# Patient Record
Sex: MALE | Race: WHITE | NOT HISPANIC OR LATINO | Employment: OTHER | URBAN - NONMETROPOLITAN AREA
[De-identification: names, ages, dates, MRNs, and addresses within clinical notes are randomized per-mention and may not be internally consistent; named-entity substitution may affect disease eponyms.]

---

## 2023-01-24 PROBLEM — E66.3 OVERWEIGHT WITH BODY MASS INDEX (BMI) OF 29 TO 29.9 IN ADULT: Status: ACTIVE | Noted: 2023-01-24

## 2023-01-24 PROBLEM — B35.1 MYCOTIC TOENAILS: Status: ACTIVE | Noted: 2023-01-24

## 2023-01-24 PROBLEM — E66.9 CLASS 1 OBESITY WITH BODY MASS INDEX (BMI) OF 30.0 TO 30.9 IN ADULT: Status: ACTIVE | Noted: 2023-01-24

## 2023-01-24 PROBLEM — J44.9 COPD (CHRONIC OBSTRUCTIVE PULMONARY DISEASE) (MULTI): Status: ACTIVE | Noted: 2023-01-24

## 2023-01-24 PROBLEM — F17.200 SMOKING: Status: ACTIVE | Noted: 2023-01-24

## 2023-01-24 PROBLEM — J01.10 ACUTE FRONTAL SINUSITIS: Status: ACTIVE | Noted: 2023-01-24

## 2023-01-24 PROBLEM — I25.10 CAD (CORONARY ARTERY DISEASE): Status: ACTIVE | Noted: 2023-01-24

## 2023-01-24 PROBLEM — R73.9 HYPERGLYCEMIA: Status: ACTIVE | Noted: 2023-01-24

## 2023-01-24 PROBLEM — I48.91 ATRIAL FIBRILLATION (MULTI): Status: ACTIVE | Noted: 2023-01-24

## 2023-01-24 PROBLEM — J01.00 ACUTE MAXILLARY SINUSITIS: Status: ACTIVE | Noted: 2023-01-24

## 2023-01-24 PROBLEM — D72.9 ABNORMAL WBC COUNT: Status: ACTIVE | Noted: 2023-01-24

## 2023-01-24 PROBLEM — I48.0 PAF (PAROXYSMAL ATRIAL FIBRILLATION) (MULTI): Status: ACTIVE | Noted: 2023-01-24

## 2023-01-24 PROBLEM — E66.811 CLASS 1 OBESITY WITH BODY MASS INDEX (BMI) OF 30.0 TO 30.9 IN ADULT: Status: ACTIVE | Noted: 2023-01-24

## 2023-01-24 PROBLEM — N40.0 BPH (BENIGN PROSTATIC HYPERPLASIA): Status: ACTIVE | Noted: 2023-01-24

## 2023-01-24 PROBLEM — E66.9 OBESITY (BMI 30.0-34.9): Status: ACTIVE | Noted: 2023-01-24

## 2023-01-24 PROBLEM — D64.9 LOW HEMOGLOBIN: Status: ACTIVE | Noted: 2023-01-24

## 2023-01-24 PROBLEM — I10 BENIGN ESSENTIAL HTN: Status: ACTIVE | Noted: 2023-01-24

## 2023-01-24 PROBLEM — U07.1 COVID-19: Status: ACTIVE | Noted: 2023-01-24

## 2023-01-24 PROBLEM — R20.2 NUMBNESS AND TINGLING OF BOTH LEGS: Status: ACTIVE | Noted: 2023-01-24

## 2023-01-24 PROBLEM — L60.0 INGROWING TOENAIL OF LEFT FOOT: Status: ACTIVE | Noted: 2023-01-24

## 2023-01-24 PROBLEM — I10 UNCONTROLLED HYPERTENSION: Status: ACTIVE | Noted: 2023-01-24

## 2023-01-24 PROBLEM — J32.9 SINUS INFECTION: Status: ACTIVE | Noted: 2023-01-24

## 2023-01-24 PROBLEM — E78.00 HYPERCHOLESTEROLEMIA: Status: ACTIVE | Noted: 2023-01-24

## 2023-01-24 PROBLEM — R05.9 COUGH: Status: ACTIVE | Noted: 2023-01-24

## 2023-01-24 PROBLEM — J20.9 ACUTE BRONCHITIS: Status: ACTIVE | Noted: 2023-01-24

## 2023-01-24 PROBLEM — R53.83 FATIGUE: Status: ACTIVE | Noted: 2023-01-24

## 2023-01-24 PROBLEM — E66.811 OBESITY (BMI 30.0-34.9): Status: ACTIVE | Noted: 2023-01-24

## 2023-01-24 PROBLEM — R07.81 RIB PAIN: Status: ACTIVE | Noted: 2023-01-24

## 2023-01-24 PROBLEM — R20.2 DISTAL PARESTHESIA: Status: ACTIVE | Noted: 2023-01-24

## 2023-01-24 PROBLEM — G62.9 PERIPHERAL NEUROPATHY: Status: ACTIVE | Noted: 2023-01-24

## 2023-01-24 PROBLEM — R42 DIZZINESS: Status: ACTIVE | Noted: 2023-01-24

## 2023-01-24 PROBLEM — R20.0 NUMBNESS AND TINGLING OF BOTH LEGS: Status: ACTIVE | Noted: 2023-01-24

## 2023-01-24 PROBLEM — K21.9 GASTROESOPHAGEAL REFLUX DISEASE WITHOUT ESOPHAGITIS: Status: ACTIVE | Noted: 2023-01-24

## 2023-01-24 PROBLEM — M79.672 LEFT FOOT PAIN: Status: ACTIVE | Noted: 2023-01-24

## 2023-01-24 RX ORDER — LISINOPRIL 20 MG/1
1 TABLET ORAL DAILY
COMMUNITY
Start: 2016-06-17 | End: 2023-04-06 | Stop reason: SDUPTHER

## 2023-01-24 RX ORDER — ATORVASTATIN CALCIUM 40 MG/1
1 TABLET, FILM COATED ORAL DAILY
COMMUNITY
Start: 2016-05-17 | End: 2023-04-04 | Stop reason: SDUPTHER

## 2023-01-24 RX ORDER — TAMSULOSIN HYDROCHLORIDE 0.4 MG/1
1 CAPSULE ORAL DAILY
COMMUNITY
Start: 2016-10-27 | End: 2023-04-06 | Stop reason: SDUPTHER

## 2023-01-24 RX ORDER — CLOPIDOGREL BISULFATE 75 MG/1
1 TABLET ORAL DAILY
COMMUNITY
Start: 2021-03-01 | End: 2023-04-06 | Stop reason: SDUPTHER

## 2023-01-24 RX ORDER — FUROSEMIDE 20 MG/1
1 TABLET ORAL DAILY
COMMUNITY
Start: 2019-03-28 | End: 2023-07-19 | Stop reason: SDUPTHER

## 2023-01-24 RX ORDER — METOPROLOL SUCCINATE 25 MG/1
1 TABLET, EXTENDED RELEASE ORAL DAILY
COMMUNITY
Start: 2017-11-08 | End: 2023-04-06

## 2023-03-07 ENCOUNTER — APPOINTMENT (OUTPATIENT)
Dept: PRIMARY CARE | Facility: CLINIC | Age: 88
End: 2023-03-07
Payer: MEDICARE

## 2023-03-13 ENCOUNTER — TELEPHONE (OUTPATIENT)
Dept: PRIMARY CARE | Facility: CLINIC | Age: 88
End: 2023-03-13
Payer: MEDICARE

## 2023-03-13 NOTE — TELEPHONE ENCOUNTER
Pt was discharged from the hospital on xarelto 20mg, but was never told to discontinue plavix.  Daughter gave the plavis this morning and wants to know if she should hold his xarelto tonight??

## 2023-03-22 ENCOUNTER — OFFICE VISIT (OUTPATIENT)
Dept: PRIMARY CARE | Facility: CLINIC | Age: 88
End: 2023-03-22
Payer: MEDICARE

## 2023-03-22 VITALS
SYSTOLIC BLOOD PRESSURE: 111 MMHG | BODY MASS INDEX: 27.52 KG/M2 | OXYGEN SATURATION: 93 % | WEIGHT: 196.6 LBS | RESPIRATION RATE: 18 BRPM | HEIGHT: 71 IN | HEART RATE: 78 BPM | DIASTOLIC BLOOD PRESSURE: 70 MMHG

## 2023-03-22 DIAGNOSIS — I48.91 ATRIAL FIBRILLATION, UNSPECIFIED TYPE (MULTI): ICD-10-CM

## 2023-03-22 DIAGNOSIS — N40.0 BENIGN PROSTATIC HYPERPLASIA, UNSPECIFIED WHETHER LOWER URINARY TRACT SYMPTOMS PRESENT: ICD-10-CM

## 2023-03-22 DIAGNOSIS — E78.00 HYPERCHOLESTEROLEMIA: ICD-10-CM

## 2023-03-22 DIAGNOSIS — I25.10 CORONARY ARTERY DISEASE INVOLVING NATIVE HEART, UNSPECIFIED VESSEL OR LESION TYPE, UNSPECIFIED WHETHER ANGINA PRESENT: ICD-10-CM

## 2023-03-22 DIAGNOSIS — J44.9 CHRONIC OBSTRUCTIVE PULMONARY DISEASE, UNSPECIFIED COPD TYPE (MULTI): ICD-10-CM

## 2023-03-22 DIAGNOSIS — R30.0 DYSURIA: ICD-10-CM

## 2023-03-22 DIAGNOSIS — I10 BENIGN ESSENTIAL HTN: ICD-10-CM

## 2023-03-22 PROCEDURE — 1036F TOBACCO NON-USER: CPT | Performed by: INTERNAL MEDICINE

## 2023-03-22 PROCEDURE — 1159F MED LIST DOCD IN RCRD: CPT | Performed by: INTERNAL MEDICINE

## 2023-03-22 PROCEDURE — 3078F DIAST BP <80 MM HG: CPT | Performed by: INTERNAL MEDICINE

## 2023-03-22 PROCEDURE — 99213 OFFICE O/P EST LOW 20 MIN: CPT | Performed by: INTERNAL MEDICINE

## 2023-03-22 PROCEDURE — 3074F SYST BP LT 130 MM HG: CPT | Performed by: INTERNAL MEDICINE

## 2023-03-22 RX ORDER — METOPROLOL SUCCINATE 50 MG/1
50 TABLET, EXTENDED RELEASE ORAL DAILY
COMMUNITY
End: 2023-04-06 | Stop reason: SDUPTHER

## 2023-03-22 RX ORDER — PANTOPRAZOLE SODIUM 40 MG/1
40 TABLET, DELAYED RELEASE ORAL
COMMUNITY
End: 2023-04-06 | Stop reason: SDUPTHER

## 2023-03-22 ASSESSMENT — PAIN SCALES - GENERAL: PAINLEVEL: 0-NO PAIN

## 2023-03-22 NOTE — PATIENT INSTRUCTIONS
Continue taking your medications as prescribed  Follow up with cardiology as discussed  Follow up in four months  Please drop off urine specimen when you are able so we can check for infection

## 2023-03-22 NOTE — PROGRESS NOTES
Subjective Pt. States that he is doing ok, has been feeling weak and his breathing is still an issue at times. He states that he feels like he has phlegm in bottom of his lung, and he has difficulty coughing it out. He states his appetite is not as good as it had been prior to being hospitalized. He states that he had nuclear stress test yesterday, has follow up on next Tuesday. T hHe states that he is not having diarrhea, but he is having dysuria, and noticed blood in his urine. He states that it feels similar to when he had kidney stone in the past, but he denies any flank or back pain.     KENNEDY Iqbal is a 87 y.o. male with PMH remarkable for HTN, HLD, COPD, BPH who presents to the office today for a check up. He was recently hospitalized from 03/07/23--> 03/12/23 for PAFIb, acute respiratory failure with hypoxia, alcohol abuse, CAP, COPD. CT scan of chest in ER showed no PE, but did show bilateral PNA, most pronounced laterally and posteriorly in the lower lobe. He received IV Ceftriaxone, Azithromycin, Solu-Medrol, was placed on supplemental oxygen and was discharged to home on 02 with activity and at night. Per hospital notes, his AFIB had been rate controlled on tele, but he was noted to have a HR in the 150's overnight during his hospitalization.  He had 2D Echo 3/8: LVSF mildly decreased with a 40% EFl with pseudonormal pattern of LVDF. CVP is elevated. Cardiology was consulted, recommend outpatient follow-up for perfusion stress test and to discuss possible Watchman procedure. He was place on [facility] protocol. He had diarrhea, stool test was negative for cdiff. He was discharged to home on 03/12/23.    Review of Systems  Constitutional: No fever, chills, anorexia or weight loss  Eye: No blurry vision, diplopia or vision loss  ENT: No nasal congestion, earache or sore throat  Respiratory: + cough, congestion, No SOB, hemoptysis or wheezing  Cardiac: No chest pain or palpitations, dyspnea on exertion or  "orthopnea  Gastrointestinal: No abdominal pain, nausea, vomiting, diarrhea, melena, hemoptysis or hematochezia.  Genitourinary: No dysuria, hematuria, frequency, urgency, incontinence or flank pain  Musculoskeletal: No arthralgia, myalgia, stiffness, weakness  Neurological: No dizziness, lightheadness, blurry vision, headache or syncope  Psychiatric: No history of anxiety, depression, hallucinations, suicidal/homicidal ideation  Skin: No rash or pruritus, ulcers  Endocrine: No heat or cold intolerance, polyuria, polydipsia  Hematologic: No bruising, petechiae    Objective   Vitals:    03/22/23 1121   BP: 111/70   Pulse: 78   Resp: 18   SpO2: 93%   Weight: 89.2 kg (196 lb 9.6 oz)   Height: 1.803 m (5' 11\")       Physical Exam  General: Alert and oriented x3, well nourished, no acute distress.  Eye: PERRL, EOMI, normal conjunctiva.  HENT: Normocephalic, clear tympanic membranes, moist oral mucosa, no sinus tenderness.  Neck: Supple, non-tender, no carotid bruits, no JVD, no lymphadenopathy.  Lungs: diffuse rhonchi, mostly CTA,  non-labored respiration.  Heart: Normal rate, regular rhythm, no murmur, gallop or edema.  Abdomen: Soft, non-tender, non-distended, normal bowel sounds, no masses.  Musculoskeletal: Normal range of motion and strength, no tenderness or swelling.  Skin: Skin is warm, dry and pink, no rashes or lesions.  Neurologic: Alert & Oriented x3, intact senses, motor, response and reflexes ok, normal strength, CN II-XII intact.  Psychiatric: Cooperative, appropriate mood and affect.    Assessment/Plan   Dysuria with hematuria  - will check urine for UA/C+S, pt. Will drop specimen off as he is unable to urinate at this time    Recent hospitalization for acute hypoxic respiratory failure 2/2 CAP/COPD  - he has been completely weaned off oxygen, and his daughter has been monitoring his SP02 at home and it has been good  - states his breathing is improved, still has occasional cough, but has been walking " around a lot and feels like he is getting better every day    Paroxysmal AFIB  - he had nuclear stress test yesterday, awaiting results  - follow up with cardiology  - c/w Metoprolol Succinate 50mg daily, dose increased during recent hospitalization  - c/w Xarelto for stroke prevention    HTN/CAD/Chronic systolic HF  - c/w current Plavix, ASA, Metoprolol, Lasix, Lisinopril    HLD  - c/w statin    BPH  - c/w Flomax    Follow up in four months  ----------------------------------    Written by  Ritika Cerrato acting as a scribe for Dr. Hubbard. This note accurately reflects the work and decisions made by Dr. Hubbard.     I, Dr. Hubbard, attest all medical record entries made by the scribe were under my direction and were personally dictated by me. I have reviewed the chart and agree that the record accurately reflects my performance of the history, physical exam, and assessment and plan.

## 2023-03-23 ENCOUNTER — LAB (OUTPATIENT)
Dept: LAB | Facility: LAB | Age: 88
End: 2023-03-23
Payer: MEDICARE

## 2023-03-23 DIAGNOSIS — R30.0 DYSURIA: ICD-10-CM

## 2023-03-23 LAB
APPEARANCE, URINE: ABNORMAL
BILIRUBIN, URINE: NEGATIVE
BLOOD, URINE: NEGATIVE
COLOR, URINE: YELLOW
GLUCOSE, URINE: NEGATIVE MG/DL
KETONES, URINE: NEGATIVE MG/DL
LEUKOCYTE ESTERASE, URINE: NEGATIVE
NITRITE, URINE: NEGATIVE
PH, URINE: 6 (ref 5–8)
PROTEIN, URINE: NEGATIVE MG/DL
SPECIFIC GRAVITY, URINE: 1.02 (ref 1–1.03)
UROBILINOGEN, URINE: 2 MG/DL (ref 0–1.9)

## 2023-03-23 PROCEDURE — 81003 URINALYSIS AUTO W/O SCOPE: CPT

## 2023-03-23 PROCEDURE — 87186 SC STD MICRODIL/AGAR DIL: CPT

## 2023-03-23 PROCEDURE — 87077 CULTURE AEROBIC IDENTIFY: CPT

## 2023-03-23 PROCEDURE — 87086 URINE CULTURE/COLONY COUNT: CPT

## 2023-03-27 ENCOUNTER — TELEPHONE (OUTPATIENT)
Dept: PRIMARY CARE | Facility: CLINIC | Age: 88
End: 2023-03-27
Payer: MEDICARE

## 2023-03-27 DIAGNOSIS — N39.0 URINARY TRACT INFECTION WITHOUT HEMATURIA, SITE UNSPECIFIED: Primary | ICD-10-CM

## 2023-03-27 LAB — URINE CULTURE: ABNORMAL

## 2023-03-27 RX ORDER — NITROFURANTOIN 25; 75 MG/1; MG/1
100 CAPSULE ORAL 2 TIMES DAILY
Qty: 14 CAPSULE | Refills: 0 | Status: SHIPPED | OUTPATIENT
Start: 2023-03-27 | End: 2023-04-03

## 2023-04-04 DIAGNOSIS — E78.00 HYPERCHOLESTEROLEMIA: ICD-10-CM

## 2023-04-04 RX ORDER — ATORVASTATIN CALCIUM 40 MG/1
40 TABLET, FILM COATED ORAL DAILY
Qty: 30 TABLET | Refills: 0 | Status: SHIPPED | OUTPATIENT
Start: 2023-04-04 | End: 2023-04-06 | Stop reason: SDUPTHER

## 2023-04-06 DIAGNOSIS — K21.9 CHRONIC GERD: ICD-10-CM

## 2023-04-06 DIAGNOSIS — I25.10 CORONARY ARTERY DISEASE, UNSPECIFIED VESSEL OR LESION TYPE, UNSPECIFIED WHETHER ANGINA PRESENT, UNSPECIFIED WHETHER NATIVE OR TRANSPLANTED HEART: ICD-10-CM

## 2023-04-06 DIAGNOSIS — E78.00 HYPERCHOLESTEROLEMIA: ICD-10-CM

## 2023-04-06 DIAGNOSIS — N40.1 BENIGN PROSTATIC HYPERPLASIA WITH LOWER URINARY TRACT SYMPTOMS, SYMPTOM DETAILS UNSPECIFIED: ICD-10-CM

## 2023-04-06 DIAGNOSIS — I10 BENIGN HYPERTENSION: Primary | ICD-10-CM

## 2023-04-07 RX ORDER — TAMSULOSIN HYDROCHLORIDE 0.4 MG/1
0.4 CAPSULE ORAL DAILY
Qty: 90 CAPSULE | Refills: 0 | Status: SHIPPED | OUTPATIENT
Start: 2023-04-07 | End: 2023-07-19 | Stop reason: SDUPTHER

## 2023-04-07 RX ORDER — PANTOPRAZOLE SODIUM 40 MG/1
40 TABLET, DELAYED RELEASE ORAL
Qty: 90 TABLET | Refills: 0 | Status: SHIPPED | OUTPATIENT
Start: 2023-04-07 | End: 2023-07-19 | Stop reason: SDUPTHER

## 2023-04-07 RX ORDER — METOPROLOL SUCCINATE 50 MG/1
50 TABLET, EXTENDED RELEASE ORAL DAILY
Qty: 90 TABLET | Refills: 0 | Status: SHIPPED | OUTPATIENT
Start: 2023-04-07 | End: 2023-07-19 | Stop reason: SDUPTHER

## 2023-04-07 RX ORDER — LISINOPRIL 20 MG/1
20 TABLET ORAL DAILY
Qty: 90 TABLET | Refills: 0 | Status: SHIPPED | OUTPATIENT
Start: 2023-04-07 | End: 2023-07-19 | Stop reason: SDUPTHER

## 2023-04-07 RX ORDER — ATORVASTATIN CALCIUM 40 MG/1
40 TABLET, FILM COATED ORAL DAILY
Qty: 90 TABLET | Refills: 0 | Status: SHIPPED | OUTPATIENT
Start: 2023-04-07 | End: 2023-05-31 | Stop reason: SDUPTHER

## 2023-04-07 RX ORDER — CLOPIDOGREL BISULFATE 75 MG/1
TABLET ORAL
Qty: 90 TABLET | Refills: 0 | Status: SHIPPED | OUTPATIENT
Start: 2023-04-07 | End: 2023-07-19 | Stop reason: SDUPTHER

## 2023-05-31 DIAGNOSIS — E78.00 HYPERCHOLESTEROLEMIA: ICD-10-CM

## 2023-05-31 RX ORDER — ATORVASTATIN CALCIUM 40 MG/1
40 TABLET, FILM COATED ORAL DAILY
Qty: 90 TABLET | Refills: 0 | Status: SHIPPED | OUTPATIENT
Start: 2023-05-31 | End: 2023-07-19 | Stop reason: SDUPTHER

## 2023-07-19 ENCOUNTER — OFFICE VISIT (OUTPATIENT)
Dept: PRIMARY CARE | Facility: CLINIC | Age: 88
End: 2023-07-19
Payer: MEDICARE

## 2023-07-19 VITALS
RESPIRATION RATE: 18 BRPM | BODY MASS INDEX: 28.59 KG/M2 | SYSTOLIC BLOOD PRESSURE: 112 MMHG | HEIGHT: 71 IN | OXYGEN SATURATION: 95 % | HEART RATE: 79 BPM | DIASTOLIC BLOOD PRESSURE: 78 MMHG | WEIGHT: 204.2 LBS

## 2023-07-19 DIAGNOSIS — I10 BENIGN HYPERTENSION: ICD-10-CM

## 2023-07-19 DIAGNOSIS — I48.91 ATRIAL FIBRILLATION, UNSPECIFIED TYPE (MULTI): ICD-10-CM

## 2023-07-19 DIAGNOSIS — E01.0 THYROMEGALY: ICD-10-CM

## 2023-07-19 DIAGNOSIS — K21.9 CHRONIC GERD: ICD-10-CM

## 2023-07-19 DIAGNOSIS — I48.0 PAF (PAROXYSMAL ATRIAL FIBRILLATION) (MULTI): ICD-10-CM

## 2023-07-19 DIAGNOSIS — E78.5 HYPERLIPIDEMIA, UNSPECIFIED HYPERLIPIDEMIA TYPE: ICD-10-CM

## 2023-07-19 DIAGNOSIS — K21.9 GASTROESOPHAGEAL REFLUX DISEASE WITHOUT ESOPHAGITIS: ICD-10-CM

## 2023-07-19 DIAGNOSIS — I10 BENIGN ESSENTIAL HTN: ICD-10-CM

## 2023-07-19 DIAGNOSIS — N40.1 BENIGN PROSTATIC HYPERPLASIA WITH LOWER URINARY TRACT SYMPTOMS, SYMPTOM DETAILS UNSPECIFIED: ICD-10-CM

## 2023-07-19 DIAGNOSIS — N40.1 BENIGN PROSTATIC HYPERPLASIA WITH URINARY FREQUENCY: ICD-10-CM

## 2023-07-19 DIAGNOSIS — I25.10 CORONARY ARTERY DISEASE, UNSPECIFIED VESSEL OR LESION TYPE, UNSPECIFIED WHETHER ANGINA PRESENT, UNSPECIFIED WHETHER NATIVE OR TRANSPLANTED HEART: ICD-10-CM

## 2023-07-19 DIAGNOSIS — E78.00 HYPERCHOLESTEROLEMIA: ICD-10-CM

## 2023-07-19 DIAGNOSIS — Z00.00 MEDICARE ANNUAL WELLNESS VISIT, SUBSEQUENT: ICD-10-CM

## 2023-07-19 DIAGNOSIS — R35.0 BENIGN PROSTATIC HYPERPLASIA WITH URINARY FREQUENCY: ICD-10-CM

## 2023-07-19 DIAGNOSIS — J44.9 CHRONIC OBSTRUCTIVE PULMONARY DISEASE, UNSPECIFIED COPD TYPE (MULTI): ICD-10-CM

## 2023-07-19 PROBLEM — R53.1 GENERALIZED WEAKNESS: Status: ACTIVE | Noted: 2023-01-24

## 2023-07-19 PROBLEM — R26.2 DIFFICULTY WALKING: Status: ACTIVE | Noted: 2023-07-19

## 2023-07-19 PROCEDURE — 3074F SYST BP LT 130 MM HG: CPT | Performed by: INTERNAL MEDICINE

## 2023-07-19 PROCEDURE — 1126F AMNT PAIN NOTED NONE PRSNT: CPT | Performed by: INTERNAL MEDICINE

## 2023-07-19 PROCEDURE — 1160F RVW MEDS BY RX/DR IN RCRD: CPT | Performed by: INTERNAL MEDICINE

## 2023-07-19 PROCEDURE — 3078F DIAST BP <80 MM HG: CPT | Performed by: INTERNAL MEDICINE

## 2023-07-19 PROCEDURE — 1170F FXNL STATUS ASSESSED: CPT | Performed by: INTERNAL MEDICINE

## 2023-07-19 PROCEDURE — 99214 OFFICE O/P EST MOD 30 MIN: CPT | Performed by: INTERNAL MEDICINE

## 2023-07-19 PROCEDURE — G0439 PPPS, SUBSEQ VISIT: HCPCS | Performed by: INTERNAL MEDICINE

## 2023-07-19 PROCEDURE — 1159F MED LIST DOCD IN RCRD: CPT | Performed by: INTERNAL MEDICINE

## 2023-07-19 RX ORDER — CLOPIDOGREL BISULFATE 75 MG/1
TABLET ORAL
Qty: 90 TABLET | Refills: 0 | Status: SHIPPED | OUTPATIENT
Start: 2023-07-19 | End: 2023-11-22 | Stop reason: SDUPTHER

## 2023-07-19 RX ORDER — ATORVASTATIN CALCIUM 40 MG/1
40 TABLET, FILM COATED ORAL DAILY
Qty: 90 TABLET | Refills: 0 | Status: SHIPPED | OUTPATIENT
Start: 2023-07-19 | End: 2023-09-25 | Stop reason: SDUPTHER

## 2023-07-19 RX ORDER — FUROSEMIDE 20 MG/1
20 TABLET ORAL DAILY
Qty: 90 TABLET | Refills: 0 | Status: SHIPPED | OUTPATIENT
Start: 2023-07-19 | End: 2023-11-22 | Stop reason: SDUPTHER

## 2023-07-19 RX ORDER — LISINOPRIL 20 MG/1
20 TABLET ORAL DAILY
Qty: 90 TABLET | Refills: 0 | Status: SHIPPED | OUTPATIENT
Start: 2023-07-19 | End: 2023-11-22 | Stop reason: SDUPTHER

## 2023-07-19 RX ORDER — PANTOPRAZOLE SODIUM 40 MG/1
40 TABLET, DELAYED RELEASE ORAL
Qty: 90 TABLET | Refills: 0 | Status: SHIPPED | OUTPATIENT
Start: 2023-07-19 | End: 2023-11-22 | Stop reason: SDUPTHER

## 2023-07-19 RX ORDER — TAMSULOSIN HYDROCHLORIDE 0.4 MG/1
0.4 CAPSULE ORAL DAILY
Qty: 90 CAPSULE | Refills: 0 | Status: SHIPPED | OUTPATIENT
Start: 2023-07-19 | End: 2023-11-22 | Stop reason: SDUPTHER

## 2023-07-19 RX ORDER — METOPROLOL SUCCINATE 50 MG/1
50 TABLET, EXTENDED RELEASE ORAL DAILY
Qty: 90 TABLET | Refills: 0 | Status: SHIPPED | OUTPATIENT
Start: 2023-07-19 | End: 2023-11-22 | Stop reason: SDUPTHER

## 2023-07-19 ASSESSMENT — ACTIVITIES OF DAILY LIVING (ADL)
TAKING_MEDICATION: INDEPENDENT
GROCERY_SHOPPING: INDEPENDENT
BATHING: INDEPENDENT
MANAGING_FINANCES: INDEPENDENT
DOING_HOUSEWORK: INDEPENDENT
DRESSING: INDEPENDENT

## 2023-07-19 ASSESSMENT — PATIENT HEALTH QUESTIONNAIRE - PHQ9
1. LITTLE INTEREST OR PLEASURE IN DOING THINGS: NOT AT ALL
SUM OF ALL RESPONSES TO PHQ9 QUESTIONS 1 AND 2: 0
2. FEELING DOWN, DEPRESSED OR HOPELESS: NOT AT ALL

## 2023-07-19 ASSESSMENT — PAIN SCALES - GENERAL: PAINLEVEL: 0-NO PAIN

## 2023-07-19 NOTE — LETTER
Chico Iqbal  1935      To whom it concerns,    Chico Iqbal is cleared to have his oxygen discontinued. Please feel free to contact my office with any questions or concerns at [phone].        Thank you,      Dr. Erick Baldwin.

## 2023-07-19 NOTE — PROGRESS NOTES
"SUBJECTIVE: He states that he has been feeling good, he has been very active in his garden. He states he gets winded when working outside in the heat. He states that he has always had good lungs. He states that he urinates 3-4 times per night, takes his Flomax as prescribed. He states he has a lot of sinus drainage at night, feels like it is \"choking him\" at night. He states that he had recent carotid US and was told to report there was abnormality of his thyroid. He has BLE edema, takes his water pills as prescribed. He has compression stocking at home, but he does not wear them very often.     HPI: Chico Iqbal is a 87 y.o. male with PMH remarkable for HTN, HLD, COPD, BPH  who presents to the office today for Medicare Wellness exam. He was last seen in the office on 03/22/23 for check up, had complaint of dysuria with hematuria at that time, culture was positive for multi-drug resistant organism Enterococcus faecium, treated with Nitrofurantoin at that time.     HEALTH MAINTENANCE:  Smoking: Smokes cigars  Labs: 3/12/23  PSA: 3/8/22 WNL  Colonoscopy (45-75):   Lung cancer (55-80 + 30 pack year + smoking/quit in last 15 years): CT chest with IV contrast on 03/07/23 revealed: No CT evidence of pulmonary embolism in the current exam. Evidence of prior granulomatous disease as described. Bilateral pneumonia as described, most pronounced laterally and posteriorly in the lower lobes. Mild nonspecific mediastinal and bilateral hilar adenopathy, perhaps infectious or inflammatory. Mild bronchial wall thickening, predominantly in the lower lobes. Suspect mild bronchitis. Left thyroid lobe nodule is nonspecific. Consider further evaluation  with thyroid ultrasound. Advanced three-vessel bilateral coronary artery calcifications.  DEXA (65+, q 2 years): 5/22/17 osteopenia    SOCIAL HISTORY:  Social History     Tobacco Use    Smoking status: Former     Types: Cigars    Smokeless tobacco: Never    Tobacco comments:     JUST QUIT " "ABOUT 2 WEEKS AGO WHEN HE GOT SICK    Substance Use Topics    Alcohol use: Yes     Comment: OCCASIONAL, 2-3 BEERS DAILY BUT NONE IN LAST MONTH    Drug use: Never       IMMUNIZATIONS:  Immunization History   Administered Date(s) Administered    Pfizer Purple Cap SARS-CoV-2 03/12/2021, 04/04/2021, 12/06/2021     REVIEW OF SYSTEMS:  Review of Systems  12 point review of systems negative unless stated above in HPI    ALLERGIES:  No Known Allergies     VITAL SIGNS:  Vitals:    07/19/23 1336   BP: 112/78   Pulse: 79   Resp: 18   SpO2: 95%   Weight: 92.6 kg (204 lb 3.2 oz)   Height: 1.803 m (5' 11\")     PHYSICAL EXAM:  General: Alert and oriented x3, well nourished, no acute distress.  Eye: PERRL, EOMI, normal conjunctiva.  HENT: Normocephalic, clear tympanic membranes, moist oral mucosa, no sinus tenderness.  Neck: Supple, non-tender, no carotid bruits, no JVD, no lymphadenopathy. Thyromegaly is present  Lungs: Clear to auscultation, non-labored respiration.  Heart: Normal rate, regular rhythm, no murmur, gallop or edema.  Abdomen: Soft, non-tender, non-distended, normal bowel sounds, no masses.  Musculoskeletal: Normal range of motion and strength, no tenderness or swelling.  Skin: Skin is warm, dry and pink, no rashes or lesions.  Neurologic: Alert & Oriented x3, intact senses, motor, response and reflexes ok, normal strength, CN II-XII intact.  Psychiatric: Cooperative, appropriate mood and affect.    MEDICATIONS:  Current Outpatient Medications on File Prior to Visit   Medication Sig Dispense Refill    atorvastatin (Lipitor) 40 mg tablet Take 1 tablet (40 mg) by mouth once daily. 90 tablet 0    clopidogrel (Plavix) 75 mg tablet Take one tablet by mouth daily 90 tablet 0    furosemide (Lasix) 20 mg tablet Take 1 tablet (20 mg) by mouth once daily.      lisinopril 20 mg tablet Take 1 tablet (20 mg) by mouth once daily. 90 tablet 0    metoprolol succinate XL (Toprol-XL) 50 mg 24 hr tablet Take 1 tablet (50 mg) by mouth " once daily. Do not crush or chew. 90 tablet 0    nirmatrelvir-ritonavir 300 mg (150 mg x 2)-100 mg tablets,dose pack take 2 nirmatrelvir and 1 ritonavir  by mouth twice daily for 5 days      pantoprazole (ProtoNix) 40 mg EC tablet Take 1 tablet (40 mg) by mouth once daily in the morning. Take before meals. Do not crush, chew, or split. 90 tablet 0    rivaroxaban (Xarelto) 20 mg tablet Take 1 tablet (20 mg) by mouth once daily in the evening. Take with meals. Take with food.      tamsulosin (Flomax) 0.4 mg 24 hr capsule Take 1 capsule (0.4 mg) by mouth once daily. 90 capsule 0     No current facility-administered medications on file prior to visit.      LABORATORY DATA:  Lab Results   Component Value Date    WBC 4.3 (L) 03/12/2023    HGB 11.5 (L) 03/12/2023    HCT 33.9 (L) 03/12/2023     03/12/2023    CHOL 111 09/02/2021    TRIG 148 09/02/2021    HDL 38.0 (A) 09/02/2021    ALT 12 03/07/2023    AST 25 03/07/2023     03/12/2023    K 3.4 (L) 03/12/2023     03/12/2023    CREATININE 0.85 03/12/2023    BUN 21 03/12/2023    CO2 28 03/12/2023    TSH 1.73 06/29/2020    INR 1.2 (H) 03/07/2023    HGBA1C 6.1 (A) 03/08/2022     ASSESSMENT AND PLAN:  Health Maintenance: Annual Medicare wellness visit  - reviewed most recent bloodwork from 3/12/23   - he has not had lipid panel, PSA, TSH for over a year  - will check PSA, lipid panel, TSH and free T4 due to thyroid nodule  - he had thyroid nodule noted on recent carotid US, will get US of thyroid for further evaluation  - he is refusing a cologuard test or colonoscopy at this point  - Follow up in four months    COPD  - has been stable  - he had oxygen ordered after hospitalization but has not needed it since March  - ok to discontinue oxygen      Paroxysmal AFIB/HTN/CAD/Chronic systolic HF  - c/w Metoprolol  Succinate 50mg daily  - c/w Xarelto for stroke prevention  - c/w current Plavix, ASA,  Lasix, Lisinopril  - he does have some swelling of BLE's, advised to  wear compression stockings during the day  - continue to follow up with cardiology  - stress test on 03/21/23 showed no chest discomfort or ischemic EKG changes with pharmacological stress, normal left ventricular systolic function, no perfusion evidence of scar, or focal ischemia  - 2D echo on 03/09/23 revealed: 1. Left ventricular systolic function is mildly decreased with a 40% estimated ejection fraction.  2. Multiple segmental abnormalities exist. See findings.3. Spectral Doppler shows a pseudonormal pattern of left ventricular diastolic filling.4. There is mildly reduced right ventricular systolic function.5. The left atrium is mild to moderately dilated.  6. There is global and segmental LV hypokinesis. 7. CVP is elevated.  -Bilateral Carotid US on 06/26/23 showed 20-49% carotid stenosis bilaterally    HLD  - c/w statin  - will check lipid panel     BPH  - he denies any increased urinary symptoms, states he does have urinary frequency, but it is not any worse  - c/w Flomax  - will check PSA    GERD  - stable  - c/w PPI     --------------------  Written by Ritika Cerrato LPN, acting as a scribe for Dr. Hubbard. This note accurately reflects the work and decisions made by Dr. Hubbard.     I, Dr. Hubbard, attest all medical record entries made by the scribe were under my direction and were personally dictated by me. I have reviewed the chart and agree that the record accurately reflects my performance of the history, physical exam, and assessment and plan.

## 2023-07-20 NOTE — PATIENT INSTRUCTIONS
It was great to see you in the office today! Here is what we discussed at your visit today:  Please continue to take your current medications   Please get bloodwork drawn as soon as you are able. We will call you with results.  Try to wear your compression stockings during the day to help with the swelling in your legs  We ordered an ultrasound of your thryoid, please schedule it as soon as you are able  Follow up in four months

## 2023-07-24 ENCOUNTER — LAB (OUTPATIENT)
Dept: LAB | Facility: LAB | Age: 88
End: 2023-07-24
Payer: MEDICARE

## 2023-07-24 DIAGNOSIS — N40.1 BENIGN PROSTATIC HYPERPLASIA WITH URINARY FREQUENCY: ICD-10-CM

## 2023-07-24 DIAGNOSIS — R35.0 BENIGN PROSTATIC HYPERPLASIA WITH URINARY FREQUENCY: ICD-10-CM

## 2023-07-24 DIAGNOSIS — E01.0 THYROMEGALY: ICD-10-CM

## 2023-07-24 DIAGNOSIS — E78.5 HYPERLIPIDEMIA, UNSPECIFIED HYPERLIPIDEMIA TYPE: ICD-10-CM

## 2023-07-24 LAB
CHOLESTEROL (MG/DL) IN SER/PLAS: 102 MG/DL (ref 0–199)
CHOLESTEROL IN HDL (MG/DL) IN SER/PLAS: 28.8 MG/DL
CHOLESTEROL/HDL RATIO: 3.5
LDL: 50 MG/DL (ref 0–99)
PROSTATE SPECIFIC ANTIGEN,SCREEN: 0.87 NG/ML (ref 0–4)
THYROTROPIN (MIU/L) IN SER/PLAS BY DETECTION LIMIT <= 0.05 MIU/L: 1.2 MIU/L (ref 0.44–3.98)
THYROXINE (T4) FREE (NG/DL) IN SER/PLAS: 1.09 NG/DL (ref 0.61–1.12)
TRIGLYCERIDE (MG/DL) IN SER/PLAS: 114 MG/DL (ref 0–149)
VLDL: 23 MG/DL (ref 0–40)

## 2023-07-24 PROCEDURE — 84439 ASSAY OF FREE THYROXINE: CPT

## 2023-07-24 PROCEDURE — 36415 COLL VENOUS BLD VENIPUNCTURE: CPT

## 2023-07-24 PROCEDURE — 80061 LIPID PANEL: CPT

## 2023-07-24 PROCEDURE — 84153 ASSAY OF PSA TOTAL: CPT

## 2023-07-24 PROCEDURE — 84443 ASSAY THYROID STIM HORMONE: CPT

## 2023-09-25 DIAGNOSIS — E78.00 HYPERCHOLESTEROLEMIA: ICD-10-CM

## 2023-09-25 RX ORDER — ATORVASTATIN CALCIUM 40 MG/1
40 TABLET, FILM COATED ORAL DAILY
Qty: 90 TABLET | Refills: 0 | Status: SHIPPED | OUTPATIENT
Start: 2023-09-25 | End: 2023-11-22 | Stop reason: SDUPTHER

## 2023-11-22 ENCOUNTER — OFFICE VISIT (OUTPATIENT)
Dept: PRIMARY CARE | Facility: CLINIC | Age: 88
End: 2023-11-22
Payer: MEDICARE

## 2023-11-22 VITALS
SYSTOLIC BLOOD PRESSURE: 109 MMHG | OXYGEN SATURATION: 96 % | HEIGHT: 71 IN | HEART RATE: 78 BPM | RESPIRATION RATE: 22 BRPM | DIASTOLIC BLOOD PRESSURE: 69 MMHG | BODY MASS INDEX: 28.56 KG/M2 | WEIGHT: 204 LBS

## 2023-11-22 DIAGNOSIS — J44.9 CHRONIC OBSTRUCTIVE PULMONARY DISEASE, UNSPECIFIED COPD TYPE (MULTI): ICD-10-CM

## 2023-11-22 DIAGNOSIS — I25.10 CORONARY ARTERY DISEASE, UNSPECIFIED VESSEL OR LESION TYPE, UNSPECIFIED WHETHER ANGINA PRESENT, UNSPECIFIED WHETHER NATIVE OR TRANSPLANTED HEART: ICD-10-CM

## 2023-11-22 DIAGNOSIS — K21.9 CHRONIC GERD: ICD-10-CM

## 2023-11-22 DIAGNOSIS — R31.9 HEMATURIA, UNSPECIFIED TYPE: ICD-10-CM

## 2023-11-22 DIAGNOSIS — N40.1 BENIGN PROSTATIC HYPERPLASIA WITH LOWER URINARY TRACT SYMPTOMS, SYMPTOM DETAILS UNSPECIFIED: ICD-10-CM

## 2023-11-22 DIAGNOSIS — E78.00 HYPERCHOLESTEROLEMIA: ICD-10-CM

## 2023-11-22 DIAGNOSIS — I10 BENIGN HYPERTENSION: ICD-10-CM

## 2023-11-22 DIAGNOSIS — I48.91 ATRIAL FIBRILLATION, UNSPECIFIED TYPE (MULTI): ICD-10-CM

## 2023-11-22 DIAGNOSIS — I10 BENIGN ESSENTIAL HTN: ICD-10-CM

## 2023-11-22 PROCEDURE — 3074F SYST BP LT 130 MM HG: CPT | Performed by: INTERNAL MEDICINE

## 2023-11-22 PROCEDURE — 99214 OFFICE O/P EST MOD 30 MIN: CPT | Performed by: INTERNAL MEDICINE

## 2023-11-22 PROCEDURE — 3078F DIAST BP <80 MM HG: CPT | Performed by: INTERNAL MEDICINE

## 2023-11-22 PROCEDURE — 1160F RVW MEDS BY RX/DR IN RCRD: CPT | Performed by: INTERNAL MEDICINE

## 2023-11-22 PROCEDURE — 1159F MED LIST DOCD IN RCRD: CPT | Performed by: INTERNAL MEDICINE

## 2023-11-22 PROCEDURE — 1126F AMNT PAIN NOTED NONE PRSNT: CPT | Performed by: INTERNAL MEDICINE

## 2023-11-22 RX ORDER — PANTOPRAZOLE SODIUM 40 MG/1
40 TABLET, DELAYED RELEASE ORAL
Qty: 90 TABLET | Refills: 0 | Status: SHIPPED | OUTPATIENT
Start: 2023-11-22 | End: 2024-03-25 | Stop reason: SDUPTHER

## 2023-11-22 RX ORDER — TAMSULOSIN HYDROCHLORIDE 0.4 MG/1
0.4 CAPSULE ORAL DAILY
Qty: 90 CAPSULE | Refills: 0 | Status: SHIPPED | OUTPATIENT
Start: 2023-11-22 | End: 2024-03-25 | Stop reason: SDUPTHER

## 2023-11-22 RX ORDER — FUROSEMIDE 20 MG/1
20 TABLET ORAL DAILY
Qty: 90 TABLET | Refills: 0 | Status: SHIPPED | OUTPATIENT
Start: 2023-11-22 | End: 2024-04-03 | Stop reason: SDUPTHER

## 2023-11-22 RX ORDER — TADALAFIL 20 MG/1
TABLET ORAL
COMMUNITY
Start: 2023-09-11

## 2023-11-22 RX ORDER — FLUTICASONE PROPIONATE 50 MCG
SPRAY, SUSPENSION (ML) NASAL
COMMUNITY
Start: 2023-10-27

## 2023-11-22 RX ORDER — LISINOPRIL 20 MG/1
20 TABLET ORAL DAILY
Qty: 90 TABLET | Refills: 0 | Status: SHIPPED | OUTPATIENT
Start: 2023-11-22 | End: 2024-04-03 | Stop reason: SDUPTHER

## 2023-11-22 RX ORDER — CLOPIDOGREL BISULFATE 75 MG/1
TABLET ORAL
Qty: 90 TABLET | Refills: 0 | Status: SHIPPED | OUTPATIENT
Start: 2023-11-22 | End: 2024-04-03 | Stop reason: SDUPTHER

## 2023-11-22 RX ORDER — METOPROLOL SUCCINATE 50 MG/1
50 TABLET, EXTENDED RELEASE ORAL DAILY
Qty: 90 TABLET | Refills: 0 | Status: SHIPPED | OUTPATIENT
Start: 2023-11-22 | End: 2024-03-25 | Stop reason: SDUPTHER

## 2023-11-22 RX ORDER — ATORVASTATIN CALCIUM 40 MG/1
40 TABLET, FILM COATED ORAL DAILY
Qty: 90 TABLET | Refills: 0 | Status: SHIPPED | OUTPATIENT
Start: 2023-11-22 | End: 2024-01-30 | Stop reason: SDUPTHER

## 2023-11-22 ASSESSMENT — PAIN SCALES - GENERAL: PAINLEVEL: 0-NO PAIN

## 2023-11-22 ASSESSMENT — ENCOUNTER SYMPTOMS
RESPIRATORY NEGATIVE: 1
PSYCHIATRIC NEGATIVE: 1
PALPITATIONS: 1
NEUROLOGICAL NEGATIVE: 1
CONSTITUTIONAL NEGATIVE: 1
BACK PAIN: 1
GASTROINTESTINAL NEGATIVE: 1

## 2023-11-22 ASSESSMENT — PATIENT HEALTH QUESTIONNAIRE - PHQ9
SUM OF ALL RESPONSES TO PHQ9 QUESTIONS 1 AND 2: 0
2. FEELING DOWN, DEPRESSED OR HOPELESS: NOT AT ALL
1. LITTLE INTEREST OR PLEASURE IN DOING THINGS: NOT AT ALL

## 2023-11-22 NOTE — PROGRESS NOTES
Patient ID: He states that he has been doing ok. He states he has been eating ok. He has been sleeping ok. He denies any constipation. Denies any change in color of stool or urine. Denies any abnormal bleeding or bruising. He states that sometimes he feels like his heart is racing/fluttering, but it goes away quickly. He states that he uses a urinal at night so he does not have to get up out of bed several times to urinate and sometimes his urine is red. He states he does have some slight back pain at times, but he states he feels it is because he sits too much. He wears his compression stockings, sometimes his BLE's swell up.     HPI: Chico Iqbal is a 88 y.o. male with PMH remarkable for HTN, HLD, COPD, BPH  who presents to the office today for follow up.     HEALTH MAINTENANCE: Follow Up  Smoking: Smokes cigars  Labs: 7/24/23  PSA: 7/24/23 WNL  Colonoscopy (45-75):   Lung cancer (55-80 + 30 pack year + smoking/quit in last 15 years): CT chest with IV contrast on 03/07/23 revealed: No CT evidence of pulmonary embolism in the current exam. Evidence of prior granulomatous disease as described. Bilateral pneumonia as described, most pronounced laterally and posteriorly in the lower lobes. Mild nonspecific mediastinal and bilateral hilar adenopathy, perhaps infectious or inflammatory. Mild bronchial wall thickening, predominantly in the lower lobes. Suspect mild bronchitis. Left thyroid lobe nodule is nonspecific. Consider further evaluation  with thyroid ultrasound. Advanced three-vessel bilateral coronary artery calcifications.  DEXA (65+, q 2 years): 5/22/17 osteopenia    SOCIAL HISTORY:  Social History     Tobacco Use    Smoking status: Some Days     Types: Cigars    Smokeless tobacco: Never   Substance Use Topics    Alcohol use: Yes     Comment: OCCASIONAL, 2-3 BEERS DAILY    Drug use: Never     IMMUNIZATIONS:  Immunization History   Administered Date(s) Administered    Influenza, High Dose Seasonal, Preservative  Free 03/09/2023    Pfizer Purple Cap SARS-CoV-2 03/12/2021, 04/04/2021, 12/06/2021    Pneumococcal polysaccharide vaccine, 23-valent, age 2 years and older (PNEUMOVAX 23) 03/09/2023     REVIEW OF SYSTEMS:  Review of Systems   Constitutional: Negative.    Respiratory: Negative.     Cardiovascular:  Positive for palpitations.   Gastrointestinal: Negative.    Genitourinary: Negative.    Musculoskeletal:  Positive for back pain.   Neurological: Negative.    Psychiatric/Behavioral: Negative.       ALLERGIES:  No Known Allergies     VITAL SIGNS:  Vitals:    11/22/23 1416   BP: 109/69   Pulse: 78   Resp: 22   SpO2: 96%     PHYSICAL EXAM:  Physical Exam  Vitals reviewed.   Constitutional:       Appearance: Normal appearance.   HENT:      Head: Normocephalic and atraumatic.   Cardiovascular:      Rate and Rhythm: Normal rate and regular rhythm.      Pulses: Normal pulses.      Heart sounds: Normal heart sounds.   Pulmonary:      Effort: Pulmonary effort is normal.      Breath sounds: Normal breath sounds.   Abdominal:      General: Bowel sounds are normal.      Palpations: Abdomen is soft.   Musculoskeletal:         General: Normal range of motion.   Neurological:      General: No focal deficit present.      Mental Status: He is alert and oriented to person, place, and time.   Psychiatric:         Mood and Affect: Mood normal.         Behavior: Behavior normal.         MEDICATIONS:  Current Outpatient Medications on File Prior to Visit   Medication Sig Dispense Refill    atorvastatin (Lipitor) 40 mg tablet Take 1 tablet (40 mg) by mouth once daily. 90 tablet 0    clopidogrel (Plavix) 75 mg tablet Take one tablet by mouth daily 90 tablet 0    fluticasone (Flonase) 50 mcg/actuation nasal spray USE 1 SPRAY IN EACH NOSTRIL ONCE A DAY AS NEEDED FOR ALLERGIES      furosemide (Lasix) 20 mg tablet Take 1 tablet (20 mg) by mouth once daily. 90 tablet 0    lisinopril 20 mg tablet Take 1 tablet (20 mg) by mouth once daily. 90 tablet 0     metoprolol succinate XL (Toprol-XL) 50 mg 24 hr tablet Take 1 tablet (50 mg) by mouth once daily. Do not crush or chew. 90 tablet 0    pantoprazole (ProtoNix) 40 mg EC tablet Take 1 tablet (40 mg) by mouth once daily in the morning. Take before meals. Do not crush, chew, or split. 90 tablet 0    rivaroxaban (Xarelto) 20 mg tablet Take 1 tablet (20 mg) by mouth once daily in the evening. Take with meals. Take with food. 90 tablet 0    tadalafil 20 mg tablet TAKE 1 TABLET BY MOUTH EVERY 36 HOURS PRIOR TO INTERCOURSE; EFFECT MAY LAST 36 HOURS. TAKE NO MORE THAN 1 PER DAY.      tamsulosin (Flomax) 0.4 mg 24 hr capsule Take 1 capsule (0.4 mg) by mouth once daily. 90 capsule 0    fish oil concentrate (Omega-3) 120-180 mg capsule Take 1 capsule (1 g) by mouth once daily.       No current facility-administered medications on file prior to visit.        LABORATORY DATA:  Lab Results   Component Value Date    WBC 4.3 (L) 03/12/2023    HGB 11.5 (L) 03/12/2023    HCT 33.9 (L) 03/12/2023     03/12/2023    CHOL 102 07/24/2023    TRIG 114 07/24/2023    HDL 28.8 (A) 07/24/2023    ALT 12 03/07/2023    AST 25 03/07/2023     03/12/2023    K 3.4 (L) 03/12/2023     03/12/2023    CREATININE 0.85 03/12/2023    BUN 21 03/12/2023    CO2 28 03/12/2023    TSH 1.20 07/24/2023    INR 1.2 (H) 03/07/2023    HGBA1C 6.1 (A) 03/08/2022       ASSESSMENT AND PLAN:  Health Maintenance: Annual Medicare wellness visit  - reviewed most recent bloodwork from 07/24/23  - he is refusing a cologuard test or colonoscopy at this point  - he states he uses a urinal at night so that he does not have to get up to go to BR and his urine appears red sometimes, he also has complaint of low back pain and reports history of kidney stones  - will check urine for hematuria with urinalysis, patient unable to urinate today, advised to follow up in lab  - Follow up in four months     COPD  - has been stable  - we discontinued oxygen on previous  exam    Paroxysmal AFIB/HTN/CAD/Chronic systolic HF  - BP today is good, 109/69  - c/w Metoprolol  Succinate 50mg daily  - c/w Xarelto for stroke prevention  - c/w current Plavix, ASA,  Lasix, Lisinopril, lipid lowering agent  - BLE edema stable, he is compliant with wearing bilateral compression stockings  - continue to follow up with cardiology  - stress test on 03/21/23 showed no chest discomfort or ischemic EKG changes with pharmacological stress, normal left ventricular systolic function, no perfusion evidence of scar, or focal ischemia  - 2D echo on 03/09/23 revealed: 1. Left ventricular systolic function is mildly decreased with a 40% estimated ejection fraction.  2. Multiple segmental abnormalities exist. See findings.3. Spectral Doppler shows a pseudonormal pattern of left ventricular diastolic filling.4. There is mildly reduced right ventricular systolic function.5. The left atrium is mild to moderately dilated 6. There is global and segmental LV hypokinesis. 7. CVP is elevated.  -Bilateral Carotid US on 06/26/23 showed 20-49% carotid stenosis bilaterally     HLD  - c/w statin  - lipid panel on 07/24/23 WNL other than low HDL     BPH  - he denies any increased urinary symptoms, states he does have urinary frequency, but it is not any worse  - c/w Flomax  - PSA on 07/24/23 was normal     GERD  - stable  - c/w PPI  --------------------  Written by Ritika Cerrato LPN, acting as a scribe for Dr. Hubbard. This note accurately reflects the work and decisions made by Dr. Hubbard.     I, Dr. Hubbard, attest all medical record entries made by the scribe were under my direction and were personally dictated by me. I have reviewed the chart and agree that the record accurately reflects my performance of the history, physical exam, and assessment and plan.

## 2023-11-23 NOTE — PATIENT INSTRUCTIONS
It was great to see you in the office today! Here is what we discussed at your visit today:  Please continue to take your current medications   As discussed please drop off urine specimen as soon as you are able due to the discoloration of your urine  Follow up in four months

## 2024-01-30 DIAGNOSIS — E78.00 HYPERCHOLESTEROLEMIA: ICD-10-CM

## 2024-01-30 RX ORDER — ATORVASTATIN CALCIUM 40 MG/1
40 TABLET, FILM COATED ORAL DAILY
Qty: 90 TABLET | Refills: 0 | Status: SHIPPED | OUTPATIENT
Start: 2024-01-30 | End: 2024-05-14 | Stop reason: SDUPTHER

## 2024-03-05 DIAGNOSIS — I25.10 CORONARY ARTERY DISEASE, UNSPECIFIED VESSEL OR LESION TYPE, UNSPECIFIED WHETHER ANGINA PRESENT, UNSPECIFIED WHETHER NATIVE OR TRANSPLANTED HEART: ICD-10-CM

## 2024-03-25 DIAGNOSIS — N40.1 BENIGN PROSTATIC HYPERPLASIA WITH LOWER URINARY TRACT SYMPTOMS, SYMPTOM DETAILS UNSPECIFIED: ICD-10-CM

## 2024-03-25 DIAGNOSIS — K21.9 CHRONIC GERD: ICD-10-CM

## 2024-03-25 DIAGNOSIS — I10 BENIGN HYPERTENSION: ICD-10-CM

## 2024-03-25 RX ORDER — PANTOPRAZOLE SODIUM 40 MG/1
40 TABLET, DELAYED RELEASE ORAL
Qty: 90 TABLET | Refills: 0 | Status: SHIPPED | OUTPATIENT
Start: 2024-03-25

## 2024-03-25 RX ORDER — TAMSULOSIN HYDROCHLORIDE 0.4 MG/1
0.4 CAPSULE ORAL DAILY
Qty: 90 CAPSULE | Refills: 0 | Status: SHIPPED | OUTPATIENT
Start: 2024-03-25

## 2024-03-25 RX ORDER — METOPROLOL SUCCINATE 50 MG/1
50 TABLET, EXTENDED RELEASE ORAL DAILY
Qty: 90 TABLET | Refills: 0 | Status: SHIPPED | OUTPATIENT
Start: 2024-03-25

## 2024-03-28 ENCOUNTER — APPOINTMENT (OUTPATIENT)
Dept: PRIMARY CARE | Facility: CLINIC | Age: 89
End: 2024-03-28
Payer: MEDICARE

## 2024-04-03 ENCOUNTER — OFFICE VISIT (OUTPATIENT)
Dept: PRIMARY CARE | Facility: CLINIC | Age: 89
End: 2024-04-03
Payer: MEDICARE

## 2024-04-03 VITALS
RESPIRATION RATE: 20 BRPM | DIASTOLIC BLOOD PRESSURE: 67 MMHG | BODY MASS INDEX: 27.41 KG/M2 | SYSTOLIC BLOOD PRESSURE: 120 MMHG | HEART RATE: 86 BPM | OXYGEN SATURATION: 95 % | WEIGHT: 195.8 LBS | HEIGHT: 71 IN

## 2024-04-03 DIAGNOSIS — I10 BENIGN HYPERTENSION: ICD-10-CM

## 2024-04-03 DIAGNOSIS — I10 BENIGN ESSENTIAL HTN: ICD-10-CM

## 2024-04-03 DIAGNOSIS — I11.0 HYPERTENSIVE HEART DISEASE WITH HEART FAILURE (MULTI): ICD-10-CM

## 2024-04-03 DIAGNOSIS — R31.9 HEMATURIA, UNSPECIFIED TYPE: ICD-10-CM

## 2024-04-03 DIAGNOSIS — N40.0 BENIGN PROSTATIC HYPERPLASIA, UNSPECIFIED WHETHER LOWER URINARY TRACT SYMPTOMS PRESENT: ICD-10-CM

## 2024-04-03 DIAGNOSIS — J32.9 CHRONIC SINUSITIS, UNSPECIFIED LOCATION: ICD-10-CM

## 2024-04-03 DIAGNOSIS — I48.0 PAF (PAROXYSMAL ATRIAL FIBRILLATION) (MULTI): ICD-10-CM

## 2024-04-03 DIAGNOSIS — J96.01 ACUTE RESPIRATORY FAILURE WITH HYPOXIA (MULTI): Primary | ICD-10-CM

## 2024-04-03 DIAGNOSIS — Z00.00 HEALTHCARE MAINTENANCE: ICD-10-CM

## 2024-04-03 DIAGNOSIS — E78.00 HYPERCHOLESTEROLEMIA: ICD-10-CM

## 2024-04-03 DIAGNOSIS — J44.9 CHRONIC OBSTRUCTIVE PULMONARY DISEASE, UNSPECIFIED COPD TYPE (MULTI): ICD-10-CM

## 2024-04-03 DIAGNOSIS — K21.9 GASTROESOPHAGEAL REFLUX DISEASE WITHOUT ESOPHAGITIS: ICD-10-CM

## 2024-04-03 DIAGNOSIS — I25.10 CORONARY ARTERY DISEASE, UNSPECIFIED VESSEL OR LESION TYPE, UNSPECIFIED WHETHER ANGINA PRESENT, UNSPECIFIED WHETHER NATIVE OR TRANSPLANTED HEART: ICD-10-CM

## 2024-04-03 LAB
POC APPEARANCE, URINE: CLEAR
POC BILIRUBIN, URINE: NEGATIVE
POC BLOOD, URINE: ABNORMAL
POC COLOR, URINE: YELLOW
POC GLUCOSE, URINE: NEGATIVE MG/DL
POC KETONES, URINE: NEGATIVE MG/DL
POC LEUKOCYTES, URINE: ABNORMAL
POC NITRITE,URINE: NEGATIVE
POC PH, URINE: 7 PH
POC PROTEIN, URINE: NEGATIVE MG/DL
POC SPECIFIC GRAVITY, URINE: 1.02
POC UROBILINOGEN, URINE: 1 EU/DL

## 2024-04-03 PROCEDURE — 81003 URINALYSIS AUTO W/O SCOPE: CPT | Performed by: INTERNAL MEDICINE

## 2024-04-03 PROCEDURE — 1159F MED LIST DOCD IN RCRD: CPT | Performed by: INTERNAL MEDICINE

## 2024-04-03 PROCEDURE — 1158F ADVNC CARE PLAN TLK DOCD: CPT | Performed by: INTERNAL MEDICINE

## 2024-04-03 PROCEDURE — 1126F AMNT PAIN NOTED NONE PRSNT: CPT | Performed by: INTERNAL MEDICINE

## 2024-04-03 PROCEDURE — 87086 URINE CULTURE/COLONY COUNT: CPT

## 2024-04-03 PROCEDURE — 3078F DIAST BP <80 MM HG: CPT | Performed by: INTERNAL MEDICINE

## 2024-04-03 PROCEDURE — 1160F RVW MEDS BY RX/DR IN RCRD: CPT | Performed by: INTERNAL MEDICINE

## 2024-04-03 PROCEDURE — 1123F ACP DISCUSS/DSCN MKR DOCD: CPT | Performed by: INTERNAL MEDICINE

## 2024-04-03 PROCEDURE — 3074F SYST BP LT 130 MM HG: CPT | Performed by: INTERNAL MEDICINE

## 2024-04-03 PROCEDURE — 99213 OFFICE O/P EST LOW 20 MIN: CPT | Performed by: INTERNAL MEDICINE

## 2024-04-03 RX ORDER — LISINOPRIL 20 MG/1
20 TABLET ORAL DAILY
Qty: 90 TABLET | Refills: 0 | Status: SHIPPED | OUTPATIENT
Start: 2024-04-03

## 2024-04-03 RX ORDER — DOXYCYCLINE 100 MG/1
100 CAPSULE ORAL 2 TIMES DAILY
Qty: 28 CAPSULE | Refills: 0 | Status: SHIPPED | OUTPATIENT
Start: 2024-04-03 | End: 2024-04-17

## 2024-04-03 RX ORDER — FUROSEMIDE 20 MG/1
20 TABLET ORAL DAILY
Qty: 90 TABLET | Refills: 0 | Status: SHIPPED | OUTPATIENT
Start: 2024-04-03

## 2024-04-03 RX ORDER — CLOPIDOGREL BISULFATE 75 MG/1
TABLET ORAL
Qty: 90 TABLET | Refills: 0 | Status: SHIPPED | OUTPATIENT
Start: 2024-04-03

## 2024-04-03 ASSESSMENT — ENCOUNTER SYMPTOMS
MUSCULOSKELETAL NEGATIVE: 1
CONSTITUTIONAL NEGATIVE: 1
NEUROLOGICAL NEGATIVE: 1
HEMATOLOGIC/LYMPHATIC NEGATIVE: 1
RESPIRATORY NEGATIVE: 1
PSYCHIATRIC NEGATIVE: 1
GASTROINTESTINAL NEGATIVE: 1
CARDIOVASCULAR NEGATIVE: 1
HEMATURIA: 1

## 2024-04-03 ASSESSMENT — PATIENT HEALTH QUESTIONNAIRE - PHQ9
2. FEELING DOWN, DEPRESSED OR HOPELESS: NOT AT ALL
1. LITTLE INTEREST OR PLEASURE IN DOING THINGS: NOT AT ALL
SUM OF ALL RESPONSES TO PHQ9 QUESTIONS 1 AND 2: 0

## 2024-04-03 ASSESSMENT — PAIN SCALES - GENERAL: PAINLEVEL: 0-NO PAIN

## 2024-04-03 NOTE — PATIENT INSTRUCTIONS
It was great to see you in the office today! Here is what we discussed at your visit today:  Please continue to take your current medications   We have sent in a prescription for Doxycycline for your sinus symptoms. Take as directed  Use Flonase as discussed, 1 spray each nostril twice daily  Follow up in four months

## 2024-04-04 LAB — BACTERIA UR CULT: NORMAL

## 2024-05-06 ENCOUNTER — OFFICE VISIT (OUTPATIENT)
Dept: PRIMARY CARE | Facility: CLINIC | Age: 89
End: 2024-05-06
Payer: MEDICARE

## 2024-05-06 VITALS
OXYGEN SATURATION: 97 % | WEIGHT: 187 LBS | DIASTOLIC BLOOD PRESSURE: 75 MMHG | HEART RATE: 86 BPM | BODY MASS INDEX: 26.18 KG/M2 | HEIGHT: 71 IN | SYSTOLIC BLOOD PRESSURE: 117 MMHG | RESPIRATION RATE: 20 BRPM

## 2024-05-06 DIAGNOSIS — N30.01 ACUTE CYSTITIS WITH HEMATURIA: Primary | ICD-10-CM

## 2024-05-06 DIAGNOSIS — K59.01 SLOW TRANSIT CONSTIPATION: ICD-10-CM

## 2024-05-06 DIAGNOSIS — R39.9 UTI SYMPTOMS: ICD-10-CM

## 2024-05-06 DIAGNOSIS — R30.9 URINARY PAIN: ICD-10-CM

## 2024-05-06 DIAGNOSIS — K59.1 FUNCTIONAL DIARRHEA: ICD-10-CM

## 2024-05-06 LAB
POC APPEARANCE, URINE: ABNORMAL
POC BILIRUBIN, URINE: ABNORMAL
POC BLOOD, URINE: ABNORMAL
POC COLOR, URINE: ABNORMAL
POC GLUCOSE, URINE: NEGATIVE MG/DL
POC KETONES, URINE: NEGATIVE MG/DL
POC LEUKOCYTES, URINE: ABNORMAL
POC NITRITE,URINE: NEGATIVE
POC PH, URINE: 5.5 PH
POC PROTEIN, URINE: ABNORMAL MG/DL
POC SPECIFIC GRAVITY, URINE: 1.02
POC UROBILINOGEN, URINE: 1 EU/DL

## 2024-05-06 PROCEDURE — 3074F SYST BP LT 130 MM HG: CPT

## 2024-05-06 PROCEDURE — 1123F ACP DISCUSS/DSCN MKR DOCD: CPT

## 2024-05-06 PROCEDURE — 1125F AMNT PAIN NOTED PAIN PRSNT: CPT

## 2024-05-06 PROCEDURE — 87186 SC STD MICRODIL/AGAR DIL: CPT

## 2024-05-06 PROCEDURE — 1159F MED LIST DOCD IN RCRD: CPT

## 2024-05-06 PROCEDURE — 1160F RVW MEDS BY RX/DR IN RCRD: CPT

## 2024-05-06 PROCEDURE — 87086 URINE CULTURE/COLONY COUNT: CPT

## 2024-05-06 PROCEDURE — 3078F DIAST BP <80 MM HG: CPT

## 2024-05-06 PROCEDURE — 81002 URINALYSIS NONAUTO W/O SCOPE: CPT

## 2024-05-06 PROCEDURE — 99213 OFFICE O/P EST LOW 20 MIN: CPT

## 2024-05-06 RX ORDER — AMOXICILLIN 250 MG
1 CAPSULE ORAL DAILY
Qty: 7 TABLET | Refills: 0 | Status: SHIPPED | OUTPATIENT
Start: 2024-05-06 | End: 2024-05-13

## 2024-05-06 RX ORDER — NITROFURANTOIN 25; 75 MG/1; MG/1
100 CAPSULE ORAL 2 TIMES DAILY
Qty: 14 CAPSULE | Refills: 0 | Status: SHIPPED | OUTPATIENT
Start: 2024-05-06 | End: 2024-05-13

## 2024-05-06 RX ORDER — DOCUSATE SODIUM 100 MG/1
100 CAPSULE, LIQUID FILLED ORAL 2 TIMES DAILY
Qty: 60 CAPSULE | Refills: 0 | Status: SHIPPED | OUTPATIENT
Start: 2024-05-06

## 2024-05-06 ASSESSMENT — ENCOUNTER SYMPTOMS
DIZZINESS: 0
FLANK PAIN: 0
HEMATURIA: 1
SHORTNESS OF BREATH: 0
FEVER: 0
FATIGUE: 0
VOMITING: 0
BLOOD IN STOOL: 0
CHILLS: 0
ABDOMINAL PAIN: 1
DIARRHEA: 1
HEADACHES: 0
NAUSEA: 0
DYSURIA: 1
CONSTIPATION: 1

## 2024-05-06 ASSESSMENT — PAIN SCALES - GENERAL: PAINLEVEL: 9

## 2024-05-06 NOTE — PATIENT INSTRUCTIONS
For constipation, we recommend that you start taking…  Stool Softener: Colace (docusate sodium) 100mg two times a day  Natural Laxative: Senna (Sennoside, senokot or senna glycoside) 8.6mg one time a day (usually at night)  These are both over the counter and can be found at many stores.   Call the office with any questions / concerns.     OT Current Status-Daily Note


Subjective


Pt denies pain only itching. RN notified post treatment.





Appearance


Pt remained supine in bed, all needs within reach at OT departure.





Mental Status/Objective


Patient Orientation:  Person, Place, Situation


Attachments:  IV





ADL-Treatment


Therapy Code Descriptions/Definitions 





Functional [place] Measure:


0=Not Assessed/NA        4=Minimal Assistance


1=Total Assistance        5=Supervision or Setup


2=Maximal Assistance  6=Modified [place]


3=Moderate Assistance 7=Complete IndependenceSCALE: Activities may be completed 

with or without assistive devices.





6-Indepedent-patient completes the activity by him/herself with no assistance 

from a helper.


5-Set-up or Clean-up Assistance-helper sets up or cleans up; patient completes 

activity. [place] assists only prior to or  


    following the activity.


4-Supervision or Touching Assistance-helper provides verbal cues and/or 

touching/steadying and/or contact guard assistance as patient completes 

activity. Assistance may be provided   


    throughout the activity or intermittently.


3-Partial/Moderate Assistance-helper does LESS THAN HALF the effort. [place] 

lifts, holds or supports trunk or limbs, but provides less than half the effort.


2-Substantial/Maximal Assistance-helper does MORE THAN HALF the effort. [place] 

lifts or holds trunk or limbs and provides more than half the effort.


3-Denixqeeh-rsdstj does ALL the effort. Patient does none of the effort to 

complete the activity. Or, the assistance of 2 or more helpers is required for 

the patient to complete the  


    activity.


If activity was not attempted, code reason:


7-Patient Refused.


9-Not Applicable-not attempted and the patient did not perform the activity 

before the current illness, exacerbation or injury.


10-Not Attempted due to Environmental Limitations-(lack of equipment, weather 

restraints, etc.).


88-Not Attempted due to Medical Conditions or Safety Concerns.


Shower/Bathe Self (QC):  3


Pt supine in bed at OT arrival. Reports itchy skin under abodminal pannus. Skin 

inspected, redness and odor present. Pt able to reach area to wash but required 

mod a to ensure thorough cleaning. Pt able to lift stomach as OT washed under 

pannus and mya area. RN notified that pt may need powder/cream to keep from 

getting worse. Pt declined sitting EOB or even adjusting self in bed as he was 

positioned very close to Edge. OT educated pt on continuing to move during the 

day in order to maintain strength as well as relieve pressure on bottom. Pt 

verbalizes agreement but does not appear as if he will comply.





Education


OT Patient Education:  Correct positioning, Disease process, Progress toward 

Goal/Update tx plan, Purpose of tx/functional activities, Safety issues


Teaching Recipient:  Patient


Teaching Methods:  Discussion


Response to Teaching:  Verbalize Understanding, Reinforcement Needed





OT Long Term Goals


Long Term Goals


Time Frame:  Jan 11, 2022


Toileting Hygiene (QC):  4


Shower/Bathe Self (QC):  4


Upper Body Dressing (QC):  5


Lower Body Dressing (QC):  4


On/Off Footwear (QC):  4


Transfer to/from w/c with CGA-MIn A.


1=Demonstrate adherence to instructed precautions during ADL tasks.


2=Patient will verbalize/demonstrate understanding of assistive devices/gabriela

fications for ADL.


3=Patient will improve strength/tolerance for activity to enable patient to 

perform ADL's.





OT Education/Plan


Problem List/Assessment


Assessment:  Decreased Activ Tolerance, Decreased Safety Aware, Decreased UE 

Strength, Impaired I ADL's, Impaired Self-Care Skills





Discharge Recommendations


Plan/Recommendations:  Continue POC





Treatment Plan/Plan of Care


Treatment,Training & Education:  Yes


Patient would benefit from OT for education, treatment and training to promote 

independence in ADL's, mobility, safety and/or upper extremity function for 

ADL's.


Plan of Care:  ADL Retraining, Functional Mobility, UE Funct Exercise/Act, W/C 

Management Training


Treatment Duration:  Jan 11, 2022


Frequency:  3 times per week


Estimated Hrs Per Day:  .25 hour per day


Rehab Potential:  Poor





Time/GCodes


Start Time:  08:46


Stop Time:  08:57


Total Time Billed (hr/min):  11


Billed Treatment Time


1 visit


ADL











Yaritza Grace OT                Dec 22, 2021 09:29

## 2024-05-06 NOTE — PROGRESS NOTES
"Subjective   Patient ID: Chico Iqbal is a 88 y.o. male who presents for Abdominal Pain (Increased fluid, no output, shooting pain, back pain, urinating a little at a time, burning, feels like a kidney stone/).    HPI   88 y.o. male with PMH remarkable for HTN, HLD, COPD, BPH who presents to the office today for abdominal pain and decreased urine output.     Dysuria:  - x 1 week, States when he wakes up in the morning it is reddish in color, has pain when urinating. Mentions he has history of kidney stones and is drinking a lot of water. Last formed bowel movement was 1 week ago. Has taken Milk of magnesia some times to help with constipation. States bowel movement yesterday was watery, diarrhea. Denies any fevers, chills, N/V, flank pain.     Review of Systems   Constitutional:  Negative for chills, fatigue and fever.   Respiratory:  Negative for shortness of breath.    Cardiovascular:  Negative for chest pain.   Gastrointestinal:  Positive for abdominal pain, constipation and diarrhea. Negative for blood in stool, nausea and vomiting.   Genitourinary:  Positive for decreased urine volume, dysuria, hematuria and penile pain. Negative for flank pain.   Neurological:  Negative for dizziness and headaches.   All other systems reviewed and are negative.      Objective   /75   Pulse 86   Resp 20   Ht 1.803 m (5' 11\")   Wt 84.8 kg (187 lb)   SpO2 97%   BMI 26.08 kg/m²     Physical Exam  Vitals reviewed.   Constitutional:       Appearance: Normal appearance.   HENT:      Head: Normocephalic and atraumatic.   Eyes:      Extraocular Movements: Extraocular movements intact.      Conjunctiva/sclera: Conjunctivae normal.   Cardiovascular:      Rate and Rhythm: Normal rate and regular rhythm.      Pulses: Normal pulses.      Heart sounds: Normal heart sounds.   Pulmonary:      Effort: Pulmonary effort is normal.      Breath sounds: Normal breath sounds.   Abdominal:      General: Bowel sounds are normal.      " Palpations: There is no mass.      Tenderness: There is abdominal tenderness in the right upper quadrant and left upper quadrant. There is guarding. There is no right CVA tenderness, left CVA tenderness or rebound.      Comments: Tenderness to palpation of the upper abdomen.   Musculoskeletal:      Cervical back: Normal range of motion and neck supple.   Neurological:      General: No focal deficit present.      Mental Status: He is alert and oriented to person, place, and time.   Psychiatric:         Mood and Affect: Mood normal.         Behavior: Behavior normal.         Thought Content: Thought content normal.         Judgment: Judgment normal.         Assessment/Plan   Problem List Items Addressed This Visit             ICD-10-CM    Acute cystitis with hematuria - Primary N30.01    Relevant Medications    nitrofurantoin, macrocrystal-monohydrate, (Macrobid) 100 mg capsule    Slow transit constipation K59.01    Relevant Medications    docusate sodium (Colace) 100 mg capsule    sennosides-docusate sodium (Connie-Colace) 8.6-50 mg tablet    Functional diarrhea K59.1     Other Visit Diagnoses         Codes    Urinary pain     R30.9    Relevant Orders    POCT UA (nonautomated) manually resulted (Completed)    UTI symptoms     R39.9    Relevant Orders    Urine Culture          - POCT UA shows infection. Will treat for UTI. Will follow up with culture results.   - Patient has not had regular bowel movement in 1 week. Will treat for constipation with overflow diarrhea. Recommend colace and senna.  - Discussed importance of hydration and regular bowel movements.   - Patient understands seeking care at ER with worsening symptoms.   - Follow up in 3-5 days if no improvement in symptoms.

## 2024-05-09 DIAGNOSIS — N30.01 ACUTE CYSTITIS WITH HEMATURIA: Primary | ICD-10-CM

## 2024-05-09 LAB — BACTERIA UR CULT: ABNORMAL

## 2024-05-09 RX ORDER — SULFAMETHOXAZOLE AND TRIMETHOPRIM 800; 160 MG/1; MG/1
1 TABLET ORAL 2 TIMES DAILY
Qty: 6 TABLET | Refills: 0 | Status: SHIPPED | OUTPATIENT
Start: 2024-05-09 | End: 2024-05-12

## 2024-05-14 DIAGNOSIS — E78.00 HYPERCHOLESTEROLEMIA: ICD-10-CM

## 2024-05-14 RX ORDER — ATORVASTATIN CALCIUM 40 MG/1
40 TABLET, FILM COATED ORAL DAILY
Qty: 90 TABLET | Refills: 0 | Status: SHIPPED | OUTPATIENT
Start: 2024-05-14

## 2024-06-20 ENCOUNTER — TELEPHONE (OUTPATIENT)
Dept: PRIMARY CARE | Facility: CLINIC | Age: 89
End: 2024-06-20
Payer: MEDICARE

## 2024-06-20 DIAGNOSIS — R30.0 DYSURIA: Primary | ICD-10-CM

## 2024-06-21 ENCOUNTER — LAB (OUTPATIENT)
Dept: LAB | Facility: LAB | Age: 89
End: 2024-06-21
Payer: MEDICARE

## 2024-06-21 DIAGNOSIS — R30.0 DYSURIA: Primary | ICD-10-CM

## 2024-06-21 DIAGNOSIS — R30.0 DYSURIA: ICD-10-CM

## 2024-06-21 LAB
APPEARANCE UR: CLEAR
BACTERIA #/AREA URNS AUTO: ABNORMAL /HPF
BILIRUB UR STRIP.AUTO-MCNC: NEGATIVE MG/DL
COLOR UR: YELLOW
GLUCOSE UR STRIP.AUTO-MCNC: NORMAL MG/DL
KETONES UR STRIP.AUTO-MCNC: NEGATIVE MG/DL
LEUKOCYTE ESTERASE UR QL STRIP.AUTO: ABNORMAL
MUCOUS THREADS #/AREA URNS AUTO: ABNORMAL /LPF
NITRITE UR QL STRIP.AUTO: NEGATIVE
PH UR STRIP.AUTO: 7.5 [PH]
PROT UR STRIP.AUTO-MCNC: ABNORMAL MG/DL
RBC # UR STRIP.AUTO: NEGATIVE /UL
RBC #/AREA URNS AUTO: ABNORMAL /HPF
SP GR UR STRIP.AUTO: 1.02
SQUAMOUS #/AREA URNS AUTO: ABNORMAL /HPF
TRANS CELLS #/AREA UR COMP ASSIST: ABNORMAL /HPF
UROBILINOGEN UR STRIP.AUTO-MCNC: ABNORMAL MG/DL
WBC #/AREA URNS AUTO: >50 /HPF

## 2024-06-21 PROCEDURE — 81001 URINALYSIS AUTO W/SCOPE: CPT

## 2024-06-21 PROCEDURE — 87086 URINE CULTURE/COLONY COUNT: CPT

## 2024-06-21 PROCEDURE — 87186 SC STD MICRODIL/AGAR DIL: CPT

## 2024-06-21 RX ORDER — CEPHALEXIN 500 MG/1
500 CAPSULE ORAL 3 TIMES DAILY
Qty: 21 CAPSULE | Refills: 0 | Status: SHIPPED | OUTPATIENT
Start: 2024-06-21 | End: 2024-06-28

## 2024-06-23 LAB — BACTERIA UR CULT: ABNORMAL

## 2024-07-01 ENCOUNTER — APPOINTMENT (OUTPATIENT)
Dept: UROLOGY | Facility: CLINIC | Age: 89
End: 2024-07-01
Payer: MEDICARE

## 2024-07-01 DIAGNOSIS — R31.0 GROSS HEMATURIA: ICD-10-CM

## 2024-07-01 DIAGNOSIS — N40.0 BENIGN PROSTATIC HYPERPLASIA, UNSPECIFIED WHETHER LOWER URINARY TRACT SYMPTOMS PRESENT: ICD-10-CM

## 2024-07-01 PROCEDURE — 1123F ACP DISCUSS/DSCN MKR DOCD: CPT | Performed by: STUDENT IN AN ORGANIZED HEALTH CARE EDUCATION/TRAINING PROGRAM

## 2024-07-01 PROCEDURE — G2211 COMPLEX E/M VISIT ADD ON: HCPCS | Performed by: STUDENT IN AN ORGANIZED HEALTH CARE EDUCATION/TRAINING PROGRAM

## 2024-07-01 PROCEDURE — 99204 OFFICE O/P NEW MOD 45 MIN: CPT | Performed by: STUDENT IN AN ORGANIZED HEALTH CARE EDUCATION/TRAINING PROGRAM

## 2024-07-01 RX ORDER — TAMSULOSIN HYDROCHLORIDE 0.4 MG/1
0.4 CAPSULE ORAL DAILY
Qty: 30 CAPSULE | Refills: 11 | Status: SHIPPED | OUTPATIENT
Start: 2024-07-01 | End: 2024-07-02 | Stop reason: SDUPTHER

## 2024-07-01 NOTE — PROGRESS NOTES
Subjective   Patient ID: Chico Iqbal is a 88 y.o. male who presents for UTIs.    HPI  88 y.o. male who presents for UTIs. Results of UA as below. Initially seen by his PCP.     Had UTI 5/6/24 and then again 6/21/24. Was on antibiotics Keflex and he felt better after taking this.    He is on single dose tamsulosin for urinary sxs.      Social History     Tobacco Use    Smoking status: Some Days     Types: Cigars     Passive exposure: Current    Smokeless tobacco: Never   Vaping Use    Vaping status: Never Used   Substance Use Topics    Alcohol use: Yes     Comment: OCCASIONAL, 2-3 BEERS DAILY    Drug use: Never     Component  Ref Range & Units 1 mo ago 2 mo ago   POC Color, Urine  Straw, Yellow, Light-Yellow Orange Abnormal  Yellow   POC Appearance, Urine  Clear Cloudy Abnormal  Clear   POC Glucose, Urine  NEGATIVE mg/dl NEGATIVE NEGATIVE   POC Bilirubin, Urine  NEGATIVE SMALL (1+) Abnormal  NEGATIVE   POC Ketones, Urine  NEGATIVE mg/dl NEGATIVE NEGATIVE   POC Specific Gravity, Urine  1.005 - 1.035 1.025 1.020   POC Blood, Urine  NEGATIVE MODERATE (2+) Abnormal  MODERATE (2+) Abnormal    POC PH, Urine  No Reference Range Established PH 5.5 7.0   POC Protein, Urine  NEGATIVE, 30 (1+) mg/dl 30 (1+) NEGATIVE   POC Urobilinogen, Urine  0.2, 1.0 EU/DL 1.0 1.0   Poc Nitrite, Urine  NEGATIVE NEGATIVE NEGATIVE   POC Leukocytes, Urine  NEGATIVE LARGE (3+) Abnormal  SMALL (1+) Abnormal      Review of Systems  A complete review of systems was performed. All systems are noted to be negative unless indicated in the history of present illness, impression, active problem list, or past histories.     Objective   Physical Exam  General: Well developed, well nourished, alert and cooperative, appears in no acute distress  Eyes: Non-injected conjunctiva, sclera clear, no proptosis  Cardiac: Extremities are warm and well perfused. No edema, cyanosis or pallor.   Lungs: Breathing is easy, non-labored. Speaking in clear and complete  sentences. Normal diaphragmatic movement.  Abdomen: soft, non-tender  MSK: Ambulatory with steady gait, unassisted  Neuro: alert and oriented to person, place and time  Psych: Demonstrates good judgement and reason, without hallucinations, abnormal affect or abnormal behaviors.  Skin: no obvious lesions, no rashes.  : phallus circumcised, normal in size, no plaques or lesions. Testicles normal in size and texture, no masses  AARON: prostate enlarged, smooth surface, no nodules    Assessment/Plan   88 y.o. male who presents for UTIs. Had UTI 5/6/24 and then again 6/21/24. Was on antibiotics Keflex and he felt better after taking this.    He is on single dose tamsulosin for urinary sxs. We discussed increasing to double dose. Risks, benefits, alternatives and side effects discussed.     We will obtain a CT urogram along with blood work as outline below. We will review at next visit.      Plan:  CT urogram.   CBC, CMP in 1 week.   Make tamsulosin 0.4  mg 2 pills daily at bedtime.  UA, U culture.   FUV 4-6 weeks.     Diagnoses and all orders for this visit:  Benign prostatic hyperplasia, unspecified whether lower urinary tract symptoms present  -     Urine Culture; Future  -     Urinalysis with Reflex Microscopic; Future  -     CT urography w 3D volume rendered imaging; Future  -     CBC; Future  -     Comprehensive Metabolic Panel; Future  Gross hematuria  -     Urine Culture; Future  -     Urinalysis with Reflex Microscopic; Future  -     CT urography w 3D volume rendered imaging; Future  -     CBC; Future  -     Comprehensive Metabolic Panel; Future    Scribe Attestation  By signing my name below, ISepideh Scribe attest that this documentation has been prepared under the direction and in the presence of Latrell Yancey MD.

## 2024-07-02 DIAGNOSIS — N40.0 BENIGN PROSTATIC HYPERPLASIA, UNSPECIFIED WHETHER LOWER URINARY TRACT SYMPTOMS PRESENT: ICD-10-CM

## 2024-07-02 DIAGNOSIS — I10 BENIGN HYPERTENSION: ICD-10-CM

## 2024-07-02 DIAGNOSIS — K21.9 CHRONIC GERD: ICD-10-CM

## 2024-07-02 DIAGNOSIS — I25.10 CORONARY ARTERY DISEASE, UNSPECIFIED VESSEL OR LESION TYPE, UNSPECIFIED WHETHER ANGINA PRESENT, UNSPECIFIED WHETHER NATIVE OR TRANSPLANTED HEART: ICD-10-CM

## 2024-07-02 RX ORDER — TAMSULOSIN HYDROCHLORIDE 0.4 MG/1
0.4 CAPSULE ORAL DAILY
Qty: 90 CAPSULE | Refills: 0 | Status: SHIPPED | OUTPATIENT
Start: 2024-07-02 | End: 2025-07-02

## 2024-07-02 RX ORDER — PANTOPRAZOLE SODIUM 40 MG/1
40 TABLET, DELAYED RELEASE ORAL
Qty: 90 TABLET | Refills: 0 | Status: SHIPPED | OUTPATIENT
Start: 2024-07-02

## 2024-07-02 RX ORDER — CLOPIDOGREL BISULFATE 75 MG/1
TABLET ORAL
Qty: 90 TABLET | Refills: 0 | Status: SHIPPED | OUTPATIENT
Start: 2024-07-02

## 2024-07-02 RX ORDER — FUROSEMIDE 20 MG/1
20 TABLET ORAL DAILY
Qty: 90 TABLET | Refills: 0 | Status: SHIPPED | OUTPATIENT
Start: 2024-07-02

## 2024-07-02 RX ORDER — METOPROLOL SUCCINATE 50 MG/1
50 TABLET, EXTENDED RELEASE ORAL DAILY
Qty: 90 TABLET | Refills: 0 | Status: SHIPPED | OUTPATIENT
Start: 2024-07-02

## 2024-07-03 ENCOUNTER — APPOINTMENT (OUTPATIENT)
Dept: UROLOGY | Facility: CLINIC | Age: 89
End: 2024-07-03
Payer: MEDICARE

## 2024-07-15 ENCOUNTER — LAB (OUTPATIENT)
Dept: LAB | Facility: LAB | Age: 89
End: 2024-07-15
Payer: MEDICARE

## 2024-07-15 DIAGNOSIS — N40.0 BENIGN PROSTATIC HYPERPLASIA, UNSPECIFIED WHETHER LOWER URINARY TRACT SYMPTOMS PRESENT: ICD-10-CM

## 2024-07-15 DIAGNOSIS — R31.0 GROSS HEMATURIA: ICD-10-CM

## 2024-07-15 LAB
ALBUMIN SERPL BCP-MCNC: 3.6 G/DL (ref 3.4–5)
ALP SERPL-CCNC: 87 U/L (ref 33–136)
ALT SERPL W P-5'-P-CCNC: 10 U/L (ref 10–52)
ANION GAP SERPL CALC-SCNC: 10 MMOL/L (ref 10–20)
AST SERPL W P-5'-P-CCNC: 14 U/L (ref 9–39)
BILIRUB SERPL-MCNC: 0.6 MG/DL (ref 0–1.2)
BUN SERPL-MCNC: 10 MG/DL (ref 6–23)
CALCIUM SERPL-MCNC: 8.8 MG/DL (ref 8.6–10.3)
CHLORIDE SERPL-SCNC: 101 MMOL/L (ref 98–107)
CO2 SERPL-SCNC: 30 MMOL/L (ref 21–32)
CREAT SERPL-MCNC: 0.73 MG/DL (ref 0.5–1.3)
EGFRCR SERPLBLD CKD-EPI 2021: 88 ML/MIN/1.73M*2
ERYTHROCYTE [DISTWIDTH] IN BLOOD BY AUTOMATED COUNT: 14.8 % (ref 11.5–14.5)
GLUCOSE SERPL-MCNC: 116 MG/DL (ref 74–99)
HCT VFR BLD AUTO: 24 % (ref 41–52)
HGB BLD-MCNC: 7.8 G/DL (ref 13.5–17.5)
MCH RBC QN AUTO: 40.8 PG (ref 26–34)
MCHC RBC AUTO-ENTMCNC: 32.5 G/DL (ref 32–36)
MCV RBC AUTO: 126 FL (ref 80–100)
NRBC BLD-RTO: 0 /100 WBCS (ref 0–0)
PLATELET # BLD AUTO: 105 X10*3/UL (ref 150–450)
POTASSIUM SERPL-SCNC: 3.8 MMOL/L (ref 3.5–5.3)
PROT SERPL-MCNC: 6.4 G/DL (ref 6.4–8.2)
RBC # BLD AUTO: 1.91 X10*6/UL (ref 4.5–5.9)
SODIUM SERPL-SCNC: 137 MMOL/L (ref 136–145)
WBC # BLD AUTO: 3.8 X10*3/UL (ref 4.4–11.3)

## 2024-07-15 PROCEDURE — 36415 COLL VENOUS BLD VENIPUNCTURE: CPT

## 2024-07-26 ENCOUNTER — LAB (OUTPATIENT)
Dept: LAB | Facility: LAB | Age: 89
End: 2024-07-26
Payer: MEDICARE

## 2024-07-26 ENCOUNTER — HOSPITAL ENCOUNTER (OUTPATIENT)
Dept: RADIOLOGY | Facility: HOSPITAL | Age: 89
Discharge: HOME | End: 2024-07-26
Payer: MEDICARE

## 2024-07-26 DIAGNOSIS — N40.0 BENIGN PROSTATIC HYPERPLASIA, UNSPECIFIED WHETHER LOWER URINARY TRACT SYMPTOMS PRESENT: ICD-10-CM

## 2024-07-26 DIAGNOSIS — R31.0 GROSS HEMATURIA: ICD-10-CM

## 2024-07-26 PROCEDURE — 76377 3D RENDER W/INTRP POSTPROCES: CPT

## 2024-07-26 PROCEDURE — 2550000001 HC RX 255 CONTRASTS: Performed by: STUDENT IN AN ORGANIZED HEALTH CARE EDUCATION/TRAINING PROGRAM

## 2024-07-26 PROCEDURE — 87086 URINE CULTURE/COLONY COUNT: CPT

## 2024-07-27 LAB — BACTERIA UR CULT: ABNORMAL

## 2024-08-05 NOTE — PROGRESS NOTES
Patient ID:   Chico Iqbal is a 88 y.o. male with PMH remarkable for HTN, HLD, COPD, BPH who presents to the office today for Medicare Annual Wellness Visit Subsequent.    HEALTH MAINTENANCE: Annual Medicare Wellness Physical  Last Labs: CBC, CMP 7/15/2024. Lipid, TSH 7/24/2023.   PSA Screening (starting at age 55 till 69): 0.87 on 7/24/2023  Colonoscopy (45-75 or age 40 with 1st degree relative dx colon ca): Declines  Lung cancer screening (50-76 y/o x 20 pk yr for at least 20 yrs + current smoker OR quit in last 15 years, no CT w/I last year): 3/7/2023 showed RLL calcified granuloma.  DEXA (65+, q 2 years women and 70+ men): 2017 osteopenia of left femoral neck and left total hip  Echo 3/9/2023 showed LVSF 40%, pseudonormal pattern of LVDF. LA mild to moderately dilated. Global and segmental LV hypokinesis. CVP elevated.   Carotid US done 6/26/2024 showed 20-49% bilaterally.  - last urine culture 7/26/2024 showed multiple organisms present, likely contamination.     Last Hgb 7.8 and hematocrit was 24.0 on 7/15/2024 --> will recheck this level.    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     Joint Injection Large/Arthrocentesis: R greater trochanteric bursa on 8/6/2024 12:34 PM  Indications: pain  Details: 25 G needle  Medications: 2.5 mg triamcinolone acetonide 40 mg/mL; 1 mL lidocaine PF 10 mg/mL (1 %)  Aspirate: 0 mL  Outcome: tolerated well, no immediate complications  Procedure, treatment alternatives, risks and benefits explained, specific risks discussed. Consent was given by the patient. Immediately prior to procedure a time out was called to verify the correct patient, procedure, equipment, support  "staff and site/side marked as required. Patient was prepped and draped in the usual sterile fashion.         Social History     Tobacco Use    Smoking status: Some Days     Types: Cigars     Passive exposure: Current    Smokeless tobacco: Never   Vaping Use    Vaping status: Never Used   Substance Use Topics    Alcohol use: Not Currently     Comment: OCCASIONAL, 2-3 BEERS DAILY    Drug use: Never     Immunization History   Administered Date(s) Administered    Influenza, High Dose Seasonal, Preservative Free 03/09/2023    Quietyme Purple Cap SARS-CoV-2 03/12/2021, 04/04/2021, 12/06/2021    Pneumococcal polysaccharide vaccine, 23-valent, age 2 years and older (PNEUMOVAX 23) 03/09/2023     Review of Systems   Constitutional:  Positive for fatigue.   Genitourinary:  Positive for difficulty urinating, dysuria, hematuria and urgency.   Musculoskeletal:  Positive for arthralgias and myalgias.   Neurological:  Positive for weakness.   All other systems reviewed and are negative.    No Known Allergies     Visit Vitals  /62 (BP Location: Left arm, Patient Position: Sitting)   Pulse 77   Resp 18   Ht 1.803 m (5' 11\")   Wt 83 kg (183 lb)   SpO2 91%   BMI 25.52 kg/m²   Smoking Status Some Days   BSA 2.04 m²     Physical Exam  Vitals reviewed.   Constitutional:       General: He is not in acute distress.     Appearance: Normal appearance. He is not ill-appearing.   HENT:      Head: Normocephalic and atraumatic.      Right Ear: Tympanic membrane and external ear normal.      Left Ear: Tympanic membrane and external ear normal.      Nose: Nose normal.      Mouth/Throat:      Mouth: Mucous membranes are moist.      Pharynx: Oropharynx is clear.   Eyes:      Conjunctiva/sclera: Conjunctivae normal.      Pupils: Pupils are equal, round, and reactive to light.   Cardiovascular:      Rate and Rhythm: Normal rate and regular rhythm.      Heart sounds: Normal heart sounds. No murmur heard.  Pulmonary:      Effort: Pulmonary effort is " normal. No respiratory distress.      Breath sounds: Normal breath sounds. No wheezing.   Abdominal:      General: There is no distension.      Palpations: Abdomen is soft. There is no mass.      Tenderness: There is no abdominal tenderness.   Musculoskeletal:      Cervical back: Normal range of motion and neck supple.      Right hip: Tenderness present. Decreased range of motion.   Skin:     General: Skin is warm and dry.      Findings: Bruising present.   Neurological:      General: No focal deficit present.      Mental Status: He is alert and oriented to person, place, and time.      Sensory: No sensory deficit.      Motor: Weakness present.   Psychiatric:         Mood and Affect: Mood normal.         Behavior: Behavior normal.       Current Outpatient Medications   Medication Instructions    atorvastatin (LIPITOR) 40 mg, oral, Daily    clopidogrel (Plavix) 75 mg tablet Take one tablet by mouth daily    docusate sodium (COLACE) 100 mg, oral, 2 times daily    Eliquis 2.5 mg, oral, 2 times daily    furosemide (LASIX) 20 mg, oral, Daily    metoprolol succinate XL (TOPROL-XL) 50 mg, oral, Daily, Do not crush or chew.     mirtazapine (REMERON) 7.5 mg, oral, Nightly    pantoprazole (PROTONIX) 40 mg, oral, Daily before breakfast, Do not crush, chew, or split.     tamsulosin (FLOMAX) 0.4 mg, oral, Daily       Lab Results   Component Value Date    WBC 3.8 (L) 07/15/2024    HGB 7.8 (L) 07/15/2024    HCT 24.0 (L) 07/15/2024     (L) 07/15/2024    CHOL 102 07/24/2023    TRIG 114 07/24/2023    HDL 28.8 (A) 07/24/2023    ALT 10 07/15/2024    AST 14 07/15/2024     07/15/2024    K 3.8 07/15/2024     07/15/2024    CREATININE 0.73 07/15/2024    BUN 10 07/15/2024    CO2 30 07/15/2024    TSH 1.20 07/24/2023    INR 1.2 (H) 03/07/2023    HGBA1C 6.1 (A) 03/08/2022     Problem List Items Addressed This Visit             ICD-10-CM    BPH (benign prostatic hyperplasia) N40.0     - c/w flomax  - FU with Dr Ramirez as  scheduled  - cystoscopy is scheduled for soon with him         Hypercholesterolemia E78.00    Low hemoglobin D64.9     - Hgb was 7.8 ~3 wks ago when Dr Yancey checked it  - will recheck the level in 2-3 days and we will call you with the results  - if Hgb <7 then you will likely need a blood transfusion  - he is currently on eliqus 2.5mg BID for PMH Afib and endorses hematuria         Relevant Orders    CBC and Auto Differential    PAF (paroxysmal atrial fibrillation) (Multi) I48.0     - c/w eliqus for stroke prevention. He is on 2.5mg eliqus BID.  - c/w metoprolol for rate control         Colon cancer screening declined Z53.20    Encounter for annual wellness visit (AWV) in Medicare patient Z00.00    Trochanteric bursitis of right hip M70.61     - s/p steroid injection done today  - pt tolerated well         Relevant Medications    triamcinolone acetonide (Kenalog-40) injection 40 mg (Completed)    lidocaine PF (Xylocaine) 10 mg/mL (1 %) injection 10 mg (Completed)     --------------------  Written by Annalise Arellano RN, acting as a scribe for Dr. Hubbard. This note accurately reflects the work and decisions made by Dr. Hubbard.     I, Dr. Hubbard, attest all medical record entries made by the scribe were under my direction and were personally dictated by me. I have reviewed the chart and agree that the record accurately reflects my performance of the history, physical exam, and assessment and plan.

## 2024-08-06 ENCOUNTER — APPOINTMENT (OUTPATIENT)
Dept: PRIMARY CARE | Facility: CLINIC | Age: 89
End: 2024-08-06
Payer: MEDICARE

## 2024-08-06 VITALS
OXYGEN SATURATION: 91 % | RESPIRATION RATE: 18 BRPM | SYSTOLIC BLOOD PRESSURE: 116 MMHG | WEIGHT: 183 LBS | HEIGHT: 71 IN | DIASTOLIC BLOOD PRESSURE: 62 MMHG | HEART RATE: 77 BPM | BODY MASS INDEX: 25.62 KG/M2

## 2024-08-06 DIAGNOSIS — M70.61 TROCHANTERIC BURSITIS OF RIGHT HIP: ICD-10-CM

## 2024-08-06 DIAGNOSIS — E78.00 HYPERCHOLESTEROLEMIA: ICD-10-CM

## 2024-08-06 DIAGNOSIS — I48.0 PAF (PAROXYSMAL ATRIAL FIBRILLATION) (MULTI): ICD-10-CM

## 2024-08-06 DIAGNOSIS — Z00.00 ENCOUNTER FOR ANNUAL WELLNESS VISIT (AWV) IN MEDICARE PATIENT: ICD-10-CM

## 2024-08-06 DIAGNOSIS — Z53.20 COLON CANCER SCREENING DECLINED: ICD-10-CM

## 2024-08-06 DIAGNOSIS — N40.0 BENIGN PROSTATIC HYPERPLASIA, UNSPECIFIED WHETHER LOWER URINARY TRACT SYMPTOMS PRESENT: ICD-10-CM

## 2024-08-06 DIAGNOSIS — D64.9 LOW HEMOGLOBIN: ICD-10-CM

## 2024-08-06 PROBLEM — J32.9 SINUS INFECTION: Status: RESOLVED | Noted: 2023-01-24 | Resolved: 2024-08-06

## 2024-08-06 PROBLEM — J96.01 ACUTE RESPIRATORY FAILURE WITH HYPOXIA (MULTI): Status: RESOLVED | Noted: 2024-04-03 | Resolved: 2024-08-06

## 2024-08-06 PROBLEM — R05.9 COUGH: Status: RESOLVED | Noted: 2023-01-24 | Resolved: 2024-08-06

## 2024-08-06 PROBLEM — K59.1 FUNCTIONAL DIARRHEA: Status: RESOLVED | Noted: 2024-05-06 | Resolved: 2024-08-06

## 2024-08-06 PROBLEM — I25.2 HISTORY OF MYOCARDIAL INFARCTION: Status: ACTIVE | Noted: 2024-08-06

## 2024-08-06 PROBLEM — I48.91 ATRIAL FIBRILLATION (MULTI): Status: RESOLVED | Noted: 2023-01-24 | Resolved: 2024-08-06

## 2024-08-06 PROBLEM — E66.811 CLASS 1 OBESITY WITH BODY MASS INDEX (BMI) OF 30.0 TO 30.9 IN ADULT: Status: RESOLVED | Noted: 2023-01-24 | Resolved: 2024-08-06

## 2024-08-06 PROBLEM — E66.811 OBESITY (BMI 30.0-34.9): Status: RESOLVED | Noted: 2023-01-24 | Resolved: 2024-08-06

## 2024-08-06 PROBLEM — J20.9 ACUTE BRONCHITIS: Status: RESOLVED | Noted: 2023-01-24 | Resolved: 2024-08-06

## 2024-08-06 PROBLEM — J01.00 ACUTE MAXILLARY SINUSITIS: Status: RESOLVED | Noted: 2023-01-24 | Resolved: 2024-08-06

## 2024-08-06 PROBLEM — J01.10 ACUTE FRONTAL SINUSITIS: Status: RESOLVED | Noted: 2023-01-24 | Resolved: 2024-08-06

## 2024-08-06 PROBLEM — U07.1 COVID-19: Status: RESOLVED | Noted: 2023-01-24 | Resolved: 2024-08-06

## 2024-08-06 PROBLEM — R73.9 HYPERGLYCEMIA: Status: RESOLVED | Noted: 2023-01-24 | Resolved: 2024-08-06

## 2024-08-06 PROBLEM — E66.9 CLASS 1 OBESITY WITH BODY MASS INDEX (BMI) OF 30.0 TO 30.9 IN ADULT: Status: RESOLVED | Noted: 2023-01-24 | Resolved: 2024-08-06

## 2024-08-06 PROBLEM — E66.9 OBESITY (BMI 30.0-34.9): Status: RESOLVED | Noted: 2023-01-24 | Resolved: 2024-08-06

## 2024-08-06 PROCEDURE — G0439 PPPS, SUBSEQ VISIT: HCPCS | Performed by: INTERNAL MEDICINE

## 2024-08-06 PROCEDURE — 3078F DIAST BP <80 MM HG: CPT | Performed by: INTERNAL MEDICINE

## 2024-08-06 PROCEDURE — 1159F MED LIST DOCD IN RCRD: CPT | Performed by: INTERNAL MEDICINE

## 2024-08-06 PROCEDURE — 99214 OFFICE O/P EST MOD 30 MIN: CPT | Performed by: INTERNAL MEDICINE

## 2024-08-06 PROCEDURE — 1170F FXNL STATUS ASSESSED: CPT | Performed by: INTERNAL MEDICINE

## 2024-08-06 PROCEDURE — 1160F RVW MEDS BY RX/DR IN RCRD: CPT | Performed by: INTERNAL MEDICINE

## 2024-08-06 PROCEDURE — 1125F AMNT PAIN NOTED PAIN PRSNT: CPT | Performed by: INTERNAL MEDICINE

## 2024-08-06 PROCEDURE — 3074F SYST BP LT 130 MM HG: CPT | Performed by: INTERNAL MEDICINE

## 2024-08-06 PROCEDURE — 20610 DRAIN/INJ JOINT/BURSA W/O US: CPT | Performed by: INTERNAL MEDICINE

## 2024-08-06 PROCEDURE — 1123F ACP DISCUSS/DSCN MKR DOCD: CPT | Performed by: INTERNAL MEDICINE

## 2024-08-06 RX ORDER — APIXABAN 2.5 MG/1
2.5 TABLET, FILM COATED ORAL 2 TIMES DAILY
COMMUNITY
Start: 2024-08-02

## 2024-08-06 RX ORDER — LIDOCAINE HYDROCHLORIDE 10 MG/ML
1 INJECTION, SOLUTION EPIDURAL; INFILTRATION; INTRACAUDAL; PERINEURAL ONCE
Status: COMPLETED | OUTPATIENT
Start: 2024-08-06 | End: 2024-08-06

## 2024-08-06 RX ORDER — MIRTAZAPINE 15 MG/1
7.5 TABLET, FILM COATED ORAL NIGHTLY
COMMUNITY
Start: 2024-07-25

## 2024-08-06 RX ORDER — TRIAMCINOLONE ACETONIDE 40 MG/ML
40 INJECTION, SUSPENSION INTRA-ARTICULAR; INTRAMUSCULAR ONCE
Status: COMPLETED | OUTPATIENT
Start: 2024-08-06 | End: 2024-08-06

## 2024-08-06 RX ADMIN — TRIAMCINOLONE ACETONIDE 40 MG: 40 INJECTION, SUSPENSION INTRA-ARTICULAR; INTRAMUSCULAR at 12:43

## 2024-08-06 RX ADMIN — TRIAMCINOLONE ACETONIDE 2.5 MG: 40 INJECTION, SUSPENSION INTRA-ARTICULAR; INTRAMUSCULAR at 12:34

## 2024-08-06 RX ADMIN — LIDOCAINE HYDROCHLORIDE 10 MG: 10 INJECTION, SOLUTION EPIDURAL; INFILTRATION; INTRACAUDAL; PERINEURAL at 12:42

## 2024-08-06 RX ADMIN — LIDOCAINE HYDROCHLORIDE 1 ML: 10 INJECTION, SOLUTION EPIDURAL; INFILTRATION; INTRACAUDAL; PERINEURAL at 12:34

## 2024-08-06 ASSESSMENT — ACTIVITIES OF DAILY LIVING (ADL)
GROCERY_SHOPPING: NEEDS ASSISTANCE
BATHING: INDEPENDENT
TAKING_MEDICATION: TOTAL CARE
MANAGING_FINANCES: INDEPENDENT
DOING_HOUSEWORK: NEEDS ASSISTANCE
DRESSING: INDEPENDENT

## 2024-08-06 ASSESSMENT — ENCOUNTER SYMPTOMS
DYSURIA: 1
ARTHRALGIAS: 1
WEAKNESS: 1
DIFFICULTY URINATING: 1
MYALGIAS: 1
FATIGUE: 1
HEMATURIA: 1

## 2024-08-06 ASSESSMENT — PATIENT HEALTH QUESTIONNAIRE - PHQ9
2. FEELING DOWN, DEPRESSED OR HOPELESS: NOT AT ALL
SUM OF ALL RESPONSES TO PHQ9 QUESTIONS 1 AND 2: 0
1. LITTLE INTEREST OR PLEASURE IN DOING THINGS: NOT AT ALL

## 2024-08-06 ASSESSMENT — PAIN SCALES - GENERAL: PAINLEVEL: 5

## 2024-08-06 NOTE — PATIENT INSTRUCTIONS
It was nice to see you in the office today!  As discussed during our visit...     Please have your blood drawn in the next 1-2 days.  We will contact you with the results of your blood work and any necessary adjustments to your plan of care.  If you do not hear from us within 3-5 days of having your blood drawn, please call the [place] office at [phone].     The two closest Outpatient Lab's to this office is:  [facility]  [address] [place] [address]    Hours:   Monday through Friday 7:30am - 4:30pm (Closed 12:30-1pm for lunch)     Or you can go to ...  [place] [id number] [address] [phone]    Hours:   Monday through Friday 7:30am - 4:30pm (Closed 12:30-1pm for lunch)   Saturday 8am - 12pm    Website to confirm hours and location OR look up more locations ... [email or web address]     --------------------------------------------------    Let us know if your right hip pain does not improve.

## 2024-08-06 NOTE — ASSESSMENT & PLAN NOTE
- Hgb was 7.8 ~3 wks ago when Dr Yancey checked it  - will recheck the level in 2-3 days and we will call you with the results  - if Hgb <7 then you will likely need a blood transfusion  - he is currently on eliqus 2.5mg BID for PMH Afib and endorses hematuria

## 2024-08-10 DIAGNOSIS — E78.00 HYPERCHOLESTEROLEMIA: ICD-10-CM

## 2024-08-11 RX ORDER — LIDOCAINE HYDROCHLORIDE 10 MG/ML
1 INJECTION, SOLUTION EPIDURAL; INFILTRATION; INTRACAUDAL; PERINEURAL
Status: COMPLETED | OUTPATIENT
Start: 2024-08-06 | End: 2024-08-06

## 2024-08-11 RX ORDER — TRIAMCINOLONE ACETONIDE 40 MG/ML
2.5 INJECTION, SUSPENSION INTRA-ARTICULAR; INTRAMUSCULAR
Status: COMPLETED | OUTPATIENT
Start: 2024-08-06 | End: 2024-08-06

## 2024-08-12 ENCOUNTER — APPOINTMENT (OUTPATIENT)
Dept: UROLOGY | Facility: CLINIC | Age: 89
End: 2024-08-12
Payer: MEDICARE

## 2024-08-12 DIAGNOSIS — R31.0 GROSS HEMATURIA: Primary | ICD-10-CM

## 2024-08-12 DIAGNOSIS — N40.0 BENIGN PROSTATIC HYPERPLASIA, UNSPECIFIED WHETHER LOWER URINARY TRACT SYMPTOMS PRESENT: ICD-10-CM

## 2024-08-12 PROCEDURE — 99214 OFFICE O/P EST MOD 30 MIN: CPT | Performed by: STUDENT IN AN ORGANIZED HEALTH CARE EDUCATION/TRAINING PROGRAM

## 2024-08-12 PROCEDURE — 52000 CYSTOURETHROSCOPY: CPT | Performed by: STUDENT IN AN ORGANIZED HEALTH CARE EDUCATION/TRAINING PROGRAM

## 2024-08-12 RX ORDER — ATORVASTATIN CALCIUM 40 MG/1
40 TABLET, FILM COATED ORAL DAILY
Qty: 90 TABLET | Refills: 0 | Status: SHIPPED | OUTPATIENT
Start: 2024-08-12

## 2024-08-12 NOTE — PROGRESS NOTES
Patient ID: Chico Iqbal is a 88 y.o. male.    Procedures  PROCEDURE NOTE:    PREOPERATIVE DIAGNOSIS:  Gross hematuria    POSTOPERATIVE DIAGNOSIS:  Same    OPERATION:  Flexible Cystourethroscopy      SURGEON:  Latrell Yancey MD    ANESTHESIA:  2%  lidocaine jelly    COMPLICATIONS:  None    EBL: Minimal    SPECIMEN:  Voided urine was not collected and submitted for cytology.    DISPOSITION:  The patient was discharged home after the procedure, per routine.    INDICATIONS: :  Mr. Iqbal is a 88 y.o. patient with a history of gross hematuria and UTI who presents today for Cystoscopy.     The indications, risks and benefits of this procedure were discussed with the patient, consent was obtained prior to the procedure, and to the best of my judgement the patient seemed to understand and agree to the procedure.    PROCEDURE:  The patient  was brought into the procedure suite and informed consent was reviewed and confirmed. Vital signs were obtained prior to the procedure: There were no vitals taken for this visit..  The patient was escorted onto the stretcher, placed supine, prepped with betadine and draped in the usual standard surgical fashion.  Intraurethral 2% viscous lidocaine jelly was used for local analgesia.  A 16 [place] flexible cystourethroscope was inserted into the urethra.   The penile urethra was normal.  The prostate urethra was enlarged with prominent median lobe and prominent veins.  Upon entering the bladder the entire bladder was surveyed in a 360 degree fashion.  The left and right ureteral orifices were in normal orthotopic position effluxing clear yellow urine, bilaterally.   There was no evidence of any bladder lesions, foreign objects, stones or evidence of any mucosal changes. The cystoscope was then retroflexed.  The bladder neck was then further examined without any evidence of lesions. The scope was then removed and in an antegrade fashion, the urethra and bladder were again resurveyed with no  evidence of additional lesions.  The cystoscope was then fully removed.   The patient tolerated the procedure well.  Vitals were stable after the procedure.  The patient was able to void and was discharged home.  Verbal and written Post procedure instructions were reviewed with the patient.    IMPRESSION:  Enlarged prostate with prominent median lobe and prominent veins    PLAN:  Urine PCR  Start finasteride 5mg daily  Increase tamsulosin dose to 2 pills at night    Subjective   Patient ID: Chico Iqbal is a 88 y.o. male.    HPI  88 y.o. male who presents for UTIs. Results of UA as below. Initially seen by his PCP.      Had UTI 5/6/24 and then again 6/21/24. Was on antibiotics Keflex and he felt better after taking this.     He is on double dose tamsulosin for urinary sxs.       CT urography w 3D volume rendered imaging  Result Date: 7/28/2024      1.  14 mm right upper pole hyperdense minimally enhancing lesion as described, suspicious for papillary subtype RCC. No calculi or hydronephrosis. 2. Unremarkable urinary bladder. 3. Moderate prostatomegaly with mass effect on the urinary bladder wall.       Review of Systems    Objective   Physical Exam    Assessment/Plan   88 y.o. male who presents for UTIs. Results of UA as below. Initially seen by his PCP.      Had UTI 5/6/24 and then again 6/21/24. Was on antibiotics Keflex and he felt better after taking this.     He is on double dose tamsulosin for urinary sxs.      I personally reviewed the CT urogram. 14 mm right upper pole hyperdense minimally enhancing lesion as described, suspicious for papillary subtype RCC. No calculi or hydronephrosis. Unremarkable urinary bladder. Moderate prostatomegaly with mass effect on the urinary bladder wall.       Cystoscopy details as above. Very enlarged prostate.     We discussed procedures for prostate, however due to age and commorbidities, continuing medical therapy for now is acceptable.     Plan  Make tamsulosin 0.4  mg 2  pills daily at bedtime. Side effects reviewed.   Add finasteride 5 mg 1 pill daily. Side effects reviewed.   Urine PCR.   FUV 2 months.   Diagnoses and all orders for this visit:  Gross hematuria  Benign prostatic hyperplasia, unspecified whether lower urinary tract symptoms present  -     finasteride (Proscar) 5 mg tablet; Take 1 tablet (5 mg) by mouth once daily. Do not crush, chew, or split.  -     tamsulosin (Flomax) 0.4 mg 24 hr capsule; Take 2 capsules (0.8 mg) by mouth once daily.

## 2024-08-13 RX ORDER — FINASTERIDE 5 MG/1
5 TABLET, FILM COATED ORAL DAILY
Qty: 30 TABLET | Refills: 11 | Status: SHIPPED | OUTPATIENT
Start: 2024-08-13 | End: 2025-08-13

## 2024-08-13 RX ORDER — TAMSULOSIN HYDROCHLORIDE 0.4 MG/1
0.8 CAPSULE ORAL DAILY
Qty: 60 CAPSULE | Refills: 11 | Status: SHIPPED | OUTPATIENT
Start: 2024-08-13 | End: 2025-08-13

## 2024-08-25 ENCOUNTER — HOSPITAL ENCOUNTER (INPATIENT)
Facility: HOSPITAL | Age: 89
LOS: 4 days | Discharge: HOME | DRG: 872 | End: 2024-08-30
Attending: FAMILY MEDICINE | Admitting: INTERNAL MEDICINE
Payer: MEDICARE

## 2024-08-25 ENCOUNTER — APPOINTMENT (OUTPATIENT)
Dept: RADIOLOGY | Facility: HOSPITAL | Age: 89
DRG: 872 | End: 2024-08-25
Payer: MEDICARE

## 2024-08-25 DIAGNOSIS — D64.9 ANEMIA REQUIRING TRANSFUSIONS: ICD-10-CM

## 2024-08-25 DIAGNOSIS — N39.0 URINARY TRACT INFECTION WITHOUT HEMATURIA, SITE UNSPECIFIED: Primary | ICD-10-CM

## 2024-08-25 DIAGNOSIS — A41.9 SEPSIS DUE TO URINARY TRACT INFECTION (MULTI): ICD-10-CM

## 2024-08-25 DIAGNOSIS — A41.9 SEPSIS (MULTI): ICD-10-CM

## 2024-08-25 DIAGNOSIS — I10 BENIGN HYPERTENSION: ICD-10-CM

## 2024-08-25 DIAGNOSIS — N39.0 SEPSIS DUE TO URINARY TRACT INFECTION (MULTI): ICD-10-CM

## 2024-08-25 LAB
ABO GROUP (TYPE) IN BLOOD: NORMAL
ABO GROUP (TYPE) IN BLOOD: NORMAL
ALBUMIN SERPL BCP-MCNC: 3.9 G/DL (ref 3.4–5)
ALP SERPL-CCNC: 93 U/L (ref 33–136)
ALT SERPL W P-5'-P-CCNC: 14 U/L (ref 10–52)
ANION GAP BLDV CALCULATED.4IONS-SCNC: 13 MMOL/L (ref 10–25)
ANION GAP SERPL CALC-SCNC: 16 MMOL/L (ref 10–20)
ANTIBODY SCREEN: NORMAL
APPEARANCE UR: CLEAR
AST SERPL W P-5'-P-CCNC: 17 U/L (ref 9–39)
BACTERIA #/AREA URNS AUTO: ABNORMAL /HPF
BASE EXCESS BLDV CALC-SCNC: -0.5 MMOL/L (ref -2–3)
BASOPHILS # BLD AUTO: 0.01 X10*3/UL (ref 0–0.1)
BASOPHILS NFR BLD AUTO: 0.1 %
BILIRUB SERPL-MCNC: 0.7 MG/DL (ref 0–1.2)
BILIRUB UR STRIP.AUTO-MCNC: NEGATIVE MG/DL
BODY TEMPERATURE: ABNORMAL
BUN SERPL-MCNC: 17 MG/DL (ref 6–23)
CA-I BLDV-SCNC: 1.14 MMOL/L (ref 1.1–1.33)
CALCIUM SERPL-MCNC: 9.2 MG/DL (ref 8.6–10.3)
CARDIAC TROPONIN I PNL SERPL HS: 4 NG/L (ref 0–20)
CHLORIDE BLDV-SCNC: 102 MMOL/L (ref 98–107)
CHLORIDE SERPL-SCNC: 101 MMOL/L (ref 98–107)
CO2 SERPL-SCNC: 25 MMOL/L (ref 21–32)
COLOR UR: YELLOW
CREAT SERPL-MCNC: 1.13 MG/DL (ref 0.5–1.3)
DACRYOCYTES BLD QL SMEAR: NORMAL
EGFRCR SERPLBLD CKD-EPI 2021: 63 ML/MIN/1.73M*2
EOSINOPHIL # BLD AUTO: 0 X10*3/UL (ref 0–0.4)
EOSINOPHIL NFR BLD AUTO: 0 %
ERYTHROCYTE [DISTWIDTH] IN BLOOD BY AUTOMATED COUNT: 14.7 % (ref 11.5–14.5)
GIANT PLATELETS BLD QL SMEAR: NORMAL
GLUCOSE BLDV-MCNC: 137 MG/DL (ref 74–99)
GLUCOSE SERPL-MCNC: 131 MG/DL (ref 74–99)
GLUCOSE UR STRIP.AUTO-MCNC: NORMAL MG/DL
HCO3 BLDV-SCNC: 23.6 MMOL/L (ref 22–26)
HCT VFR BLD AUTO: 19.5 % (ref 41–52)
HCT VFR BLD EST: 11 % (ref 41–52)
HEMOCCULT SP1 STL QL: NEGATIVE
HGB BLD-MCNC: 5.9 G/DL (ref 13.5–17.5)
HGB BLDV-MCNC: 3.6 G/DL (ref 13.5–17.5)
IMM GRANULOCYTES # BLD AUTO: 0.05 X10*3/UL (ref 0–0.5)
IMM GRANULOCYTES NFR BLD AUTO: 0.7 % (ref 0–0.9)
INHALED O2 CONCENTRATION: 21 %
INR PPP: 1.5 (ref 0.9–1.1)
KETONES UR STRIP.AUTO-MCNC: NEGATIVE MG/DL
LACTATE BLDV-SCNC: 4.3 MMOL/L (ref 0.4–2)
LACTATE SERPL-SCNC: 3.3 MMOL/L (ref 0.4–2)
LACTATE SERPL-SCNC: 5.7 MMOL/L (ref 0.4–2)
LEUKOCYTE ESTERASE UR QL STRIP.AUTO: ABNORMAL
LYMPHOCYTES # BLD AUTO: 3.26 X10*3/UL (ref 0.8–3)
LYMPHOCYTES NFR BLD AUTO: 43.5 %
MAGNESIUM SERPL-MCNC: 1.69 MG/DL (ref 1.6–2.4)
MCH RBC QN AUTO: 40.1 PG (ref 26–34)
MCHC RBC AUTO-ENTMCNC: 30.3 G/DL (ref 32–36)
MCV RBC AUTO: 133 FL (ref 80–100)
MONOCYTES # BLD AUTO: 3.1 X10*3/UL (ref 0.05–0.8)
MONOCYTES NFR BLD AUTO: 41.4 %
MUCOUS THREADS #/AREA URNS AUTO: ABNORMAL /LPF
NEUTROPHILS # BLD AUTO: 1.07 X10*3/UL (ref 1.6–5.5)
NEUTROPHILS NFR BLD AUTO: 14.3 %
NITRITE UR QL STRIP.AUTO: NEGATIVE
NRBC BLD-RTO: 0 /100 WBCS (ref 0–0)
OVALOCYTES BLD QL SMEAR: NORMAL
OXYHGB MFR BLDV: 60.7 % (ref 45–75)
PCO2 BLDV: 34 MM HG (ref 41–51)
PH BLDV: 7.45 PH (ref 7.33–7.43)
PH UR STRIP.AUTO: 6.5 [PH]
PLATELET # BLD AUTO: 89 X10*3/UL (ref 150–450)
PO2 BLDV: 36 MM HG (ref 35–45)
POTASSIUM BLDV-SCNC: 4.3 MMOL/L (ref 3.5–5.3)
POTASSIUM SERPL-SCNC: 4.7 MMOL/L (ref 3.5–5.3)
PROT SERPL-MCNC: 6.8 G/DL (ref 6.4–8.2)
PROT UR STRIP.AUTO-MCNC: NEGATIVE MG/DL
PROTHROMBIN TIME: 17.1 SECONDS (ref 9.8–12.8)
RBC # BLD AUTO: 1.47 X10*6/UL (ref 4.5–5.9)
RBC # UR STRIP.AUTO: ABNORMAL /UL
RBC #/AREA URNS AUTO: ABNORMAL /HPF
RBC MORPH BLD: NORMAL
RH FACTOR (ANTIGEN D): NORMAL
RH FACTOR (ANTIGEN D): NORMAL
SAO2 % BLDV: 63 % (ref 45–75)
SCHISTOCYTES BLD QL SMEAR: NORMAL
SODIUM BLDV-SCNC: 134 MMOL/L (ref 136–145)
SODIUM SERPL-SCNC: 137 MMOL/L (ref 136–145)
SP GR UR STRIP.AUTO: 1.01
UROBILINOGEN UR STRIP.AUTO-MCNC: ABNORMAL MG/DL
WBC # BLD AUTO: 7.5 X10*3/UL (ref 4.4–11.3)
WBC #/AREA URNS AUTO: ABNORMAL /HPF
WBC CLUMPS #/AREA URNS AUTO: ABNORMAL /HPF
YEAST BUDDING #/AREA UR COMP ASSIST: PRESENT /HPF

## 2024-08-25 PROCEDURE — 36415 COLL VENOUS BLD VENIPUNCTURE: CPT | Performed by: HEALTH CARE PROVIDER

## 2024-08-25 PROCEDURE — 74176 CT ABD & PELVIS W/O CONTRAST: CPT

## 2024-08-25 PROCEDURE — 87040 BLOOD CULTURE FOR BACTERIA: CPT | Mod: GENLAB | Performed by: FAMILY MEDICINE

## 2024-08-25 PROCEDURE — 81001 URINALYSIS AUTO W/SCOPE: CPT | Performed by: HEALTH CARE PROVIDER

## 2024-08-25 PROCEDURE — 2500000004 HC RX 250 GENERAL PHARMACY W/ HCPCS (ALT 636 FOR OP/ED): Performed by: FAMILY MEDICINE

## 2024-08-25 PROCEDURE — 74176 CT ABD & PELVIS W/O CONTRAST: CPT | Performed by: RADIOLOGY

## 2024-08-25 PROCEDURE — 83605 ASSAY OF LACTIC ACID: CPT | Performed by: HEALTH CARE PROVIDER

## 2024-08-25 PROCEDURE — 85025 COMPLETE CBC W/AUTO DIFF WBC: CPT | Performed by: HEALTH CARE PROVIDER

## 2024-08-25 PROCEDURE — 86901 BLOOD TYPING SEROLOGIC RH(D): CPT | Performed by: FAMILY MEDICINE

## 2024-08-25 PROCEDURE — 82435 ASSAY OF BLOOD CHLORIDE: CPT | Performed by: FAMILY MEDICINE

## 2024-08-25 PROCEDURE — 86920 COMPATIBILITY TEST SPIN: CPT

## 2024-08-25 PROCEDURE — 83735 ASSAY OF MAGNESIUM: CPT | Performed by: HEALTH CARE PROVIDER

## 2024-08-25 PROCEDURE — 36415 COLL VENOUS BLD VENIPUNCTURE: CPT | Performed by: FAMILY MEDICINE

## 2024-08-25 PROCEDURE — 36430 TRANSFUSION BLD/BLD COMPNT: CPT

## 2024-08-25 PROCEDURE — 96365 THER/PROPH/DIAG IV INF INIT: CPT

## 2024-08-25 PROCEDURE — 96361 HYDRATE IV INFUSION ADD-ON: CPT

## 2024-08-25 PROCEDURE — P9016 RBC LEUKOCYTES REDUCED: HCPCS

## 2024-08-25 PROCEDURE — 2500000004 HC RX 250 GENERAL PHARMACY W/ HCPCS (ALT 636 FOR OP/ED): Performed by: HEALTH CARE PROVIDER

## 2024-08-25 PROCEDURE — 80053 COMPREHEN METABOLIC PANEL: CPT | Performed by: HEALTH CARE PROVIDER

## 2024-08-25 PROCEDURE — 96360 HYDRATION IV INFUSION INIT: CPT

## 2024-08-25 PROCEDURE — 85610 PROTHROMBIN TIME: CPT | Performed by: HEALTH CARE PROVIDER

## 2024-08-25 PROCEDURE — 99285 EMERGENCY DEPT VISIT HI MDM: CPT | Mod: 25

## 2024-08-25 PROCEDURE — 87086 URINE CULTURE/COLONY COUNT: CPT | Mod: GENLAB | Performed by: HEALTH CARE PROVIDER

## 2024-08-25 PROCEDURE — 82270 OCCULT BLOOD FECES: CPT | Performed by: FAMILY MEDICINE

## 2024-08-25 PROCEDURE — 84484 ASSAY OF TROPONIN QUANT: CPT | Performed by: HEALTH CARE PROVIDER

## 2024-08-25 RX ORDER — CEFTRIAXONE 2 G/50ML
2 INJECTION, SOLUTION INTRAVENOUS ONCE
Status: COMPLETED | OUTPATIENT
Start: 2024-08-25 | End: 2024-08-26

## 2024-08-25 ASSESSMENT — COLUMBIA-SUICIDE SEVERITY RATING SCALE - C-SSRS
6. HAVE YOU EVER DONE ANYTHING, STARTED TO DO ANYTHING, OR PREPARED TO DO ANYTHING TO END YOUR LIFE?: NO
1. IN THE PAST MONTH, HAVE YOU WISHED YOU WERE DEAD OR WISHED YOU COULD GO TO SLEEP AND NOT WAKE UP?: NO
2. HAVE YOU ACTUALLY HAD ANY THOUGHTS OF KILLING YOURSELF?: NO

## 2024-08-25 ASSESSMENT — PAIN DESCRIPTION - PAIN TYPE: TYPE: ACUTE PAIN

## 2024-08-25 ASSESSMENT — PAIN DESCRIPTION - LOCATION: LOCATION: RECTUM

## 2024-08-25 ASSESSMENT — PAIN - FUNCTIONAL ASSESSMENT: PAIN_FUNCTIONAL_ASSESSMENT: 0-10

## 2024-08-25 ASSESSMENT — PAIN SCALES - GENERAL: PAINLEVEL_OUTOF10: 8

## 2024-08-25 NOTE — ED NOTES
This Rn assumed care report from Rtiika BROWN. Call accepted from lab regarding critical Hb of 5.9. Md Simons made aware.      Rose Rouse RN  08/25/24 1938

## 2024-08-25 NOTE — ED TRIAGE NOTES
States he is super constipated and hasn't pooped in a few weeks and states he has poop stuck in his rectum and it won't come out. Says he has vomited today as well.

## 2024-08-25 NOTE — ED PROVIDER NOTES
HPI   Chief Complaint   Patient presents with    Fecal Impaction     States he is super constipated and hasn't pooped in a few weeks and states he has poop stuck in his rectum and it won't come out. Says he has vomited today as well.        HPI    Dragon down for two Days    Patient is 89-year-old male came to the emergency room with a complaint of feeling constipated patient feels fecal impaction in rectum.  Patient has been in emergency room for more than an hour prior to my arrival.  Denies any chest pain or short of breath.  Denies blood in stool or black stool or vomiting blood.  Also denies any fever or chills.  Denies any abdominal distention.  Patient feels like he is impacted.  Denies hemorrhoids.    Family history: Reviewed  Social history: Reviewed denies substance abuse.  Review of system: 10 review of system obtained review of system described in HPI otherwise negative.  Patient History   Past Medical History:   Diagnosis Date    Body mass index (BMI) 29.0-29.9, adult 09/29/2020    Body mass index (BMI) of 29.0 to 29.9 in adult    Old myocardial infarction     History of myocardial infarction     Past Surgical History:   Procedure Laterality Date    OTHER SURGICAL HISTORY  05/17/2016    Previous Stent Placement     Family History   Problem Relation Name Age of Onset    Diabetes Mother      Heart attack Brother       Social History     Tobacco Use    Smoking status: Some Days     Types: Cigars     Passive exposure: Current    Smokeless tobacco: Never   Vaping Use    Vaping status: Never Used   Substance Use Topics    Alcohol use: Not Currently     Comment: OCCASIONAL, 2-3 BEERS DAILY    Drug use: Never       Physical Exam   ED Triage Vitals [08/25/24 1813]   Temperature Heart Rate Respirations BP   36.8 °C (98.3 °F) 95 18 111/68      Pulse Ox Temp Source Heart Rate Source Patient Position   97 % Temporal Monitor Sitting      BP Location FiO2 (%)     Right arm --       Physical Exam  Constitutional:        Appearance: Normal appearance.      Comments: Patient awake alert but complaining of fecal impaction.  No abdominal distention noted..  Patient was noted to be having generalized fatigue and weakness.  No tachypnea hypoxemia respite distress noted.  Moving air well bilaterally.  Heart with regular rate and rhythm no murmurs auscultated.  However he appears otherwise lethargic and weak.  Abdomen soft positive bowel sound nontender no guarding rebound. There is moderate amount of stool in the colon and also in the rectum as seen on CAT scan.  However no fecal impaction noted.  No CVA tenderness noted.. No CVA tenderness noted.  Calf is nontender Homans' sign negative intact distal pulse intact sensation.   HENT:      Head: Normocephalic and atraumatic.      Right Ear: External ear normal.      Left Ear: External ear normal.      Nose: Nose normal. No congestion or rhinorrhea.   Eyes:      Extraocular Movements: Extraocular movements intact.      Conjunctiva/sclera: Conjunctivae normal.      Pupils: Pupils are equal, round, and reactive to light.   Neck:      Comments: Neck is supple no evidence of injury is noted.  No meningismus.  Cardiovascular:      Rate and Rhythm: Normal rate and regular rhythm.      Pulses: Normal pulses.      Heart sounds: Normal heart sounds.      Comments: Regular rate and rhythm.  No friction rub no murmur no JVD peripheral pulses no peripheral edema calf is nontender Homans' sign negative.  Pulmonary:      Effort: Pulmonary effort is normal.      Breath sounds: Normal breath sounds.      Comments: Moving air well no tachypnea hypoxemia respiratory distress.  Abdominal:      General: Abdomen is flat. Bowel sounds are normal.      Palpations: Abdomen is soft.      Comments: Abdomen soft positive bowel sounds tenderness to tenderness no guarding rebound surgery.  No CVA tenderness noted.There is moderate amount of stool in the colon and also in the rectum as seen on CAT scan.  However no fecal  impaction noted.  No CVA tenderness noted.   Musculoskeletal:      Cervical back: Normal range of motion and neck supple.      Comments: Good motion arms or legs symmetrical range of motion of the leg strength 5/5 bilaterally spine nontender neck was supple pelvic is stable.   Skin:     General: Skin is warm.      Capillary Refill: Capillary refill takes less than 2 seconds.      Comments: No rashes bruises petechiae ecchymosis no red streaks.   Neurological:      General: No focal deficit present.      Mental Status: He is alert and oriented to person, place, and time.   Psychiatric:         Mood and Affect: Mood normal.         Behavior: Behavior normal.           ED Course & MDM   Diagnoses as of 08/26/24 0050   Urinary tract infection without hematuria, site unspecified   Sepsis due to urinary tract infection (Multi)   Anemia requiring transfusions   Sepsis (Multi)   Patient is 89-year-old male came to the emergency room with a complaint of feeling constipated patient feels fecal impaction in rectum.  Patient has been in emergency room for more than an hour prior to my arrival.  Denies any chest pain or short of breath.  Denies blood in stool or black stool or vomiting blood.  Also denies any fever or chills.  Denies any abdominal distention.  Patient feels like he is impacted.  Denies hemorrhoids.      Upon examination patient awake alert but complaining of fecal impaction.  No abdominal distention noted..  Patient was noted to be having generalized fatigue and weakness.  No tachypnea hypoxemia respite distress noted.  Moving air well bilaterally.  Heart with regular rate and rhythm no murmurs auscultated.  However he appears otherwise lethargic and weak.  Abdomen soft positive bowel sound nontender no guarding rebound or rigidity.  There is moderate amount of stool in the colon and also in the rectum as seen on CAT scan.  However no fecal impaction noted.  No CVA tenderness noted.  Calf is nontender Homans'  sign negative intact distal pulse intact sensation.            No data recorded                                 Medical Decision Making  Given the patient reported to emergency with a fecal impaction impaction he did take his sepsis alert as result sepsis protocol was used to treat this patient patient urinalysis showed leukocyte Estrace..  2+ bacteria WBC 21-50, white count 7.5 H&H is 5.9 hemoglobin 5 point hematocrit 19.5 severe anemia PT/INR was 17 and 1.5.    CT showed no acute abnormality in the abdomen or pelvis.      Small hiatal hernia.      Atherosclerosis. Descending thoracic aortic aneurysm. Coronary artery  calcifications.      Stable hyperdense lesion in the right kidney. Prostatomegaly. Refer  to recently performed CT urogram for details regarding the urinary  tract.  Initially UTI urosepsis patient also required treatment for anemia Caused by then patient had signed consent and blood transfusion started in the ER CT abdomen pelvis showed no findings to suggest or explain patient's anemia stool was negative Hemoccult.  No definite constipation noted on CT.  Patient was admitted for further care..     Procedures     Marisel Crook MD  08/25/24 1907       Marisel Crook MD  08/25/24 1907       Marisel Crook MD  08/25/24 2121       Marisel Crook MD  08/26/24 0050       Marisel Crook MD  09/07/24 0435       Marisel Crook MD  09/07/24 0436       Marisel Crook MD  09/17/24 1410

## 2024-08-26 PROBLEM — B35.1 MYCOTIC TOENAILS: Status: RESOLVED | Noted: 2023-01-24 | Resolved: 2024-08-26

## 2024-08-26 PROBLEM — M79.672 LEFT FOOT PAIN: Status: RESOLVED | Noted: 2023-01-24 | Resolved: 2024-08-26

## 2024-08-26 PROBLEM — N39.0 URINARY TRACT INFECTION WITHOUT HEMATURIA: Status: RESOLVED | Noted: 2024-08-26 | Resolved: 2024-08-26

## 2024-08-26 PROBLEM — N39.0 SEPSIS DUE TO URINARY TRACT INFECTION (MULTI): Status: ACTIVE | Noted: 2024-08-26

## 2024-08-26 PROBLEM — N39.0 URINARY TRACT INFECTION WITHOUT HEMATURIA: Status: ACTIVE | Noted: 2024-08-26

## 2024-08-26 PROBLEM — R42 DIZZINESS: Status: RESOLVED | Noted: 2023-01-24 | Resolved: 2024-08-26

## 2024-08-26 PROBLEM — E87.6 HYPOKALEMIA: Status: ACTIVE | Noted: 2024-08-26

## 2024-08-26 PROBLEM — R07.81 RIB PAIN: Status: RESOLVED | Noted: 2023-01-24 | Resolved: 2024-08-26

## 2024-08-26 PROBLEM — R20.0 NUMBNESS AND TINGLING OF BOTH LEGS: Status: RESOLVED | Noted: 2023-01-24 | Resolved: 2024-08-26

## 2024-08-26 PROBLEM — R20.2 DISTAL PARESTHESIA: Status: RESOLVED | Noted: 2023-01-24 | Resolved: 2024-08-26

## 2024-08-26 PROBLEM — R20.2 NUMBNESS AND TINGLING OF BOTH LEGS: Status: RESOLVED | Noted: 2023-01-24 | Resolved: 2024-08-26

## 2024-08-26 PROBLEM — F10.10 ALCOHOL ABUSE: Status: ACTIVE | Noted: 2024-08-26

## 2024-08-26 PROBLEM — M70.61 TROCHANTERIC BURSITIS OF RIGHT HIP: Status: RESOLVED | Noted: 2024-08-06 | Resolved: 2024-08-26

## 2024-08-26 PROBLEM — D72.9 ABNORMAL WBC COUNT: Status: RESOLVED | Noted: 2023-01-24 | Resolved: 2024-08-26

## 2024-08-26 PROBLEM — A41.9 SEPSIS DUE TO URINARY TRACT INFECTION (MULTI): Status: ACTIVE | Noted: 2024-08-26

## 2024-08-26 PROBLEM — A41.9 SEPSIS (MULTI): Status: ACTIVE | Noted: 2024-08-26

## 2024-08-26 PROBLEM — I10 UNCONTROLLED HYPERTENSION: Status: RESOLVED | Noted: 2023-01-24 | Resolved: 2024-08-26

## 2024-08-26 PROBLEM — Z00.00 ENCOUNTER FOR ANNUAL WELLNESS VISIT (AWV) IN MEDICARE PATIENT: Status: RESOLVED | Noted: 2024-08-06 | Resolved: 2024-08-26

## 2024-08-26 PROBLEM — R30.0 DYSURIA: Status: RESOLVED | Noted: 2023-03-22 | Resolved: 2024-08-26

## 2024-08-26 PROBLEM — K59.01 SLOW TRANSIT CONSTIPATION: Status: RESOLVED | Noted: 2024-05-06 | Resolved: 2024-08-26

## 2024-08-26 PROBLEM — K59.00 CONSTIPATION: Status: ACTIVE | Noted: 2024-05-06

## 2024-08-26 PROBLEM — Z53.20 COLON CANCER SCREENING DECLINED: Status: RESOLVED | Noted: 2024-08-06 | Resolved: 2024-08-26

## 2024-08-26 PROBLEM — L60.0 INGROWING TOENAIL OF LEFT FOOT: Status: RESOLVED | Noted: 2023-01-24 | Resolved: 2024-08-26

## 2024-08-26 PROBLEM — R26.2 DIFFICULTY WALKING: Status: RESOLVED | Noted: 2023-07-19 | Resolved: 2024-08-26

## 2024-08-26 LAB
ANION GAP SERPL CALC-SCNC: 13 MMOL/L (ref 10–20)
BLOOD EXPIRATION DATE: NORMAL
BLOOD EXPIRATION DATE: NORMAL
BUN SERPL-MCNC: 21 MG/DL (ref 6–23)
CALCIUM SERPL-MCNC: 7.6 MG/DL (ref 8.6–10.3)
CHLORIDE SERPL-SCNC: 102 MMOL/L (ref 98–107)
CO2 SERPL-SCNC: 22 MMOL/L (ref 21–32)
CREAT SERPL-MCNC: 1.04 MG/DL (ref 0.5–1.3)
DISPENSE STATUS: NORMAL
DISPENSE STATUS: NORMAL
EGFRCR SERPLBLD CKD-EPI 2021: 69 ML/MIN/1.73M*2
ERYTHROCYTE [DISTWIDTH] IN BLOOD BY AUTOMATED COUNT: ABNORMAL %
GLUCOSE SERPL-MCNC: 117 MG/DL (ref 74–99)
HCT VFR BLD AUTO: 18.1 % (ref 41–52)
HCT VFR BLD AUTO: 22.2 % (ref 41–52)
HCT VFR BLD AUTO: 24.7 % (ref 41–52)
HGB BLD-MCNC: 5.6 G/DL (ref 13.5–17.5)
HGB BLD-MCNC: 7.2 G/DL (ref 13.5–17.5)
HGB BLD-MCNC: 8.1 G/DL (ref 13.5–17.5)
HOLD SPECIMEN: NORMAL
LACTATE SERPL-SCNC: 1.7 MMOL/L (ref 0.4–2)
LACTATE SERPL-SCNC: 2.1 MMOL/L (ref 0.4–2)
LACTATE SERPL-SCNC: 2.3 MMOL/L (ref 0.4–2)
MCH RBC QN AUTO: 35.2 PG (ref 26–34)
MCH RBC QN AUTO: 36.9 PG (ref 26–34)
MCH RBC QN AUTO: 37.1 PG (ref 26–34)
MCHC RBC AUTO-ENTMCNC: 30.9 G/DL (ref 32–36)
MCHC RBC AUTO-ENTMCNC: 32.4 G/DL (ref 32–36)
MCHC RBC AUTO-ENTMCNC: 32.8 G/DL (ref 32–36)
MCV RBC AUTO: 107 FL (ref 80–100)
MCV RBC AUTO: 114 FL (ref 80–100)
MCV RBC AUTO: 120 FL (ref 80–100)
NRBC BLD-RTO: 0 /100 WBCS (ref 0–0)
PLATELET # BLD AUTO: 40 X10*3/UL (ref 150–450)
PLATELET # BLD AUTO: 49 X10*3/UL (ref 150–450)
PLATELET # BLD AUTO: 51 X10*3/UL (ref 150–450)
POTASSIUM SERPL-SCNC: 3.3 MMOL/L (ref 3.5–5.3)
PRODUCT BLOOD TYPE: 6200
PRODUCT BLOOD TYPE: 6200
PRODUCT CODE: NORMAL
PRODUCT CODE: NORMAL
RBC # BLD AUTO: 1.51 X10*6/UL (ref 4.5–5.9)
RBC # BLD AUTO: 1.95 X10*6/UL (ref 4.5–5.9)
RBC # BLD AUTO: 2.3 X10*6/UL (ref 4.5–5.9)
SODIUM SERPL-SCNC: 134 MMOL/L (ref 136–145)
UNIT ABO: NORMAL
UNIT ABO: NORMAL
UNIT NUMBER: NORMAL
UNIT NUMBER: NORMAL
UNIT RH: NORMAL
UNIT RH: NORMAL
UNIT VOLUME: 350
UNIT VOLUME: 350
WBC # BLD AUTO: 7.6 X10*3/UL (ref 4.4–11.3)
WBC # BLD AUTO: 8.7 X10*3/UL (ref 4.4–11.3)
WBC # BLD AUTO: 9.4 X10*3/UL (ref 4.4–11.3)
XM INTEP: NORMAL
XM INTEP: NORMAL

## 2024-08-26 PROCEDURE — 83605 ASSAY OF LACTIC ACID: CPT | Mod: IPSPLIT

## 2024-08-26 PROCEDURE — 2020000001 HC ICU ROOM DAILY: Mod: IPSPLIT

## 2024-08-26 PROCEDURE — 2500000004 HC RX 250 GENERAL PHARMACY W/ HCPCS (ALT 636 FOR OP/ED): Mod: IPSPLIT | Performed by: NURSE PRACTITIONER

## 2024-08-26 PROCEDURE — 36430 TRANSFUSION BLD/BLD COMPNT: CPT | Mod: IPSPLIT

## 2024-08-26 PROCEDURE — 36415 COLL VENOUS BLD VENIPUNCTURE: CPT | Mod: IPSPLIT

## 2024-08-26 PROCEDURE — 99223 1ST HOSP IP/OBS HIGH 75: CPT | Performed by: NURSE PRACTITIONER

## 2024-08-26 PROCEDURE — 2500000002 HC RX 250 W HCPCS SELF ADMINISTERED DRUGS (ALT 637 FOR MEDICARE OP, ALT 636 FOR OP/ED): Mod: IPSPLIT | Performed by: NURSE PRACTITIONER

## 2024-08-26 PROCEDURE — 36415 COLL VENOUS BLD VENIPUNCTURE: CPT | Performed by: FAMILY MEDICINE

## 2024-08-26 PROCEDURE — 85027 COMPLETE CBC AUTOMATED: CPT | Mod: IPSPLIT

## 2024-08-26 PROCEDURE — 2500000005 HC RX 250 GENERAL PHARMACY W/O HCPCS: Mod: IPSPLIT

## 2024-08-26 PROCEDURE — 99223 1ST HOSP IP/OBS HIGH 75: CPT | Performed by: SURGERY

## 2024-08-26 PROCEDURE — P9016 RBC LEUKOCYTES REDUCED: HCPCS | Mod: IPSPLIT

## 2024-08-26 PROCEDURE — 99291 CRITICAL CARE FIRST HOUR: CPT | Performed by: INTERNAL MEDICINE

## 2024-08-26 PROCEDURE — 82374 ASSAY BLOOD CARBON DIOXIDE: CPT | Mod: IPSPLIT

## 2024-08-26 PROCEDURE — 2500000001 HC RX 250 WO HCPCS SELF ADMINISTERED DRUGS (ALT 637 FOR MEDICARE OP): Mod: IPSPLIT | Performed by: SURGERY

## 2024-08-26 PROCEDURE — 85027 COMPLETE CBC AUTOMATED: CPT | Mod: IPSPLIT | Performed by: NURSE PRACTITIONER

## 2024-08-26 PROCEDURE — 2500000001 HC RX 250 WO HCPCS SELF ADMINISTERED DRUGS (ALT 637 FOR MEDICARE OP): Mod: IPSPLIT | Performed by: NURSE PRACTITIONER

## 2024-08-26 PROCEDURE — 2500000004 HC RX 250 GENERAL PHARMACY W/ HCPCS (ALT 636 FOR OP/ED): Mod: IPSPLIT | Performed by: INTERNAL MEDICINE

## 2024-08-26 PROCEDURE — 94760 N-INVAS EAR/PLS OXIMETRY 1: CPT | Mod: IPSPLIT

## 2024-08-26 PROCEDURE — 2500000004 HC RX 250 GENERAL PHARMACY W/ HCPCS (ALT 636 FOR OP/ED): Mod: IPSPLIT

## 2024-08-26 PROCEDURE — 83605 ASSAY OF LACTIC ACID: CPT | Performed by: FAMILY MEDICINE

## 2024-08-26 PROCEDURE — 2500000001 HC RX 250 WO HCPCS SELF ADMINISTERED DRUGS (ALT 637 FOR MEDICARE OP): Mod: IPSPLIT

## 2024-08-26 RX ORDER — LACTULOSE 10 G/15ML
20 SOLUTION ORAL DAILY
Status: DISCONTINUED | OUTPATIENT
Start: 2024-08-26 | End: 2024-08-30 | Stop reason: HOSPADM

## 2024-08-26 RX ORDER — FOLIC ACID 1 MG/1
1 TABLET ORAL DAILY
Status: DISCONTINUED | OUTPATIENT
Start: 2024-08-26 | End: 2024-08-30 | Stop reason: HOSPADM

## 2024-08-26 RX ORDER — LORAZEPAM 2 MG/ML
1 INJECTION INTRAMUSCULAR EVERY 2 HOUR PRN
Status: DISCONTINUED | OUTPATIENT
Start: 2024-08-26 | End: 2024-08-27

## 2024-08-26 RX ORDER — ACETAMINOPHEN 325 MG/1
650 TABLET ORAL EVERY 4 HOURS PRN
Status: DISCONTINUED | OUTPATIENT
Start: 2024-08-26 | End: 2024-08-30 | Stop reason: HOSPADM

## 2024-08-26 RX ORDER — BISMUTH SUBSALICYLATE 262 MG
1 TABLET,CHEWABLE ORAL DAILY
Status: DISCONTINUED | OUTPATIENT
Start: 2024-08-26 | End: 2024-08-30 | Stop reason: HOSPADM

## 2024-08-26 RX ORDER — ONDANSETRON HYDROCHLORIDE 2 MG/ML
4 INJECTION, SOLUTION INTRAVENOUS EVERY 8 HOURS PRN
Status: DISCONTINUED | OUTPATIENT
Start: 2024-08-26 | End: 2024-08-30 | Stop reason: HOSPADM

## 2024-08-26 RX ORDER — NOREPINEPHRINE BITARTRATE/D5W 8 MG/250ML
0-.5 PLASTIC BAG, INJECTION (ML) INTRAVENOUS CONTINUOUS
Status: DISCONTINUED | OUTPATIENT
Start: 2024-08-26 | End: 2024-08-27

## 2024-08-26 RX ORDER — SODIUM CHLORIDE 9 MG/ML
10 INJECTION, SOLUTION INTRAVENOUS CONTINUOUS PRN
Status: DISCONTINUED | OUTPATIENT
Start: 2024-08-26 | End: 2024-08-30 | Stop reason: HOSPADM

## 2024-08-26 RX ORDER — FUROSEMIDE 20 MG/1
20 TABLET ORAL DAILY
Status: DISCONTINUED | OUTPATIENT
Start: 2024-08-26 | End: 2024-08-30 | Stop reason: HOSPADM

## 2024-08-26 RX ORDER — CEFTRIAXONE 1 G/50ML
1 INJECTION, SOLUTION INTRAVENOUS EVERY 24 HOURS
Status: DISCONTINUED | OUTPATIENT
Start: 2024-08-26 | End: 2024-08-29

## 2024-08-26 RX ORDER — NOREPINEPHRINE BITARTRATE/D5W 8 MG/250ML
PLASTIC BAG, INJECTION (ML) INTRAVENOUS
Status: COMPLETED
Start: 2024-08-26 | End: 2024-08-26

## 2024-08-26 RX ORDER — METOPROLOL SUCCINATE 25 MG/1
50 TABLET, EXTENDED RELEASE ORAL DAILY
Status: DISCONTINUED | OUTPATIENT
Start: 2024-08-26 | End: 2024-08-30 | Stop reason: HOSPADM

## 2024-08-26 RX ORDER — FINASTERIDE 5 MG/1
5 TABLET, FILM COATED ORAL DAILY
Status: DISCONTINUED | OUTPATIENT
Start: 2024-08-26 | End: 2024-08-30 | Stop reason: HOSPADM

## 2024-08-26 RX ORDER — LANOLIN ALCOHOL/MO/W.PET/CERES
100 CREAM (GRAM) TOPICAL DAILY
Status: DISCONTINUED | OUTPATIENT
Start: 2024-08-26 | End: 2024-08-30 | Stop reason: HOSPADM

## 2024-08-26 RX ORDER — LORAZEPAM 2 MG/ML
2 INJECTION INTRAMUSCULAR EVERY 2 HOUR PRN
Status: DISCONTINUED | OUTPATIENT
Start: 2024-08-26 | End: 2024-08-27

## 2024-08-26 RX ORDER — TAMSULOSIN HYDROCHLORIDE 0.4 MG/1
0.8 CAPSULE ORAL DAILY
Status: DISCONTINUED | OUTPATIENT
Start: 2024-08-26 | End: 2024-08-30 | Stop reason: HOSPADM

## 2024-08-26 RX ORDER — ACETAMINOPHEN 325 MG/1
975 TABLET ORAL ONCE
Status: COMPLETED | OUTPATIENT
Start: 2024-08-26 | End: 2024-08-26

## 2024-08-26 RX ORDER — ATORVASTATIN CALCIUM 40 MG/1
40 TABLET, FILM COATED ORAL DAILY
Status: DISCONTINUED | OUTPATIENT
Start: 2024-08-26 | End: 2024-08-30 | Stop reason: HOSPADM

## 2024-08-26 RX ORDER — PANTOPRAZOLE SODIUM 40 MG/10ML
40 INJECTION, POWDER, LYOPHILIZED, FOR SOLUTION INTRAVENOUS 2 TIMES DAILY
Status: DISCONTINUED | OUTPATIENT
Start: 2024-08-26 | End: 2024-08-30 | Stop reason: HOSPADM

## 2024-08-26 RX ORDER — CLOPIDOGREL BISULFATE 75 MG/1
75 TABLET ORAL DAILY
Status: DISCONTINUED | OUTPATIENT
Start: 2024-08-26 | End: 2024-08-30 | Stop reason: HOSPADM

## 2024-08-26 RX ORDER — TALC
3 POWDER (GRAM) TOPICAL NIGHTLY PRN
Status: DISCONTINUED | OUTPATIENT
Start: 2024-08-26 | End: 2024-08-30 | Stop reason: HOSPADM

## 2024-08-26 RX ORDER — MIRTAZAPINE 15 MG/1
7.5 TABLET, FILM COATED ORAL NIGHTLY
Status: DISCONTINUED | OUTPATIENT
Start: 2024-08-26 | End: 2024-08-30 | Stop reason: HOSPADM

## 2024-08-26 RX ORDER — LORAZEPAM 2 MG/ML
0.5 INJECTION INTRAMUSCULAR EVERY 2 HOUR PRN
Status: DISCONTINUED | OUTPATIENT
Start: 2024-08-26 | End: 2024-08-27

## 2024-08-26 RX ORDER — POLYETHYLENE GLYCOL 3350 17 G/17G
17 POWDER, FOR SOLUTION ORAL DAILY PRN
Status: DISCONTINUED | OUTPATIENT
Start: 2024-08-26 | End: 2024-08-30 | Stop reason: HOSPADM

## 2024-08-26 RX ORDER — MULTIVIT-MIN/IRON FUM/FOLIC AC 7.5 MG-4
1 TABLET ORAL DAILY
Status: DISCONTINUED | OUTPATIENT
Start: 2024-08-26 | End: 2024-08-26

## 2024-08-26 SDOH — SOCIAL STABILITY: SOCIAL INSECURITY: HAVE YOU HAD THOUGHTS OF HARMING ANYONE ELSE?: NO

## 2024-08-26 SDOH — SOCIAL STABILITY: SOCIAL INSECURITY: ARE THERE ANY APPARENT SIGNS OF INJURIES/BEHAVIORS THAT COULD BE RELATED TO ABUSE/NEGLECT?: NO

## 2024-08-26 SDOH — SOCIAL STABILITY: SOCIAL INSECURITY: ABUSE: ADULT

## 2024-08-26 SDOH — SOCIAL STABILITY: SOCIAL INSECURITY: DO YOU FEEL ANYONE HAS EXPLOITED OR TAKEN ADVANTAGE OF YOU FINANCIALLY OR OF YOUR PERSONAL PROPERTY?: NO

## 2024-08-26 SDOH — SOCIAL STABILITY: SOCIAL INSECURITY: HAS ANYONE EVER THREATENED TO HURT YOUR FAMILY OR YOUR PETS?: NO

## 2024-08-26 SDOH — SOCIAL STABILITY: SOCIAL INSECURITY: ARE YOU OR HAVE YOU BEEN THREATENED OR ABUSED PHYSICALLY, EMOTIONALLY, OR SEXUALLY BY ANYONE?: NO

## 2024-08-26 SDOH — SOCIAL STABILITY: SOCIAL INSECURITY: HAVE YOU HAD ANY THOUGHTS OF HARMING ANYONE ELSE?: NO

## 2024-08-26 SDOH — SOCIAL STABILITY: SOCIAL INSECURITY: DOES ANYONE TRY TO KEEP YOU FROM HAVING/CONTACTING OTHER FRIENDS OR DOING THINGS OUTSIDE YOUR HOME?: NO

## 2024-08-26 SDOH — SOCIAL STABILITY: SOCIAL INSECURITY: DO YOU FEEL UNSAFE GOING BACK TO THE PLACE WHERE YOU ARE LIVING?: NO

## 2024-08-26 SDOH — SOCIAL STABILITY: SOCIAL INSECURITY: WERE YOU ABLE TO COMPLETE ALL THE BEHAVIORAL HEALTH SCREENINGS?: YES

## 2024-08-26 ASSESSMENT — ACTIVITIES OF DAILY LIVING (ADL)
HEARING - RIGHT EAR: FUNCTIONAL
HEARING - LEFT EAR: FUNCTIONAL
JUDGMENT_ADEQUATE_SAFELY_COMPLETE_DAILY_ACTIVITIES: YES
GROOMING: NEEDS ASSISTANCE
ASSISTIVE_DEVICE: WALKER
BATHING: NEEDS ASSISTANCE
ADEQUATE_TO_COMPLETE_ADL: YES
PATIENT'S MEMORY ADEQUATE TO SAFELY COMPLETE DAILY ACTIVITIES?: YES
DRESSING YOURSELF: INDEPENDENT
WALKS IN HOME: NEEDS ASSISTANCE
LACK_OF_TRANSPORTATION: NO
FEEDING YOURSELF: INDEPENDENT
TOILETING: NEEDS ASSISTANCE

## 2024-08-26 ASSESSMENT — LIFESTYLE VARIABLES
ANXIETY: NO ANXIETY, AT EASE
AUDITORY DISTURBANCES: NOT PRESENT
HOW OFTEN DURING THE LAST YEAR HAVE YOU BEEN UNABLE TO REMEMBER WHAT HAPPENED THE NIGHT BEFORE BECAUSE YOU HAD BEEN DRINKING: NEVER
VISUAL DISTURBANCES: NOT PRESENT
NAUSEA AND VOMITING: NO NAUSEA AND NO VOMITING
AUDIT TOTAL SCORE: 0
AUDIT TOTAL SCORE: 5
TOTAL SCORE: 0
TREMOR: NO TREMOR
ANXIETY: NO ANXIETY, AT EASE
HEADACHE, FULLNESS IN HEAD: NOT PRESENT
TREMOR: NO TREMOR
HAVE YOU OR SOMEONE ELSE BEEN INJURED AS A RESULT OF YOUR DRINKING: NO
TREMOR: NO TREMOR
HOW OFTEN DO YOU HAVE A DRINK CONTAINING ALCOHOL: 2-3 TIMES A WEEK
ORIENTATION AND CLOUDING OF SENSORIUM: ORIENTED AND CAN DO SERIAL ADDITIONS
VISUAL DISTURBANCES: NOT PRESENT
HOW MANY STANDARD DRINKS CONTAINING ALCOHOL DO YOU HAVE ON A TYPICAL DAY: 3 OR 4
NAUSEA AND VOMITING: NO NAUSEA AND NO VOMITING
ORIENTATION AND CLOUDING OF SENSORIUM: ORIENTED AND CAN DO SERIAL ADDITIONS
SKIP TO QUESTIONS 9-10: 0
HOW OFTEN DURING THE LAST YEAR HAVE YOU FOUND THAT YOU WERE NOT ABLE TO STOP DRINKING ONCE YOU HAD STARTED: NEVER
NAUSEA AND VOMITING: NO NAUSEA AND NO VOMITING
HOW OFTEN DURING THE LAST YEAR HAVE YOU NEEDED AN ALCOHOLIC DRINK FIRST THING IN THE MORNING TO GET YOURSELF GOING AFTER A NIGHT OF HEAVY DRINKING: NEVER
AUDIT-C TOTAL SCORE: 5
HEADACHE, FULLNESS IN HEAD: NOT PRESENT
PAROXYSMAL SWEATS: NO SWEAT VISIBLE
PAROXYSMAL SWEATS: NO SWEAT VISIBLE
NAUSEA AND VOMITING: NO NAUSEA AND NO VOMITING
NAUSEA AND VOMITING: NO NAUSEA AND NO VOMITING
AGITATION: NORMAL ACTIVITY
TREMOR: NO TREMOR
TOTAL SCORE: 0
VISUAL DISTURBANCES: NOT PRESENT
HOW OFTEN DURING THE LAST YEAR HAVE YOU HAD A FEELING OF GUILT OR REMORSE AFTER DRINKING: NEVER
ANXIETY: NO ANXIETY, AT EASE
AGITATION: NORMAL ACTIVITY
HOW OFTEN DURING THE LAST YEAR HAVE YOU FAILED TO DO WHAT WAS NORMALLY EXPECTED FROM YOU BECAUSE OF DRINKING: NEVER
AGITATION: NORMAL ACTIVITY
TOTAL SCORE: 0
TOTAL SCORE: 0
AGITATION: NORMAL ACTIVITY
ANXIETY: NO ANXIETY, AT EASE
AUDITORY DISTURBANCES: NOT PRESENT
VISUAL DISTURBANCES: NOT PRESENT
VISUAL DISTURBANCES: NOT PRESENT
ORIENTATION AND CLOUDING OF SENSORIUM: ORIENTED AND CAN DO SERIAL ADDITIONS
AUDITORY DISTURBANCES: NOT PRESENT
ANXIETY: NO ANXIETY, AT EASE
ORIENTATION AND CLOUDING OF SENSORIUM: ORIENTED AND CAN DO SERIAL ADDITIONS
PAROXYSMAL SWEATS: NO SWEAT VISIBLE
HEADACHE, FULLNESS IN HEAD: NOT PRESENT
HOW OFTEN DO YOU HAVE 6 OR MORE DRINKS ON ONE OCCASION: LESS THAN MONTHLY
AGITATION: NORMAL ACTIVITY
PAROXYSMAL SWEATS: NO SWEAT VISIBLE
ORIENTATION AND CLOUDING OF SENSORIUM: ORIENTED AND CAN DO SERIAL ADDITIONS
PAROXYSMAL SWEATS: NO SWEAT VISIBLE
AUDITORY DISTURBANCES: NOT PRESENT
TOTAL SCORE: 0
HEADACHE, FULLNESS IN HEAD: NOT PRESENT
AUDIT-C TOTAL SCORE: 5
AUDITORY DISTURBANCES: NOT PRESENT
TREMOR: NO TREMOR
HEADACHE, FULLNESS IN HEAD: NOT PRESENT
HAS A RELATIVE, FRIEND, DOCTOR, OR ANOTHER HEALTH PROFESSIONAL EXPRESSED CONCERN ABOUT YOUR DRINKING OR SUGGESTED YOU CUT DOWN: NO

## 2024-08-26 ASSESSMENT — PAIN - FUNCTIONAL ASSESSMENT
PAIN_FUNCTIONAL_ASSESSMENT: 0-10
PAIN_FUNCTIONAL_ASSESSMENT: 0-10
PAIN_FUNCTIONAL_ASSESSMENT: CPOT (CRITICAL CARE PAIN OBSERVATION TOOL)

## 2024-08-26 ASSESSMENT — COGNITIVE AND FUNCTIONAL STATUS - GENERAL
DRESSING REGULAR LOWER BODY CLOTHING: A LITTLE
PERSONAL GROOMING: A LITTLE
TOILETING: A LITTLE
CLIMB 3 TO 5 STEPS WITH RAILING: A LOT
MOVING TO AND FROM BED TO CHAIR: A LOT
DRESSING REGULAR LOWER BODY CLOTHING: A LITTLE
HELP NEEDED FOR BATHING: A LOT
PERSONAL GROOMING: A LITTLE
WALKING IN HOSPITAL ROOM: A LOT
CLIMB 3 TO 5 STEPS WITH RAILING: TOTAL
MOBILITY SCORE: 14
DRESSING REGULAR UPPER BODY CLOTHING: A LOT
STANDING UP FROM CHAIR USING ARMS: A LITTLE
WALKING IN HOSPITAL ROOM: A LITTLE
DAILY ACTIVITIY SCORE: 17
TURNING FROM BACK TO SIDE WHILE IN FLAT BAD: A LITTLE
DRESSING REGULAR UPPER BODY CLOTHING: A LITTLE
STANDING UP FROM CHAIR USING ARMS: A LOT
TURNING FROM BACK TO SIDE WHILE IN FLAT BAD: A LITTLE
STANDING UP FROM CHAIR USING ARMS: A LOT
HELP NEEDED FOR BATHING: A LITTLE
PATIENT BASELINE BEDBOUND: NO
CLIMB 3 TO 5 STEPS WITH RAILING: TOTAL
PERSONAL GROOMING: A LITTLE
DAILY ACTIVITIY SCORE: 18
MOVING TO AND FROM BED TO CHAIR: A LOT
HELP NEEDED FOR BATHING: A LOT
MOVING TO AND FROM BED TO CHAIR: A LITTLE
TOILETING: A LITTLE
DAILY ACTIVITIY SCORE: 17
DRESSING REGULAR UPPER BODY CLOTHING: A LOT
TURNING FROM BACK TO SIDE WHILE IN FLAT BAD: A LITTLE
DRESSING REGULAR LOWER BODY CLOTHING: A LITTLE
MOBILITY SCORE: 18
TOILETING: A LITTLE
EATING MEALS: A LITTLE
WALKING IN HOSPITAL ROOM: A LOT
MOBILITY SCORE: 14

## 2024-08-26 ASSESSMENT — ENCOUNTER SYMPTOMS
TREMORS: 0
DIARRHEA: 0
NAUSEA: 0
ABDOMINAL PAIN: 0
FEVER: 0
FREQUENCY: 1
WEAKNESS: 0
ENDOCRINE NEGATIVE: 1
HEMATURIA: 1
ABDOMINAL DISTENTION: 0
RECTAL PAIN: 1
MUSCULOSKELETAL NEGATIVE: 1
CONSTIPATION: 1
ANAL BLEEDING: 0
EYES NEGATIVE: 1
ALLERGIC/IMMUNOLOGIC NEGATIVE: 1
PSYCHIATRIC NEGATIVE: 1
APPETITE CHANGE: 1
VOMITING: 1
FATIGUE: 0
DIAPHORESIS: 0
HEMATOLOGIC/LYMPHATIC NEGATIVE: 1
BLOOD IN STOOL: 0
RESPIRATORY NEGATIVE: 1
PALPITATIONS: 0

## 2024-08-26 ASSESSMENT — PAIN SCALES - GENERAL
PAINLEVEL_OUTOF10: 0 - NO PAIN

## 2024-08-26 ASSESSMENT — PATIENT HEALTH QUESTIONNAIRE - PHQ9
2. FEELING DOWN, DEPRESSED OR HOPELESS: NOT AT ALL
1. LITTLE INTEREST OR PLEASURE IN DOING THINGS: NOT AT ALL
SUM OF ALL RESPONSES TO PHQ9 QUESTIONS 1 & 2: 0

## 2024-08-26 NOTE — CONSULTS
"Nutrition Initial Assessment:   Nutrition Assessment    Reason for Assessment: Admission nursing screening (MST=2)    Patient is a 88 y.o. male presenting with constipation rectal pain and also noted to have macrocytic anemia. Patient was admitted with urinary sepsis. Per Dr. Antonio's note was noted to have significant constipation and complained of pain in the rectal area. He has no abdominal pain. He feels a fullness in his rectal area. He denies any blood in the stool. Trial of daily lactulose.     PMH:  has a past medical history of Body mass index (BMI) 29.0-29.9, adult (09/29/2020) and Old myocardial infarction.    Nutrition History:  Energy Intake: Fair 50-75 %  Food and Nutrient History: Visited patient at bedside. The patient reported losing approximately 20 lbs over the past 6 months and noted a significant decrease in appetite. He stated that he does not have any difficulty swallowing food but tends to eat softer foods due to missing teeth. He typically does not consume supplements unless his daughter provides them. The patient also mentioned experiencing a lot of pain in his anal area and is unsure if he is still constipated or if the medication is working effectively. He is currently eating about 50% of his meals.    Food Allergies/Intolerances:  None  GI Symptoms: Constipation  Oral Problems: Chewing difficulty with some foods d/t missing teeth.  Teeth: Intact.       Anthropometrics:  Height: 180.3 cm (5' 11\")   Weight: 81.5 kg (179 lb 10.8 oz)   BMI (Calculated): 25.07  IBW/kg (Dietitian Calculated): 78.2 kg  Percent of IBW: 104 %       Weight History:   Wt Readings from Last 6 Encounters:   08/26/24 81.5 kg (179 lb 10.8 oz)   08/06/24 83 kg (183 lb)   05/06/24 84.8 kg (187 lb)   04/03/24 88.8 kg (195 lb 12.8 oz)   11/22/23 92.5 kg (204 lb)   07/19/23 92.6 kg (204 lb 3.2 oz)     Weight Change %:  Weight History / % Weight Change: gradual decline over past year of 12% from 7/19/23: 92.6kg and 8/26/24: " 81.5kg. Weight history from chart. Self reporte weight of 200lbs six months ago: 10% loss (Signifcant)  Significant Weight Loss: Yes  Interpretation of Weight Loss: 10% in 6 months    Nutrition Focused Physical Exam Findings:    Subcutaneous Fat Loss:   Orbital Fat Pads: Mild-Moderate (slight dark circles and slight hollowing)  Buccal Fat Pads: Mild-Moderate (flat cheeks, minimal bounce) (addtional hollowness could be from missing teeth)  Triceps: Defer (pt lying on his side)  Ribs: Defer  Muscle Wasting:  Temporalis: Mild-Moderate (slight depression)  Pectoralis (Clavicular Region): Mild-Moderate (some protrusion of clavicle)  Deltoid/Trapezius: Mild-Moderate (slight protrusion of acromion process)  Interosseous: Mild-Moderate (slightly depressed area between thumb and forefinger)  Trapezius/Infraspinatus/Supraspinatus (Scapular Region): Defer  Quadriceps: Mild-Moderate (mild depression on inner and outer thigh)  Gastrocnemius: Defer (IPC devices)  Edema:  Edema: none  Physical Findings:  Hair: Negative (age related hair loss)  Eyes: Negative  Mouth: Negative  Nails: Positive (mild vertical ridges)  Skin: Positive (pale)    Nutrition Significant Labs:  CBC Trend:   Results from last 7 days   Lab Units 08/26/24  0901 08/26/24  0519 08/25/24  1905   WBC AUTO x10*3/uL 9.4 7.6 7.5   RBC AUTO x10*6/uL 1.95* 1.51* 1.47*   HEMOGLOBIN g/dL 7.2* 5.6* 5.9*   HEMATOCRIT % 22.2* 18.1* 19.5*   MCV fL 114* 120* 133*   PLATELETS AUTO x10*3/uL 51* 40* 89*    , BMP Trend:   Results from last 7 days   Lab Units 08/26/24  0519 08/25/24  1905   GLUCOSE mg/dL 117* 131*   CALCIUM mg/dL 7.6* 9.2   SODIUM mmol/L 134* 137   POTASSIUM mmol/L 3.3* 4.7   CO2 mmol/L 22 25   CHLORIDE mmol/L 102 101   BUN mg/dL 21 17   CREATININE mg/dL 1.04 1.13     Nutrition Specific Medications:  Scheduled medications  cefTRIAXone, 1 g, intravenous, q24h  folic acid, 1 mg, oral, Daily  lactulose, 20 g, oral, Daily  multivitamin, 1 tablet, oral,  Daily  pantoprazole, 40 mg, intravenous, BID  thiamine, 100 mg, oral, Daily    I/O:   Last BM Date: 08/26/24; Stool Appearance: Loose, Soft (08/26/24 0857)    Dietary Orders (From admission, onward)       Start     Ordered    08/26/24 1153  Oral nutritional supplements  Until discontinued        Question Answer Comment   Deliver with Breakfast    Deliver with Dinner    Select supplement: Ensure Plus High Protein        08/26/24 1152    08/26/24 0324  Adult diet Cardiac; 70 gm fat; 2 - 3 grams Sodium  Diet effective now        Question Answer Comment   Diet type Cardiac    Fat restriction: 70 gm fat    Sodium restriction: 2 - 3 grams Sodium        08/26/24 0323                Estimated Needs:   Total Energy Estimated Needs (kCal): 2445 kCal (2038-2445kcal)  Method for Estimating Needs: 25-30kcal/kg of acutal BW (81.5kg)  Total Protein Estimated Needs (g): 94 g (78-94g)  Method for Estimating Needs: 1-1.2g/kg of IBW (78.2kg)  Total Fluid Estimated Needs (mL): 2038 mL  Method for Estimating Needs: 25ml/kg of acutal weight        Nutrition Diagnosis   Malnutrition Diagnosis  Patient has Malnutrition Diagnosis: Yes  Diagnosis Status: New  Malnutrition Diagnosis: Severe malnutrition related to acute disease or injury  As Evidenced by: >10% weight loss in 6 months, prolonged poor intake of less than 50% of est needs for greater than a month, moderate subcutaneous fat loss of orbital, and modearte muscle loss of temporal, clavicle, interosseous, and quads, decreased appetite and significant constipation and complained of pain in the rectal area.            Nutrition Interventions/Recommendations         Nutrition Prescription:  Individualized Nutrition Prescription Provided for : diet, fluids, ONS        Nutrition Interventions:   Interventions: Medical food supplement, Meals and snacks  Meals and Snacks: Fat-modified diet, Mineral-modified diet  Goal: Cardiac diet per medical team  Medical Food Supplement: Commercial  beverage  Goal: Ensure Plus High Protein (350kcal and 20g protein per 8 fluid oz) BID    Collaboration and Referral of Nutrition Care: Collaboration by nutrition professional with other providers  Goal: IDT rounds; [facility] via secure message    Nutrition Education:   Reviewed high protein foods, and eating small amounts, even if not hungry. Willing to try Ensure Plus to help with calorie and protein needs.      Nutrition Monitoring and Evaluation   Food/Nutrient Related History Monitoring  Monitoring and Evaluation Plan: Amount of food  Amount of Food: Medical food intake, Estimated amout of food  Criteria: increase intake to % of est needs    Body Composition/Growth/Weight History  Monitoring and Evaluation Plan: Weight  Weight: Measured weight  Criteria: Maintain stable weight    Biochemical Data, Medical Tests and Procedures  Monitoring and Evaluation Plan: Nutritional anemia profile  Nutritional Anemia Profile: Hematocrit, Hemoglobin  Criteria: Labs WNL    Nutrition Focused Physical Findings  Monitoring and Evaluation Plan: Adipose, Muscles  Adipose: Loss of subcutaneous fat  Criteria: maintain adipose stores  Muscles: Muscle atrophy  Criteria: maintain lean muscle mass  Other: Evaluate nutrition intervention as compared to nutrition goal(s) and estimated nutrient need criteria.       Follow Up:     Time Spent (min): 75 minutes  Follow up: Provided inpatient RDN contact information  Last Date of Nutrition Visit: 08/26/24  Nutrition Follow-Up Needed?: Dietitian to reassess per policy  Time Spent (min): 75 minutes

## 2024-08-26 NOTE — H&P
History Of Present Illness  Chico Iqbal is a 88 y.o. male with PMH of daily Etoh, HTN, HLD, CAD with MI s/p stenting, paroxysmal AF (reportedly on Xarelto but unable to find prescription/provider record), BPH, COPD (not on home O2) who presented to ED  with c/o constipation and rectal pain. He stated his stool was stuck in his rectum and he had not had moved his bowels in a few weeks. He also vomited x 1 prior to arrival. In the ED his workup showed lactate 5.7, INR 1.5, H/H 5.9/19.5, RBC 1.47, , plt 89. VB.45/34/36/23.6. CT a/p showed no acute process, prostatomegaly. Stool occult was negative. After fluid resuscitation lactate was 2.1. UA showed 75 LE, 2+ bacteria. Of note, he was recently seen by Dr. Yancey, Urology, for repeat UTIs and gross hematuria and had a flex cysto done, finding enlarged prostate. In ED he was started on IV ceftriaxone, urine was cultured and he was admitted to ICU for blood transfusion, further evaluation and treatment.     Past Medical History  Past Medical History:   Diagnosis Date    Alcohol abuse     Body mass index (BMI) 29.0-29.9, adult 2020    Body mass index (BMI) of 29.0 to 29.9 in adult    BPH (benign prostatic hyperplasia)     CAD (coronary artery disease)     COPD (chronic obstructive pulmonary disease) (Multi)     GERD (gastroesophageal reflux disease)     Hyperlipidemia     Hypertension     Old myocardial infarction     History of myocardial infarction    Paroxysmal A-fib (Multi)     Urinary tract infection      Surgical History  Past Surgical History:   Procedure Laterality Date    CARDIAC CATHETERIZATION      CORONARY STENT PLACEMENT        Social History  He reports that he has been smoking cigars. He has been exposed to tobacco smoke. He has never used smokeless tobacco. He reports that he does not currently use alcohol. He reports that he does not use drugs.    Family History  Family History   Problem Relation Name Age of Onset    Diabetes  Mother      Heart attack Brother        Allergies  Patient has no known allergies.    Review of Systems   Constitutional:  Positive for appetite change. Negative for diaphoresis, fatigue and fever.   HENT: Negative.     Eyes: Negative.    Respiratory: Negative.     Cardiovascular:  Negative for chest pain, palpitations and leg swelling.   Gastrointestinal:  Positive for constipation, rectal pain and vomiting. Negative for abdominal distention, abdominal pain, anal bleeding, blood in stool, diarrhea and nausea.   Endocrine: Negative.    Genitourinary:  Positive for frequency, hematuria and urgency.   Musculoskeletal: Negative.    Skin: Negative.    Allergic/Immunologic: Negative.    Neurological:  Negative for tremors, syncope and weakness.   Hematological: Negative.    Psychiatric/Behavioral: Negative.        Physical Exam  Constitutional:       General: He is not in acute distress.     Appearance: Normal appearance. He is ill-appearing. He is not toxic-appearing.   HENT:      Head: Normocephalic and atraumatic.      Mouth/Throat:      Mouth: Mucous membranes are moist.   Eyes:      Extraocular Movements: Extraocular movements intact.      Pupils: Pupils are equal, round, and reactive to light.   Cardiovascular:      Rate and Rhythm: Normal rate and regular rhythm.      Pulses: Normal pulses.      Heart sounds: Normal heart sounds. No murmur heard.     No gallop.   Pulmonary:      Effort: Pulmonary effort is normal. No respiratory distress.      Breath sounds: Normal breath sounds. No wheezing, rhonchi or rales.   Abdominal:      General: Bowel sounds are normal. There is no distension.      Palpations: Abdomen is soft.      Tenderness: There is no abdominal tenderness. There is no guarding or rebound.   Musculoskeletal:         General: No swelling, tenderness, deformity or signs of injury. Normal range of motion.      Cervical back: Normal range of motion and neck supple.   Skin:     General: Skin is warm and dry.  "     Capillary Refill: Capillary refill takes less than 2 seconds.      Coloration: Skin is pale. Skin is not jaundiced.      Findings: No bruising or rash.   Neurological:      General: No focal deficit present.      Mental Status: He is alert and oriented to person, place, and time.      Cranial Nerves: No cranial nerve deficit.      Sensory: No sensory deficit.      Motor: No weakness.      Gait: Gait normal.   Psychiatric:         Mood and Affect: Mood normal.         Behavior: Behavior normal.        Last Recorded Vitals  Blood pressure 102/69, pulse 72, temperature 36.2 °C (97.2 °F), temperature source Temporal, resp. rate 21, height 1.803 m (5' 11\"), weight 81.5 kg (179 lb 10.8 oz), SpO2 96%.    Scheduled medications  [Held by provider] apixaban, 2.5 mg, oral, BID  atorvastatin, 40 mg, oral, Daily  cefTRIAXone, 1 g, intravenous, q24h  [Held by provider] clopidogrel, 75 mg, oral, Daily  finasteride, 5 mg, oral, Daily  folic acid, 1 mg, oral, Daily  [Held by provider] furosemide, 20 mg, oral, Daily  lactulose, 20 g, oral, Daily  [Held by provider] metoprolol succinate XL, 50 mg, oral, Daily  mirtazapine, 7.5 mg, oral, Nightly  multivitamin, 1 tablet, oral, Daily  pantoprazole, 40 mg, intravenous, BID  tamsulosin, 0.8 mg, oral, Daily  thiamine, 100 mg, oral, Daily    Continuous medications  norepinephrine, 0-0.5 mcg/kg/min, Last Rate: 0.04 mcg/kg/min (08/26/24 1456)  sodium chloride 0.9%, 10 mL/hr, Last Rate: 10 mL/hr (08/26/24 0846)    PRN medications  PRN medications: acetaminophen, LORazepam **OR** LORazepam **OR** LORazepam, melatonin, ondansetron, polyethylene glycol, sodium chloride 0.9%    Relevant Results  CT abdomen pelvis wo IV contrast  Result Date: 8/25/2024  No acute abnormality in the abdomen or pelvis.   Small hiatal hernia.   Atherosclerosis. Descending thoracic aortic aneurysm. Coronary artery calcifications.   Stable hyperdense lesion in the right kidney. Prostatomegaly. Refer to recently " performed CT urogram for details regarding the urinary tract.   Signed by: Virginia Serrano 8/25/2024 8:42 PM Dictation workstation:   [id number]    Latest Reference Range & Units 08/25/24 19:05   GLUCOSE 74 - 99 mg/dL 131 (H)   SODIUM 136 - 145 mmol/L 137   POTASSIUM 3.5 - 5.3 mmol/L 4.7   CHLORIDE 98 - 107 mmol/L 101   Bicarbonate 21 - 32 mmol/L 25   Anion Gap 10 - 20 mmol/L 16   Blood Urea Nitrogen 6 - 23 mg/dL 17   Creatinine 0.50 - 1.30 mg/dL 1.13   EGFR >60 mL/min/1.73m*2 63   Calcium 8.6 - 10.3 mg/dL 9.2   Albumin 3.4 - 5.0 g/dL 3.9   Alkaline Phosphatase 33 - 136 U/L 93   ALT 10 - 52 U/L 14   AST 9 - 39 U/L 17   Bilirubin Total 0.0 - 1.2 mg/dL 0.7   Total Protein 6.4 - 8.2 g/dL 6.8   MAGNESIUM 1.60 - 2.40 mg/dL 1.69   Lactate 0.4 - 2.0 mmol/L 5.7 (HH)   Troponin I, High Sensitivity 0 - 20 ng/L 4      Latest Reference Range & Units 08/25/24 19:05 08/25/24 22:00 08/26/24 01:21 08/26/24 03:32 08/26/24 05:19   Lactate 0.4 - 2.0 mmol/L 5.7 (HH) 3.3 (H) 2.3 (H) 2.1 (H) 1.7      Latest Reference Range & Units 08/25/24 19:05 08/26/24 05:19 08/26/24 09:01   WBC 4.4 - 11.3 x10*3/uL 7.5 7.6 9.4   nRBC 0.0 - 0.0 /100 WBCs 0.0 0.0 0.0   RBC 4.50 - 5.90 x10*6/uL 1.47 (L) 1.51 (L) 1.95 (L)   HEMOGLOBIN 13.5 - 17.5 g/dL 5.9 (LL) 5.6 (LL) 7.2 (L)   HEMATOCRIT 41.0 - 52.0 % 19.5 (L) 18.1 (L) 22.2 (L)   MCV 80 - 100 fL 133 (H) 120 (H) 114 (H)   MCH 26.0 - 34.0 pg 40.1 (H) 37.1 (H) 36.9 (H)   MCHC 32.0 - 36.0 g/dL 30.3 (L) 30.9 (L) 32.4   RED CELL DISTRIBUTION WIDTH 11.5 - 14.5 % 14.7 (H) COMMENT ONLY COMMENT ONLY   Platelets 150 - 450 x10*3/uL 89 (L) 40 (LL) 51 (L)      Assessment/Plan   Assessment & Plan  Sepsis (Multi)    Anemia requiring transfusions    Sepsis due to urinary tract infection (Multi)    CAD (coronary artery disease)    Hypercholesterolemia    Hypertensive heart disease with heart failure (Multi)    Benign essential HTN    BPH (benign prostatic hyperplasia)    Acute cystitis with  hematuria    Gastroesophageal reflux disease without esophagitis    History of myocardial infarction    PAF (paroxysmal atrial fibrillation) (Multi)    Hypokalemia    Constipation    Alcohol abuse    Elevated lactic acid  Sepsis due to urinary tract infection (Multi)  Anemia requiring transfusions  - lactate 5.7 > 2.2 > 1.7  - WBC 7.5 > 9.4  - H/H 5.9/19.54 > 7.2/22.2  -  > 114  - plt 89> 40 >51  - hypotension requiring levophed overnight  - UA: 75 LE, 2+ bacteria, WBC 21-50  - Urine culture pending  - ICU  - continue IV ceftriaxone, day 2  - 3 units PRBC today, repeat CBC after #2, #3  - hematology consult as needed   - wean off pressors as able  - apixaban, clopidogrel held for now    CAD (coronary artery disease)  Hypercholesterolemia  History of myocardial infarction  PAF (paroxysmal atrial fibrillation) (Multi)  Benign essential HTN  - Echo 3/2023: 1. Left ventricular systolic function is mildly decreased with a 40% estimated ejection fraction.  2. Multiple segmental abnormalities exist. 3. Spectral Doppler shows a pseudonormal pattern of left ventricular diastolic filling. 4. There is mildly reduced right ventricular systolic function. 5. The left atrium is mild to moderately dilated. 6. There is global and segmental LV hypokinesis. 7. CVP is elevated.  - ICU  - telemetry  - hypotensive overnight, on pressors  - furosemide, metoprolol held until BP normalizes  - continue atorvastatin    Gastroesophageal reflux disease without esophagitis  Constipation  - continue PPI  - continue lactulose  - document BM     BPH (benign prostatic hyperplasia)  Acute cystitis with hematuria  - recent visit with urology for gross hematuria  - flex cysto showed enlarged prostate  - continue finasteride, tamsulosin  - UOP recorded, no hematuria noted    Hypokalemia  - K 3.3  - repleted orally  - follow BMP  - replete as needed    Daily ETOH  - CIWA  - folic acid, thiamine  - monitor for S/S withdrawal  - encourage  cessation    GI ppx: PPI  DVT ppx: apixaban on hold for anemi  Fluids:  as needed  Electrolytes: replace as needed  Nutrition: regular  Adjuncts: PIV  Code Status: full  Pt requires inpatient stay at this time.    Linn Cheek, APRN-CNP  Attending Attestation:    I was present with the APRN-CNP who participated in the documentation of this note. I have personally seen and re-examined the patient and performed the medical decision-making components (assessment and plan of care). I have reviewed the documentation and verified the findings in the note as written with additions or exceptions as stated in the body of this note.     Dr. Erick Baldwin MD  Internal Medicine

## 2024-08-26 NOTE — CARE PLAN
The patient's goals for the shift include  to maintain noraml blood pressure.    The clinical goals for the shift include Patient to sustain MAP >60 overnight. Patient. Blood pressures managed with levophed today.    Over the shift, the patient did not make progress toward the following goals.  Patient not able to be removed from levophed for blood pressure support. Barriers to progression include monitor intake & output and vital signs..

## 2024-08-26 NOTE — ED NOTES
Collaborated with MD on BP MAPs and diastolic pressures, orders received. Will start a liter of fluid, aware that lactaate is stil 3.3     Rose Rouse RN  08/25/24 [id number]

## 2024-08-26 NOTE — NURSING NOTE
Patient blood transfusion completed.patient. Transfusion tolerated well..Patient incontinent of large amount of brown stool.

## 2024-08-26 NOTE — ED NOTES
Collaborated with Zahra VALE about transfusing with a fever of 102F 2/2 possible inability to appreciate reactions. Per Zahra Vale ok to transfuse at this. MD aware of fever     Rose Rouse RN  08/26/24 [id number]

## 2024-08-26 NOTE — CARE PLAN
The clinical goals for the shift include Patient to sustain MAP >60 overnight    Over the shift, the patient did not make progress toward the following goals. Patient admitted to ICU Bed 3 from ED. Patient received 1 unit PRBCs and started levophed on unit to improve labs and MAP. Patient on room air baseline. Low urine output. Incontinent of stool. Patient has blanchable redness to sacrum-mepliex placed. X2 assist from stretcher to bed.     Problem: Discharge Planning  Goal: Discharge to home or other facility with appropriate resources  Outcome: Not Progressing     Problem: Chronic Conditions and Co-morbidities  Goal: Patient's chronic conditions and co-morbidity symptoms are monitored and maintained or improved  Outcome: Not Progressing     Problem: Skin  Goal: Decreased wound size/increased tissue granulation at next dressing change  Outcome: Not Progressing  Goal: Participates in plan/prevention/treatment measures  Outcome: Not Progressing  Goal: Prevent/manage excess moisture  Outcome: Not Progressing  Goal: Prevent/minimize sheer/friction injuries  Outcome: Not Progressing  Goal: Promote/optimize nutrition  Outcome: Not Progressing  Goal: Promote skin healing  Outcome: Not Progressing     Problem: Fall/Injury  Goal: Verbalize understanding of risk factor reduction measures to prevent injury from fall in the home  Outcome: Not Progressing  Goal: Use assistive devices by end of the shift  Outcome: Not Progressing  Goal: Pace activities to prevent fatigue by end of the shift  Outcome: Not Progressing     Problem: Nutrition  Goal: Oral intake greater than 50%  Outcome: Not Progressing  Goal: Oral intake greater 75%  Outcome: Not Progressing  Goal: Consume prescribed supplement  Outcome: Not Progressing  Goal: Adequate PO fluid intake  Outcome: Not Progressing  Goal: Nutrition support goals are met within 48 hrs  Outcome: Not Progressing  Goal: Nutrition support is meeting 75% of nutrient needs  Outcome: Not  Progressing  Goal: Tube feed tolerance  Outcome: Not Progressing  Goal: BG  mg/dL  Outcome: Not Progressing  Goal: Lab values WNL  Outcome: Not Progressing  Goal: Electrolytes WNL  Outcome: Not Progressing  Goal: Promote healing  Outcome: Not Progressing  Goal: Maintain stable weight  Outcome: Not Progressing  Goal: Reduce weight from edema/fluid  Outcome: Not Progressing  Goal: Gradual weight gain  Outcome: Not Progressing  Goal: Improve ostomy output  Outcome: Not Progressing

## 2024-08-26 NOTE — ED NOTES
Bladder scan done by Terese BROWN finding 57 ml urine in bladder, abd is soft and not distended     Rose Rouse RN  08/26/24 [id number]

## 2024-08-26 NOTE — PROGRESS NOTES
08/26/24 1304   Discharge Planning   Living Arrangements Children   Support Systems Children   Assistance Needed uses walker/cane for ambulation, independent with ADL'S, daughter Mirta lives with patient, she assists as needed   Type of Residence Private residence   Number of Stairs to Enter Residence 0   Number of Stairs Within Residence 0   Home or Post Acute Services None   Expected Discharge Disposition Home   Does the patient need discharge transport arranged? No     Spoke with patient to discuss discharge planning. Patient A&Ox3, no home oxygen, no HD, Patient/daughter deny any home going needs.

## 2024-08-26 NOTE — ED NOTES
Follow up collaboration with MD for tylenol for fever. Orders received  Will ask about repeat lactate     Rose Rouse RN  08/26/24 0053

## 2024-08-26 NOTE — DISCHARGE INSTR - DIET
The dietitian that visited with you was Юлия Gutierrez, MPH, RDN, LD, CLC. If you have additional questions please feel free to reach out to her at:  Phone: [phone]   Email: [email or web address]

## 2024-08-26 NOTE — CONSULTS
HPI  This encounter requested for patient with constipation rectal pain and also noted to have macrocytic anemia this patient was admitted with urinary sepsis.  He had recently undergone flexible endoscopy.  He was noted to have significant constipation and complained of pain in the rectal area.  He has no abdominal pain.  He feels a fullness in his rectal area.  He has had colonoscopies by his account in the past.  He does not have any drainage from his rectal area.  He denies any blood in the stool.  Past Medical History:   Diagnosis Date    Body mass index (BMI) 29.0-29.9, adult 09/29/2020    Body mass index (BMI) of 29.0 to 29.9 in adult    Old myocardial infarction     History of myocardial infarction          Current Facility-Administered Medications:     acetaminophen (Tylenol) tablet 650 mg, 650 mg, oral, q4h PRN, DU Peck    melatonin tablet 3 mg, 3 mg, oral, Nightly PRN, DU Peck    norepinephrine (Levophed) 8 mg in dextrose 5% 250 mL (0.032 mg/mL) infusion (premix), 0-0.5 mcg/kg/min, intravenous, Continuous, Shabana Pepe MD, Last Rate: 3.06 mL/hr at 08/26/24 0601, 0.02 mcg/kg/min at 08/26/24 0601    ondansetron (Zofran) injection 4 mg, 4 mg, intravenous, q8h PRN, DU Peck    pantoprazole (ProtoNix) injection 40 mg, 40 mg, intravenous, BID, DU Peck    polyethylene glycol (Glycolax, Miralax) packet 17 g, 17 g, oral, Daily PRN, DU Peck     No Known Allergies     Review of Systems  Review of Systems   Gastrointestinal:  Positive for constipation and rectal pain.       Objective     Vital signs for last 24 hours:  Temp:  [36.4 °C (97.5 °F)-38.9 °C (102.1 °F)] 36.5 °C (97.7 °F)  Heart Rate:  [] 77  Resp:  [17-31] 19  BP: ()/(41-68) 90/57    Intake/Output this shift:  I/O this shift:  In: 3225 [Blood:725; IV Piggyback:2500]  Out: 100 [Urine:100]    Physical Exam  Physical Exam  Vitals reviewed.   Constitutional:        Appearance: Normal appearance.   HENT:      Head: Normocephalic.   Cardiovascular:      Rate and Rhythm: Normal rate and regular rhythm.      Heart sounds: Normal heart sounds.   Pulmonary:      Effort: Pulmonary effort is normal.      Breath sounds: Normal breath sounds.   Abdominal:      General: Abdomen is flat. There is no distension.      Palpations: Abdomen is soft. There is no mass.      Tenderness: There is no abdominal tenderness. There is no guarding.      Comments: Rectal examination no masses.  Large amount of soft stool in the rectal vault.  No evidence of abscess.  Stool is heme-negative.   Musculoskeletal:         General: Normal range of motion.      Cervical back: Normal range of motion.   Skin:     General: Skin is warm.   Neurological:      General: No focal deficit present.   Psychiatric:         Mood and Affect: Mood normal.         Labs & Radiology      Labs Reviewed   CBC WITH AUTO DIFFERENTIAL - Abnormal       Result Value    WBC 7.5      nRBC 0.0      RBC 1.47 (*)     Hemoglobin 5.9 (*)     Hematocrit 19.5 (*)      (*)     MCH 40.1 (*)     MCHC 30.3 (*)     RDW 14.7 (*)     Platelets 89 (*)     Neutrophils % 14.3      Immature Granulocytes %, Automated 0.7      Lymphocytes % 43.5      Monocytes % 41.4      Eosinophils % 0.0      Basophils % 0.1      Neutrophils Absolute 1.07 (*)     Immature Granulocytes Absolute, Automated 0.05      Lymphocytes Absolute 3.26 (*)     Monocytes Absolute 3.10 (*)     Eosinophils Absolute 0.00      Basophils Absolute 0.01     COMPREHENSIVE METABOLIC PANEL - Abnormal    Glucose 131 (*)     Sodium 137      Potassium 4.7      Chloride 101      Bicarbonate 25      Anion Gap 16      Urea Nitrogen 17      Creatinine 1.13      eGFR 63      Calcium 9.2      Albumin 3.9      Alkaline Phosphatase 93      Total Protein 6.8      AST 17      Bilirubin, Total 0.7      ALT 14     LACTATE - Abnormal    Lactate 5.7 (*)     Narrative:     Venipuncture immediately after or  during the administration of Metamizole may lead to falsely low results. Testing should be performed immediately                  prior to Metamizole dosing.   PROTIME-INR - Abnormal    Protime 17.1 (*)     INR 1.5 (*)    URINALYSIS WITH REFLEX CULTURE AND MICROSCOPIC - Abnormal    Color, Urine Yellow      Appearance, Urine Clear      Specific Gravity, Urine 1.015      pH, Urine 6.5      Protein, Urine NEGATIVE      Glucose, Urine Normal      Blood, Urine 0.03 (TRACE) (*)     Ketones, Urine NEGATIVE      Bilirubin, Urine NEGATIVE      Urobilinogen, Urine 2 (1+) (*)     Nitrite, Urine NEGATIVE      Leukocyte Esterase, Urine 75 Jakub/µL (*)    LACTATE - Abnormal    Lactate 3.3 (*)     Narrative:     Venipuncture immediately after or during the administration of Metamizole may lead to falsely low results. Testing should be performed immediately                  prior to Metamizole dosing.   BLOOD GAS VENOUS FULL PANEL - Abnormal    POCT pH, Venous 7.45 (*)     POCT pCO2, Venous 34 (*)     POCT pO2, Venous 36      POCT SO2, Venous 63      POCT Oxy Hemoglobin, Venous 60.7      POCT Hematocrit Calculated, Venous 11.0 (*)     POCT Sodium, Venous 134 (*)     POCT Potassium, Venous 4.3      POCT Chloride, Venous 102      POCT Ionized Calicum, Venous 1.14      POCT Glucose, Venous 137 (*)     POCT Lactate, Venous 4.3 (*)     POCT Base Excess, Venous -0.5      POCT HCO3 Calculated, Venous 23.6      POCT Hemoglobin, Venous 3.6 (*)     POCT Anion Gap, Venous 13.0      Patient Temperature        FiO2 21     MICROSCOPIC ONLY, URINE - Abnormal    WBC, Urine 21-50 (*)     WBC Clumps, Urine RARE      RBC, Urine 6-10 (*)     Bacteria, Urine 2+ (*)     Budding Yeast, Urine PRESENT (*)     Mucus, Urine FEW     LACTATE - Abnormal    Lactate 2.3 (*)     Narrative:     Venipuncture immediately after or during the administration of Metamizole may lead to falsely low results. Testing should be performed immediately                  prior to  Metamizole dosing.   LACTATE - Abnormal    Lactate 2.1 (*)     Narrative:     Venipuncture immediately after or during the administration of Metamizole may lead to falsely low results. Testing should be performed immediately                  prior to Metamizole dosing.   OCCULT BLOOD X1, STOOL - Normal    Occult Blood, Stool X1 Negative     MAGNESIUM - Normal    Magnesium 1.69     TROPONIN I, HIGH SENSITIVITY - Normal    Troponin I, High Sensitivity 4      Narrative:     Less than 99th percentile of normal range cutoff-                  Female and children under 18 years old <14 ng/L; Male <21 ng/L: Negative                  Repeat testing should be performed if clinically indicated.                                     Female and children under 18 years old 14-50 ng/L; Male 21-50 ng/L:                  Consistent with possible cardiac damage and possible increased clinical                   risk. Serial measurements may help to assess extent of myocardial damage.                                     >50 ng/L: Consistent with cardiac damage, increased clinical risk and                  myocardial infarction. Serial measurements may help assess extent of                   myocardial damage.                                      NOTE: Children less than 1 year old may have higher baseline troponin                   levels and results should be interpreted in conjunction with the overall                   clinical context.                                     NOTE: Troponin I testing is performed using a different                   testing methodology at [facility] than at other                   [facility]. Direct result comparisons should only                   be made within the same method.   BLOOD CULTURE   BLOOD CULTURE   URINE CULTURE   TYPE AND SCREEN    ABO TYPE A      Rh TYPE POS      ANTIBODY SCREEN NEG     VERAB/VERIFY ABORH    ABO TYPE A      Rh TYPE POS     URINALYSIS WITH REFLEX CULTURE  AND MICROSCOPIC    Narrative:     The following orders were created for panel order Urinalysis with Reflex Culture and Microscopic.                  Procedure                               Abnormality         Status                                     ---------                               -----------         ------                                     Urinalysis with Reflex C...[[id number]]  Abnormal            Final result                               Extra Urine Gray Tube[[id number]]                                                                                         Please view results for these tests on the individual orders.   EXTRA URINE GRAY TUBE   BLOOD GAS LACTIC ACID, VENOUS   CBC   BASIC METABOLIC PANEL   LACTATE   PREPARE RBC    PRODUCT CODE T2631D41      Unit Number [id number]      Unit ABO A      Unit RH POS      XM INTEP COMP      Dispense Status IS      Blood Expiration Date 9/7/2024 11:59:00 PM EDT      PRODUCT BLOOD TYPE 6200      UNIT VOLUME 350      PRODUCT CODE M9451J66      Unit Number [id number]      Unit ABO A      Unit RH POS      XM INTEP COMP      Dispense Status TR      Blood Expiration Date 9/11/2024 11:59:00 PM EDT      PRODUCT BLOOD TYPE 6200      UNIT VOLUME 350     MORPHOLOGY    RBC Morphology See Below      RBC Fragments Few      Ovalocytes Few      Teardrop Cells Few      Giant Platelets Few     PATH REVIEW-CBC DIFFERENTIAL     CT scan 8/25/2024  IMPRESSION:  No acute abnormality in the abdomen or pelvis.      Small hiatal hernia.      Atherosclerosis. Descending thoracic aortic aneurysm. Coronary artery  calcifications.      Stable hyperdense lesion in the right kidney. Prostatomegaly. Refer  to recently performed CT urogram for details regarding the urinary  tract.    Impression  Constipation macrocytic anemia.  Heme-negative stool  Plan   Trial of daily lactulose.  Follow H&H.  Elevated mean cell volume could be related to ingestion of alcohol.  Will require  hematology consult.  As stool is heme-negative upper endoscopy would not be necessarily helpful.  Will continue to follow.

## 2024-08-27 LAB
ANION GAP SERPL CALC-SCNC: 12 MMOL/L (ref 10–20)
BLOOD EXPIRATION DATE: NORMAL
BUN SERPL-MCNC: 14 MG/DL (ref 6–23)
CALCIUM SERPL-MCNC: 8.4 MG/DL (ref 8.6–10.3)
CHLORIDE SERPL-SCNC: 106 MMOL/L (ref 98–107)
CO2 SERPL-SCNC: 23 MMOL/L (ref 21–32)
CREAT SERPL-MCNC: 0.83 MG/DL (ref 0.5–1.3)
DISPENSE STATUS: NORMAL
EGFRCR SERPLBLD CKD-EPI 2021: 85 ML/MIN/1.73M*2
ERYTHROCYTE [DISTWIDTH] IN BLOOD BY AUTOMATED COUNT: ABNORMAL %
ERYTHROCYTE [DISTWIDTH] IN BLOOD BY AUTOMATED COUNT: ABNORMAL %
FOLATE SERPL-MCNC: 2.8 NG/ML (ref 4.2–19.9)
GLUCOSE SERPL-MCNC: 120 MG/DL (ref 74–99)
HCT VFR BLD AUTO: 23.8 % (ref 41–52)
HCT VFR BLD AUTO: 24.1 % (ref 41–52)
HGB BLD-MCNC: 7.7 G/DL (ref 13.5–17.5)
HGB BLD-MCNC: 7.7 G/DL (ref 13.5–17.5)
HOLD SPECIMEN: NORMAL
MCH RBC QN AUTO: 34.8 PG (ref 26–34)
MCH RBC QN AUTO: 34.8 PG (ref 26–34)
MCHC RBC AUTO-ENTMCNC: 32 G/DL (ref 32–36)
MCHC RBC AUTO-ENTMCNC: 32.4 G/DL (ref 32–36)
MCV RBC AUTO: 108 FL (ref 80–100)
MCV RBC AUTO: 109 FL (ref 80–100)
NRBC BLD-RTO: 0 /100 WBCS (ref 0–0)
NRBC BLD-RTO: 0.3 /100 WBCS (ref 0–0)
PATH REVIEW-CBC DIFFERENTIAL: NORMAL
PLATELET # BLD AUTO: 44 X10*3/UL (ref 150–450)
PLATELET # BLD AUTO: 46 X10*3/UL (ref 150–450)
POTASSIUM SERPL-SCNC: 3.2 MMOL/L (ref 3.5–5.3)
PRODUCT BLOOD TYPE: 6200
PRODUCT CODE: NORMAL
RBC # BLD AUTO: 2.21 X10*6/UL (ref 4.5–5.9)
RBC # BLD AUTO: 2.21 X10*6/UL (ref 4.5–5.9)
SODIUM SERPL-SCNC: 138 MMOL/L (ref 136–145)
UNIT ABO: NORMAL
UNIT NUMBER: NORMAL
UNIT RH: NORMAL
UNIT VOLUME: 350
VIT B12 SERPL-MCNC: 247 PG/ML (ref 211–946)
WBC # BLD AUTO: 7.5 X10*3/UL (ref 4.4–11.3)
WBC # BLD AUTO: 9.1 X10*3/UL (ref 4.4–11.3)
XM INTEP: NORMAL

## 2024-08-27 PROCEDURE — 2500000001 HC RX 250 WO HCPCS SELF ADMINISTERED DRUGS (ALT 637 FOR MEDICARE OP): Mod: IPSPLIT | Performed by: NURSE PRACTITIONER

## 2024-08-27 PROCEDURE — 1200000002 HC GENERAL ROOM WITH TELEMETRY DAILY: Mod: IPSPLIT

## 2024-08-27 PROCEDURE — 2500000004 HC RX 250 GENERAL PHARMACY W/ HCPCS (ALT 636 FOR OP/ED): Mod: IPSPLIT

## 2024-08-27 PROCEDURE — 99233 SBSQ HOSP IP/OBS HIGH 50: CPT | Performed by: NURSE PRACTITIONER

## 2024-08-27 PROCEDURE — 36415 COLL VENOUS BLD VENIPUNCTURE: CPT | Mod: IPSPLIT | Performed by: NURSE PRACTITIONER

## 2024-08-27 PROCEDURE — 85027 COMPLETE CBC AUTOMATED: CPT | Mod: IPSPLIT

## 2024-08-27 PROCEDURE — 99232 SBSQ HOSP IP/OBS MODERATE 35: CPT | Performed by: SURGERY

## 2024-08-27 PROCEDURE — 36415 COLL VENOUS BLD VENIPUNCTURE: CPT | Mod: IPSPLIT

## 2024-08-27 PROCEDURE — 80048 BASIC METABOLIC PNL TOTAL CA: CPT | Mod: IPSPLIT

## 2024-08-27 PROCEDURE — 2500000001 HC RX 250 WO HCPCS SELF ADMINISTERED DRUGS (ALT 637 FOR MEDICARE OP): Mod: IPSPLIT | Performed by: SURGERY

## 2024-08-27 PROCEDURE — 82746 ASSAY OF FOLIC ACID SERUM: CPT | Performed by: NURSE PRACTITIONER

## 2024-08-27 PROCEDURE — 82607 VITAMIN B-12: CPT | Performed by: NURSE PRACTITIONER

## 2024-08-27 PROCEDURE — 2500000002 HC RX 250 W HCPCS SELF ADMINISTERED DRUGS (ALT 637 FOR MEDICARE OP, ALT 636 FOR OP/ED): Mod: IPSPLIT | Performed by: NURSE PRACTITIONER

## 2024-08-27 PROCEDURE — 94760 N-INVAS EAR/PLS OXIMETRY 1: CPT | Mod: IPSPLIT

## 2024-08-27 PROCEDURE — 2500000004 HC RX 250 GENERAL PHARMACY W/ HCPCS (ALT 636 FOR OP/ED): Mod: IPSPLIT | Performed by: NURSE PRACTITIONER

## 2024-08-27 PROCEDURE — 85027 COMPLETE CBC AUTOMATED: CPT | Mod: IPSPLIT | Performed by: NURSE PRACTITIONER

## 2024-08-27 RX ORDER — POTASSIUM CHLORIDE 20 MEQ/1
40 TABLET, EXTENDED RELEASE ORAL ONCE
Status: COMPLETED | OUTPATIENT
Start: 2024-08-27 | End: 2024-08-27

## 2024-08-27 RX ORDER — SUCRALFATE 1 G/1
1 TABLET ORAL
Status: DISCONTINUED | OUTPATIENT
Start: 2024-08-27 | End: 2024-08-30 | Stop reason: HOSPADM

## 2024-08-27 ASSESSMENT — LIFESTYLE VARIABLES
BLOOD PRESSURE: 95/73
TOTAL SCORE: 0
PULSE: 64
AGITATION: NORMAL ACTIVITY
PULSE: 65
HEADACHE, FULLNESS IN HEAD: NOT PRESENT
NAUSEA AND VOMITING: NO NAUSEA AND NO VOMITING
NAUSEA AND VOMITING: NO NAUSEA AND NO VOMITING
AUDITORY DISTURBANCES: NOT PRESENT
ORIENTATION AND CLOUDING OF SENSORIUM: ORIENTED AND CAN DO SERIAL ADDITIONS
BLOOD PRESSURE: 114/96
BLOOD PRESSURE: 105/61
ANXIETY: NO ANXIETY, AT EASE
PULSE: 64
PULSE: 73
PULSE: 68
TREMOR: NO TREMOR
ORIENTATION AND CLOUDING OF SENSORIUM: ORIENTED AND CAN DO SERIAL ADDITIONS
HEADACHE, FULLNESS IN HEAD: NOT PRESENT
ORIENTATION AND CLOUDING OF SENSORIUM: ORIENTED AND CAN DO SERIAL ADDITIONS
ORIENTATION AND CLOUDING OF SENSORIUM: ORIENTED AND CAN DO SERIAL ADDITIONS
PULSE: 71
PULSE: 65
PULSE: 62
TOTAL SCORE: 0
AUDITORY DISTURBANCES: NOT PRESENT
BLOOD PRESSURE: 121/59
PULSE: 72
TREMOR: NO TREMOR
BLOOD PRESSURE: 92/56
PAROXYSMAL SWEATS: NO SWEAT VISIBLE
AGITATION: NORMAL ACTIVITY
PULSE: 69
BLOOD PRESSURE: 109/57
AGITATION: NORMAL ACTIVITY
PULSE: 63
BLOOD PRESSURE: 92/58
BLOOD PRESSURE: 108/57
NAUSEA AND VOMITING: NO NAUSEA AND NO VOMITING
TOTAL SCORE: 0
VISUAL DISTURBANCES: NOT PRESENT
PAROXYSMAL SWEATS: NO SWEAT VISIBLE
BLOOD PRESSURE: 104/56
VISUAL DISTURBANCES: NOT PRESENT
PULSE: 68
TREMOR: NO TREMOR
ANXIETY: NO ANXIETY, AT EASE
BLOOD PRESSURE: 101/66
TOTAL SCORE: 0
TREMOR: NO TREMOR
PULSE: 68
HEADACHE, FULLNESS IN HEAD: NOT PRESENT
PULSE: 66
BLOOD PRESSURE: 105/66
PAROXYSMAL SWEATS: NO SWEAT VISIBLE
HEADACHE, FULLNESS IN HEAD: NOT PRESENT
PULSE: 69
PULSE: 60
ANXIETY: NO ANXIETY, AT EASE
ORIENTATION AND CLOUDING OF SENSORIUM: ORIENTED AND CAN DO SERIAL ADDITIONS
PAROXYSMAL SWEATS: NO SWEAT VISIBLE
BLOOD PRESSURE: 106/69
PULSE: 69
NAUSEA AND VOMITING: NO NAUSEA AND NO VOMITING
BLOOD PRESSURE: 98/61
BLOOD PRESSURE: 107/59
ANXIETY: NO ANXIETY, AT EASE
PULSE: 68
TOTAL SCORE: 0
VISUAL DISTURBANCES: NOT PRESENT
BLOOD PRESSURE: 104/51
TREMOR: NO TREMOR
AUDITORY DISTURBANCES: NOT PRESENT
HEADACHE, FULLNESS IN HEAD: NOT PRESENT
BLOOD PRESSURE: 97/53
ANXIETY: NO ANXIETY, AT EASE
NAUSEA AND VOMITING: NO NAUSEA AND NO VOMITING
AGITATION: NORMAL ACTIVITY
BLOOD PRESSURE: 94/59
AGITATION: NORMAL ACTIVITY
PAROXYSMAL SWEATS: NO SWEAT VISIBLE
AUDITORY DISTURBANCES: NOT PRESENT
VISUAL DISTURBANCES: NOT PRESENT
AUDITORY DISTURBANCES: NOT PRESENT
VISUAL DISTURBANCES: NOT PRESENT

## 2024-08-27 ASSESSMENT — PAIN - FUNCTIONAL ASSESSMENT
PAIN_FUNCTIONAL_ASSESSMENT: 0-10

## 2024-08-27 ASSESSMENT — COGNITIVE AND FUNCTIONAL STATUS - GENERAL
MOVING TO AND FROM BED TO CHAIR: A LITTLE
TURNING FROM BACK TO SIDE WHILE IN FLAT BAD: A LITTLE
MOBILITY SCORE: 18
STANDING UP FROM CHAIR USING ARMS: A LITTLE
CLIMB 3 TO 5 STEPS WITH RAILING: A LOT
WALKING IN HOSPITAL ROOM: A LITTLE

## 2024-08-27 ASSESSMENT — PAIN SCALES - GENERAL
PAINLEVEL_OUTOF10: 0 - NO PAIN

## 2024-08-27 NOTE — PROGRESS NOTES
CRITICAL CARE PROGRESS NOTE      Hospital Day # 0    Chico Iqbal  Primary Care Physician: Erick Baldwin MD  Primary Pulmonologist:      Subjective/Last 24 Hour Update   Mr. Iqbal is an 88 yr old male with a pmhx of HTN, HLD, CAD with MI who presented with sepsis secondary to a UTI. Patient was found to have a lactic acid of 5.7 and a Hb of 5.9  Patient was started on ceftriaxone and given 3 units of blood     Patient has a history of heart failure as EF is reduced at 40 %     Patient has heme negative stool.       Lines - PIVS   Tubes -none   Gtts- levo- 0.02 mcg   UOP- 1000cc during shift     Hospital Course     Chico Iqbal is a 88 y.o. male with PMH of daily Etoh, HTN, HLD, CAD with MI s/p stenting, paroxysmal AF (reportedly on Xarelto but unable to find prescription/provider record), BPH, COPD (not on home O2) who presented to ED  with c/o constipation and rectal pain. He stated his stool was stuck in his rectum and he had not had moved his bowels in a few weeks. He also vomited x 1 prior to arrival. In the ED his workup showed lactate 5.7, INR 1.5, H/H 5.9/19.5, RBC 1.47, , plt 89. VB.45/34/36/23.6. CT a/p showed no acute process, prostatomegaly. Stool occult was negative. After fluid resuscitation lactate was 2.1. UA showed 75 LE, 2+ bacteria. Of note, he was recently seen by Dr. Yancey, Urology, for repeat UTIs and gross hematuria and had a flex cysto done, finding enlarged prostate. In ED he was started on IV ceftriaxone, urine was cultured and he was admitted to ICU for blood transfusion, further evaluation and treatment         Past history: reviewed as below    Past Medical History:   Diagnosis Date    Alcohol abuse     Body mass index (BMI) 29.0-29.9, adult 2020    Body mass index (BMI) of 29.0 to 29.9 in adult    BPH (benign prostatic hyperplasia)     CAD (coronary artery disease)     COPD (chronic obstructive pulmonary disease) (Multi)     GERD (gastroesophageal reflux  disease)     Hyperlipidemia     Hypertension     Old myocardial infarction     History of myocardial infarction    Paroxysmal A-fib (Multi)     Urinary tract infection      Past Surgical History:   Procedure Laterality Date    CARDIAC CATHETERIZATION      CORONARY STENT PLACEMENT       Social History     Socioeconomic History    Marital status:    Tobacco Use    Smoking status: Some Days     Types: Cigars     Passive exposure: Current    Smokeless tobacco: Never   Vaping Use    Vaping status: Never Used   Substance and Sexual Activity    Alcohol use: Not Currently     Comment: OCCASIONAL, 2-3 BEERS DAILY    Drug use: Never     Social Determinants of Health     Financial Resource Strain: Low Risk  (2024)    Overall Financial Resource Strain (CARDIA)     Difficulty of Paying Living Expenses: Not hard at all   Transportation Needs: No Transportation Needs (2024)    PRAPARE - Transportation     Lack of Transportation (Medical): No     Lack of Transportation (Non-Medical): No   Housing Stability: Low Risk  (2024)    Housing Stability Vital Sign     Unable to Pay for Housing in the Last Year: No     Number of Times Moved in the Last Year: 1     Homeless in the Last Year: No     Family History   Problem Relation Name Age of Onset    Diabetes Mother      Heart attack Brother             Objective         Vitals for last 24 hours Temperature Ins/Outs Weight   Temp:  [35.9 °C (96.6 °F)-38.8 °C (101.9 °F)] 36 °C (96.8 °F)  Heart Rate:  [59-96] 87  Resp:  [0-31] 18  BP: ()/(36-87) 113/63  FiO2 (%):  [21 %] 21 % Temp (24hrs), Av.6 °C (97.9 °F), Min:35.9 °C (96.6 °F), Max:38.8 °C (101.9 °F)     Intake/Output Summary (Last 24 hours) at 2024 2331  Last data filed at 2024 2035  Gross per 24 hour   Intake 4086.67 ml   Output 1200 ml   Net 2886.67 ml    Wt Readings from Last 7 Encounters:   24 81.5 kg (179 lb 10.8 oz)   24 83 kg (183 lb)   24 84.8 kg (187 lb)   24 88.8  kg (195 lb 12.8 oz)   11/22/23 92.5 kg (204 lb)   07/19/23 92.6 kg (204 lb 3.2 oz)   03/22/23 89.2 kg (196 lb 9.6 oz)    Body mass index is 25.06 kg/m².   Sats Hemodynamics Cumulative I/O Vent Settings   SpO2 Readings from Last 3 Encounters:   08/26/24 97%   08/06/24 91%   05/06/24 97%          [unfilled]   @[facility]@     Exam:    Gen: NAD.   HEENT: NCAT  CV: RRR  Resp: Bilateral chest excursion  Abd: S, NT, ND  Ext: WWP, no significant peripheral edema noted.  Neuro: No focal deficits identified.      Drains         Medications       Scheduled Meds:[Held by provider] apixaban, 2.5 mg, oral, BID  atorvastatin, 40 mg, oral, Daily  cefTRIAXone, 1 g, intravenous, q24h  [Held by provider] clopidogrel, 75 mg, oral, Daily  finasteride, 5 mg, oral, Daily  folic acid, 1 mg, oral, Daily  [Held by provider] furosemide, 20 mg, oral, Daily  lactulose, 20 g, oral, Daily  [Held by provider] metoprolol succinate XL, 50 mg, oral, Daily  mirtazapine, 7.5 mg, oral, Nightly  multivitamin, 1 tablet, oral, Daily  pantoprazole, 40 mg, intravenous, BID  tamsulosin, 0.8 mg, oral, Daily  thiamine, 100 mg, oral, Daily      Continuous Infusions:norepinephrine, 0-0.5 mcg/kg/min, Last Rate: 0.02 mcg/kg/min (08/26/24 2000)  sodium chloride 0.9%, 10 mL/hr, Last Rate: 10 mL/hr (08/26/24 0846)          Laboratory Data           Hematology  Results from last 7 days   Lab Units 08/26/24  1717 08/26/24  0901 08/26/24  0519   WBC AUTO x10*3/uL 8.7 9.4 7.6   RBC AUTO x10*6/uL 2.30* 1.95* 1.51*   HEMOGLOBIN g/dL 8.1* 7.2* 5.6*   HEMATOCRIT % 24.7* 22.2* 18.1*   PLATELETS AUTO x10*3/uL 49* 51* 40*     Results from last 7 days   Lab Units 08/25/24  1905   INR  1.5*     Chemistry         Results from last 7 days   Lab Units 08/26/24  0519 08/25/24  1905   SODIUM mmol/L 134* 137   POTASSIUM mmol/L 3.3* 4.7   CHLORIDE mmol/L 102 101   CO2 mmol/L 22 25   BUN mg/dL 21 17   CREATININE mg/dL 1.04 1.13   CALCIUM mg/dL 7.6* 9.2   ALBUMIN g/dL  --  3.9  "  PROTEIN TOTAL g/dL  --  6.8   BILIRUBIN TOTAL mg/dL  --  0.7   ALT U/L  --  14   AST U/L  --  17     Results from last 7 days   Lab Units 08/26/24  0519 08/26/24  0332 08/26/24  0121   LACTATE mmol/L 1.7 2.1* 2.3*     No lab exists for component: \"SCVO2\"      Blood Gases  Results from last 7 days   Lab Units 08/25/24  2018   FIO2 % 21          Assessment/Plan     Sepsis   UTI   Acute metabolic acidosis   CAD   Hypercholesterolemia   PAF   Essential hypertension   Anemia       -trend hb , monitor for bleeding   - Gen Surg not planning on endoscopy   - daily lactulose   - needs outpatient heme consult   - Will continue to manage and replete critical electrolytes. Will keep  Mg >2.0, K >4.0, and Phos greater than 3.0.  - Will actively target with vasopressors , if needed, to keep MAP > 65.  - Appropriately maintain saturations above 92%.  - Continue to monitor UOP carefully to ensure at least 0.5ml/kg/h.  - Maintain appropriate sleep/wake cycles to avoid ICU delirium.    DVT prophylaxis: SCDS  Code status: FULL       The entirety of this visit history, physical exam, and diagnostic interpretation was done via audiovisual telemedicine.     Patient is located in [place]  and I am located in [place].   Upon my own and separate evaluation, this patient had a high  probability of imminent of life-threatening deterioration due to sepsis ,  which required my direct attention, intervention and personal  management.    I have personally provided 47 minutes of critical care time exclusive  of time spent on separately billable procedures. Time includes review  of laboratory data, radiology results, discussion with consultants,  and monitoring of potential decompensation. Interventions were  performed as documented above.    Oziel Trent IV, MD  Pulmonary, Critical Care and Sleep Medicine  [facility]      "

## 2024-08-27 NOTE — PROGRESS NOTES
"Chico Iqbal is a 88 y.o. male on day 1 of admission presenting with Sepsis (Multi).    Subjective   Had a bowel movement.  Tolerating lactulose.  No bleeding per rectum.  Received blood overnight.  On very low-dose Levophed       Objective     Physical Exam  Constitutional:       Appearance: Normal appearance.   Abdominal:      Palpations: Abdomen is soft. There is no mass.      Tenderness: There is no abdominal tenderness. There is no guarding.         Last Recorded Vitals  Blood pressure 104/51, pulse 63, temperature 35.2 °C (95.4 °F), temperature source Temporal, resp. rate 18, height 1.803 m (5' 11\"), weight 81.5 kg (179 lb 10.8 oz), SpO2 95%.  Intake/Output last 3 Shifts:  I/O last 3 completed shifts:  In: 4036.7 (49.5 mL/kg) [P.O.:480; Blood:1056.7; IV Piggyback:2500]  Out: 1000 (12.3 mL/kg) [Urine:1000 (0.3 mL/kg/hr)]  Weight: 81.5 kg     RMalnutrition Diagnosis Status: New  Malnutrition Diagnosis: Severe malnutrition related to acute disease or injury  As Evidenced by: >10% weight loss in 6 months, prolonged poor intake of less than 50% of est needs for greater than a month, moderate subcutaneous fat loss of orbital, and modearte muscle loss of temporal, clavicle, interosseous, and quads, decreased appetite and significant constipation and complained of pain in the rectal area.  I agree with the dietitian's malnutrition diagnosis.      Assessment/Plan   Assessment & Plan  Sepsis (Multi)    Benign essential HTN    BPH (benign prostatic hyperplasia)    CAD (coronary artery disease)    Gastroesophageal reflux disease without esophagitis    Hypercholesterolemia    Anemia requiring transfusions    PAF (paroxysmal atrial fibrillation) (Multi)    Hypertensive heart disease with heart failure (Multi)    Acute cystitis with hematuria    Constipation    History of myocardial infarction    Sepsis due to urinary tract infection (Multi)    Hypokalemia    Alcohol abuse    Plan-continue lactulose.  Follow H&H.  Stool " heme-negative.  Wean norepinephrine, continue proton pump inhibitor.  Will add Carafate twice a day for additional gastric mucosal protection.      Mir Antonio MD

## 2024-08-27 NOTE — PROGRESS NOTES
08/27/24 1115   Discharge Planning   Home or Post Acute Services None   Expected Discharge Disposition Home     Per IDT rounds, patient with a possible discharge this afternoon pending blood pressure.      DC PLAN:  Home

## 2024-08-27 NOTE — CARE PLAN
The patient's goals for the shift include Patient will remain injury free this shift 8/27/24 1900.    The clinical goals for the shift include Patient will remain off levophed this shift 8/27/24 1900.    Patient remains off levophed and injury free this shift. Patient remains in atrial fibrillation. Family at bedside during the day, updated on patient plan of care and patient status. Patient was up to chair this am for breakfast.

## 2024-08-27 NOTE — PROGRESS NOTES
Chico Iqbal is a 88 y.o. male on day 1 of admission presenting with Sepsis (Multi).    Subjective   He is sitting up in a chair eating breakfast, tolerating well. He denies pain, shortness of breath, chest pain. We discussed his blood levels and monitoring them throughout the day. All questions answered. Will continue to monitor his CBC today. Plan for discharge when stable.      Objective     Physical Exam  Constitutional:       General: He is not in acute distress.     Appearance: Normal appearance. He is not toxic-appearing.   HENT:      Head: Normocephalic and atraumatic.      Mouth/Throat:      Mouth: Mucous membranes are moist.   Eyes:      Extraocular Movements: Extraocular movements intact.      Pupils: Pupils are equal, round, and reactive to light.   Cardiovascular:      Rate and Rhythm: Normal rate and regular rhythm.      Pulses: Normal pulses.      Heart sounds: Normal heart sounds. No murmur heard.     No gallop.   Pulmonary:      Effort: Pulmonary effort is normal. No respiratory distress.      Breath sounds: Normal breath sounds. No wheezing, rhonchi or rales.   Abdominal:      General: Bowel sounds are normal. There is no distension.      Palpations: Abdomen is soft.      Tenderness: There is no abdominal tenderness. There is no guarding or rebound.   Musculoskeletal:         General: No swelling, tenderness, deformity or signs of injury. Normal range of motion.      Cervical back: Normal range of motion and neck supple.   Skin:     General: Skin is warm and dry.      Capillary Refill: Capillary refill takes less than 2 seconds.      Coloration: Skin is pale. Skin is not jaundiced.      Findings: No bruising or rash.   Neurological:      General: No focal deficit present.      Mental Status: He is alert and oriented to person, place, and time.      Cranial Nerves: No cranial nerve deficit.      Sensory: No sensory deficit.      Motor: No weakness.      Gait: Gait normal.   Psychiatric:         Mood  "and Affect: Mood normal.         Behavior: Behavior normal.         Thought Content: Thought content normal.         Judgment: Judgment normal.     Last Recorded Vitals  Blood pressure 96/63, pulse 86, temperature 36.2 °C (97.2 °F), temperature source Temporal, resp. rate 18, height 1.803 m (5' 11\"), weight 81.5 kg (179 lb 10.8 oz), SpO2 98%.  Intake/Output last 3 Shifts:  I/O last 3 completed shifts:  In: 4086.7 (50.1 mL/kg) [P.O.:480; Blood:1056.7; IV Piggyback:2550]  Out: 2400 (29.4 mL/kg) [Urine:2400 (0.8 mL/kg/hr)]  Weight: 81.5 kg     Relevant Results  CT abdomen pelvis wo IV contrast  Result Date: 8/25/2024  No acute abnormality in the abdomen or pelvis.   Small hiatal hernia.   Atherosclerosis. Descending thoracic aortic aneurysm. Coronary artery calcifications.   Stable hyperdense lesion in the right kidney. Prostatomegaly. Refer to recently performed CT urogram for details regarding the urinary tract.   Signed by: Virginia Serrano 8/25/2024 8:42 PM Dictation workstation:   [id number]      Latest Reference Range & Units 08/26/24 05:19 08/27/24 06:24   GLUCOSE 74 - 99 mg/dL 117 (H) 120 (H)   SODIUM 136 - 145 mmol/L 134 (L) 138   POTASSIUM 3.5 - 5.3 mmol/L 3.3 (L) 3.2 (L)   CHLORIDE 98 - 107 mmol/L 102 106   Bicarbonate 21 - 32 mmol/L 22 23   Anion Gap 10 - 20 mmol/L 13 12   Blood Urea Nitrogen 6 - 23 mg/dL 21 14   Creatinine 0.50 - 1.30 mg/dL 1.04 0.83   EGFR >60 mL/min/1.73m*2 69 85   Calcium 8.6 - 10.3 mg/dL 7.6 (L) 8.4 (L)      Latest Reference Range & Units 08/26/24 01:21 08/26/24 03:32 08/26/24 05:19 08/27/24 05:19   FOLATE 4.2 - 19.9 ng/mL    2.8 (L)   Lactate 0.4 - 2.0 mmol/L 2.3 (H) 2.1 (H) 1.7       Latest Reference Range & Units 08/26/24 05:19 08/26/24 09:01 08/26/24 17:17 08/27/24 06:24   WBC 4.4 - 11.3 x10*3/uL 7.6 9.4 8.7 7.5   nRBC 0.0 - 0.0 /100 WBCs 0.0 0.0 0.0 0.3 (H)   RBC 4.50 - 5.90 x10*6/uL 1.51 (L) 1.95 (L) 2.30 (L) 2.21 (L)   HEMOGLOBIN 13.5 - 17.5 g/dL 5.6 (LL) 7.2 (L) 8.1 (L) 7.7 " "(L)   HEMATOCRIT 41.0 - 52.0 % 18.1 (L) 22.2 (L) 24.7 (L) 23.8 (L)   MCV 80 - 100 fL 120 (H) 114 (H) 107 (H) 108 (H)   MCH 26.0 - 34.0 pg 37.1 (H) 36.9 (H) 35.2 (H) 34.8 (H)   MCHC 32.0 - 36.0 g/dL 30.9 (L) 32.4 32.8 32.4   RED CELL DISTRIBUTION WIDTH  COMMENT ONLY COMMENT ONLY COMMENT ONLY COMMENT ONLY   Platelets 150 - 450 x10*3/uL 40 (LL) 51 (L) 49 (L) 46 (L)     Assessment/Plan   Assessment & Plan  Sepsis (Multi)    Anemia requiring transfusions    Sepsis due to urinary tract infection (Multi)    CAD (coronary artery disease)    Hypercholesterolemia    Hypertensive heart disease with heart failure (Multi)    Benign essential HTN    BPH (benign prostatic hyperplasia)    Acute cystitis with hematuria    Gastroesophageal reflux disease without esophagitis    History of myocardial infarction    PAF (paroxysmal atrial fibrillation) (Multi)    Hypokalemia    Constipation    Alcohol abuse    Elevated lactic acid  Sepsis due to urinary tract infection (Multi)  Anemia requiring transfusions  - lactate 5.7 > 2.2 > 1.7  - WBC 7.5 > 9.4 > 7.5  - H/H 5.9/19.54 > 7.2/22.2 > 7.7/23.8  -  > 114 > 108  - plt 89> 40 >51 > 46  - hypotension requiring levophed overnight  - UA: 75 LE, 2+ bacteria, WBC 21-50  - Urine culture NGTD  - ICU  - continue IV ceftriaxone, day 3  - 3 units PRBC today, repeat CBC after #2, #3  - hematology consult as needed   - wean off pressors as able  - apixaban, clopidogrel held for now  - restart clopidogrel, continue holding apixaban for now  - monitoring H/H still  - Dr Parks reached out over Epic: \"I am reviewing a PB smear on Chico Iqbal (MRN [id number]). I'm concerned about some of the circulating WBC and recommend flow cytometry for further evaluation. Smear review signed out in Epic, just wanted to draw your attention to this.\"  - Dr Coronado messaged, no response (He only consults on Wed/Thurs and likes to be called first)  - reach out Wednesday     CAD (coronary artery " disease)  Hypercholesterolemia  History of myocardial infarction  PAF (paroxysmal atrial fibrillation) (Multi)  Benign essential HTN  - Echo 3/2023: 1. Left ventricular systolic function is mildly decreased with a 40% estimated ejection fraction.  2. Multiple segmental abnormalities exist. 3. Spectral Doppler shows a pseudonormal pattern of left ventricular diastolic filling. 4. There is mildly reduced right ventricular systolic function. 5. The left atrium is mild to moderately dilated. 6. There is global and segmental LV hypokinesis. 7. CVP is elevated.  - ICU  - telemetry  - hypotensive overnight, on pressors  - furosemide, metoprolol held until BP normalizes  - continue atorvastatin  - BP stable, continue monitoring      Gastroesophageal reflux disease without esophagitis  Constipation  - continue PPI  - continue lactulose as needed  - document BM, had multiple yesterday     BPH (benign prostatic hyperplasia)  Acute cystitis with hematuria  - recent visit with urology for gross hematuria  - flex cysto showed enlarged prostate  - continue finasteride, tamsulosin  - UOP recorded, no hematuria noted     Hypokalemia  - K 3.3 > 3.2  - repleted orally  - follow BMP  - replete as needed     Daily ETOH  - CIWA  - folic acid, thiamine  - monitor for S/S withdrawal  - encourage cessation     GI ppx: PPI  DVT ppx: apixaban on hold for anemia  Fluids:  as needed  Electrolytes: replace as needed  Nutrition: regular  Adjuncts: PIV  Code Status: full  Pt requires inpatient stay at this time.    Linn Cheek, APRN-CNP

## 2024-08-28 ENCOUNTER — TELEPHONE (OUTPATIENT)
Dept: HEMATOLOGY/ONCOLOGY | Facility: HOSPITAL | Age: 89
End: 2024-08-28
Payer: MEDICARE

## 2024-08-28 LAB
ANION GAP SERPL CALC-SCNC: 11 MMOL/L (ref 10–20)
APTT PPP: 30 SECONDS (ref 27–38)
BACTERIA UR CULT: ABNORMAL
BLOOD EXPIRATION DATE: NORMAL
BUN SERPL-MCNC: 13 MG/DL (ref 6–23)
CALCIUM SERPL-MCNC: 8.3 MG/DL (ref 8.6–10.3)
CHLORIDE SERPL-SCNC: 105 MMOL/L (ref 98–107)
CO2 SERPL-SCNC: 26 MMOL/L (ref 21–32)
CREAT SERPL-MCNC: 0.8 MG/DL (ref 0.5–1.3)
DISPENSE STATUS: NORMAL
EGFRCR SERPLBLD CKD-EPI 2021: 86 ML/MIN/1.73M*2
ERYTHROCYTE [DISTWIDTH] IN BLOOD BY AUTOMATED COUNT: ABNORMAL %
FERRITIN SERPL-MCNC: 1067 NG/ML (ref 20–300)
GLUCOSE SERPL-MCNC: 112 MG/DL (ref 74–99)
HCT VFR BLD AUTO: 24.3 % (ref 41–52)
HGB BLD-MCNC: 7.8 G/DL (ref 13.5–17.5)
HGB RETIC QN: 37 PG (ref 28–38)
HOLD SPECIMEN: NORMAL
IMMATURE RETIC FRACTION: 20.1 %
INR PPP: 1.2 (ref 0.9–1.1)
IRON SATN MFR SERPL: ABNORMAL %
IRON SERPL-MCNC: 178 UG/DL (ref 35–150)
MCH RBC QN AUTO: 35.1 PG (ref 26–34)
MCHC RBC AUTO-ENTMCNC: 32.1 G/DL (ref 32–36)
MCV RBC AUTO: 110 FL (ref 80–100)
NRBC BLD-RTO: 0.2 /100 WBCS (ref 0–0)
PLATELET # BLD AUTO: 51 X10*3/UL (ref 150–450)
POTASSIUM SERPL-SCNC: 3.9 MMOL/L (ref 3.5–5.3)
PRODUCT BLOOD TYPE: 6200
PRODUCT CODE: NORMAL
PROTHROMBIN TIME: 13.2 SECONDS (ref 9.8–12.8)
RBC # BLD AUTO: 2.22 X10*6/UL (ref 4.5–5.9)
RETICS #: 0.02 X10*6/UL (ref 0.02–0.11)
RETICS/RBC NFR AUTO: 1 % (ref 0.5–2)
SODIUM SERPL-SCNC: 138 MMOL/L (ref 136–145)
TIBC SERPL-MCNC: ABNORMAL UG/DL
UIBC SERPL-MCNC: <55 UG/DL (ref 110–370)
UNIT ABO: NORMAL
UNIT NUMBER: NORMAL
UNIT RH: NORMAL
UNIT VOLUME: 350
WBC # BLD AUTO: 10.8 X10*3/UL (ref 4.4–11.3)
XM INTEP: NORMAL

## 2024-08-28 PROCEDURE — 85384 FIBRINOGEN ACTIVITY: CPT | Mod: GENLAB | Performed by: INTERNAL MEDICINE

## 2024-08-28 PROCEDURE — 2500000001 HC RX 250 WO HCPCS SELF ADMINISTERED DRUGS (ALT 637 FOR MEDICARE OP): Mod: IPSPLIT | Performed by: NURSE PRACTITIONER

## 2024-08-28 PROCEDURE — 83540 ASSAY OF IRON: CPT | Mod: IPSPLIT | Performed by: INTERNAL MEDICINE

## 2024-08-28 PROCEDURE — 36415 COLL VENOUS BLD VENIPUNCTURE: CPT | Mod: IPSPLIT | Performed by: NURSE PRACTITIONER

## 2024-08-28 PROCEDURE — 2500000004 HC RX 250 GENERAL PHARMACY W/ HCPCS (ALT 636 FOR OP/ED): Mod: IPSPLIT | Performed by: INTERNAL MEDICINE

## 2024-08-28 PROCEDURE — 1200000002 HC GENERAL ROOM WITH TELEMETRY DAILY: Mod: IPSPLIT

## 2024-08-28 PROCEDURE — 36415 COLL VENOUS BLD VENIPUNCTURE: CPT | Mod: IPSPLIT | Performed by: INTERNAL MEDICINE

## 2024-08-28 PROCEDURE — 99232 SBSQ HOSP IP/OBS MODERATE 35: CPT

## 2024-08-28 PROCEDURE — 80048 BASIC METABOLIC PNL TOTAL CA: CPT | Mod: IPSPLIT | Performed by: NURSE PRACTITIONER

## 2024-08-28 PROCEDURE — 88185 FLOWCYTOMETRY/TC ADD-ON: CPT | Mod: TC,GENLAB | Performed by: INTERNAL MEDICINE

## 2024-08-28 PROCEDURE — P9016 RBC LEUKOCYTES REDUCED: HCPCS | Mod: IPSPLIT

## 2024-08-28 PROCEDURE — 99221 1ST HOSP IP/OBS SF/LOW 40: CPT | Performed by: INTERNAL MEDICINE

## 2024-08-28 PROCEDURE — 2500000004 HC RX 250 GENERAL PHARMACY W/ HCPCS (ALT 636 FOR OP/ED): Mod: IPSPLIT | Performed by: NURSE PRACTITIONER

## 2024-08-28 PROCEDURE — 82728 ASSAY OF FERRITIN: CPT | Mod: IPSPLIT | Performed by: INTERNAL MEDICINE

## 2024-08-28 PROCEDURE — 85027 COMPLETE CBC AUTOMATED: CPT | Mod: IPSPLIT | Performed by: NURSE PRACTITIONER

## 2024-08-28 PROCEDURE — 94760 N-INVAS EAR/PLS OXIMETRY 1: CPT | Mod: IPSPLIT

## 2024-08-28 PROCEDURE — 2500000001 HC RX 250 WO HCPCS SELF ADMINISTERED DRUGS (ALT 637 FOR MEDICARE OP): Mod: IPSPLIT | Performed by: SURGERY

## 2024-08-28 PROCEDURE — 81450 HL NEO GSAP 5-50DNA/DNA&RNA: CPT | Performed by: INTERNAL MEDICINE

## 2024-08-28 PROCEDURE — 2500000004 HC RX 250 GENERAL PHARMACY W/ HCPCS (ALT 636 FOR OP/ED): Mod: IPSPLIT

## 2024-08-28 PROCEDURE — 85730 THROMBOPLASTIN TIME PARTIAL: CPT | Mod: IPSPLIT | Performed by: INTERNAL MEDICINE

## 2024-08-28 PROCEDURE — 36430 TRANSFUSION BLD/BLD COMPNT: CPT | Mod: IPSPLIT

## 2024-08-28 PROCEDURE — 85045 AUTOMATED RETICULOCYTE COUNT: CPT | Mod: IPSPLIT | Performed by: INTERNAL MEDICINE

## 2024-08-28 PROCEDURE — 85610 PROTHROMBIN TIME: CPT | Mod: IPSPLIT | Performed by: INTERNAL MEDICINE

## 2024-08-28 PROCEDURE — 2500000002 HC RX 250 W HCPCS SELF ADMINISTERED DRUGS (ALT 637 FOR MEDICARE OP, ALT 636 FOR OP/ED): Mod: IPSPLIT | Performed by: NURSE PRACTITIONER

## 2024-08-28 PROCEDURE — 2500000001 HC RX 250 WO HCPCS SELF ADMINISTERED DRUGS (ALT 637 FOR MEDICARE OP): Mod: IPSPLIT

## 2024-08-28 RX ORDER — FOLIC ACID 1 MG/1
1 TABLET ORAL DAILY
Status: DISCONTINUED | OUTPATIENT
Start: 2024-08-28 | End: 2024-08-28 | Stop reason: SDUPTHER

## 2024-08-28 RX ORDER — CYANOCOBALAMIN 1000 UG/ML
1000 INJECTION, SOLUTION INTRAMUSCULAR; SUBCUTANEOUS
Status: DISCONTINUED | OUTPATIENT
Start: 2024-08-28 | End: 2024-08-30 | Stop reason: HOSPADM

## 2024-08-28 ASSESSMENT — PAIN - FUNCTIONAL ASSESSMENT
PAIN_FUNCTIONAL_ASSESSMENT: 0-10

## 2024-08-28 ASSESSMENT — PAIN SCALES - GENERAL
PAINLEVEL_OUTOF10: 0 - NO PAIN

## 2024-08-28 NOTE — PROGRESS NOTES
Chico Iqbal is a 88 y.o. male on day 2 of admission presenting with Sepsis (Multi).      Subjective   Pt assessed at bedside, sitting in the chair eating breakfast. Pt states that he woke up this morning feeling pretty good, no complaints.        Objective     Last Recorded Vitals  /60 (BP Location: Left arm, Patient Position: Lying)   Pulse 64   Temp 36 °C (96.8 °F) (Temporal)   Resp 21   Wt 79.1 kg (174 lb 6.1 oz)   SpO2 97%   Intake/Output last 3 Shifts:    Intake/Output Summary (Last 24 hours) at 8/28/2024 1246  Last data filed at 8/28/2024 0616  Gross per 24 hour   Intake 1336.04 ml   Output 550 ml   Net 786.04 ml       Admission Weight  Weight: 83 kg (183 lb) (08/25/24 1813)    Daily Weight  08/28/24 : 79.1 kg (174 lb 6.1 oz)    Image Results  CT abdomen pelvis wo IV contrast  Narrative: Interpreted By:  Virginia Serrano,   STUDY:  CT ABDOMEN PELVIS WO IV CONTRAST;  8/25/2024 7:36 pm      INDICATION:  Signs/Symptoms:abd pain.      COMPARISON:  07/26/2024      ACCESSION NUMBER(S):  [id number]      ORDERING CLINICIAN:  ZAINAB CRAWFORD      TECHNIQUE:  CT of the abdomen and pelvis was performed without IV contrast.  Sagittal and coronal reconstructions.      FINDINGS:  Limited evaluation for solid organs and vasculature without  intravenous contrast.      Lower Chest: Small hiatal hernia. Calcified granuloma in the right  lung. Dense coronary artery calcifications. Mild descending thoracic  aortic aneurysm measuring 3 cm in the visualized portion. Trace  pericardial fluid.      Liver: Calcified granuloma in the liver.      Gallbladder and Biliary: Unremarkable.      Pancreas: No abnormality identified in the pancreas.      Spleen: No abnormality identified in the spleen.      Adrenals: No abnormality identified in either adrenal gland.      Urinary: Hyperattenuating lesion in the right kidney midzone  measuring 14 mm, stable in size when compared to prior exam.  Nonobstructive calculus lower pole right  kidney measuring 2 mm.  Subcentimeter hypodensity in the upper pole right kidney. Refer to  recently performed CT urogram for details regarding the urinary  tract. Prostatomegaly. No hydronephrosis.      Gastrointestinal/Peritoneum: No small or large bowel obstruction in  the visualized abdomen. In the abdomen, there is no extraluminal air.  No significant free fluid. No evidence of acute appendicitis.      Vascular: Abdominal aorta is normal in caliber. Atherosclerosis.      Lymphatics: No enlarged lymph nodes by size criteria.      MSK/Body Wall: No aggressive bony lesion identified. Multilevel  degenerative change in the spine. Small fat containing bilateral  inguinal hernias.      Impression: No acute abnormality in the abdomen or pelvis.      Small hiatal hernia.      Atherosclerosis. Descending thoracic aortic aneurysm. Coronary artery  calcifications.      Stable hyperdense lesion in the right kidney. Prostatomegaly. Refer  to recently performed CT urogram for details regarding the urinary  tract.      Signed by: Virginia Serrano 8/25/2024 8:42 PM  Dictation workstation:   [id number]      Physical Exam  Vitals reviewed.   HENT:      Head: Normocephalic and atraumatic.      Right Ear: External ear normal.      Left Ear: External ear normal.      Nose: Nose normal.      Mouth/Throat:      Pharynx: Oropharynx is clear.   Eyes:      Conjunctiva/sclera: Conjunctivae normal.   Cardiovascular:      Rate and Rhythm: Normal rate and regular rhythm.      Pulses: Normal pulses.      Heart sounds: Normal heart sounds.   Pulmonary:      Effort: Pulmonary effort is normal.      Breath sounds: Normal breath sounds.   Abdominal:      General: Bowel sounds are normal.      Palpations: Abdomen is soft.   Musculoskeletal:         General: Normal range of motion.      Cervical back: Normal range of motion and neck supple.   Skin:     General: Skin is dry.      Coloration: Skin is pale.   Neurological:      General: No focal  deficit present.      Mental Status: He is alert and oriented to person, place, and time.   Psychiatric:         Mood and Affect: Mood normal.         Behavior: Behavior normal.         Relevant Results  Scheduled medications  [Held by provider] apixaban, 2.5 mg, oral, BID  atorvastatin, 40 mg, oral, Daily  cefTRIAXone, 1 g, intravenous, q24h  clopidogrel, 75 mg, oral, Daily  finasteride, 5 mg, oral, Daily  folic acid, 1 mg, oral, Daily  [Held by provider] furosemide, 20 mg, oral, Daily  lactulose, 20 g, oral, Daily  [Held by provider] metoprolol succinate XL, 50 mg, oral, Daily  mirtazapine, 7.5 mg, oral, Nightly  multivitamin, 1 tablet, oral, Daily  pantoprazole, 40 mg, intravenous, BID  sucralfate, 1 g, oral, BID AC  tamsulosin, 0.8 mg, oral, Daily  thiamine, 100 mg, oral, Daily      Continuous medications  sodium chloride 0.9%, 10 mL/hr, Last Rate: 10 mL/hr (08/28/24 0616)      PRN medications  PRN medications: acetaminophen, melatonin, ondansetron, polyethylene glycol, sodium chloride 0.9%    Results for orders placed or performed during the hospital encounter of 08/25/24 (from the past 24 hour(s))   CBC   Result Value Ref Range    WBC 9.1 4.4 - 11.3 x10*3/uL    nRBC 0.0 0.0 - 0.0 /100 WBCs    RBC 2.21 (L) 4.50 - 5.90 x10*6/uL    Hemoglobin 7.7 (L) 13.5 - 17.5 g/dL    Hematocrit 24.1 (L) 41.0 - 52.0 %     (H) 80 - 100 fL    MCH 34.8 (H) 26.0 - 34.0 pg    MCHC 32.0 32.0 - 36.0 g/dL    RDW      Platelets 44 (L) 150 - 450 x10*3/uL   SST TOP   Result Value Ref Range    Extra Tube Hold for add-ons.    SST TOP   Result Value Ref Range    Extra Tube Hold for add-ons.    Basic metabolic panel   Result Value Ref Range    Glucose 112 (H) 74 - 99 mg/dL    Sodium 138 136 - 145 mmol/L    Potassium 3.9 3.5 - 5.3 mmol/L    Chloride 105 98 - 107 mmol/L    Bicarbonate 26 21 - 32 mmol/L    Anion Gap 11 10 - 20 mmol/L    Urea Nitrogen 13 6 - 23 mg/dL    Creatinine 0.80 0.50 - 1.30 mg/dL    eGFR 86 >60 mL/min/1.73m*2     Calcium 8.3 (L) 8.6 - 10.3 mg/dL   CBC   Result Value Ref Range    WBC 10.8 4.4 - 11.3 x10*3/uL    nRBC 0.2 (H) 0.0 - 0.0 /100 WBCs    RBC 2.22 (L) 4.50 - 5.90 x10*6/uL    Hemoglobin 7.8 (L) 13.5 - 17.5 g/dL    Hematocrit 24.3 (L) 41.0 - 52.0 %     (H) 80 - 100 fL    MCH 35.1 (H) 26.0 - 34.0 pg    MCHC 32.1 32.0 - 36.0 g/dL    RDW      Platelets 51 (L) 150 - 450 x10*3/uL   Reticulocytes   Result Value Ref Range    Retic % 1.0 0.5 - 2.0 %    Retic Absolute 0.022 0.017 - 0.110 x10*6/uL    Reticulocyte Hemoglobin 37 28 - 38 pg    Immature Retic fraction 20.1 (H) <=16.0 %   Prepare RBC: 1 Units   Result Value Ref Range    PRODUCT CODE N0229R61     Unit Number [id number]     Unit ABO A     Unit RH POS     XM INTEP COMP     Dispense Status IS     Blood Expiration Date 9/11/2024 11:59:00 PM EDT     PRODUCT BLOOD TYPE 6200     UNIT VOLUME 350           Assessment/Plan   This patient currently has cardiac telemetry ordered; if you would like to modify or discontinue the telemetry order, click here to go to the orders activity to modify/discontinue the order.    Assessment & Plan  Sepsis (Multi)    Anemia requiring transfusions    Sepsis due to urinary tract infection (Multi)    CAD (coronary artery disease)    Hypercholesterolemia    Hypertensive heart disease with heart failure (Multi)    Benign essential HTN    BPH (benign prostatic hyperplasia)    Acute cystitis with hematuria    Gastroesophageal reflux disease without esophagitis    History of myocardial infarction    PAF (paroxysmal atrial fibrillation) (Multi)    Hypokalemia    Constipation    Alcohol abuse    #Elevated lactic acid  #Sepsis due to urinary tract infection (Multi)  #Anemia requiring transfusions  -lactate 5.7 > 2.2 > 1.7  -WBC 7.5 > 9.4 > 7.5  -H/H 5.9/19.54 > 7.2/22.2 > 7.7/23.8 > 7.8/24.3  - > 114 > 108  -plt 89> 40 >51 > 46 > 51  -Folate 2.8  -hypotension requiring levophed overnight  -UA: 75 LE, 2+ bacteria, WBC 21-50  -Urine  "culture NGTD  -continue IV ceftriaxone, day 3  -3 units PRBC today, repeat CBC after #2, #3  -hematology consult, rec folate supplementation and 1 more unit PRBC before discharge  -will follow up as outpatient   -apixaban, clopidogrel held for now  -restart clopidogrel, continue holding apixaban for now  -monitoring H/H still  -Dr Parks reached out over Epic: \"I am reviewing a PB smear on Chico Iqbal (MRN [id number]). I'm concerned about some of the circulating WBC and recommend flow cytometry for further evaluation. Smear review signed out in Epic, just wanted to draw your attention to this.\"     #CAD (coronary artery disease)  #Hypercholesterolemia  #History of myocardial infarction  #PAF (paroxysmal atrial fibrillation) (Multi)  #Benign essential HTN  -Echo 3/2023: 1. Left ventricular systolic function is mildly decreased with a 40% estimated ejection fraction.  2. Multiple segmental abnormalities exist. 3. Spectral Doppler shows a pseudonormal pattern of left ventricular diastolic filling. 4. There is mildly reduced right ventricular systolic function. 5. The left atrium is mild to moderately dilated. 6. There is global and segmental LV hypokinesis. 7. CVP is elevated.  -telemetry  -hypotensive requiring pressors; now discontinued   -furosemide, metoprolol held until BP normalizes  -Continue atorvastatin  -BP stable, continue monitoring      #Gastroesophageal reflux disease without esophagitis  #Constipation  -continue PPI  -continue lactulose as needed  -document BM, had multiple yesterday     #BPH (benign prostatic hyperplasia)  #Acute cystitis with hematuria  -recent visit with urology for gross hematuria  -flex cysto showed enlarged prostate  -continue finasteride, tamsulosin  -UOP recorded, no hematuria noted     #Hypokalemia, resolved   -K 3.3 > 3.2 > 3.9  -repleted orally  -follow BMP  -replete as needed     #Daily ETOH  -CIWA  -folic acid, thiamine  -monitor for S/S withdrawal  -encourage cessation    DVT " ppx  -apixaban on hold due to anemia     PUD ppx  -pantoprazole    F: PRN  E: Replete per protocol  N: Cardiac  A: PIV    Disposition: Pt requires more than 2 inpatient days at this time   Code Status: Full Code         Mala Jarvis, APRN-CNP

## 2024-08-28 NOTE — TELEPHONE ENCOUNTER
Received additional staff message from provider. Per last staff message, he would like patient to be seen in 3-4 weeks. Reached out to provider via secure chat to clarify when he would like patient to RTC. Per provider, he would him to be seen in 3-4 weeks. Patient's daughter, Mirta, returned phone call. She is agreeable to schedule but would like to wait until patient is discharged. Daughter is requesting a call back on Friday, 8/30, to schedule. Will attempt to reach patient and/or daughter on Friday to schedule 3-4 week follow up.

## 2024-08-28 NOTE — TELEPHONE ENCOUNTER
Received attached staff message from Dr. Baker. Provider would like patient to RTC in 2-3 weeks. Attempted to reach patient to schedule follow up. Left detailed message, with call back number, for patient to call back at earliest convenience.

## 2024-08-28 NOTE — CONSULTS
Reason For Consult  anemia    History Of Present Illness  Chico Iqbal is a 88 y.o. male presenting with constipation and rectal pain. He stated his stool was stuck in his rectum and he had not had moved his bowels in a few weeks. He also vomited x 1 prior to arrival. In the ED his workup showed lactate 5.7, INR 1.5, H/H 5.9/19.5, RBC 1.47, , plt 89. VB.45/34/36/23.6. CT a/p showed no acute process, prostatomegaly. Stool occult was negative. After fluid resuscitation lactate was 2.1. UA showed 75 LE, 2+ bacteria. Of note, he was recently seen by Dr. Yancey, Urology, for repeat UTIs and gross hematuria and had a flex cysto done, finding enlarged prostate. In ED he was started on IV ceftriaxone, urine was cultured and he was admitted to ICU for blood transfusion, further evaluation and treatment.         Past Medical History  He has a past medical history of Alcohol abuse, Body mass index (BMI) 29.0-29.9, adult (2020), BPH (benign prostatic hyperplasia), CAD (coronary artery disease), COPD (chronic obstructive pulmonary disease) (Multi), GERD (gastroesophageal reflux disease), Hyperlipidemia, Hypertension, Old myocardial infarction, Paroxysmal A-fib (Multi), and Urinary tract infection.    Surgical History  He has a past surgical history that includes Coronary stent placement and Cardiac catheterization.     Social History  He reports that he has been smoking cigars. He has been exposed to tobacco smoke. He has never used smokeless tobacco. He reports that he does not currently use alcohol. He reports that he does not use drugs.    Family History  Family History   Problem Relation Name Age of Onset    Diabetes Mother      Heart attack Brother          Allergies  Patient has no known allergies.    Review of Systems  Constitutional:  Positive for appetite change. Negative for diaphoresis, fatigue and fever.   HENT: Negative.     Eyes: Negative.    Respiratory: Negative.     Cardiovascular:  Negative for  chest pain, palpitations and leg swelling.   Gastrointestinal:  Positive for constipation, rectal pain and vomiting. Negative for abdominal distention, abdominal pain, anal bleeding, blood in stool, diarrhea and nausea.   Endocrine: Negative.    Genitourinary:  Positive for frequency, hematuria and urgency.   Musculoskeletal: Negative.    Skin: Negative.    Allergic/Immunologic: Negative.    Neurological:  Negative for tremors, syncope and weakness.   Hematological: Negative.    Psychiatric/Behavioral: Negative.        Physical Exam  Constitutional:       General: He is not in acute distress.     Appearance: Normal appearance. He is ill-appearing. He is not toxic-appearing.   HENT:      Head: Normocephalic and atraumatic.      Mouth/Throat:      Mouth: Mucous membranes are moist.   Eyes:      Extraocular Movements: Extraocular movements intact.      Pupils: Pupils are equal, round, and reactive to light.   Cardiovascular:      Rate and Rhythm: Normal rate and regular rhythm.      Pulses: Normal pulses.      Heart sounds: Normal heart sounds. No murmur heard.     No gallop.   Pulmonary:      Effort: Pulmonary effort is normal. No respiratory distress.      Breath sounds: Normal breath sounds. No wheezing, rhonchi or rales.   Abdominal:      General: Bowel sounds are normal. There is no distension.      Palpations: Abdomen is soft.      Tenderness: There is no abdominal tenderness. There is no guarding or rebound.   Musculoskeletal:         General: No swelling, tenderness, deformity or signs of injury. Normal range of motion.      Cervical back: Normal range of motion and neck supple.   Skin:     General: Skin is warm and dry.      Capillary Refill: Capillary refill takes less than 2 seconds.      Coloration: Skin is pale. Skin is not jaundiced.      Findings: No bruising or rash.      Last Recorded Vitals  Blood pressure 115/72, pulse 80, temperature 36.2 °C (97.2 °F), temperature source Oral, resp. rate 21, height  "1.803 m (5' 11\"), weight 79.1 kg (174 lb 6.1 oz), SpO2 99%.    Relevant Results   Latest Reference Range & Units 08/26/24 17:17 08/27/24 06:24 08/27/24 12:56 08/28/24 05:13   WBC 4.4 - 11.3 x10*3/uL 8.7 7.5 9.1 10.8   nRBC 0.0 - 0.0 /100 WBCs 0.0 0.3 (H) 0.0 0.2 (H)   RBC 4.50 - 5.90 x10*6/uL 2.30 (L) 2.21 (L) 2.21 (L) 2.22 (L)   HEMOGLOBIN 13.5 - 17.5 g/dL 8.1 (L) 7.7 (L) 7.7 (L) 7.8 (L)   HEMATOCRIT 41.0 - 52.0 % 24.7 (L) 23.8 (L) 24.1 (L) 24.3 (L)   MCV 80 - 100 fL 107 (H) 108 (H) 109 (H) 110 (H)   MCH 26.0 - 34.0 pg 35.2 (H) 34.8 (H) 34.8 (H) 35.1 (H)   MCHC 32.0 - 36.0 g/dL 32.8 32.4 32.0 32.1   RED CELL DISTRIBUTION WIDTH  COMMENT ONLY COMMENT ONLY COMMENT ONLY COMMENT ONLY   Platelets 150 - 450 x10*3/uL 49 (L) 46 (L) 44 (L) 51 (L)   (H): Data is abnormally high  (L): Data is abnormally low     Assessment/Plan     1- Pancytopenia:    He has macrocytic anemia which could be from daily ETOH use, liver disease, hemolysis, myeloma, vit b12 or folate deficiencies. CT AP does not any concerns of liver cirrhosis. Other etiologies will be ruled out.    Folic acid is ordered. Vit B12 is 247 so he has Vit b12 def, anything less than 300 is considered a deficiency).    Thrombocytopenia: could be due to Vit b12 deficiency, sepsis, splenomegaly which current CT AP does not show, DIC. Work up started.    WBC is normal, diff is unremarkable. No need to order flow cytometry.    Myeloid NGS and DIC panel are ordered.    Will continue to follow.    I spent 60 minutes in the professional and overall care of this patient.      Ria Coronado MD    "

## 2024-08-28 NOTE — CARE PLAN
The patient's goals for the shift include Patient will remain injury free this shift 8/28/24 1900.    The clinical goals for the shift include Patient SBP will be maintained 90 and 130 this shift 8/28/24 1900.    Patient remains free of injury and SBP was maintained between 90 and 130 this shift. Patient received a unit of blood this shift, and tolerated well. No complaints of pain. Patient sat up in chair for meals.

## 2024-08-28 NOTE — CARE PLAN
Patient remained injury free and SBP maintained between 90 and 130 this shift. Family at bedside. Received 1 unit of blood, and tolerated well. No complaints of pain. Vitals table and remains afebrile.

## 2024-08-28 NOTE — TELEPHONE ENCOUNTER
----- Message from Nithin Coronado sent at 8/28/2024  2:34 PM EDT -----  Needs to see me in 3-4 weeks please as outpatient please. May be a duplicate message.  Olivia

## 2024-08-28 NOTE — TELEPHONE ENCOUNTER
----- Message from Nithin Coronado sent at 8/28/2024 10:29 AM EDT -----  Needs to follow up with me in 2-3 weeks in [place] please.  Olivia

## 2024-08-28 NOTE — CARE PLAN
The patient's goals for the shift include Patient will remain injury free this shift 8/28/24 1900.    The clinical goals for the shift include Patient SBP will be maintained 90 and 130 this shift 8/28/24 1900.    Patient remains free of injury and SBP maintained between 90 and 130 this shift. Family at bedside. Patient received blood and tolerated well. No complaints of pain.

## 2024-08-29 LAB
ANION GAP SERPL CALC-SCNC: 13 MMOL/L (ref 10–20)
BUN SERPL-MCNC: 13 MG/DL (ref 6–23)
CALCIUM SERPL-MCNC: 8.7 MG/DL (ref 8.6–10.3)
CHLORIDE SERPL-SCNC: 106 MMOL/L (ref 98–107)
CO2 SERPL-SCNC: 26 MMOL/L (ref 21–32)
CREAT SERPL-MCNC: 0.68 MG/DL (ref 0.5–1.3)
EGFRCR SERPLBLD CKD-EPI 2021: 90 ML/MIN/1.73M*2
ERYTHROCYTE [DISTWIDTH] IN BLOOD BY AUTOMATED COUNT: 25 % (ref 11.5–14.5)
FIBRINOGEN PPP-MCNC: 525 MG/DL (ref 200–400)
GLUCOSE SERPL-MCNC: 115 MG/DL (ref 74–99)
HCT VFR BLD AUTO: 27 % (ref 41–52)
HGB BLD-MCNC: 8.8 G/DL (ref 13.5–17.5)
MCH RBC QN AUTO: 34.2 PG (ref 26–34)
MCHC RBC AUTO-ENTMCNC: 32.6 G/DL (ref 32–36)
MCV RBC AUTO: 105 FL (ref 80–100)
NRBC BLD-RTO: 0.2 /100 WBCS (ref 0–0)
PLATELET # BLD AUTO: 50 X10*3/UL (ref 150–450)
POTASSIUM SERPL-SCNC: 3.7 MMOL/L (ref 3.5–5.3)
RBC # BLD AUTO: 2.57 X10*6/UL (ref 4.5–5.9)
SODIUM SERPL-SCNC: 141 MMOL/L (ref 136–145)
WBC # BLD AUTO: 9.9 X10*3/UL (ref 4.4–11.3)

## 2024-08-29 PROCEDURE — 1100000001 HC PRIVATE ROOM DAILY: Mod: IPSPLIT

## 2024-08-29 PROCEDURE — 2500000001 HC RX 250 WO HCPCS SELF ADMINISTERED DRUGS (ALT 637 FOR MEDICARE OP): Mod: IPSPLIT | Performed by: SURGERY

## 2024-08-29 PROCEDURE — 99233 SBSQ HOSP IP/OBS HIGH 50: CPT | Performed by: NURSE PRACTITIONER

## 2024-08-29 PROCEDURE — 99233 SBSQ HOSP IP/OBS HIGH 50: CPT | Performed by: INTERNAL MEDICINE

## 2024-08-29 PROCEDURE — 2500000002 HC RX 250 W HCPCS SELF ADMINISTERED DRUGS (ALT 637 FOR MEDICARE OP, ALT 636 FOR OP/ED): Mod: IPSPLIT | Performed by: NURSE PRACTITIONER

## 2024-08-29 PROCEDURE — 94760 N-INVAS EAR/PLS OXIMETRY 1: CPT | Mod: IPSPLIT

## 2024-08-29 PROCEDURE — 80048 BASIC METABOLIC PNL TOTAL CA: CPT | Mod: IPSPLIT | Performed by: NURSE PRACTITIONER

## 2024-08-29 PROCEDURE — 2500000001 HC RX 250 WO HCPCS SELF ADMINISTERED DRUGS (ALT 637 FOR MEDICARE OP): Mod: IPSPLIT | Performed by: NURSE PRACTITIONER

## 2024-08-29 PROCEDURE — 36415 COLL VENOUS BLD VENIPUNCTURE: CPT | Mod: IPSPLIT | Performed by: NURSE PRACTITIONER

## 2024-08-29 PROCEDURE — 85027 COMPLETE CBC AUTOMATED: CPT | Mod: IPSPLIT | Performed by: NURSE PRACTITIONER

## 2024-08-29 PROCEDURE — 2500000004 HC RX 250 GENERAL PHARMACY W/ HCPCS (ALT 636 FOR OP/ED): Mod: IPSPLIT

## 2024-08-29 RX ORDER — CEFUROXIME AXETIL 250 MG/1
250 TABLET ORAL 2 TIMES DAILY
Status: DISCONTINUED | OUTPATIENT
Start: 2024-08-29 | End: 2024-08-30 | Stop reason: HOSPADM

## 2024-08-29 ASSESSMENT — PAIN SCALES - GENERAL
PAINLEVEL_OUTOF10: 0 - NO PAIN

## 2024-08-29 ASSESSMENT — COGNITIVE AND FUNCTIONAL STATUS - GENERAL
MOBILITY SCORE: 18
WALKING IN HOSPITAL ROOM: A LITTLE
CLIMB 3 TO 5 STEPS WITH RAILING: A LOT
STANDING UP FROM CHAIR USING ARMS: A LITTLE
TURNING FROM BACK TO SIDE WHILE IN FLAT BAD: A LITTLE
MOVING TO AND FROM BED TO CHAIR: A LITTLE

## 2024-08-29 ASSESSMENT — LIFESTYLE VARIABLES
PAROXYSMAL SWEATS: NO SWEAT VISIBLE
TREMOR: NO TREMOR
VISUAL DISTURBANCES: NOT PRESENT
PAROXYSMAL SWEATS: NO SWEAT VISIBLE
ANXIETY: NO ANXIETY, AT EASE
AGITATION: NORMAL ACTIVITY
AGITATION: NORMAL ACTIVITY
TOTAL SCORE: 0
ORIENTATION AND CLOUDING OF SENSORIUM: ORIENTED AND CAN DO SERIAL ADDITIONS
ANXIETY: NO ANXIETY, AT EASE
ORIENTATION AND CLOUDING OF SENSORIUM: ORIENTED AND CAN DO SERIAL ADDITIONS
AUDITORY DISTURBANCES: NOT PRESENT
TOTAL SCORE: 0
NAUSEA AND VOMITING: NO NAUSEA AND NO VOMITING
AUDITORY DISTURBANCES: NOT PRESENT
HEADACHE, FULLNESS IN HEAD: NOT PRESENT
TREMOR: NO TREMOR
HEADACHE, FULLNESS IN HEAD: NOT PRESENT
VISUAL DISTURBANCES: NOT PRESENT
NAUSEA AND VOMITING: NO NAUSEA AND NO VOMITING

## 2024-08-29 ASSESSMENT — PAIN - FUNCTIONAL ASSESSMENT
PAIN_FUNCTIONAL_ASSESSMENT: 0-10

## 2024-08-29 NOTE — CARE PLAN
The patient's goals for the shift include Patient will remain injury free this shift 8/28/24 1900.    The clinical goals for the shift include Pt will be discharged home today.    Patient awaiting therapy consult prior to discharge. H & H to be checked tomorrow am to look for drop in blood counts. Possible discharge home tomorrow.

## 2024-08-29 NOTE — NURSING NOTE
0913: MARY Cheek CNP at bedside, patient seen.     1449: Telemetry discontinued as ordered.     1543: Dr Coronado at bedside.    1549: Son Amadou arrives, Dr Coronado still at bedside and explains clinical situation to Amadou.

## 2024-08-29 NOTE — PROGRESS NOTES
Chico Iqbal is a 88 y.o. male on day 3 of admission presenting with Sepsis (Multi).    Subjective   He is sitting up in a chair, eating breakfast, tolerating well. We discussed his blood work and Oncology concerns. He verbalized somewhat understanding, he is very [place]. Will continue to monitor his labs. VSS at this time.      Objective     Last Recorded Vitals  BP 96/56 (BP Location: Left arm, Patient Position: Sitting)   Pulse 69   Temp 36 °C (96.8 °F) (Temporal)   Resp 18   Wt 79.1 kg (174 lb 6.1 oz)   SpO2 99%   Intake/Output last 3 Shifts:    Intake/Output Summary (Last 24 hours) at 8/29/2024 1125  Last data filed at 8/29/2024 0841  Gross per 24 hour   Intake 969.24 ml   Output 7280 ml   Net -6310.76 ml     Admission Weight  Weight: 83 kg (183 lb) (08/25/24 1813)    Daily Weight  08/28/24 : 79.1 kg (174 lb 6.1 oz)    Physical Exam  Vitals reviewed.   HENT:      Head: Normocephalic and atraumatic.      Right Ear: External ear normal.      Left Ear: External ear normal.      Nose: Nose normal.      Mouth/Throat:      Pharynx: Oropharynx is clear.   Eyes:      Conjunctiva/sclera: Conjunctivae normal.   Cardiovascular:      Rate and Rhythm: Normal rate and regular rhythm.      Pulses: Normal pulses.      Heart sounds: Normal heart sounds.   Pulmonary:      Effort: Pulmonary effort is normal.      Breath sounds: Normal breath sounds.   Abdominal:      General: Bowel sounds are normal.      Palpations: Abdomen is soft.   Musculoskeletal:         General: Normal range of motion.      Cervical back: Normal range of motion and neck supple.   Skin:     General: Skin is dry.      Coloration: Skin is pale.   Neurological:      General: No focal deficit present.      Mental Status: He is alert and oriented to person, place, and time.   Psychiatric:         Mood and Affect: Mood normal.         Behavior: Behavior normal.     Scheduled medications  [Held by provider] apixaban, 2.5 mg, oral, BID  atorvastatin, 40 mg, oral,  Daily  cefTRIAXone, 1 g, intravenous, q24h  clopidogrel, 75 mg, oral, Daily  cyanocobalamin, 1,000 mcg, intramuscular, q7 days  finasteride, 5 mg, oral, Daily  folic acid, 1 mg, oral, Daily  [Held by provider] furosemide, 20 mg, oral, Daily  lactulose, 20 g, oral, Daily  [Held by provider] metoprolol succinate XL, 50 mg, oral, Daily  mirtazapine, 7.5 mg, oral, Nightly  multivitamin, 1 tablet, oral, Daily  pantoprazole, 40 mg, intravenous, BID  sucralfate, 1 g, oral, BID AC  tamsulosin, 0.8 mg, oral, Daily  thiamine, 100 mg, oral, Daily    Continuous medications  sodium chloride 0.9%, 10 mL/hr, Last Rate: 10 mL/hr (08/28/24 0616)    PRN medications  PRN medications: acetaminophen, melatonin, ondansetron, polyethylene glycol, sodium chloride 0.9%    Relevant Results:  CT abdomen pelvis wo IV contrast  Result Date: 8/25/2024  No acute abnormality in the abdomen or pelvis.   Small hiatal hernia.   Atherosclerosis. Descending thoracic aortic aneurysm. Coronary artery calcifications.   Stable hyperdense lesion in the right kidney. Prostatomegaly. Refer to recently performed CT urogram for details regarding the urinary tract.   Signed by: Virginia Serrano 8/25/2024 8:42 PM Dictation workstation:   [id number]      Latest Reference Range & Units 08/27/24 06:24 08/28/24 05:13 08/29/24 05:36   GLUCOSE 74 - 99 mg/dL 120 (H) 112 (H) 115 (H)   SODIUM 136 - 145 mmol/L 138 138 141   POTASSIUM 3.5 - 5.3 mmol/L 3.2 (L) 3.9 3.7   CHLORIDE 98 - 107 mmol/L 106 105 106   Bicarbonate 21 - 32 mmol/L 23 26 26   Anion Gap 10 - 20 mmol/L 12 11 13   Blood Urea Nitrogen 6 - 23 mg/dL 14 13 13   Creatinine 0.50 - 1.30 mg/dL 0.83 0.80 0.68   EGFR >60 mL/min/1.73m*2 85 86 90   Calcium 8.6 - 10.3 mg/dL 8.4 (L) 8.3 (L) 8.7      Latest Reference Range & Units 08/28/24 14:47   FERRITIN 20 - 300 ng/mL 1,067 (H)   IRON 35 - 150 ug/dL 178 (H)   TIBC  COMMENT ONLY   UIBC 110 - 370 ug/dL <55 (L)   % Saturation  COMMENT ONLY      Latest Reference Range &  "Units 08/27/24 12:56 08/28/24 05:13 08/29/24 05:36   WBC 4.4 - 11.3 x10*3/uL 9.1 10.8 9.9   nRBC 0.0 - 0.0 /100 WBCs 0.0 0.2 (H) 0.2 (H)   RBC 4.50 - 5.90 x10*6/uL 2.21 (L) 2.22 (L) 2.57 (L)   HEMOGLOBIN 13.5 - 17.5 g/dL 7.7 (L) 7.8 (L) 8.8 (L)   HEMATOCRIT 41.0 - 52.0 % 24.1 (L) 24.3 (L) 27.0 (L)   MCV 80 - 100 fL 109 (H) 110 (H) 105 (H)   MCH 26.0 - 34.0 pg 34.8 (H) 35.1 (H) 34.2 (H)   MCHC 32.0 - 36.0 g/dL 32.0 32.1 32.6   RED CELL DISTRIBUTION WIDTH 11.5 - 14.5 % COMMENT ONLY COMMENT ONLY 25.0 (H)   Platelets 150 - 450 x10*3/uL 44 (L) 51 (L) 50 (L)      Assessment/Plan      Assessment & Plan  Sepsis (Multi)    Anemia requiring transfusions    Sepsis due to urinary tract infection (Multi)    CAD (coronary artery disease)    Hypercholesterolemia    Hypertensive heart disease with heart failure (Multi)    Benign essential HTN    BPH (benign prostatic hyperplasia)    Acute cystitis with hematuria    Gastroesophageal reflux disease without esophagitis    History of myocardial infarction    PAF (paroxysmal atrial fibrillation) (Multi)    Hypokalemia    Constipation    Alcohol abuse    #Elevated lactic acid  #Sepsis due to urinary tract infection (Multi)  #Anemia requiring transfusions  -lactate 5.7 > 2.2 > 1.7  -WBC 7.5 > 9.4 > 7.5 > 9.9  -H/H 5.9/19.54 > 7.2/22.2 > 7.7/23.8 > 7.8/24.3 > 8.8/27.0  - > 114 > 108 > 105  -plt 89> 40 >51 > 46 > 51 > 50  -Folate 2.8  -hypotension requiring levophed overnight  -UA: 75 LE, 2+ bacteria, WBC 21-50  -Urine culture NGTD  -continue IV ceftriaxone, day 4  -3 units PRBC today, repeat CBC after #2, #3.   -hematology consult, rec folate supplementation and 1 more unit PRBC before discharge  -will follow up as outpatient   -apixaban, clopidogrel held for now  -restart clopidogrel, continue holding apixaban for now  -monitoring H/H still  -Dr Parks reached out over Epic: \"I am reviewing a PB smear on Chico Iqbal (MRN [id number]). I'm concerned about some of the circulating WBC and " "recommend flow cytometry for further evaluation. Smear review signed out in Epic, just wanted to draw your attention to this.\"  - Monitoring H/H, labs, if stable can discharge home     #CAD (coronary artery disease)  #Hypercholesterolemia  #History of myocardial infarction  #PAF (paroxysmal atrial fibrillation) (Multi)  #Benign essential HTN  -Echo 3/2023: 1. Left ventricular systolic function is mildly decreased with a 40% estimated ejection fraction.  2. Multiple segmental abnormalities exist. 3. Spectral Doppler shows a pseudonormal pattern of left ventricular diastolic filling. 4. There is mildly reduced right ventricular systolic function. 5. The left atrium is mild to moderately dilated. 6. There is global and segmental LV hypokinesis. 7. CVP is elevated.  -telemetry  -hypotensive requiring pressors; now discontinued   -furosemide, metoprolol held until BP normalizes  -Continue atorvastatin  -BP stable, continue monitoring      #Gastroesophageal reflux disease without esophagitis  #Constipation  -continue PPI  -continue lactulose as needed  -document BM, had multiple yesterday     #BPH (benign prostatic hyperplasia)  #Acute cystitis with hematuria  -recent visit with urology for gross hematuria  -flex cysto showed enlarged prostate  -continue finasteride, tamsulosin  -UOP recorded, no hematuria noted     #Hypokalemia, resolved   -K 3.3 > 3.2 > 3.7  -repleted orally  -follow BMP  -replete as needed     #Daily ETOH  -CIWA  -folic acid, thiamine  -monitor for S/S withdrawal  -encourage cessation    DVT ppx: apixaban on hold due to anemia   PUD ppx: pantoprazole  F: PRN  E: Replete per protocol  N: Cardiac  A: PIV  Disposition: Pt requires inpatient days at this time   Code Status: Full Code         Linn Cheek, APRN-CNP      "

## 2024-08-30 ENCOUNTER — HOME HEALTH ADMISSION (OUTPATIENT)
Dept: HOME HEALTH SERVICES | Facility: HOME HEALTH | Age: 89
End: 2024-08-30
Payer: MEDICARE

## 2024-08-30 ENCOUNTER — DOCUMENTATION (OUTPATIENT)
Dept: HOME HEALTH SERVICES | Facility: HOME HEALTH | Age: 89
End: 2024-08-30
Payer: MEDICARE

## 2024-08-30 VITALS
WEIGHT: 173.06 LBS | TEMPERATURE: 97.2 F | OXYGEN SATURATION: 97 % | DIASTOLIC BLOOD PRESSURE: 73 MMHG | HEIGHT: 71 IN | BODY MASS INDEX: 24.23 KG/M2 | SYSTOLIC BLOOD PRESSURE: 123 MMHG | HEART RATE: 76 BPM | RESPIRATION RATE: 13 BRPM

## 2024-08-30 LAB
ABO GROUP (TYPE) IN BLOOD: NORMAL
ANION GAP SERPL CALC-SCNC: 9 MMOL/L (ref 10–20)
ANTIBODY SCREEN: NORMAL
APPEARANCE UR: CLEAR
BACTERIA BLD CULT: NORMAL
BACTERIA BLD CULT: NORMAL
BILIRUB UR STRIP.AUTO-MCNC: NEGATIVE MG/DL
BLOOD EXPIRATION DATE: NORMAL
BUN SERPL-MCNC: 14 MG/DL (ref 6–23)
CALCIUM SERPL-MCNC: 8.5 MG/DL (ref 8.6–10.3)
CELL COUNT (BLOOD): 9.9 X10*3/UL
CELL POPULATIONS: NORMAL
CHLORIDE SERPL-SCNC: 106 MMOL/L (ref 98–107)
CO2 SERPL-SCNC: 28 MMOL/L (ref 21–32)
COLOR UR: YELLOW
CREAT SERPL-MCNC: 0.7 MG/DL (ref 0.5–1.3)
DIAGNOSIS: NORMAL
DISPENSE STATUS: NORMAL
EGFRCR SERPLBLD CKD-EPI 2021: 89 ML/MIN/1.73M*2
ERYTHROCYTE [DISTWIDTH] IN BLOOD BY AUTOMATED COUNT: 24.3 % (ref 11.5–14.5)
FLOW DIFFERENTIAL: NORMAL
FLOW TEST ORDERED: NORMAL
GLUCOSE SERPL-MCNC: 99 MG/DL (ref 74–99)
GLUCOSE UR STRIP.AUTO-MCNC: NORMAL MG/DL
HCT VFR BLD AUTO: 27.7 % (ref 41–52)
HGB BLD-MCNC: 9 G/DL (ref 13.5–17.5)
HOLD SPECIMEN: NORMAL
KETONES UR STRIP.AUTO-MCNC: NEGATIVE MG/DL
LAB TEST METHOD: NORMAL
LEUKOCYTE ESTERASE UR QL STRIP.AUTO: ABNORMAL
MCH RBC QN AUTO: 34.2 PG (ref 26–34)
MCHC RBC AUTO-ENTMCNC: 32.5 G/DL (ref 32–36)
MCV RBC AUTO: 105 FL (ref 80–100)
MUCOUS THREADS #/AREA URNS AUTO: ABNORMAL /LPF
NITRITE UR QL STRIP.AUTO: NEGATIVE
NRBC BLD-RTO: 0 /100 WBCS (ref 0–0)
NUMBER OF CELLS COLLECTED: NORMAL PER TUBE
PATH REPORT.TOTAL CANCER: NORMAL
PH UR STRIP.AUTO: 6.5 [PH]
PLATELET # BLD AUTO: 60 X10*3/UL (ref 150–450)
POTASSIUM SERPL-SCNC: 3.6 MMOL/L (ref 3.5–5.3)
PRODUCT BLOOD TYPE: 6200
PRODUCT CODE: NORMAL
PROT UR STRIP.AUTO-MCNC: NEGATIVE MG/DL
RBC # BLD AUTO: 2.63 X10*6/UL (ref 4.5–5.9)
RBC # UR STRIP.AUTO: NEGATIVE /UL
RBC #/AREA URNS AUTO: ABNORMAL /HPF
RH FACTOR (ANTIGEN D): NORMAL
SIGNATURE COMMENT: NORMAL
SODIUM SERPL-SCNC: 139 MMOL/L (ref 136–145)
SP GR UR STRIP.AUTO: 1.02
SPECIMEN VIABILITY: NORMAL
SQUAMOUS #/AREA URNS AUTO: ABNORMAL /HPF
UNIT ABO: NORMAL
UNIT NUMBER: NORMAL
UNIT RH: NORMAL
UNIT VOLUME: 350
UROBILINOGEN UR STRIP.AUTO-MCNC: ABNORMAL MG/DL
WBC # BLD AUTO: 9 X10*3/UL (ref 4.4–11.3)
WBC #/AREA URNS AUTO: ABNORMAL /HPF
XM INTEP: NORMAL

## 2024-08-30 PROCEDURE — 86920 COMPATIBILITY TEST SPIN: CPT | Mod: IPSPLIT

## 2024-08-30 PROCEDURE — 2500000001 HC RX 250 WO HCPCS SELF ADMINISTERED DRUGS (ALT 637 FOR MEDICARE OP): Mod: IPSPLIT | Performed by: SURGERY

## 2024-08-30 PROCEDURE — 2500000002 HC RX 250 W HCPCS SELF ADMINISTERED DRUGS (ALT 637 FOR MEDICARE OP, ALT 636 FOR OP/ED): Mod: IPSPLIT | Performed by: NURSE PRACTITIONER

## 2024-08-30 PROCEDURE — 81001 URINALYSIS AUTO W/SCOPE: CPT | Mod: IPSPLIT | Performed by: NURSE PRACTITIONER

## 2024-08-30 PROCEDURE — 94760 N-INVAS EAR/PLS OXIMETRY 1: CPT | Mod: IPSPLIT

## 2024-08-30 PROCEDURE — 85027 COMPLETE CBC AUTOMATED: CPT | Mod: IPSPLIT | Performed by: NURSE PRACTITIONER

## 2024-08-30 PROCEDURE — 36430 TRANSFUSION BLD/BLD COMPNT: CPT | Mod: IPSPLIT

## 2024-08-30 PROCEDURE — 2500000004 HC RX 250 GENERAL PHARMACY W/ HCPCS (ALT 636 FOR OP/ED): Mod: IPSPLIT

## 2024-08-30 PROCEDURE — P9016 RBC LEUKOCYTES REDUCED: HCPCS | Mod: IPSPLIT

## 2024-08-30 PROCEDURE — 87086 URINE CULTURE/COLONY COUNT: CPT | Mod: GENLAB | Performed by: NURSE PRACTITIONER

## 2024-08-30 PROCEDURE — 99239 HOSP IP/OBS DSCHRG MGMT >30: CPT | Performed by: NURSE PRACTITIONER

## 2024-08-30 PROCEDURE — 2500000001 HC RX 250 WO HCPCS SELF ADMINISTERED DRUGS (ALT 637 FOR MEDICARE OP): Mod: IPSPLIT | Performed by: NURSE PRACTITIONER

## 2024-08-30 PROCEDURE — 97161 PT EVAL LOW COMPLEX 20 MIN: CPT | Mod: GP,IPSPLIT | Performed by: PHYSICAL THERAPIST

## 2024-08-30 PROCEDURE — 36415 COLL VENOUS BLD VENIPUNCTURE: CPT | Mod: IPSPLIT | Performed by: NURSE PRACTITIONER

## 2024-08-30 PROCEDURE — 80048 BASIC METABOLIC PNL TOTAL CA: CPT | Mod: IPSPLIT | Performed by: NURSE PRACTITIONER

## 2024-08-30 PROCEDURE — 86901 BLOOD TYPING SEROLOGIC RH(D): CPT | Mod: IPSPLIT | Performed by: NURSE PRACTITIONER

## 2024-08-30 RX ORDER — CEFUROXIME AXETIL 250 MG/1
250 TABLET ORAL 2 TIMES DAILY
Qty: 3 TABLET | Refills: 0 | Status: SHIPPED | OUTPATIENT
Start: 2024-08-30 | End: 2024-09-01

## 2024-08-30 RX ORDER — METOPROLOL SUCCINATE 25 MG/1
25 TABLET, EXTENDED RELEASE ORAL DAILY
Qty: 30 TABLET | Refills: 0 | Status: SHIPPED | OUTPATIENT
Start: 2024-08-30 | End: 2024-09-29

## 2024-08-30 RX ORDER — PHENAZOPYRIDINE HYDROCHLORIDE 100 MG/1
95 TABLET, FILM COATED ORAL ONCE
Status: COMPLETED | OUTPATIENT
Start: 2024-08-30 | End: 2024-08-30

## 2024-08-30 ASSESSMENT — COGNITIVE AND FUNCTIONAL STATUS - GENERAL
MOVING TO AND FROM BED TO CHAIR: A LITTLE
MOBILITY SCORE: 20
DAILY ACTIVITIY SCORE: 24
WALKING IN HOSPITAL ROOM: A LITTLE
CLIMB 3 TO 5 STEPS WITH RAILING: A LITTLE
MOBILITY SCORE: 19
MOBILITY SCORE: 24
TURNING FROM BACK TO SIDE WHILE IN FLAT BAD: A LITTLE
DAILY ACTIVITIY SCORE: 23
MOVING TO AND FROM BED TO CHAIR: A LITTLE
STANDING UP FROM CHAIR USING ARMS: A LITTLE
WALKING IN HOSPITAL ROOM: A LITTLE
CLIMB 3 TO 5 STEPS WITH RAILING: A LITTLE
TOILETING: A LITTLE
STANDING UP FROM CHAIR USING ARMS: A LITTLE

## 2024-08-30 ASSESSMENT — LIFESTYLE VARIABLES
TOTAL SCORE: 0
VISUAL DISTURBANCES: NOT PRESENT
PAROXYSMAL SWEATS: NO SWEAT VISIBLE
BLOOD PRESSURE: 113/65
TREMOR: NO TREMOR
ORIENTATION AND CLOUDING OF SENSORIUM: ORIENTED AND CAN DO SERIAL ADDITIONS
NAUSEA AND VOMITING: NO NAUSEA AND NO VOMITING
ORIENTATION AND CLOUDING OF SENSORIUM: ORIENTED AND CAN DO SERIAL ADDITIONS
TREMOR: NO TREMOR
ANXIETY: NO ANXIETY, AT EASE
HEADACHE, FULLNESS IN HEAD: NOT PRESENT
PAROXYSMAL SWEATS: NO SWEAT VISIBLE
PULSE: 88
VISUAL DISTURBANCES: NOT PRESENT
TOTAL SCORE: 0
AGITATION: NORMAL ACTIVITY
AUDITORY DISTURBANCES: NOT PRESENT
NAUSEA AND VOMITING: NO NAUSEA AND NO VOMITING
HEADACHE, FULLNESS IN HEAD: NOT PRESENT
AUDITORY DISTURBANCES: NOT PRESENT
ANXIETY: NO ANXIETY, AT EASE
AGITATION: NORMAL ACTIVITY

## 2024-08-30 ASSESSMENT — PAIN - FUNCTIONAL ASSESSMENT
PAIN_FUNCTIONAL_ASSESSMENT: 0-10
PAIN_FUNCTIONAL_ASSESSMENT: 0-10
PAIN_FUNCTIONAL_ASSESSMENT: CPOT (CRITICAL CARE PAIN OBSERVATION TOOL)
PAIN_FUNCTIONAL_ASSESSMENT: 0-10
PAIN_FUNCTIONAL_ASSESSMENT: 0-10

## 2024-08-30 ASSESSMENT — PAIN SCALES - GENERAL
PAINLEVEL_OUTOF10: 0 - NO PAIN

## 2024-08-30 NOTE — HH CARE COORDINATION
Home Care received a Referral for Physical Therapy and Occupational Therapy. We have processed the referral for a Start of Care on 08/31-09/01.     If you have any questions or concerns, please feel free to contact us at [phone]. Follow the prompts, enter your five digit zip code, and you will be directed to your care team on E[facility] 1.

## 2024-08-30 NOTE — NURSING NOTE
0830: MARY Cheek CNP at bedside, patient seen.    0850: Blood transfusion started.    1015: Dtr Mirta calls in, updated on blood transfusion, plan for discharge home afterwards.     1022: Physical therapy at bedside, patient seen.     1309: MARY Cheek CNP at bedside to discuss plan of care with dtr Mirta & son Amadou.     1325: Discharge instructions reviewed with patient and dtr Mirta. Both verbalized understanding and denied questions. Aware prescriptions need picked up from pharmacy.     1335: Discharged to home with family.

## 2024-08-30 NOTE — DISCHARGE SUMMARY
Discharge Diagnosis  Sepsis (Multi)    Discharge Meds     Your medication list        START taking these medications        Instructions Last Dose Given Next Dose Due   cefuroxime 250 mg tablet  Commonly known as: Ceftin      Take 1 tablet (250 mg) by mouth 2 times a day for 3 doses.              CHANGE how you take these medications        Instructions Last Dose Given Next Dose Due   metoprolol succinate XL 25 mg 24 hr tablet  Commonly known as: Toprol-XL  What changed:   medication strength  how much to take      Take 1 tablet (25 mg) by mouth once daily. Do not crush or chew.              CONTINUE taking these medications        Instructions Last Dose Given Next Dose Due   atorvastatin 40 mg tablet  Commonly known as: Lipitor      TAKE ONE TABLET BY MOUTH ONCE DAILY       clopidogrel 75 mg tablet  Commonly known as: Plavix      Take one tablet by mouth daily       docusate sodium 100 mg capsule  Commonly known as: Colace      Take 1 capsule (100 mg) by mouth 2 times a day.       finasteride 5 mg tablet  Commonly known as: Proscar      Take 1 tablet (5 mg) by mouth once daily. Do not crush, chew, or split.       furosemide 20 mg tablet  Commonly known as: Lasix      Take 1 tablet (20 mg) by mouth once daily.       mirtazapine 15 mg tablet  Commonly known as: Remeron           pantoprazole 40 mg EC tablet  Commonly known as: ProtoNix      Take 1 tablet (40 mg) by mouth once daily in the morning. Take before meals. Do not crush, chew, or split.       tamsulosin 0.4 mg 24 hr capsule  Commonly known as: Flomax      Take 2 capsules (0.8 mg) by mouth once daily.              STOP taking these medications      Eliquis 2.5 mg tablet  Generic drug: apixaban                  Where to Get Your Medications        These medications were sent to [id number] - [place], [place] - [address]  [address] [address]      Phone: [phone]   cefuroxime 250 mg tablet  metoprolol succinate XL  "25 mg 24 hr tablet         Test Results Pending At Discharge  Pending Labs       Order Current Status    Extra Urine Gray Tube Collected (08/25/24 0682)    Extra Urine Gray Tube In process    Myeloid Malignancies NGS Panel In process    Urinalysis with Reflex Culture and Microscopic In process    Urinalysis with Reflex Culture and Microscopic In process    Urine Culture In process            Hospital Course   Chico Iqbal is a 88 y.o. male with PMH of daily Etoh, HTN, HLD, CAD with MI s/p stenting, paroxysmal AF (reportedly on Xarelto but unable to find prescription/provider record), BPH, COPD (not on home O2) who was admitted with constipation and rectal pain after not moving his bowels for \"a few weeks\". His constipation was treated with oral laxatives. He was found to be critically anemic and admitted to ICU for a blood transfusion and workup for anemia. He was seen by Dr Antonio. He received 4 total units of PRBCs. There was no blood found in his stool or urine. 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 Dr Coronado spoke with him and the pt chose not to have a bone marrow biopsy or pursue treatment, but will follow up in the Hem/Onc office after discharge. He was given IV antibiotics while inpatient for UTI and is discharged on oral cefuroxime tablets. His metoprolol dose was also reduced for low blood pressures. He was discharged home today with family.    Problem List:  #Elevated lactic acid  #Sepsis due to urinary tract infection (Multi)  #Anemia requiring transfusions  -lactate 5.7 > 2.2 > 1.7  -WBC 7.5 > 9.4 > 7.5 > 9.9  -H/H 5.9/19.54 > 7.2/22.2 > 7.7/23.8 > 7.8/24.3 > 8.8/27.0  - > 114 > 108 > 105  -plt 89> 40 >51 > 46 > 51 > 50  -Folate 2.8  -hypotension requiring levophed overnight  -UA: 75 LE, 2+ bacteria, WBC 21-50  -Urine culture NGTD  -continue IV ceftriaxone, day 4  -3 units PRBC today, repeat CBC after #2, #3.   -hematology " "consult, rec folate supplementation and 1 more unit PRBC before discharge  -will follow up as outpatient   -apixaban, clopidogrel held for now  -restart clopidogrel, continue holding apixaban for now  -monitoring H/H still  -Dr Parks reached out over Epic: \"I am reviewing a PB smear on Chico Iqbal (MRN [id number]). I'm concerned about some of the circulating WBC and recommend flow cytometry for further evaluation. Smear review signed out in Epic, just wanted to draw your attention to this.\"  - Monitoring H/H, labs, if stable can discharge home  ---4 units PRBCs total  ---Follow up as needed with PCP, Hem/Onc  ---oral cefuroxime to complete course on discharge     #CAD (coronary artery disease)  #Hypercholesterolemia  #History of myocardial infarction  #PAF (paroxysmal atrial fibrillation) (Multi)  #Benign essential HTN  -Echo 3/2023: 1. Left ventricular systolic function is mildly decreased with a 40% estimated ejection fraction.  2. Multiple segmental abnormalities exist. 3. Spectral Doppler shows a pseudonormal pattern of left ventricular diastolic filling. 4. There is mildly reduced right ventricular systolic function. 5. The left atrium is mild to moderately dilated. 6. There is global and segmental LV hypokinesis. 7. CVP is elevated.  -telemetry  -hypotensive requiring pressors; now discontinued   -furosemide, metoprolol held until BP normalizes  -Continue atorvastatin  -BP stable, continue monitoring   ---metoprolol dose reduced to 25mg per day  ---apixaban on hold still for bleeding risk     #Gastroesophageal reflux disease without esophagitis  #Constipation  -continue PPI  -continue lactulose as needed  -document BM, had multiple yesterday  ---encouraged daily use of stool softener at home     #BPH (benign prostatic hyperplasia)  #Acute cystitis with hematuria  -recent visit with urology for gross hematuria  -flex cysto showed enlarged prostate  -UA: 75 LE, 2+ bacteria, WBC 21-50  -Urine culture NGTD  -continue " IV ceftriaxone, day 4  -continue finasteride, tamsulosin  -UOP recorded, no hematuria noted  ---oral cefuroxime on DC     #Hypokalemia, resolved   -K 3.3 > 3.2 > 3.7  -repleted orally  -follow BMP  -replete as needed  ---stable     #Daily ETOH  -CIWA  -folic acid, thiamine  -monitor for S/S withdrawal  -encourage cessation  ---stable     Pertinent Physical Exam At Time of Discharge  Physical Exam  Vitals reviewed.   HENT:      Head: Normocephalic and atraumatic.      Right Ear: External ear normal.      Left Ear: External ear normal.      Nose: Nose normal.      Mouth/Throat:      Pharynx: Oropharynx is clear.   Eyes:      Conjunctiva/sclera: Conjunctivae normal.   Cardiovascular:      Rate and Rhythm: Normal rate and regular rhythm.      Pulses: Normal pulses.      Heart sounds: Normal heart sounds.   Pulmonary:      Effort: Pulmonary effort is normal.      Breath sounds: Normal breath sounds.   Abdominal:      General: Bowel sounds are normal.      Palpations: Abdomen is soft.   Musculoskeletal:         General: Normal range of motion.      Cervical back: Normal range of motion and neck supple.   Skin:     General: Skin is dry.      Coloration: Skin is pale.   Neurological:      General: No focal deficit present.      Mental Status: He is alert and oriented to person, place, and time.   Psychiatric:         Mood and Affect: Mood normal.         Behavior: Behavior normal.    Stable for discharge.  Total cumulative time spent in preparation of this discharge including documentation review, coordination of care with the medical team including PT/SW/care coordinators and treating consultants, discussion with patient and pertinent family members and finalization of prescriptions, follow-up appointments, and this discharge summary was approximately 45 minutes.    Outpatient Follow-Up  Future Appointments   Date Time Provider Department [place]   9/11/2024 11:15 AM Nithin Coronado MD [id number] [facility]   10/14/2024 11:40  AM Latrell Yancey MD [id number] [facility]   2/7/2025 12:00 PM Erick Baldwin MD [id number] [facility]     Linn Cheek, APRN-CNP

## 2024-08-30 NOTE — CARE PLAN
The patient's goals for the shift include Patient will remain injury free this shift 8/28/24 1900.    The clinical goals for the shift include Increased activity this shift.    Received 1 unit packed red blood cells then discharged to home.

## 2024-08-30 NOTE — CARE PLAN
The patient's goals for the shift include Patient will remain injury free this shift 8/28/24 1900. No acute injury to patient occurred this shift.     The clinical goals for the shift include patient will hacve restful evening, patient denied acute discomfort when assessed  Blood pressure remained within normal limits..

## 2024-08-30 NOTE — PROGRESS NOTES
"Chico Iqbal is a 88 y.o. male on day 3 of admission presenting with Sepsis (Multi).    Subjective   Feels OK. Tired but not more than usual. Denies pain.        Objective     Physical Exam  HENT:      Head: Normocephalic and atraumatic.   Eyes:      General: No scleral icterus.  Pulmonary:      Effort: Pulmonary effort is normal.   Musculoskeletal:         General: Normal range of motion.   Skin:     Coloration: Skin is not jaundiced.   Neurological:      General: No focal deficit present.      Mental Status: He is alert and oriented to person, place, and time.         Last Recorded Vitals  Blood pressure 114/78, pulse 93, temperature 36 °C (96.8 °F), temperature source Temporal, resp. rate 17, height 1.803 m (5' 11\"), weight 80.1 kg (176 lb 9.4 oz), SpO2 97%.  Intake/Output last 3 Shifts:  I/O last 3 completed shifts:  In: 1629.2 (20.3 mL/kg) [P.O.:960; Blood:669.2]  Out: 7780 (97.1 mL/kg) [Urine:7780 (2.7 mL/kg/hr)]  Weight: 80.1 kg     Relevant Results                              Assessment/Plan   Assessment & Plan  Sepsis (Multi)    BPH (benign prostatic hyperplasia)    CAD (coronary artery disease)    Gastroesophageal reflux disease without esophagitis    Anemia requiring transfusions    PAF (paroxysmal atrial fibrillation) (Multi)    Hypertensive heart disease with heart failure (Multi)    History of myocardial infarction    Sepsis due to urinary tract infection (Multi)    Alcohol abuse    He had low folate level and we had started him on folate. This might have caused pancytopenia that he had. 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     I talked to him and his son in detail about this diagnosis. Although it needs to be confirmed by a bone marrow biopsy, a false result is very unlikely. 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     Patient and his son did not want the biopsy or any " leukemia treatment. This is reasonable considering the low likelihood of response to AML treatment at his age. We could try him on vidaza - venetoclax as outpatient though. At this time, we will plan to continue with transfusions as outpatient as per the patient's wishes.       I spent 40 minutes in the professional and overall care of this patient.      Nithin Coronado MD

## 2024-08-30 NOTE — TELEPHONE ENCOUNTER
Provider verbally informed me patient is to be discharged today, 8/30 and that he would like patient to be seen on or around 9/10/24. Reached out to patient's daughter x2 to inform her of this and to schedule follow up. Daughter agreeable. Scheduled follow up for Wednesday, 9/11/24 at 11:15 AM. Daughter verbalized understanding and agreement regarding discussed information via verbal feedback.

## 2024-08-30 NOTE — DISCHARGE INSTR - OTHER ORDERS
Thank you for choosing [facility] for your Health Care needs.  Also, thank you for allowing us to take you and your families preferences into account when determining your discharge plan.  Stay well!    Your Care Transitions Team Member:Irma Keith Robin and Helen [phone]    For questions about your medications listed on your discharge instructions, please call the Nurses Station at [phone].       Home care contact number [phone] (HOME)

## 2024-08-30 NOTE — PROGRESS NOTES
08/30/24 0855   Discharge Planning   Home or Post Acute Services None   Expected Discharge Disposition Home   Does the patient need discharge transport arranged? No     IMM letter given and explained to patient. Consent/signature obtained and copy given to patient.     11:00 AM  Spoke with patient about therapy's low level therapy recommendation.  Patient agreeable to receive home care services upon discharge.  Patient offered choices and selected [facility].  Plan for patient to return home with the addition of HHC upon discharge.  Updated medical staff and nursing.  Will follow.      12:15 PM [facility] informed of referral.  Patient's discharge order  is in.     DC PLAN:  Home w/HC

## 2024-08-30 NOTE — PROGRESS NOTES
Physical Therapy    Physical Therapy Evaluation    Patient Name: Chico Iqbal  MRN: [id number]  Today's Date: 8/30/2024        Assessment/Plan   PT Assessment  PT Assessment Results: Decreased strength, Impaired balance, Decreased mobility  Rehab Prognosis: Good  Barriers to Discharge: n/a  Evaluation/Treatment Tolerance: Patient tolerated treatment well  Medical Staff Made Aware: Yes  Strengths: Ability to acquire knowledge, Living arrangement secure, Support of Caregivers  Barriers to Participation:  (n/a)  End of Session Communication: Bedside nurse  Assessment Comment: Pleasant 88 y.o presents with generalized weakness. Pt. lives with daughter but is normally TONY with WW. Pt. currently requires close supervision with transfers and amb. and would benefit from additional PT to address above noted limitations and prevent further decline.  End of Session Patient Position: Up in chair, Alarm on  IP OR SWING BED PT PLAN  Inpatient or Swing Bed: Inpatient  PT Plan  Treatment/Interventions: Transfer training, Bed mobility, Gait training, Balance training, Strengthening, Therapeutic activity, Therapeutic exercise, Home exercise program  PT Plan: Ongoing PT  PT Frequency: 3 times per week  PT Discharge Recommendations: Low intensity level of continued care  PT Recommended Transfer Status: Stand by assist  PT - OK to Discharge: Yes Based on completed evaluation and care plan recommendations, no barriers to discharge to next site of care        Subjective   General Visit Information:  General  Reason for Referral: impaired mobility, sepsis  Referred By: SEBASTIAN Herrera-CNP  Past Medical History Relevant to Rehab: PMH of daily Etoh, HTN, HLD, CAD with MI s/p stenting, paroxysmal AF (reportedly on Xarelto but unable to find prescription/provider record), BPH, COPD (not on home O2)  Prior to Session Communication: Bedside nurse  Patient Position Received: Bed, 2 rail up, Alarm off, not on at start of session  General  Comment: BP cuff, SPO2, IV ([place])  Home Living:  Home Living  Type of Home: House  Lives With:  (daughter (home all the time))  Home Adaptive Equipment:  (WW, Rollator)  Home Layout: One level  Home Access: Level entry  Bathroom Shower/Tub: Tub/shower unit  Bathroom Toilet: Handicapped height  Prior Level of Function:  Prior Function Per Pt/Caregiver Report  Level of [place]: Independent with ADLs and functional transfers, Needs assistance with homemaking  Receives Help From: Family  Ambulatory Assistance: Independent (with WW (Rollator for community))  Prior Function Comments: (+) drives  Precautions:  Precautions  Medical Precautions: Fall precautions    Vital Signs (Past 2hrs)        Date/Time Vitals Session Patient Position Pulse Resp SpO2 BP MAP (mmHg)    08/30/24 0900 --  --  --  --  96 %  122/70  --     08/30/24 0905 --  --  79  15  --  110/80  --     08/30/24 0950 --  --  67  14  --  112/53  --     08/30/24 1015 --  --  --  --  97 %  109/70  --                       Objective   Pain:  Pain Assessment  Pain Assessment: 0-10  Cognition:  Cognition  Overall Cognitive Status: Within Functional Limits    General Assessments:     Activity Tolerance  Endurance: Endurance does not limit participation in activity    Sensation  Sensation Comment: denies sensation deficits    Strength  Strength Comments: BLE: grossly 4-/5; L knee slightly weaker from previous injury (baseline)  Coordination  Movements are Fluid and Coordinated: Yes    Static Standing Balance  Static Standing-Comment/Number of Minutes: F+; with BUE support  Dynamic Standing Balance  Dynamic Standing-Comments: F+; with BUE support    Functional Assessments:  Bed Mobility  Bed Mobility: Yes  Bed Mobility 1  Bed Mobility 1: Supine to sitting  Level of Assistance 1: Close supervision    Transfers  Transfer: Yes  Transfer 1  Technique 1: Sit to stand, Stand to sit  Transfer Device 1: Walker  Transfer Level of Assistance 1: Close  supervision    Ambulation/Gait Training  Ambulation/Gait Training Performed: Yes  Ambulation/Gait Training 1  Assistance 1: Close supervision  Quality of Gait 1: Decreased step length, Forward flexed posture (decreased gait speed)  Comments/Distance (ft) 1: 100  Extremity/Trunk Assessments:  RLE   RLE : Within Functional Limits  LLE   LLE : Within Functional Limits  Outcome Measures:  [facility] Basic Mobility  Turning from your back to your side while in a flat bed without using bedrails: None  Moving from lying on your back to sitting on the side of a flat bed without using bedrails: A little  Moving to and from bed to chair (including a wheelchair): A little  Standing up from a chair using your arms (e.g. wheelchair or bedside chair): A little  To walk in hospital room: A little  Climbing 3-5 steps with railing: A little  Basic Mobility - Total Score: 19    FSS-ICU  Ambulation: Walks >/ or equal to 150 feet with supervision  Rolling: Supervision or set-up only  Sitting: Supervision or set-up only  Transfer Sit-to-Stand: Supervision or set-up only  Transfer Supine-to-Sit: Supervision or set-up only  Total Score: 25    Encounter Problems       Encounter Problems (Active)       Balance       STG - Maintains dynamic standing balance without upper extremity support with good balance        Start:  08/30/24    Expected End:  09/13/24               Mobility       LTG - Patient will ambulate community distance TONY with WW       Start:  08/30/24    Expected End:  09/13/24               PT Transfers       STG - Patient will perform bed mobility IND       Start:  08/30/24    Expected End:  09/13/24            STG - Patient will transfer sit to and from stand TONY with WW       Start:  08/30/24    Expected End:  09/13/24               Pain - Adult              Education Documentation  Mobility Training, taught by Ritika Last, PT at 8/30/2024 10:48 AM.  Learner: Patient  Readiness: Acceptance  Method: Explanation  Response:  Verbalizes Understanding  Comment: Educated pt. on PT POC    Education Comments  No comments found.

## 2024-08-31 LAB — BACTERIA UR CULT: NO GROWTH

## 2024-09-03 ENCOUNTER — PATIENT OUTREACH (OUTPATIENT)
Dept: PRIMARY CARE | Facility: CLINIC | Age: 89
End: 2024-09-03
Payer: MEDICARE

## 2024-09-03 LAB
ELECTRONICALLY SIGNED BY: NORMAL
MYELOID NGS RESULTS: NORMAL

## 2024-09-03 NOTE — PROGRESS NOTES
Discharge Facility: [facility]  Discharge Diagnosis: Sepsis  Admission Date: 26 Aug 24  Discharge Date: 30 Aug 24    PCP Appointment Date: Pt declining to make PCP follow up appt at this time  Specialist Appointment Date: 11 Sep 24 (Moncho OncIvonne)  Hospital Encounter and Summary Linked: Yes    See discharge assessment below for further details     Engagement  Call Start Time: 1022 (9/3/2024 10:32 AM)    Medications  Medications reviewed with patient/caregiver?: Yes (9/3/2024 10:32 AM)  Is the patient having any side effects they believe may be caused by any medication additions or changes?: No (9/3/2024 10:32 AM)  Does the patient have all medications ordered at discharge?: Yes (9/3/2024 10:32 AM)  Prescription Comments: None (9/3/2024 10:32 AM)  Is the patient taking all medications as directed (includes completed medication regime)?: Yes (9/3/2024 10:32 AM)  Medication Comments: None (9/3/2024 10:32 AM)    Appointments  Does the patient have a primary care provider?: Yes (pt and pt's daughter declining to make PCP follow up appt at this time.) (9/3/2024 10:32 AM)  Has the patient kept scheduled appointments due by today?: Yes (9/3/2024 10:32 AM)    Self Management  What is the home health agency?: [facility], set to call patient today per Daughter (9/3/2024 10:32 AM)  Has home health visited the patient within 72 hours of discharge?: Call prior to 72 hours (9/3/2024 10:32 AM)  What Durable Medical Equipment (DME) was ordered?: N/A (9/3/2024 10:32 AM)    Patient Teaching  Does the patient have access to their discharge instructions?: Yes (9/3/2024 10:32 AM)  Care Management Interventions: Reviewed instructions with patient (9/3/2024 10:32 AM)  What is the patient's perception of their health status since discharge?: Improving (9/3/2024 10:32 AM)  Is the patient/caregiver able to teach back the hierarchy of who to call/visit for symptoms/problems? PCP, Specialist, Home Health nurse, Urgent Care, ED, 911: Yes (9/3/2024  10:32 AM)  Patient/Caregiver Education Comments: None (9/3/2024 10:32 AM)    Wrap Up  Wrap Up Additional Comments: None (9/3/2024 10:32 AM)  Call End Time: 1032 (9/3/2024 10:32 AM)    Pt and pt's daughter declining to make PCP follow up appt at this time. Both have no questions on medications or discharge instructions at this time. Daughter states patient is doing well since discharge.

## 2024-09-04 ENCOUNTER — HOME CARE VISIT (OUTPATIENT)
Dept: HOME HEALTH SERVICES | Facility: HOME HEALTH | Age: 89
End: 2024-09-04
Payer: MEDICARE

## 2024-09-04 VITALS
OXYGEN SATURATION: 97 % | DIASTOLIC BLOOD PRESSURE: 80 MMHG | RESPIRATION RATE: 18 BRPM | HEART RATE: 80 BPM | SYSTOLIC BLOOD PRESSURE: 112 MMHG

## 2024-09-04 PROCEDURE — 169592 NO-PAY CLAIM PROCEDURE

## 2024-09-04 PROCEDURE — G0151 HHCP-SERV OF PT,EA 15 MIN: HCPCS | Mod: HHH

## 2024-09-04 ASSESSMENT — ACTIVITIES OF DAILY LIVING (ADL)
AMBULATION ASSISTANCE ON FLAT SURFACES: 1
ENTERING_EXITING_HOME: NEEDS ASSISTANCE
OASIS_M1830: 04
AMBULATION_DISTANCE/DURATION_TOLERATED: 30 FT

## 2024-09-04 ASSESSMENT — ENCOUNTER SYMPTOMS
DENIES PAIN: 1
PERSON REPORTING PAIN: PATIENT

## 2024-09-05 ENCOUNTER — HOME CARE VISIT (OUTPATIENT)
Dept: HOME HEALTH SERVICES | Facility: HOME HEALTH | Age: 89
End: 2024-09-05
Payer: MEDICARE

## 2024-09-05 VITALS
SYSTOLIC BLOOD PRESSURE: 130 MMHG | HEART RATE: 78 BPM | TEMPERATURE: 98.3 F | DIASTOLIC BLOOD PRESSURE: 70 MMHG | OXYGEN SATURATION: 94 %

## 2024-09-05 PROCEDURE — G0152 HHCP-SERV OF OT,EA 15 MIN: HCPCS | Mod: HHH

## 2024-09-05 ASSESSMENT — ENCOUNTER SYMPTOMS
DESCRIPTION OF MEMORY LOSS: SHORT TERM
DOUBLE VISION: 0
NYSTAGMUS: 0
DENIES PAIN: 1
PERSON REPORTING PAIN: PATIENT

## 2024-09-05 ASSESSMENT — ACTIVITIES OF DAILY LIVING (ADL)
GROOMING ASSESSED: 1
DRESSING_LB_CURRENT_FUNCTION: INDEPENDENT
PHYSICAL TRANSFERS ASSESSED: 1
BATHING_CURRENT_FUNCTION: INDEPENDENT
CURRENT_FUNCTION: SUPERVISION
TOILETING: INDEPENDENT
GROOMING_CURRENT_FUNCTION: INDEPENDENT
TOILETING: 1
DRESSING_UB_CURRENT_FUNCTION: INDEPENDENT
BATHING ASSESSED: 1

## 2024-09-10 ENCOUNTER — HOME CARE VISIT (OUTPATIENT)
Dept: HOME HEALTH SERVICES | Facility: HOME HEALTH | Age: 89
End: 2024-09-10
Payer: MEDICARE

## 2024-09-10 DIAGNOSIS — J44.9 CHRONIC OBSTRUCTIVE PULMONARY DISEASE, UNSPECIFIED COPD TYPE (MULTI): Primary | ICD-10-CM

## 2024-09-10 DIAGNOSIS — I11.0 HYPERTENSIVE HEART DISEASE WITH HEART FAILURE (MULTI): ICD-10-CM

## 2024-09-10 PROCEDURE — G0151 HHCP-SERV OF PT,EA 15 MIN: HCPCS | Mod: HHH

## 2024-09-10 ASSESSMENT — ENCOUNTER SYMPTOMS
PERSON REPORTING PAIN: PATIENT
DENIES PAIN: 1

## 2024-09-11 ENCOUNTER — LAB (OUTPATIENT)
Dept: LAB | Facility: LAB | Age: 89
End: 2024-09-11
Payer: MEDICARE

## 2024-09-11 ENCOUNTER — OFFICE VISIT (OUTPATIENT)
Dept: HEMATOLOGY/ONCOLOGY | Facility: HOSPITAL | Age: 89
End: 2024-09-11
Payer: MEDICARE

## 2024-09-11 ENCOUNTER — APPOINTMENT (OUTPATIENT)
Dept: CARDIOLOGY | Facility: HOSPITAL | Age: 89
End: 2024-09-11
Payer: MEDICARE

## 2024-09-11 ENCOUNTER — HOSPITAL ENCOUNTER (OUTPATIENT)
Dept: CARDIOLOGY | Facility: HOSPITAL | Age: 89
Discharge: HOME | End: 2024-09-11
Payer: MEDICARE

## 2024-09-11 VITALS
TEMPERATURE: 97.3 F | DIASTOLIC BLOOD PRESSURE: 62 MMHG | WEIGHT: 173.5 LBS | RESPIRATION RATE: 16 BRPM | HEIGHT: 71 IN | OXYGEN SATURATION: 98 % | BODY MASS INDEX: 24.29 KG/M2 | HEART RATE: 85 BPM | SYSTOLIC BLOOD PRESSURE: 110 MMHG

## 2024-09-11 DIAGNOSIS — R53.83 OTHER FATIGUE: ICD-10-CM

## 2024-09-11 DIAGNOSIS — C92.00 ACUTE MYELOID LEUKEMIA NOT HAVING ACHIEVED REMISSION (MULTI): ICD-10-CM

## 2024-09-11 DIAGNOSIS — C92.00 ACUTE MYELOID LEUKEMIA NOT HAVING ACHIEVED REMISSION (MULTI): Primary | ICD-10-CM

## 2024-09-11 LAB
ALBUMIN SERPL BCP-MCNC: 3.8 G/DL (ref 3.4–5)
ALP SERPL-CCNC: 81 U/L (ref 33–136)
ALT SERPL W P-5'-P-CCNC: 21 U/L (ref 10–52)
ANION GAP SERPL CALC-SCNC: 10 MMOL/L (ref 10–20)
AST SERPL W P-5'-P-CCNC: 21 U/L (ref 9–39)
BASOPHILS # BLD AUTO: 0 X10*3/UL (ref 0–0.1)
BASOPHILS NFR BLD AUTO: 0 %
BILIRUB SERPL-MCNC: 0.7 MG/DL (ref 0–1.2)
BUN SERPL-MCNC: 11 MG/DL (ref 6–23)
CALCIUM SERPL-MCNC: 9.1 MG/DL (ref 8.6–10.3)
CHLORIDE SERPL-SCNC: 103 MMOL/L (ref 98–107)
CO2 SERPL-SCNC: 30 MMOL/L (ref 21–32)
CREAT SERPL-MCNC: 0.7 MG/DL (ref 0.5–1.3)
EGFRCR SERPLBLD CKD-EPI 2021: 89 ML/MIN/1.73M*2
EOSINOPHIL # BLD AUTO: 0 X10*3/UL (ref 0–0.4)
EOSINOPHIL NFR BLD AUTO: 0 %
ERYTHROCYTE [DISTWIDTH] IN BLOOD BY AUTOMATED COUNT: 21.3 % (ref 11.5–14.5)
ERYTHROCYTE [SEDIMENTATION RATE] IN BLOOD BY WESTERGREN METHOD: 23 MM/H (ref 0–20)
FERRITIN SERPL-MCNC: 1213 NG/ML (ref 20–300)
FOLATE SERPL-MCNC: 8 NG/ML
GLUCOSE SERPL-MCNC: 112 MG/DL (ref 74–99)
HCT VFR BLD AUTO: 31.9 % (ref 41–52)
HGB BLD-MCNC: 10 G/DL (ref 13.5–17.5)
IMM GRANULOCYTES # BLD AUTO: 0.04 X10*3/UL (ref 0–0.5)
IMM GRANULOCYTES NFR BLD AUTO: 1.8 % (ref 0–0.9)
IRON SATN MFR SERPL: ABNORMAL %
IRON SERPL-MCNC: 219 UG/DL (ref 35–150)
LDH SERPL L TO P-CCNC: 169 U/L (ref 84–246)
LYMPHOCYTES # BLD AUTO: 1.32 X10*3/UL (ref 0.8–3)
LYMPHOCYTES NFR BLD AUTO: 59.5 %
MAGNESIUM SERPL-MCNC: 1.79 MG/DL (ref 1.6–2.4)
MCH RBC QN AUTO: 33.4 PG (ref 26–34)
MCHC RBC AUTO-ENTMCNC: 31.3 G/DL (ref 32–36)
MCV RBC AUTO: 107 FL (ref 80–100)
MONOCYTES # BLD AUTO: 0.65 X10*3/UL (ref 0.05–0.8)
MONOCYTES NFR BLD AUTO: 29.3 %
NEUTROPHILS # BLD AUTO: 0.21 X10*3/UL (ref 1.6–5.5)
NEUTROPHILS NFR BLD AUTO: 9.4 %
NRBC BLD-RTO: 0 /100 WBCS (ref 0–0)
PLATELET # BLD AUTO: 52 X10*3/UL (ref 150–450)
POTASSIUM SERPL-SCNC: 4.2 MMOL/L (ref 3.5–5.3)
PROT SERPL-MCNC: 6.7 G/DL (ref 6.4–8.2)
RBC # BLD AUTO: 2.99 X10*6/UL (ref 4.5–5.9)
RBC MORPH BLD: NORMAL
SODIUM SERPL-SCNC: 139 MMOL/L (ref 136–145)
TIBC SERPL-MCNC: ABNORMAL UG/DL
TSH SERPL-ACNC: 0.96 MIU/L (ref 0.44–3.98)
UIBC SERPL-MCNC: <55 UG/DL (ref 110–370)
URATE SERPL-MCNC: 4.4 MG/DL (ref 4–7.5)
VIT B12 SERPL-MCNC: 483 PG/ML (ref 211–911)
WBC # BLD AUTO: 2.2 X10*3/UL (ref 4.4–11.3)

## 2024-09-11 PROCEDURE — 3078F DIAST BP <80 MM HG: CPT | Performed by: INTERNAL MEDICINE

## 2024-09-11 PROCEDURE — 83540 ASSAY OF IRON: CPT

## 2024-09-11 PROCEDURE — 85652 RBC SED RATE AUTOMATED: CPT

## 2024-09-11 PROCEDURE — 83615 LACTATE (LD) (LDH) ENZYME: CPT

## 2024-09-11 PROCEDURE — 93005 ELECTROCARDIOGRAM TRACING: CPT

## 2024-09-11 PROCEDURE — 1159F MED LIST DOCD IN RCRD: CPT | Performed by: INTERNAL MEDICINE

## 2024-09-11 PROCEDURE — 82728 ASSAY OF FERRITIN: CPT

## 2024-09-11 PROCEDURE — 93010 ELECTROCARDIOGRAM REPORT: CPT | Performed by: INTERNAL MEDICINE

## 2024-09-11 PROCEDURE — G2211 COMPLEX E/M VISIT ADD ON: HCPCS | Performed by: INTERNAL MEDICINE

## 2024-09-11 PROCEDURE — 1111F DSCHRG MED/CURRENT MED MERGE: CPT | Performed by: INTERNAL MEDICINE

## 2024-09-11 PROCEDURE — 82607 VITAMIN B-12: CPT

## 2024-09-11 PROCEDURE — 84443 ASSAY THYROID STIM HORMONE: CPT

## 2024-09-11 PROCEDURE — 99215 OFFICE O/P EST HI 40 MIN: CPT | Performed by: INTERNAL MEDICINE

## 2024-09-11 PROCEDURE — 1123F ACP DISCUSS/DSCN MKR DOCD: CPT | Performed by: INTERNAL MEDICINE

## 2024-09-11 PROCEDURE — 82746 ASSAY OF FOLIC ACID SERUM: CPT

## 2024-09-11 PROCEDURE — 83735 ASSAY OF MAGNESIUM: CPT

## 2024-09-11 PROCEDURE — 84550 ASSAY OF BLOOD/URIC ACID: CPT

## 2024-09-11 PROCEDURE — 85025 COMPLETE CBC W/AUTO DIFF WBC: CPT

## 2024-09-11 PROCEDURE — 3074F SYST BP LT 130 MM HG: CPT | Performed by: INTERNAL MEDICINE

## 2024-09-11 PROCEDURE — 83550 IRON BINDING TEST: CPT

## 2024-09-11 PROCEDURE — 1126F AMNT PAIN NOTED NONE PRSNT: CPT | Performed by: INTERNAL MEDICINE

## 2024-09-11 PROCEDURE — 36415 COLL VENOUS BLD VENIPUNCTURE: CPT

## 2024-09-11 PROCEDURE — 80053 COMPREHEN METABOLIC PANEL: CPT

## 2024-09-11 RX ORDER — FOLIC ACID 1 MG/1
1 TABLET ORAL DAILY
Qty: 90 TABLET | Refills: 3 | Status: SHIPPED | OUTPATIENT
Start: 2024-09-11 | End: 2025-09-11

## 2024-09-11 ASSESSMENT — ENCOUNTER SYMPTOMS
OCCASIONAL FEELINGS OF UNSTEADINESS: 1
GASTROINTESTINAL NEGATIVE: 1
FEVER: 0
FATIGUE: 1
DEPRESSION: 0
CARDIOVASCULAR NEGATIVE: 1
LOSS OF SENSATION IN FEET: 0
RESPIRATORY NEGATIVE: 1

## 2024-09-11 ASSESSMENT — PAIN SCALES - GENERAL: PAINLEVEL: 0-NO PAIN

## 2024-09-11 NOTE — PROGRESS NOTES
"Patient ID: Chico Iqbal is a 89 y.o. male.    Subjective:  Returns for follow up for AML. He was diagnosed in the hospital 2-3 weeks ago. Feels OK. Does not feel more tired than usual. Denies having fever. No nausea. Gets physical therapy daily.     Heme/Onc History:  - p/w rectal pain in Aug 2024 => Was anemic with Hb of 6, elevated MCV, and Plt of 89 and low folate. Initially thought to be due to folate deficiency. Peripheral smear showed abnormal cells and flow cytometry revealed AML  - He declined having bone marrow biopsy. NGS from peripheral blood (Aug 2024): jak2 V617F (6%0, FLT3-ITD (3%), FLT3 mutation +  - Patient declined any chemotherapy but accepted to try FLT3 inhibitors    Assessment/Plan:  ? 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 All these have poor prognosis but he looks exceptionally well. He still declines bone marrow biopsy or any drastic treatment. Definitely does not want any chemo or event azacitidine.   I talked to him about quizartinib, FLT3 inhibitor, and he was receptive to it. After talkiing about side effects and potential benefit of increasing survival (albeit stduy was done in combination with chemo), he agreed to try it. We will check his labs with EKG today. Will try to get him quizartinib and start at 35.4 mg po daily.   Meanwhile, we will support him with transfusions as needed. Check CBC today.     I provide longitudinal care for Mr. Iqbal for his acute leukemia    Review Of Systems:  Review of Systems   Constitutional:  Positive for fatigue. Negative for fever.   HENT:  Negative.     Respiratory: Negative.     Cardiovascular: Negative.    Gastrointestinal: Negative.    Genitourinary: Negative.         Physical Exam:  /62 (BP Location: Left arm, Patient Position: Sitting, BP Cuff Size: Adult)   Pulse 85   Temp 36.3 °C (97.3 °F) (Temporal)   Resp 16   Ht 1.803 m (5' 11\")   Wt 78.7 kg (173 lb 8 oz)  "  SpO2 98%   BMI 24.20 kg/m²   BSA: 1.99 meters squared  Performance Status: Symptomatic; fully ambulatory  Physical Exam  HENT:      Head: Normocephalic and atraumatic.   Eyes:      General: No scleral icterus.     Pupils: Pupils are equal, round, and reactive to light.   Cardiovascular:      Rate and Rhythm: Normal rate and regular rhythm.   Pulmonary:      Effort: Pulmonary effort is normal.   Abdominal:      General: Abdomen is flat.      Palpations: Abdomen is soft. There is no mass.   Musculoskeletal:         General: Normal range of motion.   Lymphadenopathy:      Cervical: No cervical adenopathy.   Skin:     Coloration: Skin is not jaundiced.   Neurological:      General: No focal deficit present.      Mental Status: He is alert and oriented to person, place, and time.         Results:  Diagnostic Results   Lab Results   Component Value Date    WBC 9.0 08/30/2024    HGB 9.0 (L) 08/30/2024    HCT 27.7 (L) 08/30/2024     (H) 08/30/2024    PLT 60 (L) 08/30/2024     Lab Results   Component Value Date    CALCIUM 8.5 (L) 08/30/2024     08/30/2024    K 3.6 08/30/2024    CO2 28 08/30/2024     08/30/2024    BUN 14 08/30/2024    CREATININE 0.70 08/30/2024    ALT 14 08/25/2024    AST 17 08/25/2024       Current Outpatient Medications:     atorvastatin (Lipitor) 40 mg tablet, TAKE ONE TABLET BY MOUTH ONCE DAILY, Disp: 90 tablet, Rfl: 0    clopidogrel (Plavix) 75 mg tablet, Take one tablet by mouth daily, Disp: 90 tablet, Rfl: 0    docusate sodium (Colace) 100 mg capsule, Take 1 capsule (100 mg) by mouth 2 times a day., Disp: 60 capsule, Rfl: 0    finasteride (Proscar) 5 mg tablet, Take 1 tablet (5 mg) by mouth once daily. Do not crush, chew, or split., Disp: 30 tablet, Rfl: 11    furosemide (Lasix) 20 mg tablet, Take 1 tablet (20 mg) by mouth once daily. (Patient taking differently: Take 1 tablet (20 mg) by mouth once daily. None on Tues and Thur), Disp: 90 tablet, Rfl: 0    metoprolol succinate XL  (Toprol-XL) 25 mg 24 hr tablet, Take 1 tablet (25 mg) by mouth once daily. Do not crush or chew., Disp: 30 tablet, Rfl: 0    mirtazapine (Remeron) 15 mg tablet, Take 0.5 tablets (7.5 mg) by mouth once daily at bedtime., Disp: , Rfl:     pantoprazole (ProtoNix) 40 mg EC tablet, Take 1 tablet (40 mg) by mouth once daily in the morning. Take before meals. Do not crush, chew, or split., Disp: 90 tablet, Rfl: 0    tamsulosin (Flomax) 0.4 mg 24 hr capsule, Take 2 capsules (0.8 mg) by mouth once daily., Disp: 60 capsule, Rfl: 11    folic acid (Folvite) 1 mg tablet, Take 1 tablet (1 mg) by mouth once daily., Disp: 90 tablet, Rfl: 3    quizartinib 17.7 mg tablet, Take 35.4 mg by mouth once daily., Disp: 60 tablet, Rfl: 1     Past Surgical History:   Procedure Laterality Date    CARDIAC CATHETERIZATION      CORONARY STENT PLACEMENT       Family History   Problem Relation Name Age of Onset    Diabetes Mother      Heart attack Brother        reports that he has been smoking cigars. He has been exposed to tobacco smoke. He has never used smokeless tobacco.    Diagnoses and all orders for this visit:  Acute myeloid leukemia not having achieved remission (Multi)  -     quizartinib 17.7 mg tablet; Take 35.4 mg by mouth once daily.  -     CBC and Auto Differential; Future  -     Comprehensive Metabolic Panel; Future  -     Lactate Dehydrogenase; Future  -     Sedimentation Rate; Future  -     Vitamin B12; Future  -     Ferritin; Future  -     Folate; Future  -     Iron and TIBC; Future  -     Magnesium; Future  -     Thyroid Stimulating Hormone; Future  -     Clinic Appointment Request; Future  -     CBC and Auto Differential; Future  -     Comprehensive Metabolic Panel; Future  -     Lactate Dehydrogenase; Future  -     Uric Acid; Future  -     ECG 12 Lead; Future  -     folic acid (Folvite) 1 mg tablet; Take 1 tablet (1 mg) by mouth once daily.  Other fatigue  -     Thyroid Stimulating Hormone; Future  -     ECG 12 Lead; Future  -      folic acid (Folvite) 1 mg tablet; Take 1 tablet (1 mg) by mouth once daily.       I spent more than 40 minutes for the patient today, including face-to-face conversation, pre-visit preparation, post-visit orders, and others.   Nithin Coronado MD

## 2024-09-12 ENCOUNTER — SPECIALTY PHARMACY (OUTPATIENT)
Dept: PHARMACY | Facility: CLINIC | Age: 89
End: 2024-09-12

## 2024-09-12 PROCEDURE — RXMED WILLOW AMBULATORY MEDICATION CHARGE

## 2024-09-13 ENCOUNTER — SPECIALTY PHARMACY (OUTPATIENT)
Dept: PHARMACY | Facility: CLINIC | Age: 89
End: 2024-09-13

## 2024-09-13 ENCOUNTER — PHARMACY VISIT (OUTPATIENT)
Dept: PHARMACY | Facility: CLINIC | Age: 89
End: 2024-09-13
Payer: COMMERCIAL

## 2024-09-13 LAB
ATRIAL RATE: 89 BPM
Q ONSET: 209 MS
QRS COUNT: 14 BEATS
QRS DURATION: 134 MS
QT INTERVAL: 404 MS
QTC CALCULATION(BAZETT): 474 MS
QTC FREDERICIA: 450 MS
R AXIS: -79 DEGREES
T AXIS: 54 DEGREES
T OFFSET: 411 MS
VENTRICULAR RATE: 83 BPM

## 2024-09-17 ENCOUNTER — HOME CARE VISIT (OUTPATIENT)
Dept: HOME HEALTH SERVICES | Facility: HOME HEALTH | Age: 89
End: 2024-09-17
Payer: MEDICARE

## 2024-09-17 ENCOUNTER — DOCUMENTATION (OUTPATIENT)
Dept: PRIMARY CARE | Facility: CLINIC | Age: 89
End: 2024-09-17
Payer: MEDICARE

## 2024-09-17 PROCEDURE — G0151 HHCP-SERV OF PT,EA 15 MIN: HCPCS | Mod: HHH

## 2024-09-23 ENCOUNTER — TELEPHONE (OUTPATIENT)
Dept: HEMATOLOGY/ONCOLOGY | Facility: HOSPITAL | Age: 89
End: 2024-09-23
Payer: MEDICARE

## 2024-09-23 NOTE — TELEPHONE ENCOUNTER
Patient's daughter, Mirta, called in stating father has a runny nose, sneezing, and feeling weak. Denies nausea, vomiting, diarrhea, or a fever. Recommended patient follow up with PCP or go to the ER, if symptoms worsen. Daughter verbalized understanding. Daughter also stated his insurance had denied his quizartinib.

## 2024-09-24 ENCOUNTER — HOME CARE VISIT (OUTPATIENT)
Dept: HOME HEALTH SERVICES | Facility: HOME HEALTH | Age: 89
End: 2024-09-24
Payer: MEDICARE

## 2024-09-26 ASSESSMENT — ACTIVITIES OF DAILY LIVING (ADL)
OASIS_M1830: 01
HOME_HEALTH_OASIS: 00

## 2024-10-01 ENCOUNTER — LAB (OUTPATIENT)
Dept: LAB | Facility: LAB | Age: 89
End: 2024-10-01
Payer: MEDICARE

## 2024-10-01 DIAGNOSIS — C92.00 ACUTE MYELOID LEUKEMIA NOT HAVING ACHIEVED REMISSION (MULTI): ICD-10-CM

## 2024-10-01 LAB
ALBUMIN SERPL BCP-MCNC: 3.7 G/DL (ref 3.4–5)
ALP SERPL-CCNC: 85 U/L (ref 33–136)
ALT SERPL W P-5'-P-CCNC: 20 U/L (ref 10–52)
ANION GAP SERPL CALC-SCNC: 11 MMOL/L (ref 10–20)
AST SERPL W P-5'-P-CCNC: 19 U/L (ref 9–39)
BASOPHILS # BLD AUTO: 0 X10*3/UL (ref 0–0.1)
BASOPHILS NFR BLD AUTO: 0 %
BILIRUB SERPL-MCNC: 0.6 MG/DL (ref 0–1.2)
BUN SERPL-MCNC: 12 MG/DL (ref 6–23)
CALCIUM SERPL-MCNC: 9.3 MG/DL (ref 8.6–10.3)
CHLORIDE SERPL-SCNC: 100 MMOL/L (ref 98–107)
CO2 SERPL-SCNC: 29 MMOL/L (ref 21–32)
CREAT SERPL-MCNC: 0.72 MG/DL (ref 0.5–1.3)
EGFRCR SERPLBLD CKD-EPI 2021: 88 ML/MIN/1.73M*2
EOSINOPHIL # BLD AUTO: 0.01 X10*3/UL (ref 0–0.4)
EOSINOPHIL NFR BLD AUTO: 0.3 %
ERYTHROCYTE [DISTWIDTH] IN BLOOD BY AUTOMATED COUNT: 22.3 % (ref 11.5–14.5)
GLUCOSE SERPL-MCNC: 104 MG/DL (ref 74–99)
HCT VFR BLD AUTO: 24.1 % (ref 41–52)
HGB BLD-MCNC: 8.2 G/DL (ref 13.5–17.5)
IMM GRANULOCYTES # BLD AUTO: 0.01 X10*3/UL (ref 0–0.5)
IMM GRANULOCYTES NFR BLD AUTO: 0.3 % (ref 0–0.9)
LDH SERPL L TO P-CCNC: 195 U/L (ref 84–246)
LYMPHOCYTES # BLD AUTO: 2.17 X10*3/UL (ref 0.8–3)
LYMPHOCYTES NFR BLD AUTO: 59.5 %
MCH RBC QN AUTO: 35.3 PG (ref 26–34)
MCHC RBC AUTO-ENTMCNC: 34 G/DL (ref 32–36)
MCV RBC AUTO: 104 FL (ref 80–100)
MONOCYTES # BLD AUTO: 1.14 X10*3/UL (ref 0.05–0.8)
MONOCYTES NFR BLD AUTO: 31.2 %
NEUTROPHILS # BLD AUTO: 0.32 X10*3/UL (ref 1.6–5.5)
NEUTROPHILS NFR BLD AUTO: 8.7 %
NRBC BLD-RTO: 0 /100 WBCS (ref 0–0)
PLATELET # BLD AUTO: 49 X10*3/UL (ref 150–450)
POTASSIUM SERPL-SCNC: 4 MMOL/L (ref 3.5–5.3)
PROT SERPL-MCNC: 6.6 G/DL (ref 6.4–8.2)
RBC # BLD AUTO: 2.32 X10*6/UL (ref 4.5–5.9)
RBC MORPH BLD: NORMAL
SODIUM SERPL-SCNC: 136 MMOL/L (ref 136–145)
WBC # BLD AUTO: 3.7 X10*3/UL (ref 4.4–11.3)

## 2024-10-01 PROCEDURE — 36415 COLL VENOUS BLD VENIPUNCTURE: CPT

## 2024-10-02 ENCOUNTER — OFFICE VISIT (OUTPATIENT)
Dept: HEMATOLOGY/ONCOLOGY | Facility: HOSPITAL | Age: 89
End: 2024-10-02
Payer: MEDICARE

## 2024-10-02 VITALS
DIASTOLIC BLOOD PRESSURE: 62 MMHG | HEIGHT: 71 IN | HEART RATE: 93 BPM | BODY MASS INDEX: 24 KG/M2 | OXYGEN SATURATION: 97 % | TEMPERATURE: 97.2 F | WEIGHT: 171.41 LBS | SYSTOLIC BLOOD PRESSURE: 101 MMHG | RESPIRATION RATE: 16 BRPM

## 2024-10-02 DIAGNOSIS — C92.00 ACUTE MYELOID LEUKEMIA NOT HAVING ACHIEVED REMISSION (MULTI): Primary | ICD-10-CM

## 2024-10-02 PROCEDURE — 1126F AMNT PAIN NOTED NONE PRSNT: CPT | Performed by: INTERNAL MEDICINE

## 2024-10-02 PROCEDURE — 99215 OFFICE O/P EST HI 40 MIN: CPT | Performed by: INTERNAL MEDICINE

## 2024-10-02 PROCEDURE — 1159F MED LIST DOCD IN RCRD: CPT | Performed by: INTERNAL MEDICINE

## 2024-10-02 PROCEDURE — 1123F ACP DISCUSS/DSCN MKR DOCD: CPT | Performed by: INTERNAL MEDICINE

## 2024-10-02 PROCEDURE — 3078F DIAST BP <80 MM HG: CPT | Performed by: INTERNAL MEDICINE

## 2024-10-02 PROCEDURE — 3074F SYST BP LT 130 MM HG: CPT | Performed by: INTERNAL MEDICINE

## 2024-10-02 RX ORDER — TOBRAMYCIN AND DEXAMETHASONE 3; 1 MG/ML; MG/ML
1 SUSPENSION/ DROPS OPHTHALMIC
Qty: 2.5 ML | Refills: 0 | Status: SHIPPED | OUTPATIENT
Start: 2024-10-02 | End: 2024-10-12

## 2024-10-02 ASSESSMENT — ENCOUNTER SYMPTOMS
FATIGUE: 1
RESPIRATORY NEGATIVE: 1
FEVER: 0
CARDIOVASCULAR NEGATIVE: 1
GASTROINTESTINAL NEGATIVE: 1

## 2024-10-02 ASSESSMENT — COLUMBIA-SUICIDE SEVERITY RATING SCALE - C-SSRS
2. HAVE YOU ACTUALLY HAD ANY THOUGHTS OF KILLING YOURSELF?: NO
1. IN THE PAST MONTH, HAVE YOU WISHED YOU WERE DEAD OR WISHED YOU COULD GO TO SLEEP AND NOT WAKE UP?: NO
6. HAVE YOU EVER DONE ANYTHING, STARTED TO DO ANYTHING, OR PREPARED TO DO ANYTHING TO END YOUR LIFE?: NO

## 2024-10-02 ASSESSMENT — PAIN SCALES - GENERAL: PAINLEVEL: 0-NO PAIN

## 2024-10-02 NOTE — PROGRESS NOTES
"Patient ID: Chico Iqbal is a 89 y.o. male.    Subjective:  Returns for follow up for AML. He feels OK. Denies fever. Appetite good. Mild fatigue.    Heme/Onc History:  - p/w rectal pain in Aug 2024 => Was anemic with Hb of 6, elevated MCV, and Plt of 89 and low folate. Initially thought to be due to folate deficiency. Peripheral smear showed abnormal cells and flow cytometry revealed AML  - He declined having bone marrow biopsy. NGS from peripheral blood (Aug 2024): jak2 V617F (6%0, FLT3-ITD (3%), FLT3 mutation +  - Patient declined any chemotherapy but accepted to try FLT3 inhibitors => Quizartinib was denied by insurance    Assessment/Plan:  ? 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 All these have poor prognosis but he looks exceptionally well. He still declines bone marrow biopsy or any drastic treatment. Definitely does not want any chemo.    Quizartinib was denied by his insurance. I will instead order giltertinib which is approved to be use by itself albeit as second-line therapy. However, Mr. Iqbal should be considered as already failed first-line therapy since he cannot tolerate any of the first-line therapies. I sent a prescription to specialty pharmacy. We will follow his labs meanwhile. He may need blood tranfusions in the next 2-3 weeks.    I provide longitudinal care for Mr. Iqbal for his acute leukemia    Review Of Systems:  Review of Systems   Constitutional:  Positive for fatigue. Negative for fever.   HENT:  Negative.     Respiratory: Negative.     Cardiovascular: Negative.    Gastrointestinal: Negative.    Genitourinary: Negative.         Physical Exam:  /62 (BP Location: Left arm, Patient Position: Sitting, BP Cuff Size: Adult)   Pulse 93   Temp 36.2 °C (97.2 °F) (Temporal)   Resp 16   Ht 1.803 m (5' 11\")   Wt 77.7 kg (171 lb 6.5 oz)   SpO2 97%   BMI 23.91 kg/m²   BSA: 1.97 meters squared  Performance Status: " Symptomatic; fully ambulatory  Physical Exam  HENT:      Head: Normocephalic and atraumatic.   Eyes:      General: No scleral icterus.     Pupils: Pupils are equal, round, and reactive to light.   Cardiovascular:      Rate and Rhythm: Normal rate and regular rhythm.   Pulmonary:      Effort: Pulmonary effort is normal.   Abdominal:      General: Abdomen is flat.      Palpations: Abdomen is soft. There is no mass.   Musculoskeletal:         General: Normal range of motion.   Lymphadenopathy:      Cervical: No cervical adenopathy.   Skin:     Coloration: Skin is not jaundiced.   Neurological:      General: No focal deficit present.      Mental Status: He is alert and oriented to person, place, and time.         Results:  Diagnostic Results   Lab Results   Component Value Date    WBC 3.7 (L) 10/01/2024    HGB 8.2 (L) 10/01/2024    HCT 24.1 (L) 10/01/2024     (H) 10/01/2024    PLT 49 (L) 10/01/2024     Lab Results   Component Value Date    CALCIUM 9.3 10/01/2024     10/01/2024    K 4.0 10/01/2024    CO2 29 10/01/2024     10/01/2024    BUN 12 10/01/2024    CREATININE 0.72 10/01/2024    ALT 20 10/01/2024    AST 19 10/01/2024       Current Outpatient Medications:     atorvastatin (Lipitor) 40 mg tablet, TAKE ONE TABLET BY MOUTH ONCE DAILY, Disp: 90 tablet, Rfl: 0    clopidogrel (Plavix) 75 mg tablet, Take one tablet by mouth daily, Disp: 90 tablet, Rfl: 0    docusate sodium (Colace) 100 mg capsule, Take 1 capsule (100 mg) by mouth 2 times a day., Disp: 60 capsule, Rfl: 0    finasteride (Proscar) 5 mg tablet, Take 1 tablet (5 mg) by mouth once daily. Do not crush, chew, or split., Disp: 30 tablet, Rfl: 11    folic acid (Folvite) 1 mg tablet, Take 1 tablet (1 mg) by mouth once daily., Disp: 90 tablet, Rfl: 3    metoprolol succinate XL (Toprol-XL) 25 mg 24 hr tablet, Take 1 tablet (25 mg) by mouth once daily. Do not crush or chew., Disp: 30 tablet, Rfl: 0    mirtazapine (Remeron) 15 mg tablet, Take 0.5  tablets (7.5 mg) by mouth once daily at bedtime., Disp: , Rfl:     pantoprazole (ProtoNix) 40 mg EC tablet, Take 1 tablet (40 mg) by mouth once daily in the morning. Take before meals. Do not crush, chew, or split., Disp: 90 tablet, Rfl: 0    tamsulosin (Flomax) 0.4 mg 24 hr capsule, Take 2 capsules (0.8 mg) by mouth once daily., Disp: 60 capsule, Rfl: 11    gilteritinib (Xospata) 40 mg tablet, Take 3 tablets (120 mg total) by mouth once daily.  Swallow whole., Disp: 90 tablet, Rfl: 1    tobramycin-dexamethasone (Tobradex) ophthalmic suspension, Administer 1 drop into the right eye every 4 hours while awake for 10 days., Disp: 2.5 mL, Rfl: 0     Past Surgical History:   Procedure Laterality Date    CARDIAC CATHETERIZATION      CORONARY STENT PLACEMENT       Family History   Problem Relation Name Age of Onset    Diabetes Mother      Heart attack Brother        reports that he has been smoking cigars. He has been exposed to tobacco smoke. He has never used smokeless tobacco.    Diagnoses and all orders for this visit:  Acute myeloid leukemia not having achieved remission (Multi)  -     Clinic Appointment Request  -     Clinic Appointment Request; Future  -     CBC and Auto Differential; Future  -     Comprehensive Metabolic Panel; Future  -     Lactate Dehydrogenase; Future  -     tobramycin-dexamethasone (Tobradex) ophthalmic suspension; Administer 1 drop into the right eye every 4 hours while awake for 10 days.  -     gilteritinib (Xospata) 40 mg tablet; Take 3 tablets (120 mg total) by mouth once daily.  Swallow whole.       I spent more than 40 minutes for the patient today, including face-to-face conversation, pre-visit preparation, post-visit orders, and others.   Nithin Coronado MD

## 2024-10-03 ENCOUNTER — SPECIALTY PHARMACY (OUTPATIENT)
Dept: PHARMACY | Facility: CLINIC | Age: 89
End: 2024-10-03

## 2024-10-03 PROCEDURE — RXMED WILLOW AMBULATORY MEDICATION CHARGE

## 2024-10-04 ENCOUNTER — PHARMACY VISIT (OUTPATIENT)
Dept: PHARMACY | Facility: CLINIC | Age: 89
End: 2024-10-04
Payer: MEDICARE

## 2024-10-07 ENCOUNTER — TELEPHONE (OUTPATIENT)
Dept: HEMATOLOGY/ONCOLOGY | Facility: HOSPITAL | Age: 89
End: 2024-10-07
Payer: MEDICARE

## 2024-10-07 ENCOUNTER — SPECIALTY PHARMACY (OUTPATIENT)
Dept: PHARMACY | Facility: CLINIC | Age: 89
End: 2024-10-07

## 2024-10-07 DIAGNOSIS — I10 BENIGN HYPERTENSION: ICD-10-CM

## 2024-10-07 DIAGNOSIS — I25.10 CORONARY ARTERY DISEASE, UNSPECIFIED VESSEL OR LESION TYPE, UNSPECIFIED WHETHER ANGINA PRESENT, UNSPECIFIED WHETHER NATIVE OR TRANSPLANTED HEART: ICD-10-CM

## 2024-10-07 DIAGNOSIS — K21.9 CHRONIC GERD: ICD-10-CM

## 2024-10-07 RX ORDER — FUROSEMIDE 20 MG/1
20 TABLET ORAL DAILY
Qty: 90 TABLET | Refills: 0 | Status: SHIPPED | OUTPATIENT
Start: 2024-10-07

## 2024-10-07 RX ORDER — CLOPIDOGREL BISULFATE 75 MG/1
TABLET ORAL
Qty: 90 TABLET | Refills: 0 | Status: SHIPPED | OUTPATIENT
Start: 2024-10-07

## 2024-10-07 RX ORDER — PANTOPRAZOLE SODIUM 40 MG/1
40 TABLET, DELAYED RELEASE ORAL
Qty: 90 TABLET | Refills: 0 | Status: SHIPPED | OUTPATIENT
Start: 2024-10-07

## 2024-10-07 RX ORDER — METOPROLOL SUCCINATE 25 MG/1
25 TABLET, EXTENDED RELEASE ORAL DAILY
Qty: 30 TABLET | Refills: 0 | Status: SHIPPED | OUTPATIENT
Start: 2024-10-07 | End: 2024-11-06

## 2024-10-07 NOTE — TELEPHONE ENCOUNTER
Daughter, Mirta, called in today to let us know her father's gilteritinib was approved and he received it today. She plans to start the medication today. Also questioned whether she should wear gloves when handling the medication, told her it was recommended. Agreed to report any side effects he might have. Medication education sheets mailed to patient. Originally was going to be on quizartinib, but denied by insurance.

## 2024-10-07 NOTE — PROGRESS NOTES
[facility] Clinical Note    Chico Iqbal is a 89 y.o. male, who is on the specialty pharmacy service for management of:  Oncology Core.    Chico Iqbal is taking: Xospata.    Medication Receipt Date: 10/7/24  Medication Start Date (planned or actual): will check with provider     Chico was contacted on 10/7/2024 at 1:59 PM for a virtual pharmacy visit with encounter number [id number] from:   [facility]  [address] [place] [id number]  Dept: [phone]  Dept Fax: [phone]        The most recent encounter visit with the referring prescriber  Nithin Coronado on 10/2/24 (Date) was reviewed.  Pharmacy will continue to collaborate in the care of this patient with the referring prescriber  Nithin Coronado.    General Assessment      Impression/Plan  IMPRESSION/PLAN:  Is patient high risk (potential patients:  pregnancy, geriatric, pediatric)?  Geriatric   Is laboratory follow-up needed? no  Is a clinical intervention needed? no  Next reassessment date? 1/7/24  Additional comments:     Refer to the encounter summary report for documentation details about patient counseling and education.      Medication Adherence    The importance of adherence was discussed with the patient and they were advised to take the medication as prescribed by their provider. Patient was encouraged to call their physician's office if they have a question regarding a missed dose.        Patient was advised to contact the pharmacy if there are any changes to their medication list, including prescriptions, OTC medications, herbal products, or supplements. Patient was advised of [facility]'s dispensing process, refill timeline, contact information ([phone]), and patient management follow up. Patient confirmed understanding of education conducted during assessment. All patient questions and concerns were addressed to the best of my ability. Patient  was encouraged to contact the specialty pharmacy with any questions or concerns.    Confirmed follow-up outreaches are properly scheduled and reviewed goals of therapy with the patient.        Devendra EstevezD

## 2024-10-14 ENCOUNTER — APPOINTMENT (OUTPATIENT)
Dept: UROLOGY | Facility: CLINIC | Age: 89
End: 2024-10-14
Payer: MEDICARE

## 2024-10-14 DIAGNOSIS — N28.89 RIGHT RENAL MASS: Primary | ICD-10-CM

## 2024-10-14 PROCEDURE — 1123F ACP DISCUSS/DSCN MKR DOCD: CPT | Performed by: STUDENT IN AN ORGANIZED HEALTH CARE EDUCATION/TRAINING PROGRAM

## 2024-10-14 PROCEDURE — G2211 COMPLEX E/M VISIT ADD ON: HCPCS | Performed by: STUDENT IN AN ORGANIZED HEALTH CARE EDUCATION/TRAINING PROGRAM

## 2024-10-14 PROCEDURE — 99214 OFFICE O/P EST MOD 30 MIN: CPT | Performed by: STUDENT IN AN ORGANIZED HEALTH CARE EDUCATION/TRAINING PROGRAM

## 2024-10-14 NOTE — PROGRESS NOTES
Subjective   Patient ID: Chico Iqbal is a 89 y.o. male who presents for 2 month FUV.    HPI  Chico Iqbal is a 89 y.o. male patient who presents for recurrent UTIs. Had UTI 5/6/24 and then again 6/21/24. Was on antibiotics Keflex and he felt better after taking this.     He was diagnosed with leukemia     He continues taking tamsulosin and finasteride. He denies any urinary difficulties.     CT Abdomen Pelvis wo Contrast 08/25/24  No acute abnormality in the abdomen or pelvis.      Small hiatal hernia.      Atherosclerosis. Descending thoracic aortic aneurysm. Coronary artery  calcifications.      Stable hyperdense lesion in the right kidney. Prostatomegaly. Refer  to recently performed CT urogram for details regarding the urinary  tract.    Review of Systems  A complete review of systems was performed. All systems are noted to be negative unless indicated in the history of present illness, impression, active problem list, or past histories.      Objective   Physical Exam    Assessment/Plan   Diagnoses and all orders for this visit:  Right renal mass    Chico Iqbal is a 89 y.o. male patient who presents for recurrent UTIs. Had UTI 5/6/24 and then again 6/21/24. Was on antibiotics Keflex and he felt better after taking this.     We reviewed his CT abdomen pelvis that shows a small right renal mass.     I explained to the patient and his daughter that considering his age and his other recently diagnosed malignancy, leukemia, suction mass in the kidney is considered very small, and is extremely unlikely to cause any oncological bad outcome during his lifetime, and as such I do not recommend any further intervention such as MRI, biopsy, or treatment.  Since he is likely to get more CT scans in the future as part of his follow-up for his other malignancy, we will examine the appearance of the renal mass at that point in time.    Plan:   Active surveillance of right renal mass, reassure  Follow-up in 1 year    Arlen  Attestation  By signing my name below, I, Manuel Deleon, Rikibsusie   attest that this documentation has been prepared under the direction and in the presence of Latrell Yancey MD.

## 2024-10-22 ENCOUNTER — SPECIALTY PHARMACY (OUTPATIENT)
Dept: PHARMACY | Facility: CLINIC | Age: 89
End: 2024-10-22

## 2024-10-22 ENCOUNTER — APPOINTMENT (OUTPATIENT)
Dept: CARDIOLOGY | Facility: HOSPITAL | Age: 89
End: 2024-10-22
Payer: MEDICARE

## 2024-10-22 ENCOUNTER — LAB (OUTPATIENT)
Dept: LAB | Facility: LAB | Age: 89
End: 2024-10-22
Payer: MEDICARE

## 2024-10-22 ENCOUNTER — HOSPITAL ENCOUNTER (EMERGENCY)
Facility: HOSPITAL | Age: 89
Discharge: HOME | End: 2024-10-23
Attending: STUDENT IN AN ORGANIZED HEALTH CARE EDUCATION/TRAINING PROGRAM
Payer: MEDICARE

## 2024-10-22 DIAGNOSIS — D64.9 ANEMIA REQUIRING TRANSFUSIONS: ICD-10-CM

## 2024-10-22 DIAGNOSIS — C92.00 ACUTE MYELOID LEUKEMIA NOT HAVING ACHIEVED REMISSION (MULTI): ICD-10-CM

## 2024-10-22 DIAGNOSIS — D64.9 LOW HEMOGLOBIN: Primary | ICD-10-CM

## 2024-10-22 DIAGNOSIS — D64.9 LOW HEMOGLOBIN: ICD-10-CM

## 2024-10-22 DIAGNOSIS — Z51.89 ENCOUNTER FOR BLOOD TRANSFUSION: ICD-10-CM

## 2024-10-22 LAB
ABO GROUP (TYPE) IN BLOOD: NORMAL
ALBUMIN SERPL BCP-MCNC: 3.5 G/DL (ref 3.4–5)
ALBUMIN SERPL BCP-MCNC: 3.6 G/DL (ref 3.4–5)
ALP SERPL-CCNC: 105 U/L (ref 33–136)
ALP SERPL-CCNC: 95 U/L (ref 33–136)
ALT SERPL W P-5'-P-CCNC: 17 U/L (ref 10–52)
ALT SERPL W P-5'-P-CCNC: 18 U/L (ref 10–52)
ANION GAP SERPL CALC-SCNC: 11 MMOL/L (ref 10–20)
ANION GAP SERPL CALC-SCNC: 12 MMOL/L (ref 10–20)
ANTIBODY SCREEN: NORMAL
AST SERPL W P-5'-P-CCNC: 21 U/L (ref 9–39)
AST SERPL W P-5'-P-CCNC: 21 U/L (ref 9–39)
BASOPHILS # BLD AUTO: 0 X10*3/UL (ref 0–0.1)
BASOPHILS NFR BLD AUTO: 0 %
BILIRUB SERPL-MCNC: 0.4 MG/DL (ref 0–1.2)
BILIRUB SERPL-MCNC: 0.4 MG/DL (ref 0–1.2)
BLOOD EXPIRATION DATE: NORMAL
BLOOD EXPIRATION DATE: NORMAL
BUN SERPL-MCNC: 10 MG/DL (ref 6–23)
BUN SERPL-MCNC: 11 MG/DL (ref 6–23)
BURR CELLS BLD QL SMEAR: NORMAL
CALCIUM SERPL-MCNC: 8.2 MG/DL (ref 8.6–10.3)
CALCIUM SERPL-MCNC: 8.3 MG/DL (ref 8.6–10.3)
CHLORIDE SERPL-SCNC: 102 MMOL/L (ref 98–107)
CHLORIDE SERPL-SCNC: 104 MMOL/L (ref 98–107)
CO2 SERPL-SCNC: 28 MMOL/L (ref 21–32)
CO2 SERPL-SCNC: 28 MMOL/L (ref 21–32)
CREAT SERPL-MCNC: 0.81 MG/DL (ref 0.5–1.3)
CREAT SERPL-MCNC: 0.85 MG/DL (ref 0.5–1.3)
DISPENSE STATUS: NORMAL
DISPENSE STATUS: NORMAL
EGFRCR SERPLBLD CKD-EPI 2021: 84 ML/MIN/1.73M*2
EGFRCR SERPLBLD CKD-EPI 2021: 85 ML/MIN/1.73M*2
EOSINOPHIL # BLD AUTO: 0 X10*3/UL (ref 0–0.4)
EOSINOPHIL NFR BLD AUTO: 0 %
ERYTHROCYTE [DISTWIDTH] IN BLOOD BY AUTOMATED COUNT: 20.7 % (ref 11.5–14.5)
ERYTHROCYTE [DISTWIDTH] IN BLOOD BY AUTOMATED COUNT: 23.8 % (ref 11.5–14.5)
ERYTHROCYTE [DISTWIDTH] IN BLOOD BY AUTOMATED COUNT: ABNORMAL %
GLUCOSE SERPL-MCNC: 105 MG/DL (ref 74–99)
GLUCOSE SERPL-MCNC: 95 MG/DL (ref 74–99)
HCT VFR BLD AUTO: 16.1 % (ref 41–52)
HCT VFR BLD AUTO: 18.9 % (ref 41–52)
HCT VFR BLD AUTO: 19.8 % (ref 41–52)
HGB BLD-MCNC: 5.2 G/DL (ref 13.5–17.5)
HGB BLD-MCNC: 5.7 G/DL (ref 13.5–17.5)
HGB BLD-MCNC: 6.5 G/DL (ref 13.5–17.5)
IMM GRANULOCYTES # BLD AUTO: 0.02 X10*3/UL (ref 0–0.5)
IMM GRANULOCYTES NFR BLD AUTO: 0.8 % (ref 0–0.9)
LDH SERPL L TO P-CCNC: 241 U/L (ref 84–246)
LYMPHOCYTES # BLD AUTO: 1.41 X10*3/UL (ref 0.8–3)
LYMPHOCYTES NFR BLD AUTO: 59.7 %
MCH RBC QN AUTO: 31.7 PG (ref 26–34)
MCH RBC QN AUTO: 33.3 PG (ref 26–34)
MCH RBC QN AUTO: 33.5 PG (ref 26–34)
MCHC RBC AUTO-ENTMCNC: 30.2 G/DL (ref 32–36)
MCHC RBC AUTO-ENTMCNC: 32.3 G/DL (ref 32–36)
MCHC RBC AUTO-ENTMCNC: 32.8 G/DL (ref 32–36)
MCV RBC AUTO: 103 FL (ref 80–100)
MCV RBC AUTO: 111 FL (ref 80–100)
MCV RBC AUTO: 97 FL (ref 80–100)
MONOCYTES # BLD AUTO: 0.71 X10*3/UL (ref 0.05–0.8)
MONOCYTES NFR BLD AUTO: 30.1 %
NEUTROPHILS # BLD AUTO: 0.22 X10*3/UL (ref 1.6–5.5)
NEUTROPHILS NFR BLD AUTO: 9.4 %
NRBC BLD-RTO: 0 /100 WBCS (ref 0–0)
PLATELET # BLD AUTO: 57 X10*3/UL (ref 150–450)
PLATELET # BLD AUTO: 69 X10*3/UL (ref 150–450)
PLATELET # BLD AUTO: 72 X10*3/UL (ref 150–450)
POTASSIUM SERPL-SCNC: 4.2 MMOL/L (ref 3.5–5.3)
POTASSIUM SERPL-SCNC: 4.7 MMOL/L (ref 3.5–5.3)
PRODUCT BLOOD TYPE: 6200
PRODUCT BLOOD TYPE: 6200
PRODUCT CODE: NORMAL
PRODUCT CODE: NORMAL
PROT SERPL-MCNC: 6.1 G/DL (ref 6.4–8.2)
PROT SERPL-MCNC: 6.3 G/DL (ref 6.4–8.2)
RBC # BLD AUTO: 1.56 X10*6/UL (ref 4.5–5.9)
RBC # BLD AUTO: 1.7 X10*6/UL (ref 4.5–5.9)
RBC # BLD AUTO: 2.05 X10*6/UL (ref 4.5–5.9)
RBC MORPH BLD: NORMAL
RH FACTOR (ANTIGEN D): NORMAL
SODIUM SERPL-SCNC: 137 MMOL/L (ref 136–145)
SODIUM SERPL-SCNC: 139 MMOL/L (ref 136–145)
UNIT ABO: NORMAL
UNIT ABO: NORMAL
UNIT NUMBER: NORMAL
UNIT NUMBER: NORMAL
UNIT RH: NORMAL
UNIT RH: NORMAL
UNIT VOLUME: 350
UNIT VOLUME: 350
WBC # BLD AUTO: 2.4 X10*3/UL (ref 4.4–11.3)
WBC # BLD AUTO: 2.4 X10*3/UL (ref 4.4–11.3)
WBC # BLD AUTO: 2.5 X10*3/UL (ref 4.4–11.3)
XM INTEP: NORMAL
XM INTEP: NORMAL

## 2024-10-22 PROCEDURE — 85027 COMPLETE CBC AUTOMATED: CPT | Performed by: STUDENT IN AN ORGANIZED HEALTH CARE EDUCATION/TRAINING PROGRAM

## 2024-10-22 PROCEDURE — 93005 ELECTROCARDIOGRAM TRACING: CPT

## 2024-10-22 PROCEDURE — P9040 RBC LEUKOREDUCED IRRADIATED: HCPCS

## 2024-10-22 PROCEDURE — 85027 COMPLETE CBC AUTOMATED: CPT | Performed by: EMERGENCY MEDICINE

## 2024-10-22 PROCEDURE — 36415 COLL VENOUS BLD VENIPUNCTURE: CPT | Performed by: EMERGENCY MEDICINE

## 2024-10-22 PROCEDURE — 36430 TRANSFUSION BLD/BLD COMPNT: CPT

## 2024-10-22 PROCEDURE — 86920 COMPATIBILITY TEST SPIN: CPT

## 2024-10-22 PROCEDURE — 99285 EMERGENCY DEPT VISIT HI MDM: CPT | Mod: 25

## 2024-10-22 PROCEDURE — 86901 BLOOD TYPING SEROLOGIC RH(D): CPT | Performed by: EMERGENCY MEDICINE

## 2024-10-22 PROCEDURE — 80053 COMPREHEN METABOLIC PANEL: CPT | Performed by: EMERGENCY MEDICINE

## 2024-10-22 PROCEDURE — P9016 RBC LEUKOCYTES REDUCED: HCPCS | Mod: 59

## 2024-10-22 ASSESSMENT — PAIN SCALES - GENERAL
PAINLEVEL_OUTOF10: 0 - NO PAIN
PAINLEVEL_OUTOF10: 0 - NO PAIN

## 2024-10-22 ASSESSMENT — PAIN - FUNCTIONAL ASSESSMENT: PAIN_FUNCTIONAL_ASSESSMENT: 0-10

## 2024-10-22 NOTE — ED TRIAGE NOTES
Pt sent to ED per cancer doc for low HGB of 5.7. Had blood drawn this AM and was sent in. Denies pain. Endorses lightheadedness. VSS, skin pale, warm, and dry. Resps even and regular. A&Ox3.

## 2024-10-22 NOTE — ED PROVIDER NOTES
Limitations to history: None  Independent Historians: Family  External Records Reviewed: HIE, OARRS, outpatient notes, inpatient notes, paper charts if needed    History of Present Illness:  Patient is a 89-year-old male with a past medical history of GERD, coronary artery disease, BPH, hypertension, leukemia presents to ED chief complaint of abnormal labs.  Patient reports he was sent in by his primary care provider for low blood counts.  Reports he has had a history of needing a previous blood transfusion.  Patient denies any complaints of melena, hematuria, vomiting, diarrhea, chest pain and/or shortness of breath.  Patient is present to ED with with family.  Patient is alert and oriented x 3, nontoxic-appearing.      Denies HA, C/P, SOB, ABD pain, Nausea, Vomiting, Diarrhea, Weakness, Dizziness, Fever, Chills.    PMFSH:   As per HPI, otherwise nurses notes reviewed in EMR    Physical Exam:  Appearance: Alert, oriented x3, supine on exam table with head elevated, cooperative, in no acute distress. Well nourished & well hydrated.      Skin: Intact, dry skin, no lesions, rash, petechiae or purpura.     Eyes: PERRLA, EOMs intact, Conjunctiva pink with no redness or exudates. No scleral icterus.     Ears: Hearing grossly intact.      Nose: Nares patent, no epistaxis.     Mouth: Dentition without concerning abnormalities. no obstruction of posterior pharynx.     Neck: Supple, without meningismus. Trachea at midline.     Pulmonary: Clear bilaterally with good chest wall excursion. No rales, rhonchi or wheezing. No accessory muscle use or stridor. Talking in full sentences.     Cardiac: Normal S1, S2 without murmur, rub, gallop or extrasystole.     Abdomen: Soft, nontender to light and deep palpation to all quadrants, normoactive bowel sounds.  No palpable organomegaly.  No rebound or guarding.     Genitourinary: Physical exam deferred.     Musculoskeletal: Normal gait. Full range of motion to all extremities. Rest of  the exam reveals no pain on palpation, instability, or deformity. Pulses full and equal. No cyanosis or clubbing. capillary refill <2 seconds to all examined digits.     Neurological:  Cranial nerves II through XII are grossly intact, normal sensation, no weakness, no focal findings identified.      Psychiatric: Appropriate mood and affect.    Labs Reviewed   CBC - Abnormal       Result Value    WBC 2.4 (*)     nRBC 0.0      RBC 1.56 (*)     Hemoglobin 5.2 (*)     Hematocrit 16.1 (*)      (*)     MCH 33.3      MCHC 32.3      RDW 23.8 (*)     Platelets 72 (*)    COMPREHENSIVE METABOLIC PANEL - Abnormal    Glucose 95      Sodium 137      Potassium 4.2      Chloride 102      Bicarbonate 28      Anion Gap 11      Urea Nitrogen 10      Creatinine 0.85      eGFR 84      Calcium 8.3 (*)     Albumin 3.5      Alkaline Phosphatase 105      Total Protein 6.3 (*)     AST 21      Bilirubin, Total 0.4      ALT 17     TYPE AND SCREEN    ABO TYPE A      Rh TYPE POS      ANTIBODY SCREEN NEG     PREPARE RBC    PRODUCT CODE [id number]      Unit Number [id number]      Unit ABO A      Unit RH POS      XM INTEP COMP      Dispense Status IS      Blood Expiration Date 11/6/2024 11:59:00 PM EST      PRODUCT BLOOD TYPE 6200      UNIT VOLUME 350     PREPARE RBC      No orders to display                Repeat Evaluation below    Summary:  Medical Decision Making:   Patient presented as described in HPI. Patient case including ROS, PE, and treatment and plan discussed with ED attending if attached as cosigner. Due to patients presentation orders completed include as documented.  Patient evaluated for complaints of abnormal labs.  Patient reports he has a history of leukemia, sent in for low hemoglobin level per his oncologist.  Patient was found to have a hemoglobin level of 5.2.  Spoke with patient's oncologist Dr. Thurston who advised to 2 units of blood and advised that he would see the patient tomorrow and the patient can be  discharged.  Patient denied any other complaints of chest pain, shortness of breath.  Patient was found to be afebrile, nontachycardic, not hypoxic.  Patient denied any complaints of melena, hematuria, vomiting, diarrhea.  Patient is aware to follow-up with oncologist tomorrow.  Case findings also discussed with assuming ED provider Dr. Lozano.  Patient was advised to follow up with PCP or recommended provider in 2-3 days for another evaluation and exam. I advised patient/guardian to return or go to closest emergency room immediately if symptoms change, get worse, new symptoms develop prior to follow up. If there is no improvement in symptoms in the next 24 hours they are advised to return for further evaluation and exam. I also explained the plan and treatment course. Patient/guardian is in agreement with plan, treatment course, and follow up and states verbally that they will comply.    Tests/Medications/Escalations of Care considered but not given:    Patient care discussed with: N/A  Social Determinants affecting care: N/A    Final diagnosis and disposition as documented in impression    Homegoing. I discussed the differential; results and discharge plan with the patient and/or family/friend/caregiver if present.  I emphasized the importance of follow-up with the physician I referred them to in the timeframe recommended.  I explained reasons for the patient to return to the Emergency Department. They agreed that if they feel their condition is worsening or if they have any other concern they should call 911 immediately for further assistance. I gave the patient an opportunity to ask all questions they had and answered all of them accordingly. They understand return precautions and discharge instructions. The patient and/or family/friend/caregiver expressed understanding verbally and that they would comply.       Disposition:  Discharge         This note has been transcribed using voice recognition and may  contain grammatical errors, misplaced words, incorrect words, incorrect phrases or other errors.     Ashleigh Briggs, SEBASTIAN-STACY  10/22/24 2124       SEBASTIAN Kimbrough-CNP  10/22/24 2148

## 2024-10-22 NOTE — PROGRESS NOTES
[facility] Clinical Note    Chico Iqbal is a 89 y.o. male, who is on the specialty pharmacy service for management of:  Oncology Core.    Chico Iqbal is taking: Xospata 120 mg once daily      Chico was contacted on 10/22/2024 at 11:27 AM for a virtual pharmacy visit with encounter number [id number] from:   [facility]  [address] [place] [id number]  Dept: [phone]  Dept Fax: [phone]    Chico was offered a Telemedicine Video visit or Telephone visit.  Chico consented to a telephone visit, which was performed.    The most recent encounter visit with the referring prescriber Dr. Coronado on 10/2/24 was reviewed.  Pharmacy will continue to collaborate in the care of this patient with the referring prescriber Dr. Coronado.    General Assessment      Impression/Plan  IMPRESSION/PLAN:  Is patient high risk (potential patients:  pregnancy, geriatric, pediatric)? Yes   Is laboratory follow-up needed? Per provider  Is a clinical intervention needed? No  Next reassessment date? 3 months  Additional comments:     Refer to the encounter summary report for documentation details about patient counseling and education.      Medication Adherence    The importance of adherence was discussed with the patient and they were advised to take the medication as prescribed by their provider. Patient was encouraged to call their physician's office if they have a question regarding a missed dose.        Patient was advised to contact the pharmacy if there are any changes to their medication list, including prescriptions, OTC medications, herbal products, or supplements. Patient was advised of [facility]'s dispensing process, refill timeline, contact information ([phone]), and patient management follow up. Patient confirmed understanding of education conducted during assessment. All patient questions and concerns were addressed to the best  of my ability. Patient was encouraged to contact the specialty pharmacy with any questions or concerns.    Confirmed follow-up outreaches are properly scheduled and reviewed goals of therapy with the patient.        Damaris A Weiland, DevendraD

## 2024-10-23 ENCOUNTER — TELEPHONE (OUTPATIENT)
Dept: PRIMARY CARE | Facility: CLINIC | Age: 89
End: 2024-10-23
Payer: MEDICARE

## 2024-10-23 VITALS
HEIGHT: 71 IN | RESPIRATION RATE: 16 BRPM | DIASTOLIC BLOOD PRESSURE: 86 MMHG | SYSTOLIC BLOOD PRESSURE: 132 MMHG | BODY MASS INDEX: 24.92 KG/M2 | HEART RATE: 73 BPM | TEMPERATURE: 98 F | WEIGHT: 178 LBS | OXYGEN SATURATION: 95 %

## 2024-10-23 LAB
ATRIAL RATE: 75 BPM
Q ONSET: 226 MS
QRS COUNT: 11 BEATS
QRS DURATION: 136 MS
QT INTERVAL: 442 MS
QTC CALCULATION(BAZETT): 486 MS
QTC FREDERICIA: 472 MS
R AXIS: -53 DEGREES
T AXIS: 21 DEGREES
T OFFSET: 447 MS
VENTRICULAR RATE: 73 BPM

## 2024-10-23 NOTE — TELEPHONE ENCOUNTER
Pt was discharged from er around 1am, hgb 6.2 upon leaving   Oncology appt tomorrow 10/24  Do you want to see him for follow up ? Or repeat labs ?

## 2024-10-23 NOTE — ED NOTES
Patient being discharged. Pt and daughter aware that his hemoglobin only increased slightly and is still lower than it should be. Pt is denying any dizziness or SOB and would like to go home and follow up with PCP. Vitals stable. IV removed. Pt brought to car via wheelchair.      Marielos Cano RN  10/23/24 0034

## 2024-10-24 ENCOUNTER — LAB (OUTPATIENT)
Dept: LAB | Facility: LAB | Age: 89
End: 2024-10-24
Payer: MEDICARE

## 2024-10-24 ENCOUNTER — OFFICE VISIT (OUTPATIENT)
Dept: HEMATOLOGY/ONCOLOGY | Facility: HOSPITAL | Age: 89
End: 2024-10-24
Payer: MEDICARE

## 2024-10-24 VITALS
SYSTOLIC BLOOD PRESSURE: 114 MMHG | TEMPERATURE: 97.2 F | BODY MASS INDEX: 24.68 KG/M2 | RESPIRATION RATE: 16 BRPM | OXYGEN SATURATION: 99 % | HEART RATE: 86 BPM | WEIGHT: 176.26 LBS | DIASTOLIC BLOOD PRESSURE: 63 MMHG | HEIGHT: 71 IN

## 2024-10-24 DIAGNOSIS — C92.00 ACUTE MYELOID LEUKEMIA NOT HAVING ACHIEVED REMISSION (MULTI): Primary | ICD-10-CM

## 2024-10-24 DIAGNOSIS — C92.00 ACUTE MYELOID LEUKEMIA NOT HAVING ACHIEVED REMISSION (MULTI): ICD-10-CM

## 2024-10-24 LAB
BASOPHILS # BLD AUTO: 0 X10*3/UL (ref 0–0.1)
BASOPHILS NFR BLD AUTO: 0 %
EOSINOPHIL # BLD AUTO: 0 X10*3/UL (ref 0–0.4)
EOSINOPHIL NFR BLD AUTO: 0 %
ERYTHROCYTE [DISTWIDTH] IN BLOOD BY AUTOMATED COUNT: 21.8 % (ref 11.5–14.5)
HCT VFR BLD AUTO: 24.4 % (ref 41–52)
HGB BLD-MCNC: 8.1 G/DL (ref 13.5–17.5)
IMM GRANULOCYTES # BLD AUTO: 0.03 X10*3/UL (ref 0–0.5)
IMM GRANULOCYTES NFR BLD AUTO: 1.4 % (ref 0–0.9)
LYMPHOCYTES # BLD AUTO: 1.09 X10*3/UL (ref 0.8–3)
LYMPHOCYTES NFR BLD AUTO: 51.2 %
MCH RBC QN AUTO: 32.8 PG (ref 26–34)
MCHC RBC AUTO-ENTMCNC: 33.2 G/DL (ref 32–36)
MCV RBC AUTO: 99 FL (ref 80–100)
MONOCYTES # BLD AUTO: 0.83 X10*3/UL (ref 0.05–0.8)
MONOCYTES NFR BLD AUTO: 39 %
NEUTROPHILS # BLD AUTO: 0.18 X10*3/UL (ref 1.6–5.5)
NEUTROPHILS NFR BLD AUTO: 8.4 %
NRBC BLD-RTO: 0 /100 WBCS (ref 0–0)
OVALOCYTES BLD QL SMEAR: NORMAL
PLATELET # BLD AUTO: 64 X10*3/UL (ref 150–450)
RBC # BLD AUTO: 2.47 X10*6/UL (ref 4.5–5.9)
RBC MORPH BLD: NORMAL
WBC # BLD AUTO: 2.1 X10*3/UL (ref 4.4–11.3)

## 2024-10-24 PROCEDURE — 3078F DIAST BP <80 MM HG: CPT | Performed by: INTERNAL MEDICINE

## 2024-10-24 PROCEDURE — G2211 COMPLEX E/M VISIT ADD ON: HCPCS | Performed by: INTERNAL MEDICINE

## 2024-10-24 PROCEDURE — 99215 OFFICE O/P EST HI 40 MIN: CPT | Performed by: INTERNAL MEDICINE

## 2024-10-24 PROCEDURE — 1159F MED LIST DOCD IN RCRD: CPT | Performed by: INTERNAL MEDICINE

## 2024-10-24 PROCEDURE — 1126F AMNT PAIN NOTED NONE PRSNT: CPT | Performed by: INTERNAL MEDICINE

## 2024-10-24 PROCEDURE — 36415 COLL VENOUS BLD VENIPUNCTURE: CPT

## 2024-10-24 PROCEDURE — 3074F SYST BP LT 130 MM HG: CPT | Performed by: INTERNAL MEDICINE

## 2024-10-24 PROCEDURE — 85025 COMPLETE CBC W/AUTO DIFF WBC: CPT

## 2024-10-24 PROCEDURE — 1123F ACP DISCUSS/DSCN MKR DOCD: CPT | Performed by: INTERNAL MEDICINE

## 2024-10-24 RX ORDER — FAMOTIDINE 10 MG/ML
20 INJECTION INTRAVENOUS ONCE AS NEEDED
OUTPATIENT
Start: 2024-10-31

## 2024-10-24 RX ORDER — EPINEPHRINE 0.3 MG/.3ML
0.3 INJECTION SUBCUTANEOUS EVERY 5 MIN PRN
OUTPATIENT
Start: 2024-10-31

## 2024-10-24 RX ORDER — DIPHENHYDRAMINE HYDROCHLORIDE 50 MG/ML
50 INJECTION INTRAMUSCULAR; INTRAVENOUS AS NEEDED
OUTPATIENT
Start: 2024-10-31

## 2024-10-24 RX ORDER — ALBUTEROL SULFATE 0.83 MG/ML
3 SOLUTION RESPIRATORY (INHALATION) AS NEEDED
OUTPATIENT
Start: 2024-10-31

## 2024-10-24 ASSESSMENT — ENCOUNTER SYMPTOMS
CARDIOVASCULAR NEGATIVE: 1
RESPIRATORY NEGATIVE: 1
FEVER: 0
GASTROINTESTINAL NEGATIVE: 1
FATIGUE: 1

## 2024-10-24 ASSESSMENT — PAIN SCALES - GENERAL: PAINLEVEL_OUTOF10: 0-NO PAIN

## 2024-10-24 NOTE — PROGRESS NOTES
"Patient ID: Chico Iqbal is a 89 y.o. male.    Subjective:  Returns for follow up for AML. He feels OK. Denies fever. Appetite good. Mild fatigue.    Heme/Onc History:  - p/w rectal pain in Aug 2024 => Was anemic with Hb of 6, elevated MCV, and Plt of 89 and low folate. Initially thought to be due to folate deficiency. Peripheral smear showed abnormal cells and flow cytometry revealed AML  - He declined having bone marrow biopsy. NGS from peripheral blood (Aug 2024): jak2 V617F (6%0, FLT3-ITD (3%), FLT3 mutation +  - Patient declined any chemotherapy but accepted to try FLT3 inhibitors => Quizartinib was denied by insurance  - Started giltertinib in Oct 2024.     Assessment/Plan:  ? 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 All these have poor prognosis but he looks exceptionally well. He still declines bone marrow biopsy or any drastic treatment. Definitely does not want any chemo.    Declan tarted him on giltertinib and so far he is tolerating it OK. He has anemia and required 2 units of PRBC two days ago. We will repeat CBC today and then every other week starting next week. Other countts are stable. He denies B symptoms.    I provide longitudinal care for Mr. Iqbal for his acute leukemia    Review Of Systems:  Review of Systems   Constitutional:  Positive for fatigue. Negative for fever.   HENT:  Negative.     Respiratory: Negative.     Cardiovascular: Negative.    Gastrointestinal: Negative.    Genitourinary: Negative.         Physical Exam:  /63 (BP Location: Left arm, Patient Position: Sitting, BP Cuff Size: Adult)   Pulse 86   Temp 36.2 °C (97.2 °F) (Temporal)   Resp 16   Ht 1.803 m (5' 11\")   Wt 79.9 kg (176 lb 4.1 oz)   SpO2 99%   BMI 24.58 kg/m²   BSA: 2 meters squared  Performance Status: Symptomatic; fully ambulatory  Physical Exam  HENT:      Head: Normocephalic and atraumatic.   Eyes:      General: No scleral icterus.     " Pupils: Pupils are equal, round, and reactive to light.   Cardiovascular:      Rate and Rhythm: Normal rate and regular rhythm.   Pulmonary:      Effort: Pulmonary effort is normal.   Abdominal:      General: Abdomen is flat.      Palpations: Abdomen is soft. There is no mass.   Musculoskeletal:         General: Normal range of motion.   Lymphadenopathy:      Cervical: No cervical adenopathy.   Skin:     Coloration: Skin is not jaundiced.   Neurological:      General: No focal deficit present.      Mental Status: He is alert and oriented to person, place, and time.         Results:  Diagnostic Results   Lab Results   Component Value Date    WBC 2.5 (L) 10/22/2024    HGB 6.5 (LL) 10/22/2024    HCT 19.8 (L) 10/22/2024    MCV 97 10/22/2024    PLT 57 (L) 10/22/2024     Lab Results   Component Value Date    CALCIUM 8.3 (L) 10/22/2024     10/22/2024    K 4.2 10/22/2024    CO2 28 10/22/2024     10/22/2024    BUN 10 10/22/2024    CREATININE 0.85 10/22/2024    ALT 17 10/22/2024    AST 21 10/22/2024       Current Outpatient Medications:     atorvastatin (Lipitor) 40 mg tablet, TAKE ONE TABLET BY MOUTH ONCE DAILY, Disp: 90 tablet, Rfl: 0    clopidogrel (Plavix) 75 mg tablet, Take one tablet by mouth daily, Disp: 90 tablet, Rfl: 0    docusate sodium (Colace) 100 mg capsule, Take 1 capsule (100 mg) by mouth 2 times a day., Disp: 60 capsule, Rfl: 0    finasteride (Proscar) 5 mg tablet, Take 1 tablet (5 mg) by mouth once daily. Do not crush, chew, or split., Disp: 30 tablet, Rfl: 11    folic acid (Folvite) 1 mg tablet, Take 1 tablet (1 mg) by mouth once daily., Disp: 90 tablet, Rfl: 3    furosemide (Lasix) 20 mg tablet, Take 1 tablet (20 mg) by mouth once daily. None on Tues and Thur, Disp: 90 tablet, Rfl: 0    gilteritinib (Xospata) 40 mg tablet, Take 3 tablets (120 mg total) by mouth once daily.  Swallow whole., Disp: 90 tablet, Rfl: 1    metoprolol succinate XL (Toprol-XL) 25 mg 24 hr tablet, Take 1 tablet (25 mg)  by mouth once daily. Do not crush or chew., Disp: 30 tablet, Rfl: 0    mirtazapine (Remeron) 15 mg tablet, Take 0.5 tablets (7.5 mg) by mouth once daily at bedtime., Disp: , Rfl:     pantoprazole (ProtoNix) 40 mg EC tablet, Take 1 tablet (40 mg) by mouth once daily in the morning. Take before meals. Do not crush, chew, or split., Disp: 90 tablet, Rfl: 0    tamsulosin (Flomax) 0.4 mg 24 hr capsule, Take 2 capsules (0.8 mg) by mouth once daily., Disp: 60 capsule, Rfl: 11     Past Surgical History:   Procedure Laterality Date    CARDIAC CATHETERIZATION      CORONARY STENT PLACEMENT       Family History   Problem Relation Name Age of Onset    Diabetes Mother      Heart attack Brother        reports that he has been smoking cigars. He has been exposed to tobacco smoke. He has never used smokeless tobacco.    Diagnoses and all orders for this visit:  Acute myeloid leukemia not having achieved remission (Multi)  -     Clinic Appointment Request  -     CBC and Auto Differential; Future  -     Clinic Appointment Request Follow up; Future  -     CBC; Future  -     Comprehensive Metabolic Panel; Future  -     Iron and TIBC; Future  -     Ferritin; Future  -     Folate; Future  -     Vitamin B12; Future  Other orders  -     sodium chloride 0.9 % bolus 500 mL  -     dextrose 5 % in water (D5W) bolus  -     diphenhydrAMINE (BENADryl) injection 50 mg  -     methylPREDNISolone sod succinate (SOLU-Medrol) 40 mg/mL injection 40 mg  -     famotidine PF (Pepcid) injection 20 mg  -     EPINEPHrine (Epipen) injection syringe 0.3 mg  -     albuterol 2.5 mg /3 mL (0.083 %) nebulizer solution 3 mL       I spent more than 40 minutes for the patient today, including face-to-face conversation, pre-visit preparation, post-visit orders, and others.   Nithin Coronado MD

## 2024-10-30 ENCOUNTER — SPECIALTY PHARMACY (OUTPATIENT)
Dept: PHARMACY | Facility: CLINIC | Age: 89
End: 2024-10-30

## 2024-10-30 PROCEDURE — RXMED WILLOW AMBULATORY MEDICATION CHARGE

## 2024-10-31 ENCOUNTER — LAB (OUTPATIENT)
Dept: HEMATOLOGY/ONCOLOGY | Facility: HOSPITAL | Age: 89
End: 2024-10-31
Payer: MEDICARE

## 2024-10-31 VITALS
HEART RATE: 65 BPM | TEMPERATURE: 97.5 F | OXYGEN SATURATION: 98 % | RESPIRATION RATE: 16 BRPM | DIASTOLIC BLOOD PRESSURE: 69 MMHG | SYSTOLIC BLOOD PRESSURE: 115 MMHG

## 2024-10-31 DIAGNOSIS — C92.00 ACUTE MYELOID LEUKEMIA NOT HAVING ACHIEVED REMISSION (MULTI): ICD-10-CM

## 2024-10-31 LAB
ABO GROUP (TYPE) IN BLOOD: NORMAL
ANTIBODY SCREEN: NORMAL
BASOPHILS # BLD AUTO: 0 X10*3/UL (ref 0–0.1)
BASOPHILS NFR BLD AUTO: 0 %
BLOOD EXPIRATION DATE: NORMAL
DISPENSE STATUS: NORMAL
EOSINOPHIL # BLD AUTO: 0 X10*3/UL (ref 0–0.4)
EOSINOPHIL NFR BLD AUTO: 0 %
ERYTHROCYTE [DISTWIDTH] IN BLOOD BY AUTOMATED COUNT: 20.7 % (ref 11.5–14.5)
HCT VFR BLD AUTO: 21.6 % (ref 41–52)
HGB BLD-MCNC: 7.1 G/DL (ref 13.5–17.5)
IMM GRANULOCYTES # BLD AUTO: 0.08 X10*3/UL (ref 0–0.5)
IMM GRANULOCYTES NFR BLD AUTO: 4.3 % (ref 0–0.9)
LYMPHOCYTES # BLD AUTO: 0.97 X10*3/UL (ref 0.8–3)
LYMPHOCYTES NFR BLD AUTO: 51.9 %
MCH RBC QN AUTO: 32.1 PG (ref 26–34)
MCHC RBC AUTO-ENTMCNC: 32.9 G/DL (ref 32–36)
MCV RBC AUTO: 98 FL (ref 80–100)
MONOCYTES # BLD AUTO: 0.65 X10*3/UL (ref 0.05–0.8)
MONOCYTES NFR BLD AUTO: 34.8 %
NEUTROPHILS # BLD AUTO: 0.17 X10*3/UL (ref 1.6–5.5)
NEUTROPHILS NFR BLD AUTO: 9 %
NRBC BLD-RTO: 0 /100 WBCS (ref 0–0)
PLATELET # BLD AUTO: 37 X10*3/UL (ref 150–450)
PRODUCT BLOOD TYPE: 6200
PRODUCT CODE: NORMAL
RBC # BLD AUTO: 2.21 X10*6/UL (ref 4.5–5.9)
RH FACTOR (ANTIGEN D): NORMAL
UNIT ABO: NORMAL
UNIT NUMBER: NORMAL
UNIT RH: NORMAL
UNIT VOLUME: 350
WBC # BLD AUTO: 1.9 X10*3/UL (ref 4.4–11.3)
XM INTEP: NORMAL

## 2024-10-31 PROCEDURE — 36415 COLL VENOUS BLD VENIPUNCTURE: CPT

## 2024-10-31 PROCEDURE — 36430 TRANSFUSION BLD/BLD COMPNT: CPT

## 2024-10-31 PROCEDURE — P9040 RBC LEUKOREDUCED IRRADIATED: HCPCS

## 2024-10-31 PROCEDURE — 86850 RBC ANTIBODY SCREEN: CPT

## 2024-10-31 PROCEDURE — 85025 COMPLETE CBC W/AUTO DIFF WBC: CPT

## 2024-10-31 PROCEDURE — 86920 COMPATIBILITY TEST SPIN: CPT

## 2024-10-31 RX ORDER — ALBUTEROL SULFATE 0.83 MG/ML
3 SOLUTION RESPIRATORY (INHALATION) AS NEEDED
OUTPATIENT
Start: 2024-11-14

## 2024-10-31 RX ORDER — FAMOTIDINE 10 MG/ML
20 INJECTION INTRAVENOUS ONCE AS NEEDED
Status: DISCONTINUED | OUTPATIENT
Start: 2024-10-31 | End: 2024-10-31 | Stop reason: HOSPADM

## 2024-10-31 RX ORDER — ALBUTEROL SULFATE 0.83 MG/ML
3 SOLUTION RESPIRATORY (INHALATION) AS NEEDED
Status: DISCONTINUED | OUTPATIENT
Start: 2024-10-31 | End: 2024-10-31 | Stop reason: HOSPADM

## 2024-10-31 RX ORDER — EPINEPHRINE 0.3 MG/.3ML
0.3 INJECTION SUBCUTANEOUS EVERY 5 MIN PRN
OUTPATIENT
Start: 2024-11-14

## 2024-10-31 RX ORDER — FAMOTIDINE 10 MG/ML
20 INJECTION INTRAVENOUS ONCE AS NEEDED
OUTPATIENT
Start: 2024-11-14

## 2024-10-31 RX ORDER — DIPHENHYDRAMINE HYDROCHLORIDE 50 MG/ML
50 INJECTION INTRAMUSCULAR; INTRAVENOUS AS NEEDED
OUTPATIENT
Start: 2024-11-14

## 2024-10-31 RX ORDER — EPINEPHRINE 0.3 MG/.3ML
0.3 INJECTION SUBCUTANEOUS EVERY 5 MIN PRN
Status: DISCONTINUED | OUTPATIENT
Start: 2024-10-31 | End: 2024-10-31 | Stop reason: HOSPADM

## 2024-10-31 RX ORDER — DIPHENHYDRAMINE HYDROCHLORIDE 50 MG/ML
50 INJECTION INTRAMUSCULAR; INTRAVENOUS AS NEEDED
Status: DISCONTINUED | OUTPATIENT
Start: 2024-10-31 | End: 2024-10-31 | Stop reason: HOSPADM

## 2024-10-31 ASSESSMENT — PATIENT HEALTH QUESTIONNAIRE - PHQ9
2. FEELING DOWN, DEPRESSED OR HOPELESS: NOT AT ALL
SUM OF ALL RESPONSES TO PHQ9 QUESTIONS 1 & 2: 0
1. LITTLE INTEREST OR PLEASURE IN DOING THINGS: NOT AT ALL

## 2024-10-31 ASSESSMENT — ENCOUNTER SYMPTOMS
DEPRESSION: 0
LOSS OF SENSATION IN FEET: 1
OCCASIONAL FEELINGS OF UNSTEADINESS: 0

## 2024-10-31 ASSESSMENT — PAIN SCALES - GENERAL
PAINLEVEL_OUTOF10: 0-NO PAIN

## 2024-11-04 ENCOUNTER — PHARMACY VISIT (OUTPATIENT)
Dept: PHARMACY | Facility: CLINIC | Age: 89
End: 2024-11-04
Payer: MEDICARE

## 2024-11-14 ENCOUNTER — LAB (OUTPATIENT)
Dept: HEMATOLOGY/ONCOLOGY | Facility: HOSPITAL | Age: 89
End: 2024-11-14
Payer: MEDICARE

## 2024-11-14 VITALS
RESPIRATION RATE: 16 BRPM | DIASTOLIC BLOOD PRESSURE: 66 MMHG | BODY MASS INDEX: 26.44 KG/M2 | TEMPERATURE: 98.8 F | OXYGEN SATURATION: 99 % | SYSTOLIC BLOOD PRESSURE: 108 MMHG | WEIGHT: 189.6 LBS | HEART RATE: 71 BPM

## 2024-11-14 DIAGNOSIS — C92.00 ACUTE MYELOID LEUKEMIA NOT HAVING ACHIEVED REMISSION (MULTI): ICD-10-CM

## 2024-11-14 DIAGNOSIS — D64.9 ANEMIA REQUIRING TRANSFUSIONS: ICD-10-CM

## 2024-11-14 LAB
ABO GROUP (TYPE) IN BLOOD: NORMAL
ANTIBODY SCREEN: NORMAL
BASOPHILS # BLD AUTO: 0 X10*3/UL (ref 0–0.1)
BASOPHILS NFR BLD AUTO: 0 %
BLOOD EXPIRATION DATE: NORMAL
BURR CELLS BLD QL SMEAR: NORMAL
DISPENSE STATUS: NORMAL
EOSINOPHIL # BLD AUTO: 0 X10*3/UL (ref 0–0.4)
EOSINOPHIL NFR BLD AUTO: 0 %
ERYTHROCYTE [DISTWIDTH] IN BLOOD BY AUTOMATED COUNT: 21.6 % (ref 11.5–14.5)
HCT VFR BLD AUTO: 22.5 % (ref 41–52)
HGB BLD-MCNC: 7.1 G/DL (ref 13.5–17.5)
IMM GRANULOCYTES # BLD AUTO: 0.03 X10*3/UL (ref 0–0.5)
IMM GRANULOCYTES NFR BLD AUTO: 1.4 % (ref 0–0.9)
LYMPHOCYTES # BLD AUTO: 1.13 X10*3/UL (ref 0.8–3)
LYMPHOCYTES NFR BLD AUTO: 53.3 %
MCH RBC QN AUTO: 32.1 PG (ref 26–34)
MCHC RBC AUTO-ENTMCNC: 31.6 G/DL (ref 32–36)
MCV RBC AUTO: 102 FL (ref 80–100)
MONOCYTES # BLD AUTO: 0.51 X10*3/UL (ref 0.05–0.8)
MONOCYTES NFR BLD AUTO: 24.1 %
NEUTROPHILS # BLD AUTO: 0.45 X10*3/UL (ref 1.6–5.5)
NEUTROPHILS NFR BLD AUTO: 21.2 %
NRBC BLD-RTO: 0 /100 WBCS (ref 0–0)
OVALOCYTES BLD QL SMEAR: NORMAL
PLATELET # BLD AUTO: 48 X10*3/UL (ref 150–450)
PRODUCT BLOOD TYPE: 6200
PRODUCT CODE: NORMAL
RBC # BLD AUTO: 2.21 X10*6/UL (ref 4.5–5.9)
RBC MORPH BLD: NORMAL
RH FACTOR (ANTIGEN D): NORMAL
SCHISTOCYTES BLD QL SMEAR: NORMAL
UNIT ABO: NORMAL
UNIT NUMBER: NORMAL
UNIT RH: NORMAL
UNIT VOLUME: 350
WBC # BLD AUTO: 2.1 X10*3/UL (ref 4.4–11.3)
XM INTEP: NORMAL

## 2024-11-14 PROCEDURE — 36415 COLL VENOUS BLD VENIPUNCTURE: CPT

## 2024-11-14 PROCEDURE — 85025 COMPLETE CBC W/AUTO DIFF WBC: CPT

## 2024-11-14 PROCEDURE — P9040 RBC LEUKOREDUCED IRRADIATED: HCPCS

## 2024-11-14 PROCEDURE — 86901 BLOOD TYPING SEROLOGIC RH(D): CPT

## 2024-11-14 PROCEDURE — 86920 COMPATIBILITY TEST SPIN: CPT

## 2024-11-14 PROCEDURE — 36430 TRANSFUSION BLD/BLD COMPNT: CPT

## 2024-11-14 RX ORDER — FAMOTIDINE 10 MG/ML
20 INJECTION INTRAVENOUS ONCE AS NEEDED
OUTPATIENT
Start: 2024-11-28

## 2024-11-14 RX ORDER — ALBUTEROL SULFATE 0.83 MG/ML
3 SOLUTION RESPIRATORY (INHALATION) AS NEEDED
OUTPATIENT
Start: 2024-11-28

## 2024-11-14 RX ORDER — EPINEPHRINE 0.3 MG/.3ML
0.3 INJECTION SUBCUTANEOUS EVERY 5 MIN PRN
OUTPATIENT
Start: 2024-11-28

## 2024-11-14 RX ORDER — DIPHENHYDRAMINE HYDROCHLORIDE 50 MG/ML
50 INJECTION INTRAMUSCULAR; INTRAVENOUS AS NEEDED
OUTPATIENT
Start: 2024-11-28

## 2024-11-14 ASSESSMENT — PAIN SCALES - GENERAL: PAINLEVEL_OUTOF10: 0-NO PAIN

## 2024-11-26 ENCOUNTER — LAB (OUTPATIENT)
Dept: LAB | Facility: LAB | Age: 89
End: 2024-11-26
Payer: MEDICARE

## 2024-11-26 ENCOUNTER — SPECIALTY PHARMACY (OUTPATIENT)
Dept: PHARMACY | Facility: CLINIC | Age: 89
End: 2024-11-26

## 2024-11-26 DIAGNOSIS — C92.00 ACUTE MYELOID LEUKEMIA NOT HAVING ACHIEVED REMISSION (MULTI): ICD-10-CM

## 2024-11-26 LAB
ALBUMIN SERPL BCP-MCNC: 3.6 G/DL (ref 3.4–5)
ALP SERPL-CCNC: 102 U/L (ref 33–136)
ALT SERPL W P-5'-P-CCNC: 20 U/L (ref 10–52)
ANION GAP SERPL CALC-SCNC: 10 MMOL/L (ref 10–20)
AST SERPL W P-5'-P-CCNC: 26 U/L (ref 9–39)
BILIRUB SERPL-MCNC: 0.6 MG/DL (ref 0–1.2)
BUN SERPL-MCNC: 10 MG/DL (ref 6–23)
CALCIUM SERPL-MCNC: 8.5 MG/DL (ref 8.6–10.3)
CHLORIDE SERPL-SCNC: 105 MMOL/L (ref 98–107)
CO2 SERPL-SCNC: 28 MMOL/L (ref 21–32)
CREAT SERPL-MCNC: 0.76 MG/DL (ref 0.5–1.3)
EGFRCR SERPLBLD CKD-EPI 2021: 86 ML/MIN/1.73M*2
ERYTHROCYTE [DISTWIDTH] IN BLOOD BY AUTOMATED COUNT: 21.4 % (ref 11.5–14.5)
FERRITIN SERPL-MCNC: 2863 NG/ML (ref 20–300)
FOLATE SERPL-MCNC: >24 NG/ML
GLUCOSE SERPL-MCNC: 91 MG/DL (ref 74–99)
HCT VFR BLD AUTO: 22.8 % (ref 41–52)
HGB BLD-MCNC: 7.2 G/DL (ref 13.5–17.5)
IRON SATN MFR SERPL: ABNORMAL %
IRON SERPL-MCNC: 195 UG/DL (ref 35–150)
MCH RBC QN AUTO: 31.7 PG (ref 26–34)
MCHC RBC AUTO-ENTMCNC: 31.6 G/DL (ref 32–36)
MCV RBC AUTO: 100 FL (ref 80–100)
NRBC BLD-RTO: 0 /100 WBCS (ref 0–0)
PLATELET # BLD AUTO: 54 X10*3/UL (ref 150–450)
POTASSIUM SERPL-SCNC: 4 MMOL/L (ref 3.5–5.3)
PROT SERPL-MCNC: 6.4 G/DL (ref 6.4–8.2)
RBC # BLD AUTO: 2.27 X10*6/UL (ref 4.5–5.9)
SODIUM SERPL-SCNC: 139 MMOL/L (ref 136–145)
TIBC SERPL-MCNC: ABNORMAL UG/DL
UIBC SERPL-MCNC: <55 UG/DL (ref 110–370)
VIT B12 SERPL-MCNC: 651 PG/ML (ref 211–911)
WBC # BLD AUTO: 1.8 X10*3/UL (ref 4.4–11.3)

## 2024-11-26 PROCEDURE — 82746 ASSAY OF FOLIC ACID SERUM: CPT

## 2024-11-26 PROCEDURE — 36415 COLL VENOUS BLD VENIPUNCTURE: CPT

## 2024-11-26 PROCEDURE — 82607 VITAMIN B-12: CPT

## 2024-11-27 ENCOUNTER — OFFICE VISIT (OUTPATIENT)
Dept: HEMATOLOGY/ONCOLOGY | Facility: HOSPITAL | Age: 89
End: 2024-11-27
Payer: MEDICARE

## 2024-11-27 ENCOUNTER — SPECIALTY PHARMACY (OUTPATIENT)
Dept: PHARMACY | Facility: CLINIC | Age: 89
End: 2024-11-27

## 2024-11-27 ENCOUNTER — LAB (OUTPATIENT)
Dept: HEMATOLOGY/ONCOLOGY | Facility: HOSPITAL | Age: 89
End: 2024-11-27
Payer: MEDICARE

## 2024-11-27 VITALS
HEART RATE: 70 BPM | SYSTOLIC BLOOD PRESSURE: 110 MMHG | RESPIRATION RATE: 16 BRPM | OXYGEN SATURATION: 96 % | DIASTOLIC BLOOD PRESSURE: 64 MMHG | TEMPERATURE: 96.8 F

## 2024-11-27 VITALS
BODY MASS INDEX: 25.91 KG/M2 | HEIGHT: 71 IN | DIASTOLIC BLOOD PRESSURE: 68 MMHG | WEIGHT: 185.08 LBS | HEART RATE: 78 BPM | OXYGEN SATURATION: 98 % | RESPIRATION RATE: 16 BRPM | TEMPERATURE: 96.8 F | SYSTOLIC BLOOD PRESSURE: 111 MMHG

## 2024-11-27 DIAGNOSIS — C92.00 ACUTE MYELOID LEUKEMIA NOT HAVING ACHIEVED REMISSION (MULTI): Primary | ICD-10-CM

## 2024-11-27 DIAGNOSIS — C92.00 ACUTE MYELOID LEUKEMIA NOT HAVING ACHIEVED REMISSION (MULTI): ICD-10-CM

## 2024-11-27 LAB
ABO GROUP (TYPE) IN BLOOD: NORMAL
ANTIBODY SCREEN: NORMAL
BLOOD EXPIRATION DATE: NORMAL
DISPENSE STATUS: NORMAL
MAGNESIUM SERPL-MCNC: 1.78 MG/DL (ref 1.6–2.4)
PHOSPHATE SERPL-MCNC: 2.8 MG/DL (ref 2.5–4.9)
PRODUCT BLOOD TYPE: 6200
PRODUCT CODE: NORMAL
RH FACTOR (ANTIGEN D): NORMAL
UNIT ABO: NORMAL
UNIT NUMBER: NORMAL
UNIT RH: NORMAL
UNIT VOLUME: 350
XM INTEP: NORMAL

## 2024-11-27 PROCEDURE — P9040 RBC LEUKOREDUCED IRRADIATED: HCPCS

## 2024-11-27 PROCEDURE — 36415 COLL VENOUS BLD VENIPUNCTURE: CPT

## 2024-11-27 PROCEDURE — 1126F AMNT PAIN NOTED NONE PRSNT: CPT | Performed by: INTERNAL MEDICINE

## 2024-11-27 PROCEDURE — 99214 OFFICE O/P EST MOD 30 MIN: CPT | Performed by: INTERNAL MEDICINE

## 2024-11-27 PROCEDURE — 86850 RBC ANTIBODY SCREEN: CPT

## 2024-11-27 PROCEDURE — 36430 TRANSFUSION BLD/BLD COMPNT: CPT

## 2024-11-27 PROCEDURE — 3074F SYST BP LT 130 MM HG: CPT | Performed by: INTERNAL MEDICINE

## 2024-11-27 PROCEDURE — 3078F DIAST BP <80 MM HG: CPT | Performed by: INTERNAL MEDICINE

## 2024-11-27 PROCEDURE — 1159F MED LIST DOCD IN RCRD: CPT | Performed by: INTERNAL MEDICINE

## 2024-11-27 PROCEDURE — 1123F ACP DISCUSS/DSCN MKR DOCD: CPT | Performed by: INTERNAL MEDICINE

## 2024-11-27 PROCEDURE — 86920 COMPATIBILITY TEST SPIN: CPT

## 2024-11-27 PROCEDURE — G2211 COMPLEX E/M VISIT ADD ON: HCPCS | Performed by: INTERNAL MEDICINE

## 2024-11-27 PROCEDURE — 99214 OFFICE O/P EST MOD 30 MIN: CPT | Mod: 25 | Performed by: INTERNAL MEDICINE

## 2024-11-27 RX ORDER — DIPHENHYDRAMINE HYDROCHLORIDE 50 MG/ML
50 INJECTION INTRAMUSCULAR; INTRAVENOUS AS NEEDED
Status: DISCONTINUED | OUTPATIENT
Start: 2024-11-27 | End: 2024-11-27 | Stop reason: HOSPADM

## 2024-11-27 RX ORDER — ALBUTEROL SULFATE 0.83 MG/ML
3 SOLUTION RESPIRATORY (INHALATION) AS NEEDED
OUTPATIENT
Start: 2024-11-28

## 2024-11-27 RX ORDER — EPINEPHRINE 0.3 MG/.3ML
0.3 INJECTION SUBCUTANEOUS EVERY 5 MIN PRN
Status: DISCONTINUED | OUTPATIENT
Start: 2024-11-27 | End: 2024-11-27 | Stop reason: HOSPADM

## 2024-11-27 RX ORDER — FAMOTIDINE 10 MG/ML
20 INJECTION INTRAVENOUS ONCE AS NEEDED
OUTPATIENT
Start: 2024-11-28

## 2024-11-27 RX ORDER — ALBUTEROL SULFATE 0.83 MG/ML
3 SOLUTION RESPIRATORY (INHALATION) AS NEEDED
Status: DISCONTINUED | OUTPATIENT
Start: 2024-11-27 | End: 2024-11-27 | Stop reason: HOSPADM

## 2024-11-27 RX ORDER — EPINEPHRINE 0.3 MG/.3ML
0.3 INJECTION SUBCUTANEOUS EVERY 5 MIN PRN
OUTPATIENT
Start: 2024-11-28

## 2024-11-27 RX ORDER — DIPHENHYDRAMINE HYDROCHLORIDE 50 MG/ML
50 INJECTION INTRAMUSCULAR; INTRAVENOUS AS NEEDED
OUTPATIENT
Start: 2024-11-28

## 2024-11-27 RX ORDER — FAMOTIDINE 10 MG/ML
20 INJECTION INTRAVENOUS ONCE AS NEEDED
Status: DISCONTINUED | OUTPATIENT
Start: 2024-11-27 | End: 2024-11-27 | Stop reason: HOSPADM

## 2024-11-27 ASSESSMENT — ENCOUNTER SYMPTOMS
RESPIRATORY NEGATIVE: 1
FEVER: 0
CARDIOVASCULAR NEGATIVE: 1
FATIGUE: 1
GASTROINTESTINAL NEGATIVE: 1

## 2024-11-27 ASSESSMENT — PATIENT HEALTH QUESTIONNAIRE - PHQ9
SUM OF ALL RESPONSES TO PHQ9 QUESTIONS 1 & 2: 0
2. FEELING DOWN, DEPRESSED OR HOPELESS: NOT AT ALL
1. LITTLE INTEREST OR PLEASURE IN DOING THINGS: NOT AT ALL

## 2024-11-27 ASSESSMENT — PAIN SCALES - GENERAL: PAINLEVEL_OUTOF10: 0-NO PAIN

## 2024-11-27 NOTE — PROGRESS NOTES
"Patient ID: Chico Iqbal is a 89 y.o. male.    Subjective:  Returns for follow up for AML. He feels OK. Denies fever. Appetite good. Mild fatigue.    Heme/Onc History:  - p/w rectal pain in Aug 2024 => Was anemic with Hb of 6, elevated MCV, and Plt of 89 and low folate. Initially thought to be due to folate deficiency. Peripheral smear showed abnormal cells and flow cytometry revealed AML  - He declined having bone marrow biopsy. NGS from peripheral blood (Aug 2024): jak2 V617F (6%0, FLT3-ITD (3%), FLT3 mutation +  - Patient declined any chemotherapy but accepted to try FLT3 inhibitors => Quizartinib was denied by insurance  - Started giltertinib in Oct 2024 => Continued to require PRBCs every 14 days    Assessment/Plan:  ? 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 All these have poor prognosis but he looked exceptionally well. He declined bone marrow biopsy or any drastic treatment. Definitely did not want any chemo.    We started him on giltertinib and so far he is tolerating it OK. He has anemia and has been requiring 1 unit of PRBC every other week. Other counts are stable. Will continue with the same treatment. If his counts remain like this until January, I would assume treatment is effective. Repeat flow from blood in 2 weeks    I provide longitudinal care for Mr. Iqbal for his acute leukemia    Review Of Systems:  Review of Systems   Constitutional:  Positive for fatigue. Negative for fever.   HENT:  Negative.     Respiratory: Negative.     Cardiovascular: Negative.    Gastrointestinal: Negative.    Genitourinary: Negative.         Physical Exam:  /68 (BP Location: Right arm, Patient Position: Sitting, BP Cuff Size: Adult)   Pulse 78   Temp 36 °C (96.8 °F) (Temporal)   Resp 16   Ht 1.803 m (5' 11\")   Wt 84 kg (185 lb 1.2 oz)   SpO2 98%   BMI 25.81 kg/m²   BSA: 2.05 meters squared  Performance Status: Symptomatic; fully " ambulatory  Physical Exam  HENT:      Head: Normocephalic and atraumatic.   Eyes:      General: No scleral icterus.     Pupils: Pupils are equal, round, and reactive to light.   Cardiovascular:      Rate and Rhythm: Normal rate and regular rhythm.   Pulmonary:      Effort: Pulmonary effort is normal.   Abdominal:      General: Abdomen is flat.      Palpations: Abdomen is soft. There is no mass.   Musculoskeletal:         General: Normal range of motion.   Lymphadenopathy:      Cervical: No cervical adenopathy.   Skin:     Coloration: Skin is not jaundiced.   Neurological:      General: No focal deficit present.      Mental Status: He is alert and oriented to person, place, and time.         Results:  Diagnostic Results   Lab Results   Component Value Date    WBC 1.8 (L) 11/26/2024    HGB 7.2 (L) 11/26/2024    HCT 22.8 (L) 11/26/2024     11/26/2024    PLT 54 (L) 11/26/2024     Lab Results   Component Value Date    CALCIUM 8.5 (L) 11/26/2024     11/26/2024    K 4.0 11/26/2024    CO2 28 11/26/2024     11/26/2024    BUN 10 11/26/2024    CREATININE 0.76 11/26/2024    ALT 20 11/26/2024    AST 26 11/26/2024       Current Outpatient Medications:     atorvastatin (Lipitor) 40 mg tablet, TAKE ONE TABLET BY MOUTH ONCE DAILY, Disp: 90 tablet, Rfl: 0    clopidogrel (Plavix) 75 mg tablet, Take one tablet by mouth daily, Disp: 90 tablet, Rfl: 0    docusate sodium (Colace) 100 mg capsule, Take 1 capsule (100 mg) by mouth 2 times a day., Disp: 60 capsule, Rfl: 0    finasteride (Proscar) 5 mg tablet, Take 1 tablet (5 mg) by mouth once daily. Do not crush, chew, or split., Disp: 30 tablet, Rfl: 11    folic acid (Folvite) 1 mg tablet, Take 1 tablet (1 mg) by mouth once daily., Disp: 90 tablet, Rfl: 3    furosemide (Lasix) 20 mg tablet, Take 1 tablet (20 mg) by mouth once daily. None on Tues and Thur, Disp: 90 tablet, Rfl: 0    gilteritinib (Xospata) 40 mg tablet, Take 3 tablets (120 mg total) by mouth once daily.   Swallow whole., Disp: 90 tablet, Rfl: 1    metoprolol succinate XL (Toprol-XL) 25 mg 24 hr tablet, Take 1 tablet (25 mg) by mouth once daily. Do not crush or chew., Disp: 30 tablet, Rfl: 0    pantoprazole (ProtoNix) 40 mg EC tablet, Take 1 tablet (40 mg) by mouth once daily in the morning. Take before meals. Do not crush, chew, or split., Disp: 90 tablet, Rfl: 0    tamsulosin (Flomax) 0.4 mg 24 hr capsule, Take 2 capsules (0.8 mg) by mouth once daily., Disp: 60 capsule, Rfl: 11    mirtazapine (Remeron) 15 mg tablet, Take 0.5 tablets (7.5 mg) by mouth once daily at bedtime. (Patient not taking: Reported on 11/27/2024), Disp: , Rfl:   No current facility-administered medications for this visit.    Facility-Administered Medications Ordered in Other Visits:     albuterol 2.5 mg /3 mL (0.083 %) nebulizer solution 3 mL, 3 mL, nebulization, PRN, Nithin Coronado MD    dextrose 5 % in water (D5W) bolus 500 mL, 500 mL, intravenous, PRN, Nithin Coronado MD    diphenhydrAMINE (BENADryl) injection 50 mg, 50 mg, intravenous, PRN, Nithin Coronado MD    EPINEPHrine (Epipen) injection syringe 0.3 mg, 0.3 mg, intramuscular, q5 min PRN, Nithin Coronado MD    famotidine PF (Pepcid) injection 20 mg, 20 mg, intravenous, Once PRN, Nithin Coronado MD    methylPREDNISolone sod succinate (SOLU-Medrol) 40 mg/mL injection 40 mg, 40 mg, intravenous, PRN, Nithin Coronado MD    sodium chloride 0.9 % bolus 500 mL, 500 mL, intravenous, PRN, Nithin Coronado MD     Past Surgical History:   Procedure Laterality Date    CARDIAC CATHETERIZATION      CORONARY STENT PLACEMENT       Family History   Problem Relation Name Age of Onset    Diabetes Mother      Heart attack Brother        reports that he has been smoking cigars. He has been exposed to tobacco smoke. He has never used smokeless tobacco.    Diagnoses and all orders for this visit:  Acute myeloid leukemia not having achieved remission (Multi)  -     Clinic Appointment Request Follow  up  -     Phosphorus; Future  -     Magnesium; Future  -     Clinic Appointment Request Follow up; Future  -     CBC; Future  -     Comprehensive Metabolic Panel; Future  -     Lactate Dehydrogenase; Future  -     Magnesium; Future  -     Flow Cytometry Test; Future  -     Clinic Appointment Request Follow up; Future       I spent more than 40 minutes for the patient today, including face-to-face conversation, pre-visit preparation, post-visit orders, and others.   Nithin Coronado MD

## 2024-11-29 ENCOUNTER — SPECIALTY PHARMACY (OUTPATIENT)
Dept: PHARMACY | Facility: CLINIC | Age: 89
End: 2024-11-29

## 2024-11-29 PROCEDURE — RXMED WILLOW AMBULATORY MEDICATION CHARGE

## 2024-12-04 ENCOUNTER — PHARMACY VISIT (OUTPATIENT)
Dept: PHARMACY | Facility: CLINIC | Age: 89
End: 2024-12-04
Payer: COMMERCIAL

## 2024-12-06 DIAGNOSIS — K59.01 SLOW TRANSIT CONSTIPATION: ICD-10-CM

## 2024-12-06 DIAGNOSIS — C92.00 ACUTE MYELOID LEUKEMIA NOT HAVING ACHIEVED REMISSION (MULTI): ICD-10-CM

## 2024-12-06 DIAGNOSIS — I10 BENIGN HYPERTENSION: ICD-10-CM

## 2024-12-06 DIAGNOSIS — I25.10 CORONARY ARTERY DISEASE, UNSPECIFIED VESSEL OR LESION TYPE, UNSPECIFIED WHETHER ANGINA PRESENT, UNSPECIFIED WHETHER NATIVE OR TRANSPLANTED HEART: ICD-10-CM

## 2024-12-06 DIAGNOSIS — R53.83 OTHER FATIGUE: ICD-10-CM

## 2024-12-06 DIAGNOSIS — K21.9 CHRONIC GERD: ICD-10-CM

## 2024-12-06 DIAGNOSIS — E78.00 HYPERCHOLESTEROLEMIA: ICD-10-CM

## 2024-12-06 DIAGNOSIS — N40.0 BENIGN PROSTATIC HYPERPLASIA, UNSPECIFIED WHETHER LOWER URINARY TRACT SYMPTOMS PRESENT: ICD-10-CM

## 2024-12-09 RX ORDER — TAMSULOSIN HYDROCHLORIDE 0.4 MG/1
0.8 CAPSULE ORAL DAILY
Qty: 180 CAPSULE | Refills: 0 | Status: SHIPPED | OUTPATIENT
Start: 2024-12-09 | End: 2025-12-09

## 2024-12-09 RX ORDER — PANTOPRAZOLE SODIUM 40 MG/1
40 TABLET, DELAYED RELEASE ORAL
Qty: 90 TABLET | Refills: 0 | Status: SHIPPED | OUTPATIENT
Start: 2024-12-09

## 2024-12-09 RX ORDER — FOLIC ACID 1 MG/1
1 TABLET ORAL DAILY
Qty: 90 TABLET | Refills: 0 | Status: SHIPPED | OUTPATIENT
Start: 2024-12-09 | End: 2025-12-09

## 2024-12-09 RX ORDER — ATORVASTATIN CALCIUM 40 MG/1
40 TABLET, FILM COATED ORAL DAILY
Qty: 90 TABLET | Refills: 0 | Status: SHIPPED | OUTPATIENT
Start: 2024-12-09

## 2024-12-09 RX ORDER — FINASTERIDE 5 MG/1
5 TABLET, FILM COATED ORAL DAILY
Qty: 90 TABLET | Refills: 0 | Status: SHIPPED | OUTPATIENT
Start: 2024-12-09 | End: 2025-12-09

## 2024-12-09 RX ORDER — FUROSEMIDE 20 MG/1
20 TABLET ORAL DAILY
Qty: 90 TABLET | Refills: 0 | Status: SHIPPED | OUTPATIENT
Start: 2024-12-09

## 2024-12-09 RX ORDER — CLOPIDOGREL BISULFATE 75 MG/1
TABLET ORAL
Qty: 90 TABLET | Refills: 0 | Status: SHIPPED | OUTPATIENT
Start: 2024-12-09

## 2024-12-09 RX ORDER — DOCUSATE SODIUM 100 MG/1
100 CAPSULE, LIQUID FILLED ORAL 2 TIMES DAILY
Qty: 60 CAPSULE | Refills: 0 | Status: SHIPPED | OUTPATIENT
Start: 2024-12-09

## 2024-12-11 ENCOUNTER — INFUSION (OUTPATIENT)
Dept: HEMATOLOGY/ONCOLOGY | Facility: HOSPITAL | Age: 89
End: 2024-12-11
Payer: MEDICARE

## 2024-12-11 DIAGNOSIS — C92.00 ACUTE MYELOID LEUKEMIA NOT HAVING ACHIEVED REMISSION (MULTI): ICD-10-CM

## 2024-12-11 LAB
ABO GROUP (TYPE) IN BLOOD: NORMAL
ANTIBODY SCREEN: NORMAL
BASOPHILS # BLD AUTO: 0 X10*3/UL (ref 0–0.1)
BASOPHILS NFR BLD AUTO: 0 %
BITE CELLS BLD QL SMEAR: PRESENT
BURR CELLS BLD QL SMEAR: NORMAL
DACRYOCYTES BLD QL SMEAR: NORMAL
EOSINOPHIL # BLD AUTO: 0.01 X10*3/UL (ref 0–0.4)
EOSINOPHIL NFR BLD AUTO: 0.5 %
ERYTHROCYTE [DISTWIDTH] IN BLOOD BY AUTOMATED COUNT: 23.7 % (ref 11.5–14.5)
HCT VFR BLD AUTO: 23.5 % (ref 41–52)
HGB BLD-MCNC: 7.9 G/DL (ref 13.5–17.5)
IMM GRANULOCYTES # BLD AUTO: 0.07 X10*3/UL (ref 0–0.5)
IMM GRANULOCYTES NFR BLD AUTO: 3.4 % (ref 0–0.9)
LYMPHOCYTES # BLD AUTO: 1.09 X10*3/UL (ref 0.8–3)
LYMPHOCYTES NFR BLD AUTO: 53.7 %
MCH RBC QN AUTO: 33.9 PG (ref 26–34)
MCHC RBC AUTO-ENTMCNC: 33.6 G/DL (ref 32–36)
MCV RBC AUTO: 101 FL (ref 80–100)
MONOCYTES # BLD AUTO: 0.56 X10*3/UL (ref 0.05–0.8)
MONOCYTES NFR BLD AUTO: 27.6 %
NEUTROPHILS # BLD AUTO: 0.3 X10*3/UL (ref 1.6–5.5)
NEUTROPHILS NFR BLD AUTO: 14.8 %
NRBC BLD-RTO: 0 /100 WBCS (ref 0–0)
PLATELET # BLD AUTO: 72 X10*3/UL (ref 150–450)
RBC # BLD AUTO: 2.33 X10*6/UL (ref 4.5–5.9)
RBC MORPH BLD: NORMAL
RH FACTOR (ANTIGEN D): NORMAL
WBC # BLD AUTO: 2 X10*3/UL (ref 4.4–11.3)

## 2024-12-11 PROCEDURE — 88185 FLOWCYTOMETRY/TC ADD-ON: CPT | Mod: TC,GENLAB

## 2024-12-11 PROCEDURE — 36415 COLL VENOUS BLD VENIPUNCTURE: CPT

## 2024-12-11 PROCEDURE — 85025 COMPLETE CBC W/AUTO DIFF WBC: CPT

## 2024-12-11 PROCEDURE — 86850 RBC ANTIBODY SCREEN: CPT

## 2024-12-11 RX ORDER — DIPHENHYDRAMINE HYDROCHLORIDE 50 MG/ML
50 INJECTION INTRAMUSCULAR; INTRAVENOUS AS NEEDED
OUTPATIENT
Start: 2024-12-19

## 2024-12-11 RX ORDER — EPINEPHRINE 0.3 MG/.3ML
0.3 INJECTION SUBCUTANEOUS EVERY 5 MIN PRN
OUTPATIENT
Start: 2024-12-19

## 2024-12-11 RX ORDER — ALBUTEROL SULFATE 0.83 MG/ML
3 SOLUTION RESPIRATORY (INHALATION) AS NEEDED
OUTPATIENT
Start: 2024-12-19

## 2024-12-11 RX ORDER — FAMOTIDINE 10 MG/ML
20 INJECTION INTRAVENOUS ONCE AS NEEDED
OUTPATIENT
Start: 2024-12-19

## 2024-12-12 ENCOUNTER — APPOINTMENT (OUTPATIENT)
Dept: HEMATOLOGY/ONCOLOGY | Facility: HOSPITAL | Age: 89
End: 2024-12-12
Payer: MEDICARE

## 2024-12-12 NOTE — PROGRESS NOTES
Pt does not meet criteria for blood transfusion today, however if he is symptomatic before his next treatment appt, he may come sooner per Dr Baker.  Pt's daughter notified and voiced understanding.

## 2024-12-14 LAB
CELL COUNT (BLOOD): 2 X10*3/UL
CELL POPULATIONS: NORMAL
DIAGNOSIS: NORMAL
FLOW DIFFERENTIAL: NORMAL
FLOW TEST ORDERED: NORMAL
LAB TEST METHOD: NORMAL
NUMBER OF CELLS COLLECTED: NORMAL PER TUBE
PATH REPORT.TOTAL CANCER: NORMAL
SIGNATURE COMMENT: NORMAL
SPECIMEN VIABILITY: NORMAL

## 2024-12-18 ENCOUNTER — LAB (OUTPATIENT)
Dept: HEMATOLOGY/ONCOLOGY | Facility: HOSPITAL | Age: 89
End: 2024-12-18
Payer: MEDICARE

## 2024-12-18 VITALS
HEART RATE: 76 BPM | DIASTOLIC BLOOD PRESSURE: 68 MMHG | SYSTOLIC BLOOD PRESSURE: 114 MMHG | OXYGEN SATURATION: 97 % | TEMPERATURE: 97.5 F | RESPIRATION RATE: 17 BRPM

## 2024-12-18 DIAGNOSIS — C92.00 ACUTE MYELOID LEUKEMIA NOT HAVING ACHIEVED REMISSION (MULTI): ICD-10-CM

## 2024-12-18 LAB
ABO GROUP (TYPE) IN BLOOD: NORMAL
ANTIBODY SCREEN: NORMAL
BASOPHILS # BLD AUTO: 0.01 X10*3/UL (ref 0–0.1)
BASOPHILS NFR BLD AUTO: 0.5 %
BLOOD EXPIRATION DATE: NORMAL
BURR CELLS BLD QL SMEAR: NORMAL
DISPENSE STATUS: NORMAL
EOSINOPHIL # BLD AUTO: 0 X10*3/UL (ref 0–0.4)
EOSINOPHIL NFR BLD AUTO: 0 %
ERYTHROCYTE [DISTWIDTH] IN BLOOD BY AUTOMATED COUNT: 25.5 % (ref 11.5–14.5)
HCT VFR BLD AUTO: 21.5 % (ref 41–52)
HGB BLD-MCNC: 6.9 G/DL (ref 13.5–17.5)
IMM GRANULOCYTES # BLD AUTO: 0.03 X10*3/UL (ref 0–0.5)
IMM GRANULOCYTES NFR BLD AUTO: 1.5 % (ref 0–0.9)
LYMPHOCYTES # BLD AUTO: 1.03 X10*3/UL (ref 0.8–3)
LYMPHOCYTES NFR BLD AUTO: 52 %
MCH RBC QN AUTO: 33.2 PG (ref 26–34)
MCHC RBC AUTO-ENTMCNC: 32.1 G/DL (ref 32–36)
MCV RBC AUTO: 103 FL (ref 80–100)
MONOCYTES # BLD AUTO: 0.37 X10*3/UL (ref 0.05–0.8)
MONOCYTES NFR BLD AUTO: 18.7 %
NEUTROPHILS # BLD AUTO: 0.54 X10*3/UL (ref 1.6–5.5)
NEUTROPHILS NFR BLD AUTO: 27.3 %
NRBC BLD-RTO: 0 /100 WBCS (ref 0–0)
OVALOCYTES BLD QL SMEAR: NORMAL
PLATELET # BLD AUTO: 71 X10*3/UL (ref 150–450)
PRODUCT BLOOD TYPE: 6200
PRODUCT CODE: NORMAL
RBC # BLD AUTO: 2.08 X10*6/UL (ref 4.5–5.9)
RBC MORPH BLD: NORMAL
RH FACTOR (ANTIGEN D): NORMAL
UNIT ABO: NORMAL
UNIT NUMBER: NORMAL
UNIT RH: NORMAL
UNIT VOLUME: 350
WBC # BLD AUTO: 2 X10*3/UL (ref 4.4–11.3)
XM INTEP: NORMAL

## 2024-12-18 PROCEDURE — 85025 COMPLETE CBC W/AUTO DIFF WBC: CPT

## 2024-12-18 PROCEDURE — 36415 COLL VENOUS BLD VENIPUNCTURE: CPT

## 2024-12-18 PROCEDURE — 86920 COMPATIBILITY TEST SPIN: CPT

## 2024-12-18 PROCEDURE — 86850 RBC ANTIBODY SCREEN: CPT

## 2024-12-18 PROCEDURE — 36430 TRANSFUSION BLD/BLD COMPNT: CPT

## 2024-12-18 PROCEDURE — P9040 RBC LEUKOREDUCED IRRADIATED: HCPCS

## 2024-12-18 RX ORDER — EPINEPHRINE 0.3 MG/.3ML
0.3 INJECTION SUBCUTANEOUS EVERY 5 MIN PRN
OUTPATIENT
Start: 2024-12-19

## 2024-12-18 RX ORDER — ALBUTEROL SULFATE 0.83 MG/ML
3 SOLUTION RESPIRATORY (INHALATION) AS NEEDED
OUTPATIENT
Start: 2024-12-19

## 2024-12-18 RX ORDER — DIPHENHYDRAMINE HYDROCHLORIDE 50 MG/ML
50 INJECTION INTRAMUSCULAR; INTRAVENOUS AS NEEDED
Status: DISCONTINUED | OUTPATIENT
Start: 2024-12-18 | End: 2024-12-18 | Stop reason: HOSPADM

## 2024-12-18 RX ORDER — DIPHENHYDRAMINE HYDROCHLORIDE 50 MG/ML
50 INJECTION INTRAMUSCULAR; INTRAVENOUS AS NEEDED
OUTPATIENT
Start: 2024-12-19

## 2024-12-18 RX ORDER — FAMOTIDINE 10 MG/ML
20 INJECTION INTRAVENOUS ONCE AS NEEDED
OUTPATIENT
Start: 2024-12-19

## 2024-12-18 RX ORDER — EPINEPHRINE 0.3 MG/.3ML
0.3 INJECTION SUBCUTANEOUS EVERY 5 MIN PRN
Status: DISCONTINUED | OUTPATIENT
Start: 2024-12-18 | End: 2024-12-18 | Stop reason: HOSPADM

## 2024-12-18 RX ORDER — ALBUTEROL SULFATE 0.83 MG/ML
3 SOLUTION RESPIRATORY (INHALATION) AS NEEDED
Status: DISCONTINUED | OUTPATIENT
Start: 2024-12-18 | End: 2024-12-18 | Stop reason: HOSPADM

## 2024-12-18 RX ORDER — FAMOTIDINE 10 MG/ML
20 INJECTION INTRAVENOUS ONCE AS NEEDED
Status: DISCONTINUED | OUTPATIENT
Start: 2024-12-18 | End: 2024-12-18 | Stop reason: HOSPADM

## 2024-12-18 ASSESSMENT — PAIN SCALES - GENERAL: PAINLEVEL_OUTOF10: 0-NO PAIN

## 2024-12-19 DIAGNOSIS — C92.00 ACUTE MYELOID LEUKEMIA NOT HAVING ACHIEVED REMISSION (MULTI): ICD-10-CM

## 2024-12-23 PROCEDURE — RXMED WILLOW AMBULATORY MEDICATION CHARGE

## 2024-12-26 ENCOUNTER — APPOINTMENT (OUTPATIENT)
Dept: HEMATOLOGY/ONCOLOGY | Facility: HOSPITAL | Age: 89
End: 2024-12-26
Payer: MEDICARE

## 2024-12-27 ENCOUNTER — SPECIALTY PHARMACY (OUTPATIENT)
Dept: PHARMACY | Facility: CLINIC | Age: 89
End: 2024-12-27

## 2024-12-27 DIAGNOSIS — I10 BENIGN HYPERTENSION: ICD-10-CM

## 2024-12-27 RX ORDER — METOPROLOL SUCCINATE 25 MG/1
25 TABLET, EXTENDED RELEASE ORAL DAILY
Qty: 90 TABLET | Refills: 0 | Status: SHIPPED | OUTPATIENT
Start: 2024-12-27 | End: 2025-03-27

## 2024-12-28 ENCOUNTER — SPECIALTY PHARMACY (OUTPATIENT)
Dept: PHARMACY | Facility: CLINIC | Age: 89
End: 2024-12-28

## 2024-12-31 ENCOUNTER — APPOINTMENT (OUTPATIENT)
Dept: HEMATOLOGY/ONCOLOGY | Facility: HOSPITAL | Age: 89
End: 2024-12-31
Payer: MEDICARE

## 2024-12-31 VITALS
DIASTOLIC BLOOD PRESSURE: 65 MMHG | SYSTOLIC BLOOD PRESSURE: 129 MMHG | RESPIRATION RATE: 16 BRPM | TEMPERATURE: 96.3 F | HEART RATE: 73 BPM | OXYGEN SATURATION: 99 %

## 2024-12-31 DIAGNOSIS — C92.00 ACUTE MYELOID LEUKEMIA NOT HAVING ACHIEVED REMISSION (MULTI): ICD-10-CM

## 2024-12-31 LAB
ABO GROUP (TYPE) IN BLOOD: NORMAL
ANTIBODY SCREEN: NORMAL
BASOPHILS # BLD AUTO: 0.01 X10*3/UL (ref 0–0.1)
BASOPHILS NFR BLD AUTO: 0.6 %
BLOOD EXPIRATION DATE: NORMAL
BURR CELLS BLD QL SMEAR: NORMAL
DACRYOCYTES BLD QL SMEAR: NORMAL
DISPENSE STATUS: NORMAL
EOSINOPHIL # BLD AUTO: 0.01 X10*3/UL (ref 0–0.4)
EOSINOPHIL NFR BLD AUTO: 0.6 %
ERYTHROCYTE [DISTWIDTH] IN BLOOD BY AUTOMATED COUNT: ABNORMAL %
HCT VFR BLD AUTO: 22.6 % (ref 41–52)
HGB BLD-MCNC: 7.4 G/DL (ref 13.5–17.5)
IMM GRANULOCYTES # BLD AUTO: 0.05 X10*3/UL (ref 0–0.5)
IMM GRANULOCYTES NFR BLD AUTO: 3 % (ref 0–0.9)
LYMPHOCYTES # BLD AUTO: 0.88 X10*3/UL (ref 0.8–3)
LYMPHOCYTES NFR BLD AUTO: 52.7 %
MCH RBC QN AUTO: 34.3 PG (ref 26–34)
MCHC RBC AUTO-ENTMCNC: 32.7 G/DL (ref 32–36)
MCV RBC AUTO: 105 FL (ref 80–100)
MONOCYTES # BLD AUTO: 0.38 X10*3/UL (ref 0.05–0.8)
MONOCYTES NFR BLD AUTO: 22.8 %
NEUTROPHILS # BLD AUTO: 0.34 X10*3/UL (ref 1.6–5.5)
NEUTROPHILS NFR BLD AUTO: 20.3 %
NRBC BLD-RTO: 0 /100 WBCS (ref 0–0)
PLATELET # BLD AUTO: 76 X10*3/UL (ref 150–450)
PRODUCT BLOOD TYPE: 600
PRODUCT CODE: NORMAL
RBC # BLD AUTO: 2.16 X10*6/UL (ref 4.5–5.9)
RBC MORPH BLD: NORMAL
RH FACTOR (ANTIGEN D): NORMAL
UNIT ABO: NORMAL
UNIT NUMBER: NORMAL
UNIT RH: NORMAL
UNIT VOLUME: 350
WBC # BLD AUTO: 1.7 X10*3/UL (ref 4.4–11.3)
XM INTEP: NORMAL

## 2024-12-31 PROCEDURE — P9040 RBC LEUKOREDUCED IRRADIATED: HCPCS

## 2024-12-31 PROCEDURE — 86920 COMPATIBILITY TEST SPIN: CPT

## 2024-12-31 PROCEDURE — 86901 BLOOD TYPING SEROLOGIC RH(D): CPT

## 2024-12-31 PROCEDURE — 36415 COLL VENOUS BLD VENIPUNCTURE: CPT

## 2024-12-31 PROCEDURE — 36430 TRANSFUSION BLD/BLD COMPNT: CPT

## 2024-12-31 PROCEDURE — 85025 COMPLETE CBC W/AUTO DIFF WBC: CPT

## 2024-12-31 RX ORDER — FAMOTIDINE 10 MG/ML
20 INJECTION INTRAVENOUS ONCE AS NEEDED
OUTPATIENT
Start: 2025-01-02

## 2024-12-31 RX ORDER — ALBUTEROL SULFATE 0.83 MG/ML
3 SOLUTION RESPIRATORY (INHALATION) AS NEEDED
OUTPATIENT
Start: 2025-01-02

## 2024-12-31 RX ORDER — DIPHENHYDRAMINE HYDROCHLORIDE 50 MG/ML
50 INJECTION INTRAMUSCULAR; INTRAVENOUS AS NEEDED
Status: DISCONTINUED | OUTPATIENT
Start: 2024-12-31 | End: 2024-12-31 | Stop reason: HOSPADM

## 2024-12-31 RX ORDER — ALBUTEROL SULFATE 0.83 MG/ML
3 SOLUTION RESPIRATORY (INHALATION) AS NEEDED
Status: DISCONTINUED | OUTPATIENT
Start: 2024-12-31 | End: 2024-12-31 | Stop reason: HOSPADM

## 2024-12-31 RX ORDER — DIPHENHYDRAMINE HYDROCHLORIDE 50 MG/ML
50 INJECTION INTRAMUSCULAR; INTRAVENOUS AS NEEDED
OUTPATIENT
Start: 2025-01-02

## 2024-12-31 RX ORDER — EPINEPHRINE 0.3 MG/.3ML
0.3 INJECTION SUBCUTANEOUS EVERY 5 MIN PRN
Status: DISCONTINUED | OUTPATIENT
Start: 2024-12-31 | End: 2024-12-31 | Stop reason: HOSPADM

## 2024-12-31 RX ORDER — FAMOTIDINE 10 MG/ML
20 INJECTION INTRAVENOUS ONCE AS NEEDED
Status: DISCONTINUED | OUTPATIENT
Start: 2024-12-31 | End: 2024-12-31 | Stop reason: HOSPADM

## 2024-12-31 RX ORDER — EPINEPHRINE 0.3 MG/.3ML
0.3 INJECTION SUBCUTANEOUS EVERY 5 MIN PRN
OUTPATIENT
Start: 2025-01-02

## 2024-12-31 ASSESSMENT — PAIN SCALES - GENERAL: PAINLEVEL_OUTOF10: 0-NO PAIN

## 2025-01-02 ENCOUNTER — PHARMACY VISIT (OUTPATIENT)
Dept: PHARMACY | Facility: CLINIC | Age: 89
End: 2025-01-02
Payer: MEDICARE

## 2025-01-08 ENCOUNTER — APPOINTMENT (OUTPATIENT)
Dept: HEMATOLOGY/ONCOLOGY | Facility: HOSPITAL | Age: 89
End: 2025-01-08
Payer: MEDICARE

## 2025-01-15 ENCOUNTER — APPOINTMENT (OUTPATIENT)
Dept: HEMATOLOGY/ONCOLOGY | Facility: HOSPITAL | Age: 89
End: 2025-01-15
Payer: MEDICARE

## 2025-01-15 ENCOUNTER — OFFICE VISIT (OUTPATIENT)
Dept: HEMATOLOGY/ONCOLOGY | Facility: HOSPITAL | Age: 89
End: 2025-01-15
Payer: MEDICARE

## 2025-01-15 ENCOUNTER — HOSPITAL ENCOUNTER (OUTPATIENT)
Dept: RADIOLOGY | Facility: HOSPITAL | Age: 89
Discharge: HOME | End: 2025-01-15
Payer: MEDICARE

## 2025-01-15 VITALS
OXYGEN SATURATION: 96 % | BODY MASS INDEX: 28.49 KG/M2 | HEART RATE: 73 BPM | WEIGHT: 203.48 LBS | SYSTOLIC BLOOD PRESSURE: 131 MMHG | RESPIRATION RATE: 16 BRPM | TEMPERATURE: 97.5 F | HEIGHT: 71 IN | DIASTOLIC BLOOD PRESSURE: 69 MMHG

## 2025-01-15 DIAGNOSIS — C92.00 ACUTE MYELOID LEUKEMIA NOT HAVING ACHIEVED REMISSION (MULTI): ICD-10-CM

## 2025-01-15 DIAGNOSIS — C92.00 ACUTE MYELOID LEUKEMIA NOT HAVING ACHIEVED REMISSION (MULTI): Primary | ICD-10-CM

## 2025-01-15 LAB
ALBUMIN SERPL BCP-MCNC: 3.7 G/DL (ref 3.4–5)
ALP SERPL-CCNC: 126 U/L (ref 33–136)
ALT SERPL W P-5'-P-CCNC: 20 U/L (ref 10–52)
ANION GAP SERPL CALC-SCNC: 10 MMOL/L (ref 10–20)
AST SERPL W P-5'-P-CCNC: 29 U/L (ref 9–39)
BILIRUB SERPL-MCNC: 0.8 MG/DL (ref 0–1.2)
BUN SERPL-MCNC: 11 MG/DL (ref 6–23)
CALCIUM SERPL-MCNC: 8.8 MG/DL (ref 8.6–10.3)
CHLORIDE SERPL-SCNC: 102 MMOL/L (ref 98–107)
CO2 SERPL-SCNC: 29 MMOL/L (ref 21–32)
CREAT SERPL-MCNC: 0.73 MG/DL (ref 0.5–1.3)
EGFRCR SERPLBLD CKD-EPI 2021: 88 ML/MIN/1.73M*2
ERYTHROCYTE [DISTWIDTH] IN BLOOD BY AUTOMATED COUNT: ABNORMAL %
GLUCOSE SERPL-MCNC: 105 MG/DL (ref 74–99)
HCT VFR BLD AUTO: 25.3 % (ref 41–52)
HGB BLD-MCNC: 8.3 G/DL (ref 13.5–17.5)
LDH SERPL L TO P-CCNC: 386 U/L (ref 84–246)
MAGNESIUM SERPL-MCNC: 1.82 MG/DL (ref 1.6–2.4)
MCH RBC QN AUTO: 35 PG (ref 26–34)
MCHC RBC AUTO-ENTMCNC: 32.8 G/DL (ref 32–36)
MCV RBC AUTO: 107 FL (ref 80–100)
NRBC BLD-RTO: 0 /100 WBCS (ref 0–0)
PLATELET # BLD AUTO: 84 X10*3/UL (ref 150–450)
POTASSIUM SERPL-SCNC: 3.8 MMOL/L (ref 3.5–5.3)
PROT SERPL-MCNC: 7.2 G/DL (ref 6.4–8.2)
RBC # BLD AUTO: 2.37 X10*6/UL (ref 4.5–5.9)
SODIUM SERPL-SCNC: 137 MMOL/L (ref 136–145)
WBC # BLD AUTO: 2.3 X10*3/UL (ref 4.4–11.3)

## 2025-01-15 PROCEDURE — 71046 X-RAY EXAM CHEST 2 VIEWS: CPT

## 2025-01-15 PROCEDURE — 3075F SYST BP GE 130 - 139MM HG: CPT | Performed by: INTERNAL MEDICINE

## 2025-01-15 PROCEDURE — G2211 COMPLEX E/M VISIT ADD ON: HCPCS | Performed by: INTERNAL MEDICINE

## 2025-01-15 PROCEDURE — 83615 LACTATE (LD) (LDH) ENZYME: CPT

## 2025-01-15 PROCEDURE — 85027 COMPLETE CBC AUTOMATED: CPT

## 2025-01-15 PROCEDURE — 1123F ACP DISCUSS/DSCN MKR DOCD: CPT | Performed by: INTERNAL MEDICINE

## 2025-01-15 PROCEDURE — 83735 ASSAY OF MAGNESIUM: CPT

## 2025-01-15 PROCEDURE — 3078F DIAST BP <80 MM HG: CPT | Performed by: INTERNAL MEDICINE

## 2025-01-15 PROCEDURE — 99215 OFFICE O/P EST HI 40 MIN: CPT | Performed by: INTERNAL MEDICINE

## 2025-01-15 PROCEDURE — 36415 COLL VENOUS BLD VENIPUNCTURE: CPT

## 2025-01-15 PROCEDURE — 80053 COMPREHEN METABOLIC PANEL: CPT

## 2025-01-15 PROCEDURE — 1159F MED LIST DOCD IN RCRD: CPT | Performed by: INTERNAL MEDICINE

## 2025-01-15 PROCEDURE — 71046 X-RAY EXAM CHEST 2 VIEWS: CPT | Performed by: RADIOLOGY

## 2025-01-15 PROCEDURE — 1126F AMNT PAIN NOTED NONE PRSNT: CPT | Performed by: INTERNAL MEDICINE

## 2025-01-15 RX ORDER — EPINEPHRINE 0.3 MG/.3ML
0.3 INJECTION SUBCUTANEOUS EVERY 5 MIN PRN
OUTPATIENT
Start: 2025-01-23

## 2025-01-15 RX ORDER — DIPHENHYDRAMINE HYDROCHLORIDE 50 MG/ML
50 INJECTION INTRAMUSCULAR; INTRAVENOUS AS NEEDED
OUTPATIENT
Start: 2025-01-23

## 2025-01-15 RX ORDER — ALBUTEROL SULFATE 0.83 MG/ML
3 SOLUTION RESPIRATORY (INHALATION) AS NEEDED
OUTPATIENT
Start: 2025-01-23

## 2025-01-15 RX ORDER — FAMOTIDINE 10 MG/ML
20 INJECTION INTRAVENOUS ONCE AS NEEDED
OUTPATIENT
Start: 2025-01-23

## 2025-01-15 ASSESSMENT — ENCOUNTER SYMPTOMS
RESPIRATORY NEGATIVE: 1
FEVER: 0
CARDIOVASCULAR NEGATIVE: 1
GASTROINTESTINAL NEGATIVE: 1
FATIGUE: 1

## 2025-01-15 ASSESSMENT — PAIN SCALES - GENERAL: PAINLEVEL_OUTOF10: 0-NO PAIN

## 2025-01-15 NOTE — PROGRESS NOTES
"Patient ID: Chico Iqbal is a 89 y.o. male.    Subjective:  Returns for follow up for AML. He feels OK. Denies fever. Appetite good. Mild fatigue.    Heme/Onc History:  - p/w rectal pain in Aug 2024 => Was anemic with Hb of 6, elevated MCV, and Plt of 89 and low folate. Initially thought to be due to folate deficiency. Peripheral smear showed abnormal cells and flow cytometry revealed AML (65% of peripheral WBCs)  - He declined having bone marrow biopsy. NGS from peripheral blood (Aug 2024): jak2 V617F (6%), FLT3-ITD (3%), FLT3 mutation +  - Patient declined any chemotherapy but accepted to try FLT3 inhibitors => Quizartinib was denied by insurance  - Started giltertinib in Oct 2024 => Continued to require PRBCs every 14 days    Assessment/Plan:  ? 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 All these have poor prognosis but he looked exceptionally well. He declined bone marrow biopsy or any drastic treatment. Definitely did not want any chemo.    We started him on giltertinib and so far he is tolerating it OK. He has anemia and has been requiring 1 unit of PRBC every other week. Other counts are stable. Repeat flow cytometry in Nov 2024 showed 0.5% blasts down from 65% in Aug 2024 => Responding to treatment albeit still requiring PRBCs.     Today, he is as usual. No new complaints. Will continue with the same treatment    I provide longitudinal care for Mr. Iqbal for his acute leukemia    Review Of Systems:  Review of Systems   Constitutional:  Positive for fatigue. Negative for fever.   HENT:  Negative.     Respiratory: Negative.     Cardiovascular: Negative.    Gastrointestinal: Negative.    Genitourinary: Negative.         Physical Exam:  Temp 36.4 °C (97.5 °F) (Temporal)   Ht 1.803 m (5' 11\")   Wt 92.3 kg (203 lb 7.8 oz)   BMI 28.38 kg/m²   BSA: 2.15 meters squared  Performance Status: Symptomatic; fully ambulatory  Physical Exam  HENT:     "  Head: Normocephalic and atraumatic.   Eyes:      General: No scleral icterus.     Pupils: Pupils are equal, round, and reactive to light.   Cardiovascular:      Rate and Rhythm: Normal rate and regular rhythm.   Pulmonary:      Effort: Pulmonary effort is normal.   Abdominal:      General: Abdomen is flat.      Palpations: Abdomen is soft. There is no mass.   Musculoskeletal:         General: Normal range of motion.   Lymphadenopathy:      Cervical: No cervical adenopathy.   Skin:     Coloration: Skin is not jaundiced.   Neurological:      General: No focal deficit present.      Mental Status: He is alert and oriented to person, place, and time.         Results:  Diagnostic Results   Lab Results   Component Value Date    WBC 1.7 (L) 12/31/2024    HGB 7.4 (L) 12/31/2024    HCT 22.6 (L) 12/31/2024     (H) 12/31/2024    PLT 76 (L) 12/31/2024     Lab Results   Component Value Date    CALCIUM 8.5 (L) 11/26/2024     11/26/2024    K 4.0 11/26/2024    CO2 28 11/26/2024     11/26/2024    BUN 10 11/26/2024    CREATININE 0.76 11/26/2024    ALT 20 11/26/2024    AST 26 11/26/2024       Current Outpatient Medications:     atorvastatin (Lipitor) 40 mg tablet, Take 1 tablet (40 mg) by mouth once daily., Disp: 90 tablet, Rfl: 0    clopidogrel (Plavix) 75 mg tablet, Take one tablet by mouth daily, Disp: 90 tablet, Rfl: 0    docusate sodium (Colace) 100 mg capsule, Take 1 capsule (100 mg) by mouth 2 times a day., Disp: 60 capsule, Rfl: 0    finasteride (Proscar) 5 mg tablet, Take 1 tablet (5 mg) by mouth once daily. Do not crush, chew, or split., Disp: 90 tablet, Rfl: 0    folic acid (Folvite) 1 mg tablet, Take 1 tablet (1 mg) by mouth once daily., Disp: 90 tablet, Rfl: 0    furosemide (Lasix) 20 mg tablet, Take 1 tablet (20 mg) by mouth once daily. None on Tues and Thur, Disp: 90 tablet, Rfl: 0    gilteritinib (Xospata) 40 mg tablet, Take 3 tablets (120 mg total) by mouth once daily.  Swallow whole., Disp: 90  tablet, Rfl: 1    metoprolol succinate XL (Toprol-XL) 25 mg 24 hr tablet, Take 1 tablet (25 mg) by mouth once daily. Do not crush or chew., Disp: 90 tablet, Rfl: 0    pantoprazole (ProtoNix) 40 mg EC tablet, Take 1 tablet (40 mg) by mouth once daily in the morning. Take before meals. Do not crush, chew, or split., Disp: 90 tablet, Rfl: 0    tamsulosin (Flomax) 0.4 mg 24 hr capsule, Take 2 capsules (0.8 mg) by mouth once daily., Disp: 180 capsule, Rfl: 0    mirtazapine (Remeron) 15 mg tablet, Take 0.5 tablets (7.5 mg) by mouth once daily at bedtime. (Patient not taking: Reported on 1/15/2025), Disp: , Rfl:      Past Surgical History:   Procedure Laterality Date    CARDIAC CATHETERIZATION      CORONARY STENT PLACEMENT       Family History   Problem Relation Name Age of Onset    Diabetes Mother      Heart attack Brother        reports that he has been smoking cigars. He has been exposed to tobacco smoke. He has never used smokeless tobacco.    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   Nithin Coronado MD

## 2025-01-21 ENCOUNTER — SPECIALTY PHARMACY (OUTPATIENT)
Dept: PHARMACY | Facility: CLINIC | Age: 89
End: 2025-01-21

## 2025-01-21 ENCOUNTER — PHARMACY VISIT (OUTPATIENT)
Dept: PHARMACY | Facility: CLINIC | Age: 89
End: 2025-01-21
Payer: MEDICARE

## 2025-01-21 PROCEDURE — RXMED WILLOW AMBULATORY MEDICATION CHARGE

## 2025-01-21 NOTE — PROGRESS NOTES
[facility] Clinical Note    Chico Iqbal is a 89 y.o. male, who is on the specialty pharmacy service for management of:  Oncology Core.    Chico Iqbal is taking: Xospata.      Chico was contacted on 1/21/2025 at 11:41 AM for a virtual pharmacy visit with encounter number [id number] from:   [facility]  [facility] [facility] [address]  Dept: [phone]  Dept Fax: [phone]    Chico was offered a Telemedicine Video visit or Telephone visit.  Chico consented to a telephone visit, which was performed.    The most recent encounter visit with the referring prescriber Nithin Coronado MD on 1/15/2025 was reviewed.  Pharmacy will continue to collaborate in the care of this patient with the referring prescriber Nithin Steven MD.    General Assessment      Impression/Plan  IMPRESSION/PLAN:  Is patient high risk (potential patients:  pregnancy, geriatric, pediatric)? Geriatric   Is laboratory follow-up needed? No  Is a clinical intervention needed? No  Next reassessment date? ~4/21/2025  Additional comments:     Refer to the encounter summary report for documentation details about patient counseling and education.      Medication Adherence    The importance of adherence was discussed with the patient and they were advised to take the medication as prescribed by their provider. Patient was encouraged to call their physician's office if they have a question regarding a missed dose.     QOL/Patient Satisfaction  Rate your quality of life on scale of 1-10: 10 - Completely satisfied  Rate your satisfaction with  Specialty Pharmacy on scale of 1-10: 10 - Completely satisfied      Patient was advised to contact the pharmacy if there are any changes to their medication list, including prescriptions, OTC medications, herbal products, or supplements. Patient was advised of [facility]'s dispensing process, refill timeline, contact information  ([phone]), and patient management follow up. Patient confirmed understanding of education conducted during assessment. All patient questions and concerns were addressed to the best of my ability. Patient was encouraged to contact the specialty pharmacy with any questions or concerns.    Confirmed follow-up outreaches are properly scheduled and reviewed goals of therapy with the patient.        Bel Varela, PharmD

## 2025-01-23 ENCOUNTER — LAB (OUTPATIENT)
Dept: HEMATOLOGY/ONCOLOGY | Facility: HOSPITAL | Age: 89
End: 2025-01-23
Payer: MEDICARE

## 2025-01-23 VITALS
HEIGHT: 70 IN | BODY MASS INDEX: 29.43 KG/M2 | HEART RATE: 84 BPM | DIASTOLIC BLOOD PRESSURE: 64 MMHG | TEMPERATURE: 97.2 F | SYSTOLIC BLOOD PRESSURE: 141 MMHG | RESPIRATION RATE: 16 BRPM | OXYGEN SATURATION: 94 %

## 2025-01-23 DIAGNOSIS — C92.00 ACUTE MYELOID LEUKEMIA NOT HAVING ACHIEVED REMISSION (MULTI): ICD-10-CM

## 2025-01-23 LAB
BASOPHILS # BLD AUTO: 0.01 X10*3/UL (ref 0–0.1)
BASOPHILS NFR BLD AUTO: 0.4 %
BURR CELLS BLD QL SMEAR: NORMAL
EOSINOPHIL # BLD AUTO: 0.02 X10*3/UL (ref 0–0.4)
EOSINOPHIL NFR BLD AUTO: 0.9 %
ERYTHROCYTE [DISTWIDTH] IN BLOOD BY AUTOMATED COUNT: ABNORMAL %
HCT VFR BLD AUTO: 25.5 % (ref 41–52)
HGB BLD-MCNC: 8.6 G/DL (ref 13.5–17.5)
IMM GRANULOCYTES # BLD AUTO: 0.1 X10*3/UL (ref 0–0.5)
IMM GRANULOCYTES NFR BLD AUTO: 4.3 % (ref 0–0.9)
LYMPHOCYTES # BLD AUTO: 1.04 X10*3/UL (ref 0.8–3)
LYMPHOCYTES NFR BLD AUTO: 44.4 %
MCH RBC QN AUTO: 36.3 PG (ref 26–34)
MCHC RBC AUTO-ENTMCNC: 33.7 G/DL (ref 32–36)
MCV RBC AUTO: 108 FL (ref 80–100)
MONOCYTES # BLD AUTO: 0.58 X10*3/UL (ref 0.05–0.8)
MONOCYTES NFR BLD AUTO: 24.8 %
NEUTROPHILS # BLD AUTO: 0.59 X10*3/UL (ref 1.6–5.5)
NEUTROPHILS NFR BLD AUTO: 25.2 %
NRBC BLD-RTO: 0 /100 WBCS (ref 0–0)
OVALOCYTES BLD QL SMEAR: NORMAL
PLATELET # BLD AUTO: 72 X10*3/UL (ref 150–450)
POLYCHROMASIA BLD QL SMEAR: NORMAL
RBC # BLD AUTO: 2.37 X10*6/UL (ref 4.5–5.9)
RBC MORPH BLD: NORMAL
SCHISTOCYTES BLD QL SMEAR: NORMAL
WBC # BLD AUTO: 2.3 X10*3/UL (ref 4.4–11.3)

## 2025-01-23 PROCEDURE — 36415 COLL VENOUS BLD VENIPUNCTURE: CPT

## 2025-01-23 PROCEDURE — 85025 COMPLETE CBC W/AUTO DIFF WBC: CPT

## 2025-01-23 RX ORDER — ALBUTEROL SULFATE 0.83 MG/ML
3 SOLUTION RESPIRATORY (INHALATION) AS NEEDED
OUTPATIENT
Start: 2025-02-06

## 2025-01-23 RX ORDER — FAMOTIDINE 10 MG/ML
20 INJECTION INTRAVENOUS ONCE AS NEEDED
OUTPATIENT
Start: 2025-02-06

## 2025-01-23 RX ORDER — DIPHENHYDRAMINE HYDROCHLORIDE 50 MG/ML
50 INJECTION INTRAMUSCULAR; INTRAVENOUS AS NEEDED
OUTPATIENT
Start: 2025-02-06

## 2025-01-23 RX ORDER — EPINEPHRINE 0.3 MG/.3ML
0.3 INJECTION SUBCUTANEOUS EVERY 5 MIN PRN
OUTPATIENT
Start: 2025-02-06

## 2025-01-23 ASSESSMENT — PATIENT HEALTH QUESTIONNAIRE - PHQ9
SUM OF ALL RESPONSES TO PHQ9 QUESTIONS 1 & 2: 0
1. LITTLE INTEREST OR PLEASURE IN DOING THINGS: NOT AT ALL
2. FEELING DOWN, DEPRESSED OR HOPELESS: NOT AT ALL

## 2025-01-23 ASSESSMENT — ENCOUNTER SYMPTOMS
OCCASIONAL FEELINGS OF UNSTEADINESS: 0
DEPRESSION: 0
LOSS OF SENSATION IN FEET: 1

## 2025-01-23 ASSESSMENT — LIFESTYLE VARIABLES
HOW OFTEN DO YOU HAVE SIX OR MORE DRINKS ON ONE OCCASION: LESS THAN MONTHLY
HOW MANY STANDARD DRINKS CONTAINING ALCOHOL DO YOU HAVE ON A TYPICAL DAY: 1 OR 2
SKIP TO QUESTIONS 9-10: 0
HOW OFTEN DO YOU HAVE A DRINK CONTAINING ALCOHOL: MONTHLY OR LESS
AUDIT-C TOTAL SCORE: 2

## 2025-01-23 ASSESSMENT — PAIN SCALES - GENERAL: PAINLEVEL_OUTOF10: 0-NO PAIN

## 2025-01-30 ENCOUNTER — APPOINTMENT (OUTPATIENT)
Dept: HEMATOLOGY/ONCOLOGY | Facility: HOSPITAL | Age: 89
End: 2025-01-30
Payer: MEDICARE

## 2025-02-06 ENCOUNTER — APPOINTMENT (OUTPATIENT)
Dept: HEMATOLOGY/ONCOLOGY | Facility: HOSPITAL | Age: 89
End: 2025-02-06
Payer: MEDICARE

## 2025-02-06 VITALS
SYSTOLIC BLOOD PRESSURE: 114 MMHG | HEART RATE: 92 BPM | TEMPERATURE: 97.2 F | DIASTOLIC BLOOD PRESSURE: 69 MMHG | WEIGHT: 196.32 LBS | RESPIRATION RATE: 16 BRPM | OXYGEN SATURATION: 96 % | HEIGHT: 70 IN | BODY MASS INDEX: 28.11 KG/M2

## 2025-02-06 DIAGNOSIS — R53.83 OTHER FATIGUE: ICD-10-CM

## 2025-02-06 DIAGNOSIS — C92.00 ACUTE MYELOID LEUKEMIA NOT HAVING ACHIEVED REMISSION (MULTI): Primary | ICD-10-CM

## 2025-02-06 DIAGNOSIS — C92.00 ACUTE MYELOID LEUKEMIA NOT HAVING ACHIEVED REMISSION (MULTI): ICD-10-CM

## 2025-02-06 LAB
ALBUMIN SERPL BCP-MCNC: 3.6 G/DL (ref 3.4–5)
ALP SERPL-CCNC: 113 U/L (ref 33–136)
ALT SERPL W P-5'-P-CCNC: 14 U/L (ref 10–52)
ANION GAP SERPL CALC-SCNC: 11 MMOL/L (ref 10–20)
AST SERPL W P-5'-P-CCNC: 22 U/L (ref 9–39)
BASOPHILS # BLD MANUAL: 0 X10*3/UL (ref 0–0.1)
BASOPHILS NFR BLD MANUAL: 0 %
BILIRUB SERPL-MCNC: 0.9 MG/DL (ref 0–1.2)
BUN SERPL-MCNC: 15 MG/DL (ref 6–23)
BURR CELLS BLD QL SMEAR: ABNORMAL
CALCIUM SERPL-MCNC: 8.8 MG/DL (ref 8.6–10.3)
CHLORIDE SERPL-SCNC: 99 MMOL/L (ref 98–107)
CO2 SERPL-SCNC: 27 MMOL/L (ref 21–32)
CREAT SERPL-MCNC: 0.96 MG/DL (ref 0.5–1.3)
EGFRCR SERPLBLD CKD-EPI 2021: 76 ML/MIN/1.73M*2
EOSINOPHIL # BLD MANUAL: 0 X10*3/UL (ref 0–0.4)
EOSINOPHIL NFR BLD MANUAL: 0 %
ERYTHROCYTE [DISTWIDTH] IN BLOOD BY AUTOMATED COUNT: ABNORMAL %
FERRITIN SERPL-MCNC: 2337 NG/ML (ref 20–300)
FOLATE SERPL-MCNC: >24 NG/ML
GLUCOSE SERPL-MCNC: 121 MG/DL (ref 74–99)
HCT VFR BLD AUTO: 23.3 % (ref 41–52)
HGB BLD-MCNC: 7.8 G/DL (ref 13.5–17.5)
IMM GRANULOCYTES # BLD AUTO: 0.15 X10*3/UL (ref 0–0.5)
IMM GRANULOCYTES NFR BLD AUTO: 7.1 % (ref 0–0.9)
IRON SATN MFR SERPL: 32 % (ref 25–45)
IRON SERPL-MCNC: 77 UG/DL (ref 35–150)
LDH SERPL L TO P-CCNC: 384 U/L (ref 84–246)
LYMPHOCYTES # BLD MANUAL: 1.01 X10*3/UL (ref 0.8–3)
LYMPHOCYTES NFR BLD MANUAL: 48 %
MAGNESIUM SERPL-MCNC: 1.67 MG/DL (ref 1.6–2.4)
MCH RBC QN AUTO: 36.6 PG (ref 26–34)
MCHC RBC AUTO-ENTMCNC: 33.5 G/DL (ref 32–36)
MCV RBC AUTO: 109 FL (ref 80–100)
METAMYELOCYTES # BLD MANUAL: 0.02 X10*3/UL
METAMYELOCYTES NFR BLD MANUAL: 1 %
MONOCYTES # BLD MANUAL: 0.17 X10*3/UL (ref 0.05–0.8)
MONOCYTES NFR BLD MANUAL: 8 %
MYELOCYTES # BLD MANUAL: 0.11 X10*3/UL
MYELOCYTES NFR BLD MANUAL: 5 %
NEUTROPHILS # BLD MANUAL: 0.75 X10*3/UL (ref 1.6–5.5)
NEUTS BAND # BLD MANUAL: 0.06 X10*3/UL (ref 0–0.5)
NEUTS BAND NFR BLD MANUAL: 3 %
NEUTS SEG # BLD MANUAL: 0.69 X10*3/UL (ref 1.6–5)
NEUTS SEG NFR BLD MANUAL: 33 %
NRBC BLD-RTO: 0 /100 WBCS (ref 0–0)
OVALOCYTES BLD QL SMEAR: ABNORMAL
PLATELET # BLD AUTO: 67 X10*3/UL (ref 150–450)
POTASSIUM SERPL-SCNC: 3.5 MMOL/L (ref 3.5–5.3)
PROT SERPL-MCNC: 6.9 G/DL (ref 6.4–8.2)
RBC # BLD AUTO: 2.13 X10*6/UL (ref 4.5–5.9)
RBC MORPH BLD: ABNORMAL
SCHISTOCYTES BLD QL SMEAR: ABNORMAL
SODIUM SERPL-SCNC: 133 MMOL/L (ref 136–145)
TIBC SERPL-MCNC: 239 UG/DL (ref 240–445)
TOTAL CELLS COUNTED BLD: 100
TSH SERPL-ACNC: 1.25 MIU/L (ref 0.44–3.98)
UIBC SERPL-MCNC: 162 UG/DL (ref 110–370)
VARIANT LYMPHS # BLD MANUAL: 0.04 X10*3/UL (ref 0–0.3)
VARIANT LYMPHS NFR BLD: 2 %
VIT B12 SERPL-MCNC: 355 PG/ML (ref 211–911)
WBC # BLD AUTO: 2.1 X10*3/UL (ref 4.4–11.3)

## 2025-02-06 PROCEDURE — 36415 COLL VENOUS BLD VENIPUNCTURE: CPT

## 2025-02-06 PROCEDURE — 3078F DIAST BP <80 MM HG: CPT | Performed by: INTERNAL MEDICINE

## 2025-02-06 PROCEDURE — 83735 ASSAY OF MAGNESIUM: CPT | Performed by: INTERNAL MEDICINE

## 2025-02-06 PROCEDURE — 83540 ASSAY OF IRON: CPT | Performed by: INTERNAL MEDICINE

## 2025-02-06 PROCEDURE — 1123F ACP DISCUSS/DSCN MKR DOCD: CPT | Performed by: INTERNAL MEDICINE

## 2025-02-06 PROCEDURE — 3074F SYST BP LT 130 MM HG: CPT | Performed by: INTERNAL MEDICINE

## 2025-02-06 PROCEDURE — 84443 ASSAY THYROID STIM HORMONE: CPT | Performed by: INTERNAL MEDICINE

## 2025-02-06 PROCEDURE — 1159F MED LIST DOCD IN RCRD: CPT | Performed by: INTERNAL MEDICINE

## 2025-02-06 PROCEDURE — G2211 COMPLEX E/M VISIT ADD ON: HCPCS | Performed by: INTERNAL MEDICINE

## 2025-02-06 PROCEDURE — 1126F AMNT PAIN NOTED NONE PRSNT: CPT | Performed by: INTERNAL MEDICINE

## 2025-02-06 PROCEDURE — 99215 OFFICE O/P EST HI 40 MIN: CPT | Performed by: INTERNAL MEDICINE

## 2025-02-06 PROCEDURE — 85007 BL SMEAR W/DIFF WBC COUNT: CPT

## 2025-02-06 PROCEDURE — 82728 ASSAY OF FERRITIN: CPT | Performed by: INTERNAL MEDICINE

## 2025-02-06 PROCEDURE — 82607 VITAMIN B-12: CPT | Mod: GENLAB | Performed by: INTERNAL MEDICINE

## 2025-02-06 PROCEDURE — 83615 LACTATE (LD) (LDH) ENZYME: CPT | Performed by: INTERNAL MEDICINE

## 2025-02-06 PROCEDURE — 85027 COMPLETE CBC AUTOMATED: CPT

## 2025-02-06 PROCEDURE — 80053 COMPREHEN METABOLIC PANEL: CPT | Performed by: INTERNAL MEDICINE

## 2025-02-06 PROCEDURE — 82746 ASSAY OF FOLIC ACID SERUM: CPT | Mod: GENLAB | Performed by: INTERNAL MEDICINE

## 2025-02-06 RX ORDER — ALBUTEROL SULFATE 0.83 MG/ML
3 SOLUTION RESPIRATORY (INHALATION) AS NEEDED
OUTPATIENT
Start: 2025-02-20

## 2025-02-06 RX ORDER — EPINEPHRINE 0.3 MG/.3ML
0.3 INJECTION SUBCUTANEOUS EVERY 5 MIN PRN
OUTPATIENT
Start: 2025-02-20

## 2025-02-06 RX ORDER — FAMOTIDINE 10 MG/ML
20 INJECTION INTRAVENOUS ONCE AS NEEDED
OUTPATIENT
Start: 2025-02-20

## 2025-02-06 RX ORDER — DIPHENHYDRAMINE HYDROCHLORIDE 50 MG/ML
50 INJECTION INTRAMUSCULAR; INTRAVENOUS AS NEEDED
OUTPATIENT
Start: 2025-02-20

## 2025-02-06 ASSESSMENT — ENCOUNTER SYMPTOMS
FATIGUE: 1
FEVER: 0
RESPIRATORY NEGATIVE: 1
GASTROINTESTINAL NEGATIVE: 1
CARDIOVASCULAR NEGATIVE: 1

## 2025-02-06 ASSESSMENT — PAIN SCALES - GENERAL: PAINLEVEL_OUTOF10: 0-NO PAIN

## 2025-02-06 NOTE — PROGRESS NOTES
"Patient ID: Chico Iqbal is a 89 y.o. male.    Subjective:  Returns for follow up for AML. He feels OK. Denies fever. Appetite good. Mild fatigue.    Heme/Onc History:  - p/w rectal pain in Aug 2024 => Was anemic with Hb of 6, elevated MCV, and Plt of 89 and low folate. Initially thought to be due to folate deficiency. Peripheral smear showed abnormal cells and flow cytometry revealed AML (65% of peripheral WBCs)  - He declined having bone marrow biopsy. NGS from peripheral blood (Aug 2024): jak2 V617F (6%), FLT3-ITD (3%), FLT3 mutation +  - Patient declined any chemotherapy but accepted to try FLT3 inhibitors => Quizartinib was denied by insurance  - Started giltertinib in Oct 2024 => Continued to require PRBCs every 14 days then frequency decreased toward the end of 2024    Assessment/Plan:  ? 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 All these have poor prognosis but he looked exceptionally well. He declined bone marrow biopsy or any drastic treatment. Definitely did not want any chemo.    We started him on giltertinib and so far he is tolerating it OK. He has anemia and has been requiring 1 unit of PRBC every other week. But his last transfusion was 6 weeks. Maybe marrow is recuperating some. Other counts are OK.   Today, he feels more fatigued. Likely requires a transfusion. Will check labs today and decide.    I provide longitudinal care for Mr. Iqbal for his acute leukemia    Review Of Systems:  Review of Systems   Constitutional:  Positive for fatigue. Negative for fever.   HENT:  Negative.     Respiratory: Negative.     Cardiovascular: Negative.    Gastrointestinal: Negative.    Genitourinary: Negative.         Physical Exam:  /69 (BP Location: Left arm, Patient Position: Sitting, BP Cuff Size: Adult)   Pulse 92   Temp 36.2 °C (97.2 °F) (Temporal)   Resp 16   Ht 1.771 m (5' 9.72\")   Wt 89 kg (196 lb 5.1 oz)   SpO2 96%   BMI " 28.40 kg/m²   BSA: 2.09 meters squared  Performance Status: Symptomatic; fully ambulatory  Physical Exam  HENT:      Head: Normocephalic and atraumatic.   Eyes:      General: No scleral icterus.     Pupils: Pupils are equal, round, and reactive to light.   Cardiovascular:      Rate and Rhythm: Normal rate and regular rhythm.   Pulmonary:      Effort: Pulmonary effort is normal.   Abdominal:      General: Abdomen is flat.      Palpations: Abdomen is soft. There is no mass.   Musculoskeletal:         General: Normal range of motion.   Lymphadenopathy:      Cervical: No cervical adenopathy.   Skin:     Coloration: Skin is not jaundiced.   Neurological:      General: No focal deficit present.      Mental Status: He is alert and oriented to person, place, and time.         Results:  Diagnostic Results   Lab Results   Component Value Date    WBC 2.3 (L) 01/23/2025    HGB 8.6 (L) 01/23/2025    HCT 25.5 (L) 01/23/2025     (H) 01/23/2025    PLT 72 (L) 01/23/2025     Lab Results   Component Value Date    CALCIUM 8.8 01/15/2025     01/15/2025    K 3.8 01/15/2025    CO2 29 01/15/2025     01/15/2025    BUN 11 01/15/2025    CREATININE 0.73 01/15/2025    ALT 20 01/15/2025    AST 29 01/15/2025       Current Outpatient Medications:     atorvastatin (Lipitor) 40 mg tablet, Take 1 tablet (40 mg) by mouth once daily., Disp: 90 tablet, Rfl: 0    clopidogrel (Plavix) 75 mg tablet, Take one tablet by mouth daily, Disp: 90 tablet, Rfl: 0    docusate sodium (Colace) 100 mg capsule, Take 1 capsule (100 mg) by mouth 2 times a day., Disp: 60 capsule, Rfl: 0    finasteride (Proscar) 5 mg tablet, Take 1 tablet (5 mg) by mouth once daily. Do not crush, chew, or split., Disp: 90 tablet, Rfl: 0    folic acid (Folvite) 1 mg tablet, Take 1 tablet (1 mg) by mouth once daily., Disp: 90 tablet, Rfl: 0    furosemide (Lasix) 20 mg tablet, Take 1 tablet (20 mg) by mouth once daily. None on Tues and Thur, Disp: 90 tablet, Rfl: 0     gilteritinib (Xospata) 40 mg tablet, Take 3 tablets (120 mg total) by mouth once daily.  Swallow whole., Disp: 90 tablet, Rfl: 1    metoprolol succinate XL (Toprol-XL) 25 mg 24 hr tablet, Take 1 tablet (25 mg) by mouth once daily. Do not crush or chew., Disp: 90 tablet, Rfl: 0    pantoprazole (ProtoNix) 40 mg EC tablet, Take 1 tablet (40 mg) by mouth once daily in the morning. Take before meals. Do not crush, chew, or split., Disp: 90 tablet, Rfl: 0    tamsulosin (Flomax) 0.4 mg 24 hr capsule, Take 2 capsules (0.8 mg) by mouth once daily., Disp: 180 capsule, Rfl: 0    mirtazapine (Remeron) 15 mg tablet, Take 0.5 tablets (7.5 mg) by mouth once daily at bedtime. (Patient not taking: Reported on 11/27/2024), Disp: , Rfl:      Past Surgical History:   Procedure Laterality Date    CARDIAC CATHETERIZATION      CORONARY STENT PLACEMENT       Family History   Problem Relation Name Age of Onset    Diabetes Mother      Heart attack Brother        reports that he has been smoking cigars. He has been exposed to tobacco smoke. He has never used smokeless tobacco.    Diagnoses and all orders for this visit:  Acute myeloid leukemia not having achieved remission (Multi)  -     Clinic Appointment Request  -     Comprehensive Metabolic Panel; Future  -     Magnesium; Future  -     Vitamin B12; Future  -     Ferritin; Future  -     Folate; Future  -     Iron and TIBC; Future  -     Thyroid Stimulating Hormone; Future  -     Lactate Dehydrogenase; Future  -     Clinic Appointment Request Follow up; Future  -     CBC; Future  -     Comprehensive Metabolic Panel; Future  -     Lactate Dehydrogenase; Future  -     Vitamin B12; Future  -     Ferritin; Future  -     Thyroid Stimulating Hormone; Future  -     Folate; Future  -     Iron and TIBC; Future  Other fatigue  -     Thyroid Stimulating Hormone; Future  -     Thyroid Stimulating Hormone; Future       I spent more than 40 minutes for the patient today, including face-to-face  conversation, pre-visit preparation, post-visit orders, and others.   Nithin Coronado MD

## 2025-02-07 ENCOUNTER — APPOINTMENT (OUTPATIENT)
Dept: PRIMARY CARE | Facility: CLINIC | Age: 89
End: 2025-02-07
Payer: MEDICARE

## 2025-02-07 VITALS
DIASTOLIC BLOOD PRESSURE: 78 MMHG | BODY MASS INDEX: 29.86 KG/M2 | OXYGEN SATURATION: 95 % | WEIGHT: 197 LBS | HEART RATE: 87 BPM | HEIGHT: 68 IN | RESPIRATION RATE: 18 BRPM | SYSTOLIC BLOOD PRESSURE: 136 MMHG

## 2025-02-07 DIAGNOSIS — I48.0 PAF (PAROXYSMAL ATRIAL FIBRILLATION) (MULTI): ICD-10-CM

## 2025-02-07 DIAGNOSIS — I25.10 CORONARY ARTERY DISEASE INVOLVING NATIVE HEART, UNSPECIFIED VESSEL OR LESION TYPE, UNSPECIFIED WHETHER ANGINA PRESENT: ICD-10-CM

## 2025-02-07 DIAGNOSIS — Z00.00 HEALTH CARE MAINTENANCE: ICD-10-CM

## 2025-02-07 DIAGNOSIS — C92.00 ACUTE MYELOID LEUKEMIA NOT HAVING ACHIEVED REMISSION (MULTI): ICD-10-CM

## 2025-02-07 PROCEDURE — 3075F SYST BP GE 130 - 139MM HG: CPT | Performed by: INTERNAL MEDICINE

## 2025-02-07 PROCEDURE — 99213 OFFICE O/P EST LOW 20 MIN: CPT | Performed by: INTERNAL MEDICINE

## 2025-02-07 PROCEDURE — 1126F AMNT PAIN NOTED NONE PRSNT: CPT | Performed by: INTERNAL MEDICINE

## 2025-02-07 PROCEDURE — 1159F MED LIST DOCD IN RCRD: CPT | Performed by: INTERNAL MEDICINE

## 2025-02-07 PROCEDURE — 1123F ACP DISCUSS/DSCN MKR DOCD: CPT | Performed by: INTERNAL MEDICINE

## 2025-02-07 PROCEDURE — 1160F RVW MEDS BY RX/DR IN RCRD: CPT | Performed by: INTERNAL MEDICINE

## 2025-02-07 PROCEDURE — 3078F DIAST BP <80 MM HG: CPT | Performed by: INTERNAL MEDICINE

## 2025-02-07 RX ORDER — TADALAFIL 20 MG/1
TABLET ORAL
COMMUNITY

## 2025-02-07 RX ORDER — FLUTICASONE PROPIONATE 50 MCG
1 SPRAY, SUSPENSION (ML) NASAL
COMMUNITY
Start: 2024-11-15

## 2025-02-07 ASSESSMENT — PAIN SCALES - GENERAL: PAINLEVEL_OUTOF10: 0-NO PAIN

## 2025-02-07 NOTE — PROGRESS NOTES
Patient ID:   Chico Iqbal is a 89 y.o. male with PMH remarkable for acute myeloid leukemia, HTN, HLD, COPD, BPH, Afib, who presents to the office today for Follow-up (6 month).    HEALTH MAINTENANCE: Follow Up  Last Office Visit: 6/6/2024 for annual medicare wellness visit. Did trochanteric bursitis injection to right hip in the office that day. Rechecked CBC d/o anemia on prev labs.  - pt had a hospitalization in Aug 2024 for anemia and constipation.. He received a total of 4 PRBCS. Labs showed acute leukemia and he saw Oncology. He declined bone marrow biopsy or chemo but he accepted to try FLT3 inhibitors. Started giltertinib in Oct 2024. Continued to require PRBCs every 14 days then frequency decreased toward the end of 2024  Last Labs: 2/6/2025  PSA Screening (starting at age 55 till 69): 0.87 on 7/24/2023  Colonoscopy (45-75 or age 40 with 1st degree relative dx colon ca): declines  Lung cancer screening (50-78 y/o x 20 pk yr for at least 20 yrs + current smoker OR quit in last 15 years, no CT w/I last year): 3/7/2023 showed RLL calcified granuloma.  DEXA (65+, q 2 years women and 70+ men): 2017 osteopenia of left femoral neck and left total hip  Echo 3/9/2023 showed LVSF 40%, pseudonormal pattern of LVDF. LA mild to moderately dilated. Global and segmental LV hypokinesis. CVP elevated.   Carotid US done 6/26/2024 showed 20-49% bilaterally.    Following Providers:  Oncology/Heme: Dr Coronado  Urology: Dr Yancey    Feels weak, SOB, BOOKER.  Having some constipation.   Denies dysuria, chest pain.     Social History     Tobacco Use    Smoking status: Some Days     Types: Cigars     Passive exposure: Current    Smokeless tobacco: Never   Vaping Use    Vaping status: Never Used   Substance Use Topics    Alcohol use: Not Currently     Comment: OCCASIONAL, 2-3 BEERS DAILY    Drug use: Never     Review of Systems   Constitutional:  Positive for fatigue.   HENT: Negative.     Eyes: Negative.    Respiratory:  Positive  "for cough and shortness of breath.    Cardiovascular: Negative.    Gastrointestinal: Negative.    Endocrine: Negative.    Genitourinary: Negative.    Musculoskeletal: Negative.    Skin: Negative.    Neurological:  Positive for weakness.   Psychiatric/Behavioral: Negative.     All other systems reviewed and are negative.    Visit Vitals  /78 (BP Location: Left arm, Patient Position: Sitting)   Pulse 87   Resp 18   Ht 1.72 m (5' 7.72\")   Wt 89.4 kg (197 lb)   SpO2 95%   BMI 30.20 kg/m²   Smoking Status Some Days   BSA 2.07 m²     Physical Exam  Vitals reviewed. Exam conducted with a chaperone present.   Constitutional:       Appearance: Normal appearance. He is well-developed. He is ill-appearing.   HENT:      Head: Normocephalic.      Right Ear: External ear normal.      Left Ear: External ear normal.      Nose: Rhinorrhea present.      Mouth/Throat:      Lips: Pink.      Mouth: Mucous membranes are dry.   Eyes:      General: Lids are normal.      Pupils: Pupils are equal, round, and reactive to light.   Neck:      Trachea: Trachea normal.   Cardiovascular:      Rate and Rhythm: Normal rate and regular rhythm.      Heart sounds: Normal heart sounds.   Pulmonary:      Effort: Pulmonary effort is normal.      Breath sounds: Decreased breath sounds present.   Abdominal:      General: Bowel sounds are normal.      Palpations: Abdomen is soft.   Musculoskeletal:      Cervical back: Full passive range of motion without pain.      Right lower le+ Edema present.      Left lower le+ Edema present.   Skin:     General: Skin is warm and moist.      Findings: Bruising present.   Neurological:      General: No focal deficit present.      Mental Status: He is alert and oriented to person, place, and time. Mental status is at baseline.      Motor: Weakness present.   Psychiatric:         Attention and Perception: Attention normal.         Mood and Affect: Mood normal.         Speech: Speech normal.         Behavior: " Behavior is cooperative.         Thought Content: Thought content normal.         Cognition and Memory: Cognition normal.         Judgment: Judgment normal.       Current Outpatient Medications   Medication Instructions    atorvastatin (LIPITOR) 40 mg, oral, Daily    clopidogrel (Plavix) 75 mg tablet Take one tablet by mouth daily    docusate sodium (COLACE) 100 mg, oral, 2 times daily    finasteride (PROSCAR) 5 mg, oral, Daily, Do not crush, chew, or split.    fluticasone (Flonase) 50 mcg/actuation nasal spray 1 spray, Daily RT    folic acid (FOLVITE) 1 mg, oral, Daily    furosemide (LASIX) 20 mg, oral, Daily, None on Tues and Thur    metoprolol succinate XL (TOPROL-XL) 25 mg, oral, Daily, Do not crush or chew.    mirtazapine (REMERON) 7.5 mg, Nightly    pantoprazole (PROTONIX) 40 mg, oral, Daily before breakfast, Do not crush, chew, or split.     tadalafil 20 mg tablet Take one tablet prior to sexual intercourse.    tamsulosin (FLOMAX) 0.8 mg, oral, Daily    Xospata 120 mg, oral, Daily, Swallow whole.      Lab Results   Component Value Date    WBC 2.1 (L) 02/06/2025    HGB 7.8 (L) 02/06/2025    HCT 23.3 (L) 02/06/2025    PLT 67 (L) 02/06/2025    CHOL 102 07/24/2023    TRIG 114 07/24/2023    HDL 28.8 (A) 07/24/2023    ALT 14 02/06/2025    AST 22 02/06/2025     (L) 02/06/2025    K 3.5 02/06/2025    CL 99 02/06/2025    CREATININE 0.96 02/06/2025    BUN 15 02/06/2025    CO2 27 02/06/2025    TSH 1.25 02/06/2025    INR 1.2 (H) 08/28/2024    HGBA1C 6.1 (A) 03/08/2022       Problem List Items Addressed This Visit             ICD-10-CM    CAD (coronary artery disease) I25.10     Echo 3/9/2023 showed LVSF 40%, pseudonormal pattern of LVDF. LA mild to moderately dilated. Global and segmental LV hypokinesis. CVP elevated.   Carotid US done 6/26/2024 showed 20-49% bilaterally.  - c/w plavix, statin         Relevant Medications    tadalafil 20 mg tablet    PAF (paroxysmal atrial fibrillation) (Multi) I48.0     - c/w  eliqus for stroke prevention. He is on 2.5mg eliqus BID.  - c/w metoprolol for rate control         Relevant Medications    tadalafil 20 mg tablet    Acute myeloid leukemia not having achieved remission (Multi) C92.00     - pt had a hospitalization in Aug 2024 for anemia and constipation.. He received a total of 4 PRBCS. Labs showed acute leukemia and he saw Oncology. He declined bone marrow biopsy or chemo but he accepted to try FLT3 inhibitors. Started giltertinib in Oct 2024. Continued to require PRBCs every 14 days then frequency decreased toward the end of 2024  - following with Dr Coronado  - reviewed documentation          Other Visit Diagnoses         Codes    [facility]     Z00.00            --------------------  Written by Annalise Arellano RN, acting as a scribe for Dr. Hubbard. This note accurately reflects the work and decisions made by Dr. Hubbard.     I, Dr. Hubbard, attest all medical record entries made by the scribe were under my direction and were personally dictated by me. I have reviewed the chart and agree that the record accurately reflects my performance of the history, physical exam, and assessment and plan.

## 2025-02-11 PROBLEM — A41.9 SEPSIS (MULTI): Status: RESOLVED | Noted: 2024-08-26 | Resolved: 2025-02-11

## 2025-02-11 PROBLEM — A41.9 SEPSIS DUE TO URINARY TRACT INFECTION (MULTI): Status: RESOLVED | Noted: 2024-08-26 | Resolved: 2025-02-11

## 2025-02-11 PROBLEM — N39.0 SEPSIS DUE TO URINARY TRACT INFECTION (MULTI): Status: RESOLVED | Noted: 2024-08-26 | Resolved: 2025-02-11

## 2025-02-11 ASSESSMENT — ENCOUNTER SYMPTOMS
WEAKNESS: 1
FATIGUE: 1
MUSCULOSKELETAL NEGATIVE: 1
EYES NEGATIVE: 1
COUGH: 1
CARDIOVASCULAR NEGATIVE: 1
GASTROINTESTINAL NEGATIVE: 1
SHORTNESS OF BREATH: 1
ENDOCRINE NEGATIVE: 1
PSYCHIATRIC NEGATIVE: 1

## 2025-02-11 NOTE — ASSESSMENT & PLAN NOTE
- pt had a hospitalization in Aug 2024 for anemia and constipation.. He received a total of 4 PRBCS. Labs showed acute leukemia and he saw Oncology. He declined bone marrow biopsy or chemo but he accepted to try FLT3 inhibitors. Started giltertinib in Oct 2024. Continued to require PRBCs every 14 days then frequency decreased toward the end of 2024  - following with Dr Coronado  - reviewed documentation

## 2025-02-11 NOTE — ASSESSMENT & PLAN NOTE
Echo 3/9/2023 showed LVSF 40%, pseudonormal pattern of LVDF. LA mild to moderately dilated. Global and segmental LV hypokinesis. CVP elevated.   Carotid US done 6/26/2024 showed 20-49% bilaterally.  - c/w plavix, statin

## 2025-02-13 ENCOUNTER — APPOINTMENT (OUTPATIENT)
Dept: HEMATOLOGY/ONCOLOGY | Facility: HOSPITAL | Age: 89
End: 2025-02-13
Payer: MEDICARE

## 2025-02-18 ENCOUNTER — SPECIALTY PHARMACY (OUTPATIENT)
Dept: PHARMACY | Facility: CLINIC | Age: 89
End: 2025-02-18

## 2025-02-18 DIAGNOSIS — C92.00 ACUTE MYELOID LEUKEMIA NOT HAVING ACHIEVED REMISSION (MULTI): ICD-10-CM

## 2025-02-19 ENCOUNTER — SPECIALTY PHARMACY (OUTPATIENT)
Dept: PHARMACY | Facility: CLINIC | Age: 89
End: 2025-02-19

## 2025-02-19 PROCEDURE — RXMED WILLOW AMBULATORY MEDICATION CHARGE

## 2025-02-20 ENCOUNTER — APPOINTMENT (OUTPATIENT)
Dept: HEMATOLOGY/ONCOLOGY | Facility: HOSPITAL | Age: 89
End: 2025-02-20
Payer: MEDICARE

## 2025-02-20 VITALS
RESPIRATION RATE: 16 BRPM | DIASTOLIC BLOOD PRESSURE: 65 MMHG | OXYGEN SATURATION: 95 % | TEMPERATURE: 98.1 F | SYSTOLIC BLOOD PRESSURE: 125 MMHG | HEART RATE: 90 BPM

## 2025-02-20 DIAGNOSIS — C92.00 ACUTE MYELOID LEUKEMIA NOT HAVING ACHIEVED REMISSION (MULTI): ICD-10-CM

## 2025-02-20 LAB
ABO GROUP (TYPE) IN BLOOD: NORMAL
ANTIBODY SCREEN: NORMAL
BASOPHILS # BLD AUTO: ABNORMAL 10*3/UL
BASOPHILS NFR BLD AUTO: ABNORMAL %
EOSINOPHIL # BLD AUTO: ABNORMAL 10*3/UL
EOSINOPHIL NFR BLD AUTO: ABNORMAL %
ERYTHROCYTE [DISTWIDTH] IN BLOOD BY AUTOMATED COUNT: 22.5 % (ref 11.5–14.5)
HCT VFR BLD AUTO: 24.5 % (ref 41–52)
HGB BLD-MCNC: 7.8 G/DL (ref 13.5–17.5)
IMM GRANULOCYTES # BLD AUTO: 0.17 X10*3/UL (ref 0–0.5)
IMM GRANULOCYTES NFR BLD AUTO: 7.2 % (ref 0–0.9)
LYMPHOCYTES # BLD AUTO: ABNORMAL 10*3/UL
LYMPHOCYTES NFR BLD AUTO: ABNORMAL %
MCH RBC QN AUTO: 37.1 PG (ref 26–34)
MCHC RBC AUTO-ENTMCNC: 31.8 G/DL (ref 32–36)
MCV RBC AUTO: 117 FL (ref 80–100)
MONOCYTES # BLD AUTO: ABNORMAL 10*3/UL
MONOCYTES NFR BLD AUTO: ABNORMAL %
NEUTROPHILS # BLD AUTO: ABNORMAL 10*3/UL
NEUTROPHILS NFR BLD AUTO: ABNORMAL %
NRBC BLD-RTO: 0 /100 WBCS (ref 0–0)
PLATELET # BLD AUTO: 61 X10*3/UL (ref 150–450)
RBC # BLD AUTO: 2.1 X10*6/UL (ref 4.5–5.9)
RH FACTOR (ANTIGEN D): NORMAL
WBC # BLD AUTO: 2.4 X10*3/UL (ref 4.4–11.3)

## 2025-02-20 PROCEDURE — 86901 BLOOD TYPING SEROLOGIC RH(D): CPT

## 2025-02-20 PROCEDURE — 36415 COLL VENOUS BLD VENIPUNCTURE: CPT

## 2025-02-20 PROCEDURE — 85027 COMPLETE CBC AUTOMATED: CPT

## 2025-02-20 RX ORDER — ALBUTEROL SULFATE 0.83 MG/ML
3 SOLUTION RESPIRATORY (INHALATION) AS NEEDED
OUTPATIENT
Start: 2025-03-06

## 2025-02-20 RX ORDER — DIPHENHYDRAMINE HYDROCHLORIDE 50 MG/ML
50 INJECTION INTRAMUSCULAR; INTRAVENOUS AS NEEDED
OUTPATIENT
Start: 2025-03-06

## 2025-02-20 RX ORDER — EPINEPHRINE 0.3 MG/.3ML
0.3 INJECTION SUBCUTANEOUS EVERY 5 MIN PRN
OUTPATIENT
Start: 2025-03-06

## 2025-02-20 RX ORDER — FAMOTIDINE 10 MG/ML
20 INJECTION, SOLUTION INTRAVENOUS ONCE AS NEEDED
OUTPATIENT
Start: 2025-03-06

## 2025-02-20 ASSESSMENT — PAIN SCALES - GENERAL: PAINLEVEL_OUTOF10: 0-NO PAIN

## 2025-02-21 LAB — PATH REVIEW-CBC DIFFERENTIAL: NORMAL

## 2025-03-04 ENCOUNTER — PHARMACY VISIT (OUTPATIENT)
Dept: PHARMACY | Facility: CLINIC | Age: 89
End: 2025-03-04
Payer: MEDICARE

## 2025-03-06 ENCOUNTER — APPOINTMENT (OUTPATIENT)
Dept: HEMATOLOGY/ONCOLOGY | Facility: HOSPITAL | Age: 89
End: 2025-03-06
Payer: MEDICARE

## 2025-03-06 VITALS
DIASTOLIC BLOOD PRESSURE: 65 MMHG | OXYGEN SATURATION: 97 % | RESPIRATION RATE: 17 BRPM | SYSTOLIC BLOOD PRESSURE: 105 MMHG | HEART RATE: 71 BPM | TEMPERATURE: 97 F

## 2025-03-06 DIAGNOSIS — R53.83 OTHER FATIGUE: ICD-10-CM

## 2025-03-06 DIAGNOSIS — C92.00 ACUTE MYELOID LEUKEMIA NOT HAVING ACHIEVED REMISSION (MULTI): ICD-10-CM

## 2025-03-06 LAB
ABO GROUP (TYPE) IN BLOOD: NORMAL
ALBUMIN SERPL BCP-MCNC: 3.3 G/DL (ref 3.4–5)
ALP SERPL-CCNC: 97 U/L (ref 33–136)
ALT SERPL W P-5'-P-CCNC: 12 U/L (ref 10–52)
ANION GAP SERPL CALC-SCNC: 9 MMOL/L (ref 10–20)
ANTIBODY SCREEN: NORMAL
AST SERPL W P-5'-P-CCNC: 22 U/L (ref 9–39)
BASOPHILS # BLD MANUAL: 0 X10*3/UL (ref 0–0.1)
BASOPHILS NFR BLD MANUAL: 0 %
BILIRUB SERPL-MCNC: 0.7 MG/DL (ref 0–1.2)
BLOOD EXPIRATION DATE: NORMAL
BUN SERPL-MCNC: 11 MG/DL (ref 6–23)
CALCIUM SERPL-MCNC: 8.5 MG/DL (ref 8.6–10.3)
CHLORIDE SERPL-SCNC: 98 MMOL/L (ref 98–107)
CO2 SERPL-SCNC: 28 MMOL/L (ref 21–32)
CREAT SERPL-MCNC: 0.91 MG/DL (ref 0.5–1.3)
DACRYOCYTES BLD QL SMEAR: ABNORMAL
DISPENSE STATUS: NORMAL
EGFRCR SERPLBLD CKD-EPI 2021: 81 ML/MIN/1.73M*2
EOSINOPHIL # BLD MANUAL: 0.03 X10*3/UL (ref 0–0.4)
EOSINOPHIL NFR BLD MANUAL: 1 %
ERYTHROCYTE [DISTWIDTH] IN BLOOD BY AUTOMATED COUNT: 19.4 % (ref 11.5–14.5)
FERRITIN SERPL-MCNC: 2004 NG/ML (ref 20–300)
FOLATE SERPL-MCNC: >24 NG/ML
GLUCOSE SERPL-MCNC: 145 MG/DL (ref 74–99)
HCT VFR BLD AUTO: 22.9 % (ref 41–52)
HGB BLD-MCNC: 7.3 G/DL (ref 13.5–17.5)
IMM GRANULOCYTES # BLD AUTO: 0.2 X10*3/UL (ref 0–0.5)
IMM GRANULOCYTES NFR BLD AUTO: 8.1 % (ref 0–0.9)
IRON SATN MFR SERPL: 13 % (ref 25–45)
IRON SERPL-MCNC: 26 UG/DL (ref 35–150)
LDH SERPL L TO P-CCNC: 401 U/L (ref 84–246)
LYMPHOCYTES # BLD MANUAL: 0.85 X10*3/UL (ref 0.8–3)
LYMPHOCYTES NFR BLD MANUAL: 34 %
MCH RBC QN AUTO: 38 PG (ref 26–34)
MCHC RBC AUTO-ENTMCNC: 31.9 G/DL (ref 32–36)
MCV RBC AUTO: 119 FL (ref 80–100)
METAMYELOCYTES # BLD MANUAL: 0.33 X10*3/UL
METAMYELOCYTES NFR BLD MANUAL: 13 %
MONOCYTES # BLD MANUAL: 0.15 X10*3/UL (ref 0.05–0.8)
MONOCYTES NFR BLD MANUAL: 6 %
MYELOCYTES # BLD MANUAL: 0.3 X10*3/UL
MYELOCYTES NFR BLD MANUAL: 12 %
NEUTROPHILS # BLD MANUAL: 0.8 X10*3/UL (ref 1.6–5.5)
NEUTS BAND # BLD MANUAL: 0.15 X10*3/UL (ref 0–0.5)
NEUTS BAND NFR BLD MANUAL: 6 %
NEUTS SEG # BLD MANUAL: 0.65 X10*3/UL (ref 1.6–5)
NEUTS SEG NFR BLD MANUAL: 26 %
NRBC BLD-RTO: 0 /100 WBCS (ref 0–0)
OVALOCYTES BLD QL SMEAR: ABNORMAL
PLATELET # BLD AUTO: 65 X10*3/UL (ref 150–450)
POLYCHROMASIA BLD QL SMEAR: ABNORMAL
POTASSIUM SERPL-SCNC: 4.2 MMOL/L (ref 3.5–5.3)
PRODUCT BLOOD TYPE: 6200
PRODUCT CODE: NORMAL
PROMYELOCYTES # BLD MANUAL: 0.05 X10*3/UL
PROMYELOCYTES NFR BLD MANUAL: 2 %
PROT SERPL-MCNC: 7.3 G/DL (ref 6.4–8.2)
RBC # BLD AUTO: 1.92 X10*6/UL (ref 4.5–5.9)
RBC MORPH BLD: ABNORMAL
RH FACTOR (ANTIGEN D): NORMAL
SCHISTOCYTES BLD QL SMEAR: ABNORMAL
SODIUM SERPL-SCNC: 131 MMOL/L (ref 136–145)
TIBC SERPL-MCNC: 208 UG/DL (ref 240–445)
TOTAL CELLS COUNTED BLD: 100
TSH SERPL-ACNC: 1.25 MIU/L (ref 0.44–3.98)
UIBC SERPL-MCNC: 182 UG/DL (ref 110–370)
UNIT ABO: NORMAL
UNIT NUMBER: NORMAL
UNIT RH: NORMAL
UNIT VOLUME: 350
VIT B12 SERPL-MCNC: 421 PG/ML (ref 211–911)
WBC # BLD AUTO: 2.5 X10*3/UL (ref 4.4–11.3)
XM INTEP: NORMAL

## 2025-03-06 PROCEDURE — 36430 TRANSFUSION BLD/BLD COMPNT: CPT

## 2025-03-06 PROCEDURE — 82728 ASSAY OF FERRITIN: CPT

## 2025-03-06 PROCEDURE — 82746 ASSAY OF FOLIC ACID SERUM: CPT | Mod: GENLAB

## 2025-03-06 PROCEDURE — 85027 COMPLETE CBC AUTOMATED: CPT

## 2025-03-06 PROCEDURE — 84443 ASSAY THYROID STIM HORMONE: CPT

## 2025-03-06 PROCEDURE — 36415 COLL VENOUS BLD VENIPUNCTURE: CPT

## 2025-03-06 PROCEDURE — 85007 BL SMEAR W/DIFF WBC COUNT: CPT

## 2025-03-06 PROCEDURE — 83615 LACTATE (LD) (LDH) ENZYME: CPT

## 2025-03-06 PROCEDURE — 80053 COMPREHEN METABOLIC PANEL: CPT

## 2025-03-06 PROCEDURE — 82607 VITAMIN B-12: CPT | Mod: GENLAB

## 2025-03-06 PROCEDURE — 83540 ASSAY OF IRON: CPT

## 2025-03-06 PROCEDURE — 86901 BLOOD TYPING SEROLOGIC RH(D): CPT

## 2025-03-06 PROCEDURE — P9040 RBC LEUKOREDUCED IRRADIATED: HCPCS

## 2025-03-06 PROCEDURE — 86920 COMPATIBILITY TEST SPIN: CPT

## 2025-03-06 RX ORDER — ALBUTEROL SULFATE 0.83 MG/ML
3 SOLUTION RESPIRATORY (INHALATION) AS NEEDED
Status: DISCONTINUED | OUTPATIENT
Start: 2025-03-06 | End: 2025-03-06 | Stop reason: HOSPADM

## 2025-03-06 RX ORDER — ALBUTEROL SULFATE 0.83 MG/ML
3 SOLUTION RESPIRATORY (INHALATION) AS NEEDED
OUTPATIENT
Start: 2025-03-20

## 2025-03-06 RX ORDER — EPINEPHRINE 0.3 MG/.3ML
0.3 INJECTION SUBCUTANEOUS EVERY 5 MIN PRN
Status: DISCONTINUED | OUTPATIENT
Start: 2025-03-06 | End: 2025-03-06 | Stop reason: HOSPADM

## 2025-03-06 RX ORDER — DIPHENHYDRAMINE HYDROCHLORIDE 50 MG/ML
50 INJECTION INTRAMUSCULAR; INTRAVENOUS AS NEEDED
OUTPATIENT
Start: 2025-03-20

## 2025-03-06 RX ORDER — DIPHENHYDRAMINE HYDROCHLORIDE 50 MG/ML
50 INJECTION INTRAMUSCULAR; INTRAVENOUS AS NEEDED
Status: DISCONTINUED | OUTPATIENT
Start: 2025-03-06 | End: 2025-03-06 | Stop reason: HOSPADM

## 2025-03-06 RX ORDER — FAMOTIDINE 10 MG/ML
20 INJECTION, SOLUTION INTRAVENOUS ONCE AS NEEDED
Status: DISCONTINUED | OUTPATIENT
Start: 2025-03-06 | End: 2025-03-06 | Stop reason: HOSPADM

## 2025-03-06 RX ORDER — FAMOTIDINE 10 MG/ML
20 INJECTION, SOLUTION INTRAVENOUS ONCE AS NEEDED
OUTPATIENT
Start: 2025-03-20

## 2025-03-06 RX ORDER — EPINEPHRINE 0.3 MG/.3ML
0.3 INJECTION SUBCUTANEOUS EVERY 5 MIN PRN
OUTPATIENT
Start: 2025-03-20

## 2025-03-06 ASSESSMENT — PAIN SCALES - GENERAL: PAINLEVEL_OUTOF10: 0-NO PAIN

## 2025-03-17 DIAGNOSIS — K21.9 CHRONIC GERD: ICD-10-CM

## 2025-03-17 DIAGNOSIS — E78.00 HYPERCHOLESTEROLEMIA: ICD-10-CM

## 2025-03-17 DIAGNOSIS — C92.00 ACUTE MYELOID LEUKEMIA NOT HAVING ACHIEVED REMISSION (MULTI): ICD-10-CM

## 2025-03-17 DIAGNOSIS — I25.10 CORONARY ARTERY DISEASE, UNSPECIFIED VESSEL OR LESION TYPE, UNSPECIFIED WHETHER ANGINA PRESENT, UNSPECIFIED WHETHER NATIVE OR TRANSPLANTED HEART: ICD-10-CM

## 2025-03-17 DIAGNOSIS — I10 BENIGN HYPERTENSION: ICD-10-CM

## 2025-03-17 DIAGNOSIS — N40.0 BENIGN PROSTATIC HYPERPLASIA, UNSPECIFIED WHETHER LOWER URINARY TRACT SYMPTOMS PRESENT: ICD-10-CM

## 2025-03-17 RX ORDER — METOPROLOL SUCCINATE 25 MG/1
25 TABLET, EXTENDED RELEASE ORAL DAILY
Qty: 90 TABLET | Refills: 0 | Status: SHIPPED | OUTPATIENT
Start: 2025-03-17 | End: 2025-06-15

## 2025-03-17 RX ORDER — ATORVASTATIN CALCIUM 40 MG/1
40 TABLET, FILM COATED ORAL DAILY
Qty: 90 TABLET | Refills: 0 | Status: SHIPPED | OUTPATIENT
Start: 2025-03-17

## 2025-03-17 RX ORDER — TAMSULOSIN HYDROCHLORIDE 0.4 MG/1
0.8 CAPSULE ORAL DAILY
Qty: 180 CAPSULE | Refills: 0 | Status: SHIPPED | OUTPATIENT
Start: 2025-03-17 | End: 2026-03-17

## 2025-03-17 RX ORDER — FUROSEMIDE 20 MG/1
20 TABLET ORAL DAILY
Qty: 90 TABLET | Refills: 0 | Status: SHIPPED | OUTPATIENT
Start: 2025-03-17

## 2025-03-17 RX ORDER — FINASTERIDE 5 MG/1
5 TABLET, FILM COATED ORAL DAILY
Qty: 90 TABLET | Refills: 0 | Status: SHIPPED | OUTPATIENT
Start: 2025-03-17 | End: 2026-03-17

## 2025-03-17 RX ORDER — CLOPIDOGREL BISULFATE 75 MG/1
TABLET ORAL
Qty: 90 TABLET | Refills: 0 | Status: SHIPPED | OUTPATIENT
Start: 2025-03-17

## 2025-03-17 RX ORDER — PANTOPRAZOLE SODIUM 40 MG/1
40 TABLET, DELAYED RELEASE ORAL
Qty: 90 TABLET | Refills: 0 | Status: SHIPPED | OUTPATIENT
Start: 2025-03-17

## 2025-03-20 ENCOUNTER — APPOINTMENT (OUTPATIENT)
Dept: HEMATOLOGY/ONCOLOGY | Facility: HOSPITAL | Age: 89
End: 2025-03-20
Payer: MEDICARE

## 2025-03-20 ENCOUNTER — OFFICE VISIT (OUTPATIENT)
Dept: HEMATOLOGY/ONCOLOGY | Facility: HOSPITAL | Age: 89
End: 2025-03-20
Payer: MEDICARE

## 2025-03-20 VITALS
BODY MASS INDEX: 26.42 KG/M2 | RESPIRATION RATE: 16 BRPM | OXYGEN SATURATION: 96 % | TEMPERATURE: 96.8 F | WEIGHT: 188.71 LBS | HEIGHT: 71 IN | SYSTOLIC BLOOD PRESSURE: 123 MMHG | HEART RATE: 84 BPM | DIASTOLIC BLOOD PRESSURE: 70 MMHG

## 2025-03-20 DIAGNOSIS — C92.00 ACUTE MYELOID LEUKEMIA NOT HAVING ACHIEVED REMISSION (MULTI): ICD-10-CM

## 2025-03-20 LAB
ALBUMIN SERPL BCP-MCNC: 3.2 G/DL (ref 3.4–5)
ALP SERPL-CCNC: 99 U/L (ref 33–136)
ALT SERPL W P-5'-P-CCNC: 16 U/L (ref 10–52)
ANION GAP SERPL CALC-SCNC: 10 MMOL/L (ref 10–20)
AST SERPL W P-5'-P-CCNC: 22 U/L (ref 9–39)
BASOPHILS # BLD MANUAL: 0 X10*3/UL (ref 0–0.1)
BASOPHILS NFR BLD MANUAL: 0 %
BILIRUB SERPL-MCNC: 0.8 MG/DL (ref 0–1.2)
BUN SERPL-MCNC: 13 MG/DL (ref 6–23)
CALCIUM SERPL-MCNC: 8.6 MG/DL (ref 8.6–10.3)
CHLORIDE SERPL-SCNC: 98 MMOL/L (ref 98–107)
CO2 SERPL-SCNC: 28 MMOL/L (ref 21–32)
CREAT SERPL-MCNC: 0.89 MG/DL (ref 0.5–1.3)
EGFRCR SERPLBLD CKD-EPI 2021: 82 ML/MIN/1.73M*2
EOSINOPHIL # BLD MANUAL: 0 X10*3/UL (ref 0–0.4)
EOSINOPHIL NFR BLD MANUAL: 0 %
ERYTHROCYTE [DISTWIDTH] IN BLOOD BY AUTOMATED COUNT: 20.3 % (ref 11.5–14.5)
GLUCOSE SERPL-MCNC: 115 MG/DL (ref 74–99)
HCT VFR BLD AUTO: 25.4 % (ref 41–52)
HGB BLD-MCNC: 7.9 G/DL (ref 13.5–17.5)
IMM GRANULOCYTES # BLD AUTO: 0.11 X10*3/UL (ref 0–0.5)
IMM GRANULOCYTES NFR BLD AUTO: 4.4 % (ref 0–0.9)
LDH SERPL L TO P-CCNC: 369 U/L (ref 84–246)
LYMPHOCYTES # BLD MANUAL: 0.75 X10*3/UL (ref 0.8–3)
LYMPHOCYTES NFR BLD MANUAL: 30 %
MCH RBC QN AUTO: 37.1 PG (ref 26–34)
MCHC RBC AUTO-ENTMCNC: 31.1 G/DL (ref 32–36)
MCV RBC AUTO: 119 FL (ref 80–100)
MONOCYTES # BLD MANUAL: 0.83 X10*3/UL (ref 0.05–0.8)
MONOCYTES NFR BLD MANUAL: 33 %
MYELOCYTES # BLD MANUAL: 0.05 X10*3/UL
MYELOCYTES NFR BLD MANUAL: 2 %
NEUTROPHILS # BLD MANUAL: 0.88 X10*3/UL (ref 1.6–5.5)
NEUTS BAND # BLD MANUAL: 0.1 X10*3/UL (ref 0–0.5)
NEUTS BAND NFR BLD MANUAL: 4 %
NEUTS SEG # BLD MANUAL: 0.78 X10*3/UL (ref 1.6–5)
NEUTS SEG NFR BLD MANUAL: 31 %
NRBC BLD-RTO: 0 /100 WBCS (ref 0–0)
PLATELET # BLD AUTO: 61 X10*3/UL (ref 150–450)
POLYCHROMASIA BLD QL SMEAR: ABNORMAL
POTASSIUM SERPL-SCNC: 3.8 MMOL/L (ref 3.5–5.3)
PROT SERPL-MCNC: 7.2 G/DL (ref 6.4–8.2)
RBC # BLD AUTO: 2.13 X10*6/UL (ref 4.5–5.9)
RBC MORPH BLD: ABNORMAL
SCHISTOCYTES BLD QL SMEAR: ABNORMAL
SODIUM SERPL-SCNC: 132 MMOL/L (ref 136–145)
TOTAL CELLS COUNTED BLD: 100
WBC # BLD AUTO: 2.5 X10*3/UL (ref 4.4–11.3)

## 2025-03-20 PROCEDURE — 1126F AMNT PAIN NOTED NONE PRSNT: CPT | Performed by: INTERNAL MEDICINE

## 2025-03-20 PROCEDURE — 85007 BL SMEAR W/DIFF WBC COUNT: CPT

## 2025-03-20 PROCEDURE — G2211 COMPLEX E/M VISIT ADD ON: HCPCS | Performed by: INTERNAL MEDICINE

## 2025-03-20 PROCEDURE — 88185 FLOWCYTOMETRY/TC ADD-ON: CPT | Mod: TC,GENLAB

## 2025-03-20 PROCEDURE — 1123F ACP DISCUSS/DSCN MKR DOCD: CPT | Performed by: INTERNAL MEDICINE

## 2025-03-20 PROCEDURE — 36415 COLL VENOUS BLD VENIPUNCTURE: CPT

## 2025-03-20 PROCEDURE — 99215 OFFICE O/P EST HI 40 MIN: CPT | Performed by: INTERNAL MEDICINE

## 2025-03-20 PROCEDURE — 83615 LACTATE (LD) (LDH) ENZYME: CPT

## 2025-03-20 PROCEDURE — 80053 COMPREHEN METABOLIC PANEL: CPT

## 2025-03-20 PROCEDURE — 85027 COMPLETE CBC AUTOMATED: CPT

## 2025-03-20 PROCEDURE — 1159F MED LIST DOCD IN RCRD: CPT | Performed by: INTERNAL MEDICINE

## 2025-03-20 PROCEDURE — 3078F DIAST BP <80 MM HG: CPT | Performed by: INTERNAL MEDICINE

## 2025-03-20 PROCEDURE — 3074F SYST BP LT 130 MM HG: CPT | Performed by: INTERNAL MEDICINE

## 2025-03-20 ASSESSMENT — PATIENT HEALTH QUESTIONNAIRE - PHQ9
1. LITTLE INTEREST OR PLEASURE IN DOING THINGS: NOT AT ALL
SUM OF ALL RESPONSES TO PHQ9 QUESTIONS 1 AND 2: 0
1. LITTLE INTEREST OR PLEASURE IN DOING THINGS: NOT AT ALL
2. FEELING DOWN, DEPRESSED OR HOPELESS: NOT AT ALL
2. FEELING DOWN, DEPRESSED OR HOPELESS: NOT AT ALL
SUM OF ALL RESPONSES TO PHQ9 QUESTIONS 1 & 2: 0

## 2025-03-20 ASSESSMENT — PAIN SCALES - GENERAL: PAINLEVEL_OUTOF10: 0-NO PAIN

## 2025-03-20 ASSESSMENT — ENCOUNTER SYMPTOMS
FEVER: 0
GASTROINTESTINAL NEGATIVE: 1
RESPIRATORY NEGATIVE: 1
FATIGUE: 1
DEPRESSION: 0
OCCASIONAL FEELINGS OF UNSTEADINESS: 0
CARDIOVASCULAR NEGATIVE: 1
LOSS OF SENSATION IN FEET: 1

## 2025-03-20 ASSESSMENT — COLUMBIA-SUICIDE SEVERITY RATING SCALE - C-SSRS
6. HAVE YOU EVER DONE ANYTHING, STARTED TO DO ANYTHING, OR PREPARED TO DO ANYTHING TO END YOUR LIFE?: NO
2. HAVE YOU ACTUALLY HAD ANY THOUGHTS OF KILLING YOURSELF?: NO
1. IN THE PAST MONTH, HAVE YOU WISHED YOU WERE DEAD OR WISHED YOU COULD GO TO SLEEP AND NOT WAKE UP?: NO
6. HAVE YOU EVER DONE ANYTHING, STARTED TO DO ANYTHING, OR PREPARED TO DO ANYTHING TO END YOUR LIFE?: NO
2. HAVE YOU ACTUALLY HAD ANY THOUGHTS OF KILLING YOURSELF?: NO
1. IN THE PAST MONTH, HAVE YOU WISHED YOU WERE DEAD OR WISHED YOU COULD GO TO SLEEP AND NOT WAKE UP?: NO

## 2025-03-20 NOTE — PROGRESS NOTES
Patient scored 7 on PG-SGA, patient and family state he has been eating just fine. Has been much more activity with the improved weather. Do not feel he needs consult at this time.

## 2025-03-20 NOTE — PROGRESS NOTES
"Patient ID: Chico Iqbal is a 89 y.o. male.    Subjective:  Returns for follow up for AML. He feels OK. Denies fever. Appetite good. Mild fatigue.    Heme/Onc History:  - p/w rectal pain in Aug 2024 => Was anemic with Hb of 6, elevated MCV, and Plt of 89 and low folate. Initially thought to be due to folate deficiency. Peripheral smear showed abnormal cells and flow cytometry revealed AML (65% of peripheral WBCs)  - He declined having bone marrow biopsy. 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 Flow from blood in Dec 2024 showed decreased peripheral blasts    Assessment/Plan:  ? 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 All these have poor prognosis but he looked exceptionally well. He declined bone marrow biopsy or any drastic treatment. Definitely did not want any chemo.    We started him on giltertinib and so far he is tolerating it OK. His peripheral blast count decreased. He has anemia and was requiring 1 unit of PRBC every other week. But his transfusion requirements decreased. Last one was 2 weeks ago. Will check labs today and every 2 weeks. RTC 6 wks.     I provide longitudinal care for Mr. Iqbal for his acute leukemia    Review Of Systems:  Review of Systems   Constitutional:  Positive for fatigue. Negative for fever.   HENT:  Negative.     Respiratory: Negative.     Cardiovascular: Negative.    Gastrointestinal: Negative.    Genitourinary: Negative.         Physical Exam:  /70 (BP Location: Left arm, Patient Position: Sitting, BP Cuff Size: Adult)   Pulse 84   Temp 36 °C (96.8 °F) (Temporal)   Resp 16   Ht 1.803 m (5' 11\")   Wt 85.6 kg (188 lb " 11.4 oz)   SpO2 96%   BMI 26.32 kg/m²   BSA: 2.07 meters squared  Performance Status: Symptomatic; fully ambulatory  Physical Exam  HENT:      Head: Normocephalic and atraumatic.   Eyes:      General: No scleral icterus.     Pupils: Pupils are equal, round, and reactive to light.   Cardiovascular:      Rate and Rhythm: Normal rate and regular rhythm.   Pulmonary:      Effort: Pulmonary effort is normal.   Abdominal:      General: Abdomen is flat.      Palpations: Abdomen is soft. There is no mass.   Musculoskeletal:         General: Normal range of motion.   Lymphadenopathy:      Cervical: No cervical adenopathy.   Skin:     Coloration: Skin is not jaundiced.   Neurological:      General: No focal deficit present.      Mental Status: He is alert and oriented to person, place, and time.         Results:  Diagnostic Results   Lab Results   Component Value Date    WBC 2.5 (L) 03/06/2025    HGB 7.3 (L) 03/06/2025    HCT 22.9 (L) 03/06/2025     (H) 03/06/2025    PLT 65 (L) 03/06/2025     Lab Results   Component Value Date    CALCIUM 8.5 (L) 03/06/2025     (L) 03/06/2025    K 4.2 03/06/2025    CO2 28 03/06/2025    CL 98 03/06/2025    BUN 11 03/06/2025    CREATININE 0.91 03/06/2025    ALT 12 03/06/2025    AST 22 03/06/2025       Current Outpatient Medications:     atorvastatin (Lipitor) 40 mg tablet, Take 1 tablet (40 mg) by mouth once daily., Disp: 90 tablet, Rfl: 0    clopidogrel (Plavix) 75 mg tablet, Take one tablet by mouth daily, Disp: 90 tablet, Rfl: 0    docusate sodium (Colace) 100 mg capsule, Take 1 capsule (100 mg) by mouth 2 times a day., Disp: 60 capsule, Rfl: 0    finasteride (Proscar) 5 mg tablet, Take 1 tablet (5 mg) by mouth once daily. Do not crush, chew, or split., Disp: 90 tablet, Rfl: 0    fluticasone (Flonase) 50 mcg/actuation nasal spray, 1 spray once daily., Disp: , Rfl:     folic acid (Folvite) 1 mg tablet, Take 1 tablet (1 mg) by mouth once daily., Disp: 90 tablet, Rfl: 0     furosemide (Lasix) 20 mg tablet, Take 1 tablet (20 mg) by mouth once daily. None on Tues and Thur, Disp: 90 tablet, Rfl: 0    gilteritinib (Xospata) 40 mg tablet, Take 3 tablets (120 mg total) by mouth once daily.  Swallow whole., Disp: 90 tablet, Rfl: 1    metoprolol succinate XL (Toprol-XL) 25 mg 24 hr tablet, Take 1 tablet (25 mg) by mouth once daily. Do not crush or chew., Disp: 90 tablet, Rfl: 0    mirtazapine (Remeron) 15 mg tablet, Take 0.5 tablets (7.5 mg) by mouth once daily at bedtime., Disp: , Rfl:     pantoprazole (ProtoNix) 40 mg EC tablet, Take 1 tablet (40 mg) by mouth once daily in the morning. Take before meals. Do not crush, chew, or split., Disp: 90 tablet, Rfl: 0    tadalafil 20 mg tablet, Take one tablet prior to sexual intercourse., Disp: , Rfl:     tamsulosin (Flomax) 0.4 mg 24 hr capsule, Take 2 capsules (0.8 mg) by mouth once daily., Disp: 180 capsule, Rfl: 0     Past Surgical History:   Procedure Laterality Date    CARDIAC CATHETERIZATION      CORONARY STENT PLACEMENT       Family History   Problem Relation Name Age of Onset    Diabetes Mother      Heart attack Brother        reports that he has been smoking cigars. He has been exposed to tobacco smoke. He has never used smokeless tobacco.    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   Nithin Coronado MD

## 2025-03-21 LAB
CELL COUNT (BLOOD): 2.5 X10*3/UL
CELL POPULATIONS: NORMAL
DIAGNOSIS: NORMAL
FLOW DIFFERENTIAL: NORMAL
FLOW TEST ORDERED: NORMAL
LAB TEST METHOD: NORMAL
NUMBER OF CELLS COLLECTED: NORMAL
PATH REPORT.TOTAL CANCER: NORMAL
SIGNATURE COMMENT: NORMAL
SPECIMEN VIABILITY: NORMAL

## 2025-03-21 PROCEDURE — RXMED WILLOW AMBULATORY MEDICATION CHARGE

## 2025-03-24 ENCOUNTER — SPECIALTY PHARMACY (OUTPATIENT)
Dept: PHARMACY | Facility: CLINIC | Age: 89
End: 2025-03-24

## 2025-03-28 ENCOUNTER — PHARMACY VISIT (OUTPATIENT)
Dept: PHARMACY | Facility: CLINIC | Age: 89
End: 2025-03-28
Payer: MEDICARE

## 2025-04-03 ENCOUNTER — APPOINTMENT (OUTPATIENT)
Dept: HEMATOLOGY/ONCOLOGY | Facility: HOSPITAL | Age: 89
End: 2025-04-03
Payer: MEDICARE

## 2025-04-03 VITALS
BODY MASS INDEX: 26.6 KG/M2 | OXYGEN SATURATION: 97 % | SYSTOLIC BLOOD PRESSURE: 123 MMHG | HEART RATE: 101 BPM | RESPIRATION RATE: 16 BRPM | WEIGHT: 190.7 LBS | DIASTOLIC BLOOD PRESSURE: 67 MMHG

## 2025-04-03 DIAGNOSIS — C92.00 ACUTE MYELOID LEUKEMIA NOT HAVING ACHIEVED REMISSION (MULTI): ICD-10-CM

## 2025-04-03 LAB
BASOPHILS # BLD MANUAL: 0.03 X10*3/UL (ref 0–0.1)
BASOPHILS NFR BLD MANUAL: 1 %
BLASTS # BLD MANUAL: 0.03 X10*3/UL
BLASTS NFR BLD MANUAL: 1 %
BURR CELLS BLD QL SMEAR: ABNORMAL
DACRYOCYTES BLD QL SMEAR: ABNORMAL
EOSINOPHIL # BLD MANUAL: 0 X10*3/UL (ref 0–0.4)
EOSINOPHIL NFR BLD MANUAL: 0 %
ERYTHROCYTE [DISTWIDTH] IN BLOOD BY AUTOMATED COUNT: 19.3 % (ref 11.5–14.5)
GIANT PLATELETS BLD QL SMEAR: ABNORMAL
HCT VFR BLD AUTO: 24 % (ref 41–52)
HGB BLD-MCNC: 7.7 G/DL (ref 13.5–17.5)
HYPOCHROMIA BLD QL SMEAR: ABNORMAL
IMM GRANULOCYTES # BLD AUTO: 0.15 X10*3/UL (ref 0–0.5)
IMM GRANULOCYTES NFR BLD AUTO: 5.7 % (ref 0–0.9)
LYMPHOCYTES # BLD MANUAL: 0.88 X10*3/UL (ref 0.8–3)
LYMPHOCYTES NFR BLD MANUAL: 34 %
MCH RBC QN AUTO: 37.9 PG (ref 26–34)
MCHC RBC AUTO-ENTMCNC: 32.1 G/DL (ref 32–36)
MCV RBC AUTO: 118 FL (ref 80–100)
MONOCYTES # BLD MANUAL: 0.57 X10*3/UL (ref 0.05–0.8)
MONOCYTES NFR BLD MANUAL: 22 %
NEUTROPHILS # BLD MANUAL: 1.09 X10*3/UL (ref 1.6–5.5)
NEUTS BAND # BLD MANUAL: 0.18 X10*3/UL (ref 0–0.5)
NEUTS BAND NFR BLD MANUAL: 7 %
NEUTS SEG # BLD MANUAL: 0.91 X10*3/UL (ref 1.6–5)
NEUTS SEG NFR BLD MANUAL: 35 %
NRBC BLD-RTO: 0 /100 WBCS (ref 0–0)
OVALOCYTES BLD QL SMEAR: ABNORMAL
PLATELET # BLD AUTO: 64 X10*3/UL (ref 150–450)
POLYCHROMASIA BLD QL SMEAR: ABNORMAL
RBC # BLD AUTO: 2.03 X10*6/UL (ref 4.5–5.9)
RBC MORPH BLD: ABNORMAL
TOTAL CELLS COUNTED BLD: 100
WBC # BLD AUTO: 2.6 X10*3/UL (ref 4.4–11.3)

## 2025-04-03 PROCEDURE — 85007 BL SMEAR W/DIFF WBC COUNT: CPT

## 2025-04-03 PROCEDURE — 36415 COLL VENOUS BLD VENIPUNCTURE: CPT

## 2025-04-03 PROCEDURE — 85027 COMPLETE CBC AUTOMATED: CPT

## 2025-04-03 RX ORDER — ALBUTEROL SULFATE 0.83 MG/ML
3 SOLUTION RESPIRATORY (INHALATION) AS NEEDED
OUTPATIENT
Start: 2025-04-17

## 2025-04-03 RX ORDER — FAMOTIDINE 10 MG/ML
20 INJECTION, SOLUTION INTRAVENOUS ONCE AS NEEDED
OUTPATIENT
Start: 2025-04-17

## 2025-04-03 RX ORDER — EPINEPHRINE 0.3 MG/.3ML
0.3 INJECTION SUBCUTANEOUS EVERY 5 MIN PRN
OUTPATIENT
Start: 2025-04-17

## 2025-04-03 RX ORDER — DIPHENHYDRAMINE HYDROCHLORIDE 50 MG/ML
50 INJECTION, SOLUTION INTRAMUSCULAR; INTRAVENOUS AS NEEDED
OUTPATIENT
Start: 2025-04-17

## 2025-04-03 ASSESSMENT — PAIN SCALES - GENERAL: PAINLEVEL_OUTOF10: 0-NO PAIN

## 2025-04-04 LAB — PATH REVIEW-CBC DIFFERENTIAL: NORMAL

## 2025-04-17 ENCOUNTER — SPECIALTY PHARMACY (OUTPATIENT)
Dept: PHARMACY | Facility: CLINIC | Age: 89
End: 2025-04-17

## 2025-04-17 ENCOUNTER — APPOINTMENT (OUTPATIENT)
Dept: HEMATOLOGY/ONCOLOGY | Facility: HOSPITAL | Age: 89
End: 2025-04-17
Payer: MEDICARE

## 2025-04-17 VITALS
TEMPERATURE: 98.2 F | HEART RATE: 73 BPM | DIASTOLIC BLOOD PRESSURE: 69 MMHG | SYSTOLIC BLOOD PRESSURE: 117 MMHG | OXYGEN SATURATION: 95 % | RESPIRATION RATE: 17 BRPM

## 2025-04-17 DIAGNOSIS — C92.00 ACUTE MYELOID LEUKEMIA NOT HAVING ACHIEVED REMISSION (MULTI): ICD-10-CM

## 2025-04-17 LAB
ABO GROUP (TYPE) IN BLOOD: NORMAL
ANTIBODY SCREEN: NORMAL
BLOOD EXPIRATION DATE: NORMAL
DISPENSE STATUS: NORMAL
ERYTHROCYTE [DISTWIDTH] IN BLOOD BY AUTOMATED COUNT: 18.8 % (ref 11.5–14.5)
HCT VFR BLD AUTO: 22.8 % (ref 41–52)
HGB BLD-MCNC: 7.1 G/DL (ref 13.5–17.5)
MCH RBC QN AUTO: 37.8 PG (ref 26–34)
MCHC RBC AUTO-ENTMCNC: 31.1 G/DL (ref 32–36)
MCV RBC AUTO: 121 FL (ref 80–100)
NRBC BLD-RTO: 0 /100 WBCS (ref 0–0)
PLATELET # BLD AUTO: 57 X10*3/UL (ref 150–450)
PRODUCT BLOOD TYPE: NORMAL
PRODUCT CODE: NORMAL
RBC # BLD AUTO: 1.88 X10*6/UL (ref 4.5–5.9)
RH FACTOR (ANTIGEN D): NORMAL
UNIT ABO: NORMAL
UNIT NUMBER: NORMAL
UNIT RH: NORMAL
UNIT VOLUME: 350
WBC # BLD AUTO: 2.5 X10*3/UL (ref 4.4–11.3)
XM INTEP: NORMAL

## 2025-04-17 PROCEDURE — 86920 COMPATIBILITY TEST SPIN: CPT

## 2025-04-17 PROCEDURE — 36430 TRANSFUSION BLD/BLD COMPNT: CPT

## 2025-04-17 PROCEDURE — 36415 COLL VENOUS BLD VENIPUNCTURE: CPT

## 2025-04-17 PROCEDURE — 86901 BLOOD TYPING SEROLOGIC RH(D): CPT

## 2025-04-17 PROCEDURE — RXMED WILLOW AMBULATORY MEDICATION CHARGE

## 2025-04-17 PROCEDURE — 85027 COMPLETE CBC AUTOMATED: CPT | Performed by: INTERNAL MEDICINE

## 2025-04-17 PROCEDURE — P9040 RBC LEUKOREDUCED IRRADIATED: HCPCS

## 2025-04-17 RX ORDER — DIPHENHYDRAMINE HYDROCHLORIDE 50 MG/ML
50 INJECTION, SOLUTION INTRAMUSCULAR; INTRAVENOUS AS NEEDED
OUTPATIENT
Start: 2025-05-01

## 2025-04-17 RX ORDER — DIPHENHYDRAMINE HYDROCHLORIDE 50 MG/ML
50 INJECTION, SOLUTION INTRAMUSCULAR; INTRAVENOUS AS NEEDED
Status: DISCONTINUED | OUTPATIENT
Start: 2025-04-17 | End: 2025-04-17 | Stop reason: HOSPADM

## 2025-04-17 RX ORDER — ALBUTEROL SULFATE 0.83 MG/ML
3 SOLUTION RESPIRATORY (INHALATION) AS NEEDED
Status: DISCONTINUED | OUTPATIENT
Start: 2025-04-17 | End: 2025-04-17 | Stop reason: HOSPADM

## 2025-04-17 RX ORDER — FAMOTIDINE 10 MG/ML
20 INJECTION, SOLUTION INTRAVENOUS ONCE AS NEEDED
Status: DISCONTINUED | OUTPATIENT
Start: 2025-04-17 | End: 2025-04-17 | Stop reason: HOSPADM

## 2025-04-17 RX ORDER — ALBUTEROL SULFATE 0.83 MG/ML
3 SOLUTION RESPIRATORY (INHALATION) AS NEEDED
OUTPATIENT
Start: 2025-05-01

## 2025-04-17 RX ORDER — FAMOTIDINE 10 MG/ML
20 INJECTION, SOLUTION INTRAVENOUS ONCE AS NEEDED
OUTPATIENT
Start: 2025-05-01

## 2025-04-17 RX ORDER — EPINEPHRINE 0.3 MG/.3ML
0.3 INJECTION SUBCUTANEOUS EVERY 5 MIN PRN
Status: DISCONTINUED | OUTPATIENT
Start: 2025-04-17 | End: 2025-04-17 | Stop reason: HOSPADM

## 2025-04-17 RX ORDER — EPINEPHRINE 0.3 MG/.3ML
0.3 INJECTION SUBCUTANEOUS EVERY 5 MIN PRN
OUTPATIENT
Start: 2025-05-01

## 2025-04-17 ASSESSMENT — PATIENT HEALTH QUESTIONNAIRE - PHQ9
1. LITTLE INTEREST OR PLEASURE IN DOING THINGS: NOT AT ALL
2. FEELING DOWN, DEPRESSED OR HOPELESS: NOT AT ALL
SUM OF ALL RESPONSES TO PHQ9 QUESTIONS 1 & 2: 0

## 2025-04-17 ASSESSMENT — PAIN SCALES - GENERAL: PAINLEVEL_OUTOF10: 0-NO PAIN

## 2025-04-17 NOTE — PROGRESS NOTES
[facility] Clinical Note  Patient Reassessment     Introduction  Chico Iqbal is a 89 y.o. male who is on the specialty pharmacy service for management of: Oncology Core.      [facility] supplied medication: Xospata 120mg by mouth once daily         Duration of therapy: Until drug toxicity or progression    The most recent encounter visit with the referring prescriber Nithin Coronado MD on 3/20/2025 was reviewed.  Pharmacy will continue to collaborate in the care of this patient with the referring prescriber.    Discussion  Chico was contacted on 4/17/2025 at 11:23 AM for a pharmacy visit with encounter number [id number] from:   [facility]  [address] [place] [id number]  Dept: [phone]  Dept Fax: [phone]  Caregiver consented to a/an Telephone visit, which was performed.    Efficacy  Patient has developed new symptoms of condition: No  Patient/caregiver feels medication is affecting the disease state: Patient's daughter states that he is doing well on Xospata    Goals  Provided education on goals and possible outcomes of therapy:  Adherence with therapy  Timely completion of appropriate labs  Timely and appropriate follow up with provider  Identify and address medication interactions with presciption medications, OTC medications and supplements  Optimize or maintain quality of life  Oncology: Prolong life/No disease progression  Manage side effects (ex: nausea/vomiting, constipation, fatigue) in conjunction with care team  Patient has documented target(s) for goals of therapy: No        Tolerance  Patient has experienced side effects from this medication: No  Changes to current therapy regimen: No    The follow-up timeline was discussed. Every person responds to and reacts to therapy differently. Patient should be assessed for efficacy and tolerability in approximately: 3 months            Adherence  Patient Information  Informant:  Child/Children (Daughter Mirta)  Demonstrates Understanding of Importance of Adherence: Yes  Does the patient have any barriers to self-administration (including physical and mental?): No  Barriers to Self-Administration: None  Action Taken to Mitigate Barriers for Self-Administration: N/A  Support Network for Adherence: Family Member  Medication Information  Medication: gilteritinib fumarate (Xospata)  Patient Reported Missed Doses in the Last 4 Weeks: 0  Estimated Medication Adherence Level: Good  Adherence Estimation Source: Claims history  Barriers to Adherence: No Problems identified  Action Taken to Address Barriers to Adherence: N/A  What concerns do you have regarding your medications?: None   The importance of adherence was discussed and patient/caregiver was advised to take the medication as prescribed by their provider. Encouraged patient/caregiver to call physician's office or specialty pharmacy if they have a question regarding a missed dose.    General Assessment  Changes to home medications, OTCs or supplements: No  Current Medications[1]  Reported new allergies: No  Reported new medical conditions: No  Additional monitoring reviewed: Oncology - CBC-diff:   Lab Results   Component Value Date    WBC 2.5 (L) 04/17/2025    RBC 1.88 (L) 04/17/2025    HGB 7.1 (L) 04/17/2025    HCT 22.8 (L) 04/17/2025     (H) 04/17/2025    MCHC 31.1 (L) 04/17/2025    PLT 57 (L) 04/17/2025    RDW 18.8 (H) 04/17/2025    NEUTOPHILPCT 25.2 01/23/2025    IGPCT 5.7 (H) 04/03/2025    LYMPHOPCT 34.0 04/03/2025    MONOPCT 22.0 04/03/2025    EOSPCT 0.0 04/03/2025    BASOPCT 1.0 04/03/2025    NEUTROABS  02/20/2025      Comment:      Percent differential counts (%) should be interpreted in the context of the absolute cell counts (cells/uL).    LYMPHSABS 1.04 01/23/2025    MONOSABS 0.58 01/23/2025    EOSABS 0.00 04/03/2025    BASOSABS 0.03 04/03/2025    and CMP:   Lab Results   Component Value Date    GLUCOSE 115 (H) 03/20/2025      (L) 03/20/2025    K 3.8 03/20/2025    CL 98 03/20/2025    CO2 28 03/20/2025    ANIONGAP 10 03/20/2025    BUN 13 03/20/2025    CREATININE 0.89 03/20/2025    CALCIUM 8.6 03/20/2025    ALBUMIN 3.2 (L) 03/20/2025    ALKPHOS 99 03/20/2025    PROT 7.2 03/20/2025    AST 22 03/20/2025    BILITOT 0.8 03/20/2025    ALT 16 03/20/2025     Is laboratory follow up needed? No    Advised to contact the pharmacy if there are any changes to the patient's medication list, including prescriptions, OTC medications, herbal products, or supplements.    Impression/Plan  This patient has been identified as high risk due to Geriatric (over 65 years of age).  The following action was taken:Patient/caregiver encouraged to participate in patient management program          QOL/Patient Satisfaction  Rate your quality of life on scale of 1-10: 8  Rate your satisfaction with  Specialty Pharmacy on scale of 1-10: 10 - Completely satisfied    Provided contact information ([phone]) for [facility] and reviewed dispensing process, refill timeline and patient management follow up. Confirmed understanding of education conducted during assessment. All questions and concerns were addressed and patient/caregiver was encouraged to reach out for additional questions or concerns.    Based on the patient's diagnosis, medication list, progress towards goals, adherence, tolerance, and medication list, medication remains appropriate: Therapy remains appropriate (I attest)    Bel Varela, PharmD       [1]   Current Outpatient Medications   Medication Sig Dispense Refill    atorvastatin (Lipitor) 40 mg tablet Take 1 tablet (40 mg) by mouth once daily. 90 tablet 0    clopidogrel (Plavix) 75 mg tablet Take one tablet by mouth daily 90 tablet 0    docusate sodium (Colace) 100 mg capsule Take 1 capsule (100 mg) by mouth 2 times a day. 60 capsule 0    finasteride (Proscar) 5 mg tablet Take 1 tablet (5 mg) by mouth once  daily. Do not crush, chew, or split. 90 tablet 0    fluticasone (Flonase) 50 mcg/actuation nasal spray 1 spray once daily.      folic acid (Folvite) 1 mg tablet Take 1 tablet (1 mg) by mouth once daily. 90 tablet 0    furosemide (Lasix) 20 mg tablet Take 1 tablet (20 mg) by mouth once daily. None on Tues and Thur 90 tablet 0    gilteritinib (Xospata) 40 mg tablet Take 3 tablets (120 mg total) by mouth once daily.  Swallow whole. 90 tablet 1    metoprolol succinate XL (Toprol-XL) 25 mg 24 hr tablet Take 1 tablet (25 mg) by mouth once daily. Do not crush or chew. 90 tablet 0    mirtazapine (Remeron) 15 mg tablet Take 0.5 tablets (7.5 mg) by mouth once daily at bedtime.      pantoprazole (ProtoNix) 40 mg EC tablet Take 1 tablet (40 mg) by mouth once daily in the morning. Take before meals. Do not crush, chew, or split. 90 tablet 0    tadalafil 20 mg tablet Take one tablet prior to sexual intercourse.      tamsulosin (Flomax) 0.4 mg 24 hr capsule Take 2 capsules (0.8 mg) by mouth once daily. 180 capsule 0     No current facility-administered medications for this visit.     Facility-Administered Medications Ordered in Other Visits   Medication Dose Route Frequency Provider Last Rate Last Admin    albuterol 2.5 mg /3 mL (0.083 %) nebulizer solution 3 mL  3 mL nebulization PRN Nithin Coronado MD        dextrose 5 % in water (D5W) bolus 500 mL  500 mL intravenous PRN Nithin Coronado MD        diphenhydrAMINE (BENADryl) injection 50 mg  50 mg intravenous PRN Nithin Coronado MD        EPINEPHrine (Epipen) injection syringe 0.3 mg  0.3 mg intramuscular q5 min PRN Nithin Coronado MD        famotidine PF (Pepcid) injection 20 mg  20 mg intravenous Once PRN Nithin Coronado MD        methylPREDNISolone sod succinate (SOLU-Medrol) 40 mg/mL injection 40 mg  40 mg intravenous PRN Nithin Coronado MD        sodium chloride 0.9 % bolus 500 mL  500 mL intravenous PRN Nithin Coronado MD

## 2025-04-22 ENCOUNTER — PHARMACY VISIT (OUTPATIENT)
Dept: PHARMACY | Facility: CLINIC | Age: 89
End: 2025-04-22
Payer: MEDICARE

## 2025-04-30 ENCOUNTER — APPOINTMENT (OUTPATIENT)
Dept: HEMATOLOGY/ONCOLOGY | Facility: HOSPITAL | Age: 89
End: 2025-04-30
Payer: MEDICARE

## 2025-04-30 ENCOUNTER — OFFICE VISIT (OUTPATIENT)
Dept: HEMATOLOGY/ONCOLOGY | Facility: HOSPITAL | Age: 89
End: 2025-04-30
Payer: MEDICARE

## 2025-04-30 VITALS
HEART RATE: 73 BPM | DIASTOLIC BLOOD PRESSURE: 63 MMHG | RESPIRATION RATE: 16 BRPM | SYSTOLIC BLOOD PRESSURE: 102 MMHG | OXYGEN SATURATION: 94 % | TEMPERATURE: 98.1 F

## 2025-04-30 VITALS
DIASTOLIC BLOOD PRESSURE: 70 MMHG | WEIGHT: 193.67 LBS | OXYGEN SATURATION: 95 % | RESPIRATION RATE: 16 BRPM | HEART RATE: 95 BPM | TEMPERATURE: 96.6 F | HEIGHT: 71 IN | SYSTOLIC BLOOD PRESSURE: 127 MMHG | BODY MASS INDEX: 27.11 KG/M2

## 2025-04-30 DIAGNOSIS — C92.00 ACUTE MYELOID LEUKEMIA NOT HAVING ACHIEVED REMISSION (MULTI): ICD-10-CM

## 2025-04-30 DIAGNOSIS — C92.00 ACUTE MYELOID LEUKEMIA NOT HAVING ACHIEVED REMISSION (MULTI): Primary | ICD-10-CM

## 2025-04-30 LAB
ABO GROUP (TYPE) IN BLOOD: NORMAL
ANTIBODY SCREEN: NORMAL
BASOPHILS # BLD MANUAL: 0 X10*3/UL (ref 0–0.1)
BASOPHILS NFR BLD MANUAL: 0 %
BLOOD EXPIRATION DATE: NORMAL
DISPENSE STATUS: NORMAL
EOSINOPHIL # BLD MANUAL: 0 X10*3/UL (ref 0–0.4)
EOSINOPHIL NFR BLD MANUAL: 0 %
ERYTHROCYTE [DISTWIDTH] IN BLOOD BY AUTOMATED COUNT: 21.2 % (ref 11.5–14.5)
FERRITIN SERPL-MCNC: 2648 NG/ML (ref 20–300)
HCT VFR BLD AUTO: 22.9 % (ref 41–52)
HGB BLD-MCNC: 7.2 G/DL (ref 13.5–17.5)
IMM GRANULOCYTES # BLD AUTO: 0.25 X10*3/UL (ref 0–0.5)
IMM GRANULOCYTES NFR BLD AUTO: 7.4 % (ref 0–0.9)
IRON SATN MFR SERPL: 32 % (ref 25–45)
IRON SERPL-MCNC: 64 UG/DL (ref 35–150)
LYMPHOCYTES # BLD MANUAL: 1.16 X10*3/UL (ref 0.8–3)
LYMPHOCYTES NFR BLD MANUAL: 34 %
MCH RBC QN AUTO: 37.1 PG (ref 26–34)
MCHC RBC AUTO-ENTMCNC: 31.4 G/DL (ref 32–36)
MCV RBC AUTO: 118 FL (ref 80–100)
METAMYELOCYTES # BLD MANUAL: 0.54 X10*3/UL
METAMYELOCYTES NFR BLD MANUAL: 16 %
MONOCYTES # BLD MANUAL: 0.31 X10*3/UL (ref 0.05–0.8)
MONOCYTES NFR BLD MANUAL: 9 %
NEUTROPHILS # BLD MANUAL: 1.08 X10*3/UL (ref 1.6–5.5)
NEUTS BAND # BLD MANUAL: 0.03 X10*3/UL (ref 0–0.5)
NEUTS BAND NFR BLD MANUAL: 1 %
NEUTS SEG # BLD MANUAL: 1.05 X10*3/UL (ref 1.6–5)
NEUTS SEG NFR BLD MANUAL: 31 %
NRBC BLD-RTO: 0 /100 WBCS (ref 0–0)
PLATELET # BLD AUTO: 81 X10*3/UL (ref 150–450)
POLYCHROMASIA BLD QL SMEAR: ABNORMAL
PRODUCT BLOOD TYPE: 6200
PRODUCT CODE: NORMAL
PROMYELOCYTES # BLD MANUAL: 0.24 X10*3/UL
PROMYELOCYTES NFR BLD MANUAL: 7 %
RBC # BLD AUTO: 1.94 X10*6/UL (ref 4.5–5.9)
RBC MORPH BLD: ABNORMAL
RH FACTOR (ANTIGEN D): NORMAL
SCHISTOCYTES BLD QL SMEAR: ABNORMAL
TIBC SERPL-MCNC: 200 UG/DL (ref 240–445)
TOTAL CELLS COUNTED BLD: 100
UIBC SERPL-MCNC: 136 UG/DL (ref 110–370)
UNIT ABO: NORMAL
UNIT NUMBER: NORMAL
UNIT RH: NORMAL
UNIT VOLUME: 350
VARIANT LYMPHS # BLD MANUAL: 0.07 X10*3/UL (ref 0–0.3)
VARIANT LYMPHS NFR BLD: 2 %
WBC # BLD AUTO: 3.4 X10*3/UL (ref 4.4–11.3)
XM INTEP: NORMAL

## 2025-04-30 PROCEDURE — 99215 OFFICE O/P EST HI 40 MIN: CPT | Performed by: INTERNAL MEDICINE

## 2025-04-30 PROCEDURE — 36415 COLL VENOUS BLD VENIPUNCTURE: CPT

## 2025-04-30 PROCEDURE — 99215 OFFICE O/P EST HI 40 MIN: CPT | Mod: 25 | Performed by: INTERNAL MEDICINE

## 2025-04-30 PROCEDURE — 1123F ACP DISCUSS/DSCN MKR DOCD: CPT | Performed by: INTERNAL MEDICINE

## 2025-04-30 PROCEDURE — 36430 TRANSFUSION BLD/BLD COMPNT: CPT

## 2025-04-30 PROCEDURE — 86901 BLOOD TYPING SEROLOGIC RH(D): CPT

## 2025-04-30 PROCEDURE — G2211 COMPLEX E/M VISIT ADD ON: HCPCS | Performed by: INTERNAL MEDICINE

## 2025-04-30 PROCEDURE — 1159F MED LIST DOCD IN RCRD: CPT | Performed by: INTERNAL MEDICINE

## 2025-04-30 PROCEDURE — P9040 RBC LEUKOREDUCED IRRADIATED: HCPCS

## 2025-04-30 PROCEDURE — 85007 BL SMEAR W/DIFF WBC COUNT: CPT

## 2025-04-30 PROCEDURE — 3078F DIAST BP <80 MM HG: CPT | Performed by: INTERNAL MEDICINE

## 2025-04-30 PROCEDURE — 86923 COMPATIBILITY TEST ELECTRIC: CPT

## 2025-04-30 PROCEDURE — 83540 ASSAY OF IRON: CPT

## 2025-04-30 PROCEDURE — 85027 COMPLETE CBC AUTOMATED: CPT

## 2025-04-30 PROCEDURE — 1126F AMNT PAIN NOTED NONE PRSNT: CPT | Performed by: INTERNAL MEDICINE

## 2025-04-30 PROCEDURE — 3074F SYST BP LT 130 MM HG: CPT | Performed by: INTERNAL MEDICINE

## 2025-04-30 PROCEDURE — 82728 ASSAY OF FERRITIN: CPT

## 2025-04-30 RX ORDER — DIPHENHYDRAMINE HYDROCHLORIDE 50 MG/ML
50 INJECTION, SOLUTION INTRAMUSCULAR; INTRAVENOUS AS NEEDED
OUTPATIENT
Start: 2025-05-01

## 2025-04-30 RX ORDER — FAMOTIDINE 10 MG/ML
20 INJECTION, SOLUTION INTRAVENOUS ONCE AS NEEDED
Status: DISCONTINUED | OUTPATIENT
Start: 2025-04-30 | End: 2025-04-30 | Stop reason: HOSPADM

## 2025-04-30 RX ORDER — ALBUTEROL SULFATE 0.83 MG/ML
3 SOLUTION RESPIRATORY (INHALATION) AS NEEDED
OUTPATIENT
Start: 2025-05-01

## 2025-04-30 RX ORDER — ALBUTEROL SULFATE 0.83 MG/ML
3 SOLUTION RESPIRATORY (INHALATION) AS NEEDED
Status: DISCONTINUED | OUTPATIENT
Start: 2025-04-30 | End: 2025-04-30 | Stop reason: HOSPADM

## 2025-04-30 RX ORDER — FAMOTIDINE 10 MG/ML
20 INJECTION, SOLUTION INTRAVENOUS ONCE AS NEEDED
OUTPATIENT
Start: 2025-05-01

## 2025-04-30 RX ORDER — DIPHENHYDRAMINE HYDROCHLORIDE 50 MG/ML
50 INJECTION, SOLUTION INTRAMUSCULAR; INTRAVENOUS AS NEEDED
Status: DISCONTINUED | OUTPATIENT
Start: 2025-04-30 | End: 2025-04-30 | Stop reason: HOSPADM

## 2025-04-30 RX ORDER — EPINEPHRINE 0.3 MG/.3ML
0.3 INJECTION SUBCUTANEOUS EVERY 5 MIN PRN
Status: DISCONTINUED | OUTPATIENT
Start: 2025-04-30 | End: 2025-04-30 | Stop reason: HOSPADM

## 2025-04-30 RX ORDER — EPINEPHRINE 0.3 MG/.3ML
0.3 INJECTION SUBCUTANEOUS EVERY 5 MIN PRN
OUTPATIENT
Start: 2025-05-01

## 2025-04-30 SDOH — ECONOMIC STABILITY: FOOD INSECURITY: WITHIN THE PAST 12 MONTHS, THE FOOD YOU BOUGHT JUST DIDN'T LAST AND YOU DIDN'T HAVE MONEY TO GET MORE.: NEVER TRUE

## 2025-04-30 SDOH — HEALTH STABILITY: PHYSICAL HEALTH: ON AVERAGE, HOW MANY DAYS PER WEEK DO YOU ENGAGE IN MODERATE TO STRENUOUS EXERCISE (LIKE A BRISK WALK)?: 0 DAYS

## 2025-04-30 SDOH — ECONOMIC STABILITY: FOOD INSECURITY: WITHIN THE PAST 12 MONTHS, YOU WORRIED THAT YOUR FOOD WOULD RUN OUT BEFORE YOU GOT MONEY TO BUY MORE.: NEVER TRUE

## 2025-04-30 SDOH — HEALTH STABILITY: PHYSICAL HEALTH: ON AVERAGE, HOW MANY MINUTES DO YOU ENGAGE IN EXERCISE AT THIS LEVEL?: 0 MIN

## 2025-04-30 SDOH — ECONOMIC STABILITY: GENERAL
WHICH OF THE FOLLOWING WOULD YOU LIKE TO GET CONNECTED TO IN ORDER TO RECEIVE A DISCOUNT OR FOR FREE? (CHOOSE ALL THAT APPLY): NO ASSISTANCE NEEDED

## 2025-04-30 SDOH — ECONOMIC STABILITY: GENERAL
WHICH OF THE FOLLOWING DO YOU KNOW HOW TO USE AND HAVE ACCESS TO EVERY DAY? (CHOOSE ALL THAT APPLY): SMARTPHONE WITH CELLULAR DATA PLAN;DESKTOP COMPUTER, LAPTOP COMPUTER, OR TABLET WITH BROADBAND INTERNET CONNECTION

## 2025-04-30 ASSESSMENT — PATIENT HEALTH QUESTIONNAIRE - PHQ9
1. LITTLE INTEREST OR PLEASURE IN DOING THINGS: NOT AT ALL
SUM OF ALL RESPONSES TO PHQ9 QUESTIONS 1 & 2: 0
2. FEELING DOWN, DEPRESSED OR HOPELESS: NOT AT ALL

## 2025-04-30 ASSESSMENT — SOCIAL DETERMINANTS OF HEALTH (SDOH)
WITHIN THE LAST YEAR, HAVE YOU BEEN AFRAID OF YOUR PARTNER OR EX-PARTNER?: NO
WITHIN THE LAST YEAR, HAVE YOU BEEN HUMILIATED OR EMOTIONALLY ABUSED IN OTHER WAYS BY YOUR PARTNER OR EX-PARTNER?: NO
IN THE PAST 12 MONTHS, HAS THE ELECTRIC, GAS, OIL, OR WATER COMPANY THREATENED TO SHUT OFF SERVICE IN YOUR HOME?: NO
WITHIN THE LAST YEAR, HAVE YOU BEEN KICKED, HIT, SLAPPED, OR OTHERWISE PHYSICALLY HURT BY YOUR PARTNER OR EX-PARTNER?: NO
WITHIN THE LAST YEAR, HAVE TO BEEN RAPED OR FORCED TO HAVE ANY KIND OF SEXUAL ACTIVITY BY YOUR PARTNER OR EX-PARTNER?: NO

## 2025-04-30 ASSESSMENT — PAIN SCALES - GENERAL: PAINLEVEL_OUTOF10: 0-NO PAIN

## 2025-04-30 NOTE — PROGRESS NOTES
Patient ID: Chico Iqbal is a 89 y.o. male.    Subjective:  Returns for follow up for AML. He feels tired. Denies fever. Appetite good    Heme/Onc History:  - p/w rectal pain in Aug 2024 => Was anemic with Hb of 6, elevated MCV, and Plt of 89 and low folate. Initially thought to be due to folate deficiency. Peripheral smear showed abnormal cells and flow cytometry revealed AML (65% of peripheral WBCs)  - He declined having bone marrow biopsy. 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 Flow from blood in Dec 2024 and Mar 2025 showed decreased peripheral blasts    Assessment/Plan:  ? 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 All these have poor prognosis but he looked exceptionally well. He declined bone marrow biopsy or any drastic treatment. Definitely did not want any chemo.    We started him on giltertinib and so far he is tolerating it OK. His peripheral blast count decreased. He has anemia and was requiring 1 unit of PRBC every other week. But his transfusion requirements decreased. Last one was 2 weeks ago. Will check labs today and every 2 weeks. RTC 6 wks.     Has restless leg now. Will repeat iron studies. Ferritin is not helpful as it is an acute phase reactant.    Last transfusions: 3/6/25. 4/17/25    I provide longitudinal care for Mr. Iqbal for his acute leukemia    Review Of Systems:  Review of Systems   Constitutional:  Positive for fatigue. Negative for fever.   HENT:  Negative.     Respiratory: Negative.     Cardiovascular: Negative.    Gastrointestinal: Negative.    Genitourinary: Negative.         Physical Exam:  /70 (BP Location: Left arm,  "Patient Position: Sitting, BP Cuff Size: Adult)   Pulse 95   Temp 35.9 °C (96.6 °F) (Temporal)   Resp 16   Ht 1.803 m (5' 11\")   Wt 87.9 kg (193 lb 10.8 oz)   SpO2 95%   BMI 27.01 kg/m²   BSA: 2.1 meters squared  Performance Status: Symptomatic; fully ambulatory  Physical Exam  HENT:      Head: Normocephalic and atraumatic.   Eyes:      General: No scleral icterus.     Pupils: Pupils are equal, round, and reactive to light.   Cardiovascular:      Rate and Rhythm: Normal rate and regular rhythm.   Pulmonary:      Effort: Pulmonary effort is normal.   Abdominal:      General: Abdomen is flat.      Palpations: Abdomen is soft. There is no mass.   Musculoskeletal:         General: Normal range of motion.   Lymphadenopathy:      Cervical: No cervical adenopathy.   Skin:     Coloration: Skin is not jaundiced.   Neurological:      General: No focal deficit present.      Mental Status: He is alert and oriented to person, place, and time.         Results:  Diagnostic Results   Lab Results   Component Value Date    WBC 2.5 (L) 04/17/2025    HGB 7.1 (L) 04/17/2025    HCT 22.8 (L) 04/17/2025     (H) 04/17/2025    PLT 57 (L) 04/17/2025     Lab Results   Component Value Date    CALCIUM 8.6 03/20/2025     (L) 03/20/2025    K 3.8 03/20/2025    CO2 28 03/20/2025    CL 98 03/20/2025    BUN 13 03/20/2025    CREATININE 0.89 03/20/2025    ALT 16 03/20/2025    AST 22 03/20/2025       Current Outpatient Medications:     atorvastatin (Lipitor) 40 mg tablet, Take 1 tablet (40 mg) by mouth once daily., Disp: 90 tablet, Rfl: 0    clopidogrel (Plavix) 75 mg tablet, Take one tablet by mouth daily, Disp: 90 tablet, Rfl: 0    docusate sodium (Colace) 100 mg capsule, Take 1 capsule (100 mg) by mouth 2 times a day., Disp: 60 capsule, Rfl: 0    finasteride (Proscar) 5 mg tablet, Take 1 tablet (5 mg) by mouth once daily. Do not crush, chew, or split., Disp: 90 tablet, Rfl: 0    fluticasone (Flonase) 50 mcg/actuation nasal spray, 1 " spray once daily., Disp: , Rfl:     folic acid (Folvite) 1 mg tablet, Take 1 tablet (1 mg) by mouth once daily., Disp: 90 tablet, Rfl: 0    furosemide (Lasix) 20 mg tablet, Take 1 tablet (20 mg) by mouth once daily. None on Tues and Thur, Disp: 90 tablet, Rfl: 0    gilteritinib (Xospata) 40 mg tablet, Take 3 tablets (120 mg total) by mouth once daily.  Swallow whole., Disp: 90 tablet, Rfl: 1    metoprolol succinate XL (Toprol-XL) 25 mg 24 hr tablet, Take 1 tablet (25 mg) by mouth once daily. Do not crush or chew., Disp: 90 tablet, Rfl: 0    mirtazapine (Remeron) 15 mg tablet, Take 0.5 tablets (7.5 mg) by mouth once daily at bedtime., Disp: , Rfl:     pantoprazole (ProtoNix) 40 mg EC tablet, Take 1 tablet (40 mg) by mouth once daily in the morning. Take before meals. Do not crush, chew, or split., Disp: 90 tablet, Rfl: 0    tadalafil 20 mg tablet, Take one tablet prior to sexual intercourse., Disp: , Rfl:     tamsulosin (Flomax) 0.4 mg 24 hr capsule, Take 2 capsules (0.8 mg) by mouth once daily., Disp: 180 capsule, Rfl: 0     Past Surgical History:   Procedure Laterality Date    CARDIAC CATHETERIZATION      CORONARY STENT PLACEMENT       Family History   Problem Relation Name Age of Onset    Diabetes Mother      Heart attack Brother        reports that he has been smoking cigars. He has been exposed to tobacco smoke. He has never used smokeless tobacco.    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   Nithin Coronado MD

## 2025-05-13 DIAGNOSIS — C92.00 ACUTE MYELOID LEUKEMIA NOT HAVING ACHIEVED REMISSION (MULTI): ICD-10-CM

## 2025-05-14 ENCOUNTER — APPOINTMENT (OUTPATIENT)
Dept: HEMATOLOGY/ONCOLOGY | Facility: HOSPITAL | Age: 89
End: 2025-05-14
Payer: MEDICARE

## 2025-05-14 DIAGNOSIS — C92.00 ACUTE MYELOID LEUKEMIA NOT HAVING ACHIEVED REMISSION (MULTI): ICD-10-CM

## 2025-05-15 ENCOUNTER — APPOINTMENT (OUTPATIENT)
Dept: HEMATOLOGY/ONCOLOGY | Facility: HOSPITAL | Age: 89
End: 2025-05-15
Payer: MEDICARE

## 2025-05-15 VITALS
OXYGEN SATURATION: 96 % | DIASTOLIC BLOOD PRESSURE: 67 MMHG | TEMPERATURE: 96.6 F | HEART RATE: 76 BPM | SYSTOLIC BLOOD PRESSURE: 114 MMHG | RESPIRATION RATE: 16 BRPM

## 2025-05-15 DIAGNOSIS — C92.00 ACUTE MYELOID LEUKEMIA NOT HAVING ACHIEVED REMISSION (MULTI): ICD-10-CM

## 2025-05-15 LAB
ABO GROUP (TYPE) IN BLOOD: NORMAL
ANTIBODY SCREEN: NORMAL
BASOPHILS # BLD AUTO: 0.01 X10*3/UL (ref 0–0.1)
BASOPHILS NFR BLD AUTO: 0.3 %
BLOOD EXPIRATION DATE: NORMAL
DISPENSE STATUS: NORMAL
EOSINOPHIL # BLD AUTO: 0.01 X10*3/UL (ref 0–0.4)
EOSINOPHIL NFR BLD AUTO: 0.3 %
ERYTHROCYTE [DISTWIDTH] IN BLOOD BY AUTOMATED COUNT: ABNORMAL %
HCT VFR BLD AUTO: 21.6 % (ref 41–52)
HGB BLD-MCNC: 7 G/DL (ref 13.5–17.5)
HYPOCHROMIA BLD QL SMEAR: NORMAL
IMM GRANULOCYTES # BLD AUTO: 0.14 X10*3/UL (ref 0–0.5)
IMM GRANULOCYTES NFR BLD AUTO: 4.1 % (ref 0–0.9)
LYMPHOCYTES # BLD AUTO: 0.98 X10*3/UL (ref 0.8–3)
LYMPHOCYTES NFR BLD AUTO: 29 %
MCH RBC QN AUTO: 36.1 PG (ref 26–34)
MCHC RBC AUTO-ENTMCNC: 32.4 G/DL (ref 32–36)
MCV RBC AUTO: 111 FL (ref 80–100)
MONOCYTES # BLD AUTO: 1.48 X10*3/UL (ref 0.05–0.8)
MONOCYTES NFR BLD AUTO: 43.8 %
NEUTROPHILS # BLD AUTO: 0.76 X10*3/UL (ref 1.6–5.5)
NEUTROPHILS NFR BLD AUTO: 22.5 %
NRBC BLD-RTO: 0 /100 WBCS (ref 0–0)
PLATELET # BLD AUTO: 71 X10*3/UL (ref 150–450)
PRODUCT BLOOD TYPE: 600
PRODUCT CODE: NORMAL
RBC # BLD AUTO: 1.94 X10*6/UL (ref 4.5–5.9)
RBC MORPH BLD: NORMAL
RH FACTOR (ANTIGEN D): NORMAL
SCHISTOCYTES BLD QL SMEAR: NORMAL
UNIT ABO: NORMAL
UNIT NUMBER: NORMAL
UNIT RH: NORMAL
UNIT VOLUME: 350
WBC # BLD AUTO: 3.4 X10*3/UL (ref 4.4–11.3)
XM INTEP: NORMAL

## 2025-05-15 PROCEDURE — 36415 COLL VENOUS BLD VENIPUNCTURE: CPT

## 2025-05-15 PROCEDURE — 86923 COMPATIBILITY TEST ELECTRIC: CPT

## 2025-05-15 PROCEDURE — 85025 COMPLETE CBC W/AUTO DIFF WBC: CPT

## 2025-05-15 PROCEDURE — P9040 RBC LEUKOREDUCED IRRADIATED: HCPCS

## 2025-05-15 PROCEDURE — 36430 TRANSFUSION BLD/BLD COMPNT: CPT

## 2025-05-15 PROCEDURE — 86900 BLOOD TYPING SEROLOGIC ABO: CPT

## 2025-05-15 RX ORDER — DIPHENHYDRAMINE HYDROCHLORIDE 50 MG/ML
50 INJECTION, SOLUTION INTRAMUSCULAR; INTRAVENOUS AS NEEDED
OUTPATIENT
Start: 2025-05-29

## 2025-05-15 RX ORDER — ALBUTEROL SULFATE 0.83 MG/ML
3 SOLUTION RESPIRATORY (INHALATION) AS NEEDED
OUTPATIENT
Start: 2025-05-29

## 2025-05-15 RX ORDER — DIPHENHYDRAMINE HYDROCHLORIDE 50 MG/ML
50 INJECTION, SOLUTION INTRAMUSCULAR; INTRAVENOUS AS NEEDED
Status: DISCONTINUED | OUTPATIENT
Start: 2025-05-15 | End: 2025-05-15 | Stop reason: HOSPADM

## 2025-05-15 RX ORDER — EPINEPHRINE 0.3 MG/.3ML
0.3 INJECTION SUBCUTANEOUS EVERY 5 MIN PRN
Status: DISCONTINUED | OUTPATIENT
Start: 2025-05-15 | End: 2025-05-15 | Stop reason: HOSPADM

## 2025-05-15 RX ORDER — ALBUTEROL SULFATE 0.83 MG/ML
3 SOLUTION RESPIRATORY (INHALATION) AS NEEDED
Status: DISCONTINUED | OUTPATIENT
Start: 2025-05-15 | End: 2025-05-15 | Stop reason: HOSPADM

## 2025-05-15 RX ORDER — FAMOTIDINE 10 MG/ML
20 INJECTION, SOLUTION INTRAVENOUS ONCE AS NEEDED
Status: DISCONTINUED | OUTPATIENT
Start: 2025-05-15 | End: 2025-05-15 | Stop reason: HOSPADM

## 2025-05-15 RX ORDER — FAMOTIDINE 10 MG/ML
20 INJECTION, SOLUTION INTRAVENOUS ONCE AS NEEDED
OUTPATIENT
Start: 2025-05-29

## 2025-05-15 RX ORDER — EPINEPHRINE 0.3 MG/.3ML
0.3 INJECTION SUBCUTANEOUS EVERY 5 MIN PRN
OUTPATIENT
Start: 2025-05-29

## 2025-05-15 ASSESSMENT — PAIN SCALES - GENERAL: PAINLEVEL_OUTOF10: 0-NO PAIN

## 2025-05-22 ENCOUNTER — SPECIALTY PHARMACY (OUTPATIENT)
Dept: PHARMACY | Facility: CLINIC | Age: 89
End: 2025-05-22

## 2025-05-22 PROCEDURE — RXMED WILLOW AMBULATORY MEDICATION CHARGE

## 2025-05-28 ENCOUNTER — APPOINTMENT (OUTPATIENT)
Dept: HEMATOLOGY/ONCOLOGY | Facility: HOSPITAL | Age: 89
End: 2025-05-28
Payer: MEDICARE

## 2025-05-28 VITALS
SYSTOLIC BLOOD PRESSURE: 107 MMHG | BODY MASS INDEX: 26.81 KG/M2 | DIASTOLIC BLOOD PRESSURE: 64 MMHG | HEART RATE: 69 BPM | TEMPERATURE: 96.3 F | RESPIRATION RATE: 16 BRPM | OXYGEN SATURATION: 98 % | WEIGHT: 192.24 LBS

## 2025-05-28 DIAGNOSIS — C92.00 ACUTE MYELOID LEUKEMIA NOT HAVING ACHIEVED REMISSION (MULTI): ICD-10-CM

## 2025-05-28 LAB
ABO GROUP (TYPE) IN BLOOD: NORMAL
ANTIBODY SCREEN: NORMAL
BASO STIPL BLD QL SMEAR: PRESENT
BASOPHILS # BLD AUTO: 0.01 X10*3/UL (ref 0–0.1)
BASOPHILS NFR BLD AUTO: 0.3 %
BLOOD EXPIRATION DATE: NORMAL
DACRYOCYTES BLD QL SMEAR: NORMAL
DISPENSE STATUS: NORMAL
EOSINOPHIL # BLD AUTO: 0.01 X10*3/UL (ref 0–0.4)
EOSINOPHIL NFR BLD AUTO: 0.3 %
ERYTHROCYTE [DISTWIDTH] IN BLOOD BY AUTOMATED COUNT: ABNORMAL %
HCT VFR BLD AUTO: 23.4 % (ref 41–52)
HGB BLD-MCNC: 7.4 G/DL (ref 13.5–17.5)
IMM GRANULOCYTES # BLD AUTO: 0.14 X10*3/UL (ref 0–0.5)
IMM GRANULOCYTES NFR BLD AUTO: 4.5 % (ref 0–0.9)
LYMPHOCYTES # BLD AUTO: 1.05 X10*3/UL (ref 0.8–3)
LYMPHOCYTES NFR BLD AUTO: 33.4 %
MCH RBC QN AUTO: 34.6 PG (ref 26–34)
MCHC RBC AUTO-ENTMCNC: 31.6 G/DL (ref 32–36)
MCV RBC AUTO: 109 FL (ref 80–100)
MONOCYTES # BLD AUTO: 1.33 X10*3/UL (ref 0.05–0.8)
MONOCYTES NFR BLD AUTO: 42.4 %
NEUTROPHILS # BLD AUTO: 0.6 X10*3/UL (ref 1.6–5.5)
NEUTROPHILS NFR BLD AUTO: 19.1 %
NRBC BLD-RTO: 0 /100 WBCS (ref 0–0)
OVALOCYTES BLD QL SMEAR: NORMAL
PLATELET # BLD AUTO: 50 X10*3/UL (ref 150–450)
POLYCHROMASIA BLD QL SMEAR: NORMAL
PRODUCT BLOOD TYPE: 600
PRODUCT CODE: NORMAL
RBC # BLD AUTO: 2.14 X10*6/UL (ref 4.5–5.9)
RBC MORPH BLD: NORMAL
RH FACTOR (ANTIGEN D): NORMAL
SCHISTOCYTES BLD QL SMEAR: NORMAL
UNIT ABO: NORMAL
UNIT NUMBER: NORMAL
UNIT RH: NORMAL
UNIT VOLUME: 350
WBC # BLD AUTO: 3.1 X10*3/UL (ref 4.4–11.3)
XM INTEP: NORMAL

## 2025-05-28 PROCEDURE — 86850 RBC ANTIBODY SCREEN: CPT

## 2025-05-28 PROCEDURE — 36430 TRANSFUSION BLD/BLD COMPNT: CPT

## 2025-05-28 PROCEDURE — P9040 RBC LEUKOREDUCED IRRADIATED: HCPCS

## 2025-05-28 PROCEDURE — 86923 COMPATIBILITY TEST ELECTRIC: CPT

## 2025-05-28 PROCEDURE — 36415 COLL VENOUS BLD VENIPUNCTURE: CPT

## 2025-05-28 PROCEDURE — 85025 COMPLETE CBC W/AUTO DIFF WBC: CPT

## 2025-05-28 RX ORDER — EPINEPHRINE 0.3 MG/.3ML
0.3 INJECTION SUBCUTANEOUS EVERY 5 MIN PRN
OUTPATIENT
Start: 2025-05-29

## 2025-05-28 RX ORDER — DIPHENHYDRAMINE HYDROCHLORIDE 50 MG/ML
50 INJECTION, SOLUTION INTRAMUSCULAR; INTRAVENOUS AS NEEDED
OUTPATIENT
Start: 2025-05-29

## 2025-05-28 RX ORDER — EPINEPHRINE 0.3 MG/.3ML
0.3 INJECTION SUBCUTANEOUS EVERY 5 MIN PRN
Status: DISCONTINUED | OUTPATIENT
Start: 2025-05-28 | End: 2025-05-28 | Stop reason: HOSPADM

## 2025-05-28 RX ORDER — ALBUTEROL SULFATE 0.83 MG/ML
3 SOLUTION RESPIRATORY (INHALATION) AS NEEDED
Status: DISCONTINUED | OUTPATIENT
Start: 2025-05-28 | End: 2025-05-28 | Stop reason: HOSPADM

## 2025-05-28 RX ORDER — DIPHENHYDRAMINE HYDROCHLORIDE 50 MG/ML
50 INJECTION, SOLUTION INTRAMUSCULAR; INTRAVENOUS AS NEEDED
Status: DISCONTINUED | OUTPATIENT
Start: 2025-05-28 | End: 2025-05-28 | Stop reason: HOSPADM

## 2025-05-28 RX ORDER — FAMOTIDINE 10 MG/ML
20 INJECTION, SOLUTION INTRAVENOUS ONCE AS NEEDED
Status: DISCONTINUED | OUTPATIENT
Start: 2025-05-28 | End: 2025-05-28 | Stop reason: HOSPADM

## 2025-05-28 RX ORDER — FAMOTIDINE 10 MG/ML
20 INJECTION, SOLUTION INTRAVENOUS ONCE AS NEEDED
OUTPATIENT
Start: 2025-05-29

## 2025-05-28 RX ORDER — ALBUTEROL SULFATE 0.83 MG/ML
3 SOLUTION RESPIRATORY (INHALATION) AS NEEDED
OUTPATIENT
Start: 2025-05-29

## 2025-05-28 ASSESSMENT — PAIN SCALES - GENERAL: PAINLEVEL_OUTOF10: 0-NO PAIN

## 2025-05-29 ENCOUNTER — PHARMACY VISIT (OUTPATIENT)
Dept: PHARMACY | Facility: CLINIC | Age: 89
End: 2025-05-29
Payer: MEDICARE

## 2025-06-04 NOTE — PROGRESS NOTES
Patient ID:   Chico Iqbal is a 89 y.o. male with PMH remarkable for acute myeloid leukemia, HTN, HLD, COPD, BPH, Afib who presents to the office today for Medicare Annual Wellness Visit Subsequent.    HEALTH MAINTENANCE: Annual Medicare Wellness Physical  Last Office Visit:2/7/25  Last Labs: 3/6/25  PSA Screening (starting at age 55 till 69): 0.87 on 7/24/23  Colonoscopy (45-75 or age 40 with 1st degree relative dx colon ca): Declines  Lung cancer screening (50-78 y/o x 20 pk yr for at least 20 yrs + current smoker OR quit in last 15 years, no CT w/I last year): 3/7/2023 showed RLL calcified granuloma.  DEXA (65+, q 2 years women and 70+ men): 2017 osteopenia of left femoral neck and left total hip  Echo 3/9/2023 showed LVSF 40%, pseudonormal pattern of LVDF. LA mild to moderately dilated. Global and segmental LV hypokinesis. CVP elevated. DUE  Carotid US done 6/26/2024 showed 20-49% bilaterally.    4/30/2025- Dr. Salazar (hematology) -AML: FLT3 ITD and FLT3 mutation positive. 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 All these have poor prognosis but he looked exceptionally well. He declined bone marrow biopsy or any drastic treatment. Definitely did not want any chemo.   We started him on giltertinib and so far he is tolerating it OK. His peripheral blast count decreased. He has anemia and was requiring 1 unit of PRBC every other week. But his transfusion requirements decreased. Last one was 2 weeks ago. Will check labs today and every 2 weeks. RTC 6 wks.     He states that is he doing okay. States that he has been dealing with sinus drainage and pressure for a few days. Denies any fevers, cough, or sore throat. He states that he is eating and drinking okay , no issues with bowel to bladder, takes a stool softener as needed. States that he doesn't sleep too well, states that he wakes up at 5am everyday and during the night his legs jerk and burn which makes it hard to sleep. He  "states that during the day he legs feel weak and swell at times.            Medical History[1]   Surgical History[2]   Social History[3]  Family History[4]   Immunization History   Administered Date(s) Administered    Flu vaccine, trivalent, preservative free, HIGH-DOSE, age 65y+ (Fluzone) 03/09/2023    Pfizer Purple Cap SARS-CoV-2 03/12/2021, 04/04/2021, 12/06/2021    Pneumococcal polysaccharide vaccine, 23-valent, age 2 years and older (PNEUMOVAX 23) 03/09/2023    SARS-CoV-2, Unspecified 05/27/2020, 06/27/2021, 09/27/2022     Review of Systems   Constitutional: Negative.    HENT:  Positive for congestion.    Eyes: Negative.    Respiratory: Negative.     Cardiovascular:  Positive for leg swelling.   Gastrointestinal: Negative.    Endocrine: Negative.    Genitourinary: Negative.    Musculoskeletal: Negative.    Skin: Negative.    Neurological:  Positive for weakness.        Legs   Psychiatric/Behavioral: Negative.     All other systems reviewed and are negative.    Allergies[5]  Visit Vitals  /73 (BP Location: Left arm, Patient Position: Sitting)   Pulse 72   Resp 18   Ht 1.803 m (5' 11\")   Wt 86.6 kg (191 lb)   SpO2 96%   BMI 26.64 kg/m²   Smoking Status Some Days   BSA 2.08 m²     Physical Exam  Vitals reviewed. Exam conducted with a chaperone present.   Constitutional:       Appearance: Normal appearance. He is well-developed.   HENT:      Head: Normocephalic.      Right Ear: External ear normal.      Left Ear: External ear normal.      Nose: Congestion present.      Mouth/Throat:      Lips: Pink.      Mouth: Mucous membranes are moist.   Eyes:      General: Lids are normal.      Pupils: Pupils are equal, round, and reactive to light.   Neck:      Trachea: Trachea normal.   Cardiovascular:      Rate and Rhythm: Normal rate and regular rhythm.      Heart sounds: Normal heart sounds.   Pulmonary:      Effort: Pulmonary effort is normal.      Breath sounds: Normal breath sounds.   Abdominal:      General: " Bowel sounds are normal.      Palpations: Abdomen is soft.   Musculoskeletal:      Right lower leg: Edema present.      Left lower leg: Edema present.      Comments: Varicose veins noted to bilateral lower legs.    Skin:     General: Skin is warm and moist.   Neurological:      General: No focal deficit present.      Mental Status: He is alert and oriented to person, place, and time. Mental status is at baseline.   Psychiatric:         Attention and Perception: Attention normal.         Mood and Affect: Mood normal.         Speech: Speech normal.         Behavior: Behavior is cooperative.         Thought Content: Thought content normal.         Cognition and Memory: Cognition normal.         Judgment: Judgment normal.       Current Outpatient Medications   Medication Instructions    atorvastatin (LIPITOR) 40 mg, oral, Daily    clopidogrel (Plavix) 75 mg tablet Take one tablet by mouth daily    docusate sodium (COLACE) 100 mg, oral, 2 times daily    finasteride (PROSCAR) 5 mg, oral, Daily, Do not crush, chew, or split.    fluticasone (Flonase) 50 mcg/actuation nasal spray 1 spray, Daily RT    folic acid (FOLVITE) 1 mg, oral, Daily    furosemide (LASIX) 20 mg, oral, Daily, None on Tues and Thur    gabapentin (NEURONTIN) 100 mg, oral, 3 times daily    metoprolol succinate XL (TOPROL-XL) 25 mg, oral, Daily, Do not crush or chew.    pantoprazole (PROTONIX) 40 mg, oral, Daily before breakfast, Do not crush, chew, or split.     sildenafil (VIAGRA) 100 mg, As needed    tadalafil 20 mg tablet Take one tablet prior to sexual intercourse.    tamsulosin (FLOMAX) 0.8 mg, oral, Daily    Xospata 120 mg, oral, Daily, Swallow whole.     Lab Results   Component Value Date    WBC 3.3 (L) 06/11/2025    HGB 7.3 (L) 06/11/2025    HCT 23.2 (L) 06/11/2025    PLT 45 (L) 06/11/2025    CHOL 102 07/24/2023    TRIG 114 07/24/2023    HDL 28.8 (A) 07/24/2023    ALT 16 03/20/2025    AST 22 03/20/2025     (L) 03/20/2025    K 3.8 03/20/2025     CL 98 03/20/2025    CREATININE 0.89 03/20/2025    BUN 13 03/20/2025    CO2 28 03/20/2025    TSH 1.25 03/06/2025    INR 1.2 (H) 08/28/2024    HGBA1C 6.1 (A) 03/08/2022       Problem List Items Addressed This Visit           ICD-10-CM    Benign hypertension I10    -BP is 119/73  -continue taking metoprolol          Relevant Medications    furosemide (Lasix) 20 mg tablet    metoprolol succinate XL (Toprol-XL) 25 mg 24 hr tablet    BPH (benign prostatic hyperplasia) N40.0    -continue taking proscar and flomax   -follow up with urology as scheduled         Relevant Medications    finasteride (Proscar) 5 mg tablet    tamsulosin (Flomax) 0.4 mg 24 hr capsule    CAD (coronary artery disease) I25.10    -reviewed last Echo  -continue taking Plavix and Lipitor          Relevant Medications    sildenafil (Viagra) 100 mg tablet    clopidogrel (Plavix) 75 mg tablet    metoprolol succinate XL (Toprol-XL) 25 mg 24 hr tablet    COPD (chronic obstructive pulmonary disease) (Multi) J44.9    -stable         Gastroesophageal reflux disease without esophagitis K21.9    -stable   -continue taking Protonix         Relevant Medications    pantoprazole (ProtoNix) 40 mg EC tablet    Hypercholesterolemia E78.00    -continue taking Lipitor  - Patient educated and counseled to do walking and exercise regularly  - Low-fat low-cholesterol diet is advised  - Advised to reduce weight   - The goal is to keep the LDL less than 70, and HDL the more than 40           Relevant Medications    atorvastatin (Lipitor) 40 mg tablet    Anemia requiring transfusions D64.9    -HBG was 7.4 on 5/28/25 when Dr. Salazar checked it  -given 1 unit of PRBC  -repeat labs per Dr. Santiago's orders         PAF (paroxysmal atrial fibrillation) (Multi) I48.0    -continue taking Eliquis and Metoprolol          Relevant Medications    sildenafil (Viagra) 100 mg tablet    clopidogrel (Plavix) 75 mg tablet    metoprolol succinate XL (Toprol-XL) 25 mg 24 hr tablet     Hypertensive heart disease with heart failure I11.0    -stable         Relevant Medications    sildenafil (Viagra) 100 mg tablet    clopidogrel (Plavix) 75 mg tablet    metoprolol succinate XL (Toprol-XL) 25 mg 24 hr tablet    Constipation K59.00    -take stool softener as needed          Acute myeloid leukemia not having achieved remission (Multi) C92.00    4/30/2025- Dr. Salazar (hematology) -AML: FLT3 ITD and FLT3 mutation positive. 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 All these have poor prognosis but he looked exceptionally well. He declined bone marrow biopsy or any drastic treatment. Definitely did not want any chemo.   We started him on giltertinib and so far he is tolerating it OK. His peripheral blast count decreased. He has anemia and was requiring 1 unit of PRBC every other week. But his transfusion requirements decreased. Last one was 2 weeks ago. Will check labs today and every 2 weeks. RTC 6 wks.          Restless legs syndrome G25.81    -reports that during the night his legs jerk and burn which makes it hard to sleep  -start taking Gabapentin 100mg one hour before bed          Relevant Medications    gabapentin (Neurontin) 100 mg capsule    Encounter for Medicare annual wellness exam Z00.00    -  Preventive screening / Vaccination(s) reviewed and discussed  -  Recommended regular aerobic exercise and proper diet.  -  Discussed need and benefit for weight management  -  Medications were reviewed, list was updated, and refills given if needed.  -  Follow up for annual exam in one year  -declined Colonoscopy             --------------------  Written by ALFRED GREY LPN, acting as a scribe for Dr. Hubbard. This note accurately reflects the work and decisions made by Dr. Hubbard.     I, Dr. Hubbard, attest all medical record entries made by the scribe were under my direction and were personally dictated by me. I have reviewed the chart and agree that the record  accurately reflects my performance of the history, physical exam, and assessment and plan.          [1]   Past Medical History:  Diagnosis Date    Alcohol abuse     Body mass index (BMI) 29.0-29.9, adult 09/29/2020    Body mass index (BMI) of 29.0 to 29.9 in adult    BPH (benign prostatic hyperplasia)     CAD (coronary artery disease)     COPD (chronic obstructive pulmonary disease) (Multi)     GERD (gastroesophageal reflux disease)     Hyperlipidemia     Hypertension     Old myocardial infarction     History of myocardial infarction    Paroxysmal A-fib (Multi)     Urinary tract infection    [2]   Past Surgical History:  Procedure Laterality Date    CARDIAC CATHETERIZATION      CORONARY STENT PLACEMENT     [3]   Social History  Tobacco Use    Smoking status: Some Days     Types: Cigars     Passive exposure: Current    Smokeless tobacco: Never   Vaping Use    Vaping status: Never Used   Substance Use Topics    Alcohol use: Not Currently     Comment: OCCASIONAL, 2-3 BEERS DAILY    Drug use: Never   [4]   Family History  Problem Relation Name Age of Onset    Diabetes Mother      Heart attack Brother     [5] No Known Allergies

## 2025-06-10 ENCOUNTER — APPOINTMENT (OUTPATIENT)
Dept: PRIMARY CARE | Facility: CLINIC | Age: 89
End: 2025-06-10
Payer: MEDICARE

## 2025-06-10 VITALS
HEART RATE: 72 BPM | SYSTOLIC BLOOD PRESSURE: 119 MMHG | BODY MASS INDEX: 26.74 KG/M2 | RESPIRATION RATE: 18 BRPM | DIASTOLIC BLOOD PRESSURE: 73 MMHG | WEIGHT: 191 LBS | OXYGEN SATURATION: 96 % | HEIGHT: 71 IN

## 2025-06-10 DIAGNOSIS — E78.00 HYPERCHOLESTEROLEMIA: ICD-10-CM

## 2025-06-10 DIAGNOSIS — C92.00 ACUTE MYELOID LEUKEMIA NOT HAVING ACHIEVED REMISSION (MULTI): ICD-10-CM

## 2025-06-10 DIAGNOSIS — K21.9 GASTROESOPHAGEAL REFLUX DISEASE WITHOUT ESOPHAGITIS: ICD-10-CM

## 2025-06-10 DIAGNOSIS — I48.0 PAF (PAROXYSMAL ATRIAL FIBRILLATION) (MULTI): ICD-10-CM

## 2025-06-10 DIAGNOSIS — I25.10 CORONARY ARTERY DISEASE, UNSPECIFIED VESSEL OR LESION TYPE, UNSPECIFIED WHETHER ANGINA PRESENT, UNSPECIFIED WHETHER NATIVE OR TRANSPLANTED HEART: ICD-10-CM

## 2025-06-10 DIAGNOSIS — K59.00 CONSTIPATION, UNSPECIFIED CONSTIPATION TYPE: ICD-10-CM

## 2025-06-10 DIAGNOSIS — I11.0 HYPERTENSIVE HEART DISEASE WITH HEART FAILURE: ICD-10-CM

## 2025-06-10 DIAGNOSIS — D64.9 ANEMIA REQUIRING TRANSFUSIONS: ICD-10-CM

## 2025-06-10 DIAGNOSIS — I10 BENIGN HYPERTENSION: ICD-10-CM

## 2025-06-10 DIAGNOSIS — Z00.00 ENCOUNTER FOR MEDICARE ANNUAL WELLNESS EXAM: ICD-10-CM

## 2025-06-10 DIAGNOSIS — D61.818 OTHER PANCYTOPENIA (MULTI): Primary | ICD-10-CM

## 2025-06-10 DIAGNOSIS — G25.81 RESTLESS LEGS SYNDROME: ICD-10-CM

## 2025-06-10 DIAGNOSIS — N40.0 BENIGN PROSTATIC HYPERPLASIA, UNSPECIFIED WHETHER LOWER URINARY TRACT SYMPTOMS PRESENT: ICD-10-CM

## 2025-06-10 DIAGNOSIS — J44.9 CHRONIC OBSTRUCTIVE PULMONARY DISEASE, UNSPECIFIED COPD TYPE (MULTI): ICD-10-CM

## 2025-06-10 PROCEDURE — 1126F AMNT PAIN NOTED NONE PRSNT: CPT | Performed by: INTERNAL MEDICINE

## 2025-06-10 PROCEDURE — 1160F RVW MEDS BY RX/DR IN RCRD: CPT | Performed by: INTERNAL MEDICINE

## 2025-06-10 PROCEDURE — 3078F DIAST BP <80 MM HG: CPT | Performed by: INTERNAL MEDICINE

## 2025-06-10 PROCEDURE — 99213 OFFICE O/P EST LOW 20 MIN: CPT | Performed by: INTERNAL MEDICINE

## 2025-06-10 PROCEDURE — G0439 PPPS, SUBSEQ VISIT: HCPCS | Performed by: INTERNAL MEDICINE

## 2025-06-10 PROCEDURE — 1170F FXNL STATUS ASSESSED: CPT | Performed by: INTERNAL MEDICINE

## 2025-06-10 PROCEDURE — 3074F SYST BP LT 130 MM HG: CPT | Performed by: INTERNAL MEDICINE

## 2025-06-10 PROCEDURE — 1159F MED LIST DOCD IN RCRD: CPT | Performed by: INTERNAL MEDICINE

## 2025-06-10 RX ORDER — GABAPENTIN 100 MG/1
100 CAPSULE ORAL 3 TIMES DAILY
Qty: 90 CAPSULE | Refills: 5 | Status: SHIPPED | OUTPATIENT
Start: 2025-06-10 | End: 2025-12-07

## 2025-06-10 RX ORDER — TAMSULOSIN HYDROCHLORIDE 0.4 MG/1
0.8 CAPSULE ORAL DAILY
Qty: 180 CAPSULE | Refills: 0 | Status: SHIPPED | OUTPATIENT
Start: 2025-06-10 | End: 2026-06-10

## 2025-06-10 RX ORDER — CLOPIDOGREL BISULFATE 75 MG/1
TABLET ORAL
Qty: 90 TABLET | Refills: 0 | Status: SHIPPED | OUTPATIENT
Start: 2025-06-10

## 2025-06-10 RX ORDER — SILDENAFIL 100 MG/1
100 TABLET, FILM COATED ORAL AS NEEDED
COMMUNITY
Start: 2025-05-29

## 2025-06-10 RX ORDER — PANTOPRAZOLE SODIUM 40 MG/1
40 TABLET, DELAYED RELEASE ORAL
Qty: 90 TABLET | Refills: 0 | Status: SHIPPED | OUTPATIENT
Start: 2025-06-10

## 2025-06-10 RX ORDER — ATORVASTATIN CALCIUM 40 MG/1
40 TABLET, FILM COATED ORAL DAILY
Qty: 90 TABLET | Refills: 0 | Status: CANCELLED | OUTPATIENT
Start: 2025-06-10

## 2025-06-10 RX ORDER — GABAPENTIN 100 MG/1
100 CAPSULE ORAL NIGHTLY
Qty: 30 CAPSULE | Refills: 0 | Status: CANCELLED | OUTPATIENT
Start: 2025-06-10

## 2025-06-10 RX ORDER — FINASTERIDE 5 MG/1
5 TABLET, FILM COATED ORAL DAILY
Qty: 90 TABLET | Refills: 0 | Status: SHIPPED | OUTPATIENT
Start: 2025-06-10 | End: 2026-06-10

## 2025-06-10 RX ORDER — FUROSEMIDE 20 MG/1
20 TABLET ORAL DAILY
Qty: 90 TABLET | Refills: 0 | Status: SHIPPED | OUTPATIENT
Start: 2025-06-10

## 2025-06-10 RX ORDER — METOPROLOL SUCCINATE 25 MG/1
25 TABLET, EXTENDED RELEASE ORAL DAILY
Qty: 90 TABLET | Refills: 0 | Status: SHIPPED | OUTPATIENT
Start: 2025-06-10 | End: 2025-09-08

## 2025-06-10 RX ORDER — ATORVASTATIN CALCIUM 40 MG/1
40 TABLET, FILM COATED ORAL DAILY
Qty: 90 TABLET | Refills: 0 | Status: SHIPPED | OUTPATIENT
Start: 2025-06-10

## 2025-06-10 ASSESSMENT — ENCOUNTER SYMPTOMS
MUSCULOSKELETAL NEGATIVE: 1
CONSTITUTIONAL NEGATIVE: 1
ENDOCRINE NEGATIVE: 1
PSYCHIATRIC NEGATIVE: 1
EYES NEGATIVE: 1
GASTROINTESTINAL NEGATIVE: 1
RESPIRATORY NEGATIVE: 1
WEAKNESS: 1

## 2025-06-10 ASSESSMENT — PATIENT HEALTH QUESTIONNAIRE - PHQ9
SUM OF ALL RESPONSES TO PHQ9 QUESTIONS 1 AND 2: 0
1. LITTLE INTEREST OR PLEASURE IN DOING THINGS: NOT AT ALL
2. FEELING DOWN, DEPRESSED OR HOPELESS: NOT AT ALL

## 2025-06-10 ASSESSMENT — PAIN SCALES - GENERAL: PAINLEVEL_OUTOF10: 0-NO PAIN

## 2025-06-10 ASSESSMENT — ACTIVITIES OF DAILY LIVING (ADL)
GROCERY_SHOPPING: INDEPENDENT
DOING_HOUSEWORK: INDEPENDENT
BATHING: INDEPENDENT
TAKING_MEDICATION: NEEDS ASSISTANCE
DRESSING: INDEPENDENT
MANAGING_FINANCES: INDEPENDENT

## 2025-06-10 NOTE — PATIENT INSTRUCTIONS
It was nice to see you in the office today!    Start taking the Gabapentin one hour before bedtime to help with the pain and jerking in your legs.     Your chronic conditions appear stable.  Call the office with any questions or concerns [phone]  Follow up in 6 months or sooner if needed.

## 2025-06-11 ENCOUNTER — APPOINTMENT (OUTPATIENT)
Dept: HEMATOLOGY/ONCOLOGY | Facility: HOSPITAL | Age: 89
End: 2025-06-11
Payer: MEDICARE

## 2025-06-11 VITALS
BODY MASS INDEX: 26.84 KG/M2 | HEART RATE: 76 BPM | TEMPERATURE: 96.3 F | OXYGEN SATURATION: 98 % | RESPIRATION RATE: 16 BRPM | WEIGHT: 192.46 LBS | SYSTOLIC BLOOD PRESSURE: 123 MMHG | DIASTOLIC BLOOD PRESSURE: 66 MMHG

## 2025-06-11 VITALS
HEART RATE: 70 BPM | RESPIRATION RATE: 16 BRPM | OXYGEN SATURATION: 97 % | TEMPERATURE: 97 F | SYSTOLIC BLOOD PRESSURE: 105 MMHG | DIASTOLIC BLOOD PRESSURE: 67 MMHG

## 2025-06-11 DIAGNOSIS — C92.00 ACUTE MYELOID LEUKEMIA NOT HAVING ACHIEVED REMISSION (MULTI): Primary | ICD-10-CM

## 2025-06-11 DIAGNOSIS — C92.00 ACUTE MYELOID LEUKEMIA NOT HAVING ACHIEVED REMISSION (MULTI): ICD-10-CM

## 2025-06-11 PROBLEM — Z00.00 ENCOUNTER FOR MEDICARE ANNUAL WELLNESS EXAM: Status: ACTIVE | Noted: 2025-06-11

## 2025-06-11 PROBLEM — G25.81 RESTLESS LEGS SYNDROME: Status: ACTIVE | Noted: 2025-06-11

## 2025-06-11 LAB
ABO GROUP (TYPE) IN BLOOD: NORMAL
ANTIBODY SCREEN: NORMAL
BASOPHILS # BLD AUTO: 0.01 X10*3/UL (ref 0–0.1)
BASOPHILS NFR BLD AUTO: 0.3 %
BLOOD EXPIRATION DATE: NORMAL
DISPENSE STATUS: NORMAL
EOSINOPHIL # BLD AUTO: 0.01 X10*3/UL (ref 0–0.4)
EOSINOPHIL NFR BLD AUTO: 0.3 %
ERYTHROCYTE [DISTWIDTH] IN BLOOD BY AUTOMATED COUNT: 23.5 % (ref 11.5–14.5)
HCT VFR BLD AUTO: 23.2 % (ref 41–52)
HGB BLD-MCNC: 7.3 G/DL (ref 13.5–17.5)
IMM GRANULOCYTES # BLD AUTO: 0.15 X10*3/UL (ref 0–0.5)
IMM GRANULOCYTES NFR BLD AUTO: 4.5 % (ref 0–0.9)
LYMPHOCYTES # BLD AUTO: 0.97 X10*3/UL (ref 0.8–3)
LYMPHOCYTES NFR BLD AUTO: 29 %
MCH RBC QN AUTO: 34.8 PG (ref 26–34)
MCHC RBC AUTO-ENTMCNC: 31.5 G/DL (ref 32–36)
MCV RBC AUTO: 111 FL (ref 80–100)
MONOCYTES # BLD AUTO: 1.65 X10*3/UL (ref 0.05–0.8)
MONOCYTES NFR BLD AUTO: 49.4 %
NEUTROPHILS # BLD AUTO: 0.55 X10*3/UL (ref 1.6–5.5)
NEUTROPHILS NFR BLD AUTO: 16.5 %
NRBC BLD-RTO: 0 /100 WBCS (ref 0–0)
PLATELET # BLD AUTO: 45 X10*3/UL (ref 150–450)
PRODUCT BLOOD TYPE: 5100
PRODUCT CODE: NORMAL
RBC # BLD AUTO: 2.1 X10*6/UL (ref 4.5–5.9)
RH FACTOR (ANTIGEN D): NORMAL
UNIT ABO: NORMAL
UNIT NUMBER: NORMAL
UNIT RH: NORMAL
UNIT VOLUME: 350
WBC # BLD AUTO: 3.3 X10*3/UL (ref 4.4–11.3)
XM INTEP: NORMAL

## 2025-06-11 PROCEDURE — 99215 OFFICE O/P EST HI 40 MIN: CPT | Performed by: INTERNAL MEDICINE

## 2025-06-11 PROCEDURE — P9040 RBC LEUKOREDUCED IRRADIATED: HCPCS

## 2025-06-11 PROCEDURE — G2211 COMPLEX E/M VISIT ADD ON: HCPCS | Performed by: INTERNAL MEDICINE

## 2025-06-11 PROCEDURE — 36415 COLL VENOUS BLD VENIPUNCTURE: CPT

## 2025-06-11 PROCEDURE — 3074F SYST BP LT 130 MM HG: CPT | Performed by: INTERNAL MEDICINE

## 2025-06-11 PROCEDURE — 1159F MED LIST DOCD IN RCRD: CPT | Performed by: INTERNAL MEDICINE

## 2025-06-11 PROCEDURE — 36430 TRANSFUSION BLD/BLD COMPNT: CPT

## 2025-06-11 PROCEDURE — 3078F DIAST BP <80 MM HG: CPT | Performed by: INTERNAL MEDICINE

## 2025-06-11 PROCEDURE — 86923 COMPATIBILITY TEST ELECTRIC: CPT

## 2025-06-11 PROCEDURE — 86901 BLOOD TYPING SEROLOGIC RH(D): CPT

## 2025-06-11 PROCEDURE — 85025 COMPLETE CBC W/AUTO DIFF WBC: CPT

## 2025-06-11 PROCEDURE — 1126F AMNT PAIN NOTED NONE PRSNT: CPT | Performed by: INTERNAL MEDICINE

## 2025-06-11 PROCEDURE — 99215 OFFICE O/P EST HI 40 MIN: CPT | Mod: 25 | Performed by: INTERNAL MEDICINE

## 2025-06-11 RX ORDER — ALBUTEROL SULFATE 0.83 MG/ML
3 SOLUTION RESPIRATORY (INHALATION) AS NEEDED
OUTPATIENT
Start: 2025-06-19

## 2025-06-11 RX ORDER — FAMOTIDINE 10 MG/ML
20 INJECTION, SOLUTION INTRAVENOUS ONCE AS NEEDED
OUTPATIENT
Start: 2025-06-19

## 2025-06-11 RX ORDER — DIPHENHYDRAMINE HYDROCHLORIDE 50 MG/ML
50 INJECTION, SOLUTION INTRAMUSCULAR; INTRAVENOUS AS NEEDED
OUTPATIENT
Start: 2025-06-25

## 2025-06-11 RX ORDER — FAMOTIDINE 10 MG/ML
20 INJECTION, SOLUTION INTRAVENOUS ONCE AS NEEDED
OUTPATIENT
Start: 2025-06-25

## 2025-06-11 RX ORDER — EPINEPHRINE 0.3 MG/.3ML
0.3 INJECTION SUBCUTANEOUS EVERY 5 MIN PRN
OUTPATIENT
Start: 2025-06-25

## 2025-06-11 RX ORDER — ALBUTEROL SULFATE 0.83 MG/ML
3 SOLUTION RESPIRATORY (INHALATION) AS NEEDED
OUTPATIENT
Start: 2025-06-25

## 2025-06-11 RX ORDER — EPINEPHRINE 0.3 MG/.3ML
0.3 INJECTION SUBCUTANEOUS EVERY 5 MIN PRN
OUTPATIENT
Start: 2025-06-19

## 2025-06-11 RX ORDER — DIPHENHYDRAMINE HYDROCHLORIDE 50 MG/ML
50 INJECTION, SOLUTION INTRAMUSCULAR; INTRAVENOUS AS NEEDED
OUTPATIENT
Start: 2025-06-19

## 2025-06-11 ASSESSMENT — ENCOUNTER SYMPTOMS
CARDIOVASCULAR NEGATIVE: 1
FEVER: 0
RESPIRATORY NEGATIVE: 1
FATIGUE: 1
GASTROINTESTINAL NEGATIVE: 1

## 2025-06-11 ASSESSMENT — PAIN SCALES - GENERAL: PAINLEVEL_OUTOF10: 0-NO PAIN

## 2025-06-11 NOTE — PROGRESS NOTES
Patient ID: Chico Iqbal is a 89 y.o. male.    Subjective:  Returns for follow up for AML. He feels tired. Denies fever. Appetite good    Heme/Onc History:  - p/w rectal pain in Aug 2024 => Was anemic with Hb of 6, elevated MCV, and Plt of 89 and low folate. Initially thought to be due to folate deficiency. Peripheral smear showed abnormal cells and flow cytometry revealed AML (65% of peripheral WBCs)  - He declined having bone marrow biopsy. 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 Flow from blood in Dec 2024 and Mar 2025 showed decreased peripheral blasts  - PRBC requirements increased in Apr-May => Will add aranesp in June    Assessment/Plan:  ? 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 All these have poor prognosis but he looked exceptionally well. He declined bone marrow biopsy or any drastic treatment. Definitely did not want any chemo.    We started him on giltertinib and so far he is tolerating it OK. His peripheral blast count decreased. He has anemia and was requiring 1 unit of PRBC every other week. But his transfusion requirements initially decreased. For the last 2months though, he has been requiring them every other week again => We will add aranesp every 2 weeks to his regimen. Behavior of his disease is more like MDS then AML so I hope Aranesp helps him with his fatigue and anemia.    I provide longitudinal care for Mr. Iqbal for his acute leukemia    Review Of Systems:  Review of Systems   Constitutional:  Positive for fatigue. Negative for fever.   HENT:  Negative.     Respiratory: Negative.     Cardiovascular: Negative.    Gastrointestinal:  Negative.    Genitourinary: Negative.         Physical Exam:  /66 (BP Location: Right arm, Patient Position: Sitting)   Pulse 76   Temp 35.7 °C (96.3 °F) (Temporal)   Resp 16   Wt 87.3 kg (192 lb 7.4 oz)   SpO2 98%   BMI 26.84 kg/m²   BSA: 2.09 meters squared  Performance Status: Symptomatic; fully ambulatory  Physical Exam  HENT:      Head: Normocephalic and atraumatic.   Eyes:      General: No scleral icterus.     Pupils: Pupils are equal, round, and reactive to light.   Cardiovascular:      Rate and Rhythm: Normal rate and regular rhythm.   Pulmonary:      Effort: Pulmonary effort is normal.   Abdominal:      General: Abdomen is flat.      Palpations: Abdomen is soft. There is no mass.   Musculoskeletal:         General: Normal range of motion.   Lymphadenopathy:      Cervical: No cervical adenopathy.   Skin:     Coloration: Skin is not jaundiced.   Neurological:      General: No focal deficit present.      Mental Status: He is alert and oriented to person, place, and time.         Results:  Diagnostic Results   Lab Results   Component Value Date    WBC 3.1 (L) 05/28/2025    HGB 7.4 (L) 05/28/2025    HCT 23.4 (L) 05/28/2025     (H) 05/28/2025    PLT 50 (L) 05/28/2025     Lab Results   Component Value Date    CALCIUM 8.6 03/20/2025     (L) 03/20/2025    K 3.8 03/20/2025    CO2 28 03/20/2025    CL 98 03/20/2025    BUN 13 03/20/2025    CREATININE 0.89 03/20/2025    ALT 16 03/20/2025    AST 22 03/20/2025       Current Outpatient Medications:     atorvastatin (Lipitor) 40 mg tablet, Take 1 tablet (40 mg) by mouth once daily., Disp: 90 tablet, Rfl: 0    clopidogrel (Plavix) 75 mg tablet, Take one tablet by mouth daily, Disp: 90 tablet, Rfl: 0    docusate sodium (Colace) 100 mg capsule, Take 1 capsule (100 mg) by mouth 2 times a day., Disp: 60 capsule, Rfl: 0    finasteride (Proscar) 5 mg tablet, Take 1 tablet (5 mg) by mouth once daily. Do not crush, chew, or split., Disp: 90 tablet, Rfl: 0     fluticasone (Flonase) 50 mcg/actuation nasal spray, 1 spray once daily., Disp: , Rfl:     folic acid (Folvite) 1 mg tablet, Take 1 tablet (1 mg) by mouth once daily., Disp: 90 tablet, Rfl: 0    furosemide (Lasix) 20 mg tablet, Take 1 tablet (20 mg) by mouth once daily. None on Tues and Thur, Disp: 90 tablet, Rfl: 0    gabapentin (Neurontin) 100 mg capsule, Take 1 capsule (100 mg) by mouth 3 times a day., Disp: 90 capsule, Rfl: 5    gilteritinib (Xospata) 40 mg tablet, Take 3 tablets (120 mg total) by mouth once daily.  Swallow whole., Disp: 90 tablet, Rfl: 1    metoprolol succinate XL (Toprol-XL) 25 mg 24 hr tablet, Take 1 tablet (25 mg) by mouth once daily. Do not crush or chew., Disp: 90 tablet, Rfl: 0    pantoprazole (ProtoNix) 40 mg EC tablet, Take 1 tablet (40 mg) by mouth once daily in the morning. Take before meals. Do not crush, chew, or split., Disp: 90 tablet, Rfl: 0    tamsulosin (Flomax) 0.4 mg 24 hr capsule, Take 2 capsules (0.8 mg) by mouth once daily., Disp: 180 capsule, Rfl: 0    mirtazapine (Remeron) 15 mg tablet, Take 0.5 tablets (7.5 mg) by mouth once daily at bedtime. (Patient not taking: Reported on 6/11/2025), Disp: , Rfl:     sildenafil (Viagra) 100 mg tablet, Take 1 tablet (100 mg) by mouth if needed. (Patient not taking: Reported on 6/11/2025), Disp: , Rfl:     tadalafil 20 mg tablet, Take one tablet prior to sexual intercourse. (Patient not taking: Reported on 6/11/2025), Disp: , Rfl:      Past Surgical History:   Procedure Laterality Date    CARDIAC CATHETERIZATION      CORONARY STENT PLACEMENT       Family History   Problem Relation Name Age of Onset    Diabetes Mother      Heart attack Brother        reports that he has been smoking cigars. He has been exposed to tobacco smoke. He has never used smokeless tobacco.    There are no diagnoses linked to this encounter.       I spent more than 40 minutes for the patient today, including face-to-face conversation, pre-visit preparation,  post-visit orders, and others.   Nithin Coronado MD

## 2025-06-12 NOTE — ASSESSMENT & PLAN NOTE
-HBG was 7.4 on 5/28/25 when Dr. Salazar checked it  -given 1 unit of PRBC  -repeat labs per Dr. Santiago's orders

## 2025-06-12 NOTE — ASSESSMENT & PLAN NOTE
4/30/2025- Dr. Salazar (hematology) -AML: FLT3 ITD and FLT3 mutation positive. 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 All these have poor prognosis but he looked exceptionally well. He declined bone marrow biopsy or any drastic treatment. Definitely did not want any chemo.   We started him on giltertinib and so far he is tolerating it OK. His peripheral blast count decreased. He has anemia and was requiring 1 unit of PRBC every other week. But his transfusion requirements decreased. Last one was 2 weeks ago. Will check labs today and every 2 weeks. RTC 6 wks.

## 2025-06-12 NOTE — ASSESSMENT & PLAN NOTE
-continue taking Lipitor  - Patient educated and counseled to do walking and exercise regularly  - Low-fat low-cholesterol diet is advised  - Advised to reduce weight   - The goal is to keep the LDL less than 70, and HDL the more than 40

## 2025-06-12 NOTE — ASSESSMENT & PLAN NOTE
-reports that during the night his legs jerk and burn which makes it hard to sleep  -start taking Gabapentin 100mg one hour before bed

## 2025-06-12 NOTE — ASSESSMENT & PLAN NOTE
-  Preventive screening / Vaccination(s) reviewed and discussed  -  Recommended regular aerobic exercise and proper diet.  -  Discussed need and benefit for weight management  -  Medications were reviewed, list was updated, and refills given if needed.  -  Follow up for annual exam in one year  -declined Colonoscopy

## 2025-06-13 PROBLEM — D61.818 OTHER PANCYTOPENIA (MULTI): Status: ACTIVE | Noted: 2025-06-13

## 2025-06-17 NOTE — PROGRESS NOTES
"Patient ID: He states he has been doing ok, other than his sinuses have been congested. He states that he feels that he has something \"in his sinuses that won't come down\".  He states his urine is still red. He states that he never completed urine test after last appointment. He denies any abdominal pain. He states he has been feeling ok overall. He states he has swelling in his legs.     HEALTH MAINTENANCE: Follow Up  Last Office Visit: 11/22/23  Last Labs: 7/24/23  PSA Screening (starting at age 55 till 69): 7/24/23 WNL  Colonoscopy (45-75 or age 40 with 1st degree relative dx colon ca):  Lung cancer screening (50-76 y/o + 20 pack year + smoking/quit in last 15 years):   DEXA (65+, q 2 years women and 70+ men):     HPI Chico Iqbal is a 88 y.o. male with PMH remarkable for HTN, HLD, COPD, BPH who presents to the office today for follow up.     Social History     Tobacco Use   • Smoking status: Some Days     Types: Cigars   • Smokeless tobacco: Never   Substance Use Topics   • Alcohol use: Yes     Comment: OCCASIONAL, 2-3 BEERS DAILY   • Drug use: Never     Review of Systems   Constitutional: Negative.    HENT:  Positive for congestion and hearing loss.    Respiratory: Negative.     Cardiovascular: Negative.    Gastrointestinal: Negative.    Genitourinary:  Positive for hematuria.   Musculoskeletal: Negative.    Neurological: Negative.    Hematological: Negative.    Psychiatric/Behavioral: Negative.       Visit Vitals  Vitals:    04/03/24 1130   BP: 120/67   Pulse: 86   Resp: 20   SpO2: 95%   Weight: 88.8 kg (195 lb 12.8 oz)   Height: 1.803 m (5' 11\")     Smoking Status Some Days     Physical Exam  Vitals reviewed.   Constitutional:       Appearance: Normal appearance.   HENT:      Head: Normocephalic and atraumatic.      Comments: Sinus congestion and tenderness with nasal congestion bilaterally  Cardiovascular:      Rate and Rhythm: Normal rate and regular rhythm.      Pulses: Normal pulses.      Heart sounds: " Post Procedure Call Completed     Spoke with Aminata Espinosa regarding their recent IR procedure:  Lymph Node Biopsy.    Discussed follow-up care. Answered all questions and concerns at this time.     Patient given IR department call back number: [phone]          Normal heart sounds.   Pulmonary:      Effort: Pulmonary effort is normal.      Breath sounds: Normal breath sounds.   Abdominal:      General: Bowel sounds are normal.      Palpations: Abdomen is soft.   Musculoskeletal:      Right lower leg: Edema present.      Left lower leg: Edema present.   Neurological:      General: No focal deficit present.      Mental Status: He is alert and oriented to person, place, and time.   Psychiatric:         Mood and Affect: Mood normal.         Behavior: Behavior normal.     Current Outpatient Medications   Medication Instructions   • atorvastatin (LIPITOR) 40 mg, oral, Daily   • clopidogrel (Plavix) 75 mg tablet Take one tablet by mouth daily   • fish oil concentrate (Omega-3) 120-180 mg capsule 1 capsule, oral, Daily   • fluticasone (Flonase) 50 mcg/actuation nasal spray USE 1 SPRAY IN EACH NOSTRIL ONCE A DAY AS NEEDED FOR ALLERGIES   • furosemide (LASIX) 20 mg, oral, Daily   • lisinopril 20 mg, oral, Daily   • metoprolol succinate XL (TOPROL-XL) 50 mg, oral, Daily, Do not crush or chew.    • pantoprazole (PROTONIX) 40 mg, oral, Daily before breakfast, Do not crush, chew, or split.    • rivaroxaban (XARELTO) 20 mg, oral, Daily with evening meal, Take with food.   • tadalafil 20 mg tablet TAKE 1 TABLET BY MOUTH EVERY 36 HOURS PRIOR TO INTERCOURSE; EFFECT MAY LAST 36 HOURS. TAKE NO MORE THAN 1 PER DAY.   • tamsulosin (FLOMAX) 0.4 mg, oral, Daily        Lab Results   Component Value Date    WBC 4.3 (L) 03/12/2023    HGB 11.5 (L) 03/12/2023    HCT 33.9 (L) 03/12/2023     03/12/2023    CHOL 102 07/24/2023    TRIG 114 07/24/2023    HDL 28.8 (A) 07/24/2023    ALT 12 03/07/2023    AST 25 03/07/2023     03/12/2023    K 3.4 (L) 03/12/2023     03/12/2023    CREATININE 0.85 03/12/2023    BUN 21 03/12/2023    CO2 28 03/12/2023    TSH 1.20 07/24/2023    INR 1.2 (H) 03/07/2023    HGBA1C 6.1 (A) 03/08/2022     Assessment/Plan   Health Maintenance: Follow up  Acute on Chronic  Sinusitis  - start Doxycyline 100mg bid X 14 days  - advised to increase Flonase to 1 spray each nostril twice daily    Hematuria  - he states his urine still appear red, admits he never had urine checked after last appointment  - will dip urine in office today     COPD  - has been stable  - denies any increased difficulty breathing     Paroxysmal AFIB/HTN/CAD/Chronic systolic HF  -  stress test on 03/21/23 showed no chest discomfort or ischemic EKG changes with pharmacological stress, normal left ventricular systolic function, no perfusion evidence of scar, or focal ischemia  -  2D echo on 03/09/23 revealed: 1. Left ventricular systolic function is mildly decreased with a 40% estimated ejection fraction. 2. Multiple segmental abnormalities exist. See findings.3. Spectral Doppler shows a pseudonormal pattern of left ventricular diastolic filling.4. There is mildly reduced right ventricular systolic function.5. The left atrium is mild to moderately dilated 6. There is global and segmental LV hypokinesis. 7. CVP is elevated.  - Bilateral Carotid US on 06/26/23 showed 20-49% carotid stenosis bilaterally  - BP today is good, 109/69  - c/w Metoprolol  Succinate 50mg daily  - c/w Xarelto for stroke prevention  - c/w current Plavix, ASA, Lisinopril, lipid lowering agent  - he has 2+ BLE edema, advised to wear compression stockings  - c/w Lasix  - advised to follow low Na diet  - continue to follow up with cardiology, he was seen on 03/12/24, advised to monitor his orthostatic BP over a month and average out results    HLD  - c/w statin  - lipid panel on 07/24/23 WNL other than low HDL     BPH  - c/w Flomax  - PSA on 07/24/23 was normal     GERD  - stable  - c/w PPI    --------------------  Written by Ritika Cerrato LPN, acting as a scribe for Dr. Hubbard. This note accurately reflects the work and decisions made by Dr. Hubbard.     I, Dr. Hubbard, attest all medical record entries made by the scribe were under my direction and were  personally dictated by me. I have reviewed the chart and agree that the record accurately reflects my performance of the history, physical exam, and assessment and plan.

## 2025-06-18 ENCOUNTER — SPECIALTY PHARMACY (OUTPATIENT)
Dept: PHARMACY | Facility: CLINIC | Age: 89
End: 2025-06-18

## 2025-06-23 ENCOUNTER — SPECIALTY PHARMACY (OUTPATIENT)
Dept: PHARMACY | Facility: CLINIC | Age: 89
End: 2025-06-23

## 2025-06-23 PROCEDURE — RXMED WILLOW AMBULATORY MEDICATION CHARGE

## 2025-06-25 ENCOUNTER — APPOINTMENT (OUTPATIENT)
Dept: HEMATOLOGY/ONCOLOGY | Facility: HOSPITAL | Age: 89
End: 2025-06-25
Payer: MEDICARE

## 2025-06-26 ENCOUNTER — APPOINTMENT (OUTPATIENT)
Dept: HEMATOLOGY/ONCOLOGY | Facility: HOSPITAL | Age: 89
End: 2025-06-26
Payer: MEDICARE

## 2025-06-26 VITALS
OXYGEN SATURATION: 99 % | HEART RATE: 72 BPM | HEIGHT: 71 IN | TEMPERATURE: 96.4 F | DIASTOLIC BLOOD PRESSURE: 70 MMHG | WEIGHT: 191.25 LBS | SYSTOLIC BLOOD PRESSURE: 110 MMHG | RESPIRATION RATE: 16 BRPM | BODY MASS INDEX: 26.77 KG/M2

## 2025-06-26 DIAGNOSIS — C95.90: ICD-10-CM

## 2025-06-26 DIAGNOSIS — C92.00 ACUTE MYELOID LEUKEMIA NOT HAVING ACHIEVED REMISSION (MULTI): ICD-10-CM

## 2025-06-26 LAB
ABO GROUP (TYPE) IN BLOOD: NORMAL
ALBUMIN SERPL BCP-MCNC: 3.4 G/DL (ref 3.4–5)
ANION GAP SERPL CALC-SCNC: 12 MMOL/L (ref 10–20)
ANTIBODY SCREEN: NORMAL
BLOOD EXPIRATION DATE: NORMAL
BUN SERPL-MCNC: 14 MG/DL (ref 6–23)
CALCIUM SERPL-MCNC: 7.9 MG/DL (ref 8.6–10.3)
CHLORIDE SERPL-SCNC: 101 MMOL/L (ref 98–107)
CO2 SERPL-SCNC: 27 MMOL/L (ref 21–32)
CREAT SERPL-MCNC: 1.09 MG/DL (ref 0.5–1.3)
DISPENSE STATUS: NORMAL
EGFRCR SERPLBLD CKD-EPI 2021: 65 ML/MIN/1.73M*2
ERYTHROCYTE [DISTWIDTH] IN BLOOD BY AUTOMATED COUNT: 23.2 % (ref 11.5–14.5)
FERRITIN SERPL-MCNC: 3003 NG/ML (ref 20–300)
GLUCOSE SERPL-MCNC: 113 MG/DL (ref 74–99)
HCT VFR BLD AUTO: 21.1 % (ref 41–52)
HGB BLD-MCNC: 6.8 G/DL (ref 13.5–17.5)
IRON SATN MFR SERPL: ABNORMAL %
IRON SERPL-MCNC: 198 UG/DL (ref 35–150)
MCH RBC QN AUTO: 33.8 PG (ref 26–34)
MCHC RBC AUTO-ENTMCNC: 32.2 G/DL (ref 32–36)
MCV RBC AUTO: 105 FL (ref 80–100)
NRBC BLD-RTO: 0 /100 WBCS (ref 0–0)
PHOSPHATE SERPL-MCNC: 3 MG/DL (ref 2.5–4.9)
PLATELET # BLD AUTO: 79 X10*3/UL (ref 150–450)
POTASSIUM SERPL-SCNC: 3.7 MMOL/L (ref 3.5–5.3)
PRODUCT BLOOD TYPE: 5100
PRODUCT CODE: NORMAL
RBC # BLD AUTO: 2.01 X10*6/UL (ref 4.5–5.9)
RH FACTOR (ANTIGEN D): NORMAL
SODIUM SERPL-SCNC: 136 MMOL/L (ref 136–145)
TIBC SERPL-MCNC: ABNORMAL UG/DL
UIBC SERPL-MCNC: <55 UG/DL (ref 110–370)
UNIT ABO: NORMAL
UNIT NUMBER: NORMAL
UNIT RH: NORMAL
UNIT VOLUME: 350
WBC # BLD AUTO: 5.7 X10*3/UL (ref 4.4–11.3)
XM INTEP: NORMAL

## 2025-06-26 PROCEDURE — P9040 RBC LEUKOREDUCED IRRADIATED: HCPCS

## 2025-06-26 PROCEDURE — 85027 COMPLETE CBC AUTOMATED: CPT

## 2025-06-26 PROCEDURE — 86901 BLOOD TYPING SEROLOGIC RH(D): CPT

## 2025-06-26 PROCEDURE — 36430 TRANSFUSION BLD/BLD COMPNT: CPT

## 2025-06-26 PROCEDURE — 2500000004 HC RX 250 GENERAL PHARMACY W/ HCPCS (ALT 636 FOR OP/ED): Mod: JZ,TB | Performed by: INTERNAL MEDICINE

## 2025-06-26 PROCEDURE — 36415 COLL VENOUS BLD VENIPUNCTURE: CPT

## 2025-06-26 PROCEDURE — 86923 COMPATIBILITY TEST ELECTRIC: CPT

## 2025-06-26 PROCEDURE — 82728 ASSAY OF FERRITIN: CPT

## 2025-06-26 PROCEDURE — 80069 RENAL FUNCTION PANEL: CPT

## 2025-06-26 PROCEDURE — 96372 THER/PROPH/DIAG INJ SC/IM: CPT

## 2025-06-26 PROCEDURE — 82668 ASSAY OF ERYTHROPOIETIN: CPT

## 2025-06-26 PROCEDURE — 83550 IRON BINDING TEST: CPT

## 2025-06-26 RX ORDER — ALBUTEROL SULFATE 0.83 MG/ML
3 SOLUTION RESPIRATORY (INHALATION) AS NEEDED
OUTPATIENT
Start: 2025-07-03

## 2025-06-26 RX ORDER — DIPHENHYDRAMINE HYDROCHLORIDE 50 MG/ML
50 INJECTION, SOLUTION INTRAMUSCULAR; INTRAVENOUS AS NEEDED
Status: DISCONTINUED | OUTPATIENT
Start: 2025-06-26 | End: 2025-06-26 | Stop reason: HOSPADM

## 2025-06-26 RX ORDER — ALBUTEROL SULFATE 0.83 MG/ML
3 SOLUTION RESPIRATORY (INHALATION) AS NEEDED
OUTPATIENT
Start: 2025-07-10

## 2025-06-26 RX ORDER — FAMOTIDINE 10 MG/ML
20 INJECTION, SOLUTION INTRAVENOUS ONCE AS NEEDED
OUTPATIENT
Start: 2025-07-10

## 2025-06-26 RX ORDER — DIPHENHYDRAMINE HYDROCHLORIDE 50 MG/ML
50 INJECTION, SOLUTION INTRAMUSCULAR; INTRAVENOUS AS NEEDED
OUTPATIENT
Start: 2025-07-10

## 2025-06-26 RX ORDER — EPINEPHRINE 0.3 MG/.3ML
0.3 INJECTION SUBCUTANEOUS EVERY 5 MIN PRN
OUTPATIENT
Start: 2025-07-03

## 2025-06-26 RX ORDER — EPINEPHRINE 0.3 MG/.3ML
0.3 INJECTION SUBCUTANEOUS EVERY 5 MIN PRN
Status: DISCONTINUED | OUTPATIENT
Start: 2025-06-26 | End: 2025-06-26 | Stop reason: HOSPADM

## 2025-06-26 RX ORDER — DIPHENHYDRAMINE HYDROCHLORIDE 50 MG/ML
50 INJECTION, SOLUTION INTRAMUSCULAR; INTRAVENOUS AS NEEDED
OUTPATIENT
Start: 2025-07-03

## 2025-06-26 RX ORDER — ALBUTEROL SULFATE 0.83 MG/ML
3 SOLUTION RESPIRATORY (INHALATION) AS NEEDED
Status: DISCONTINUED | OUTPATIENT
Start: 2025-06-26 | End: 2025-06-26 | Stop reason: HOSPADM

## 2025-06-26 RX ORDER — FAMOTIDINE 10 MG/ML
20 INJECTION, SOLUTION INTRAVENOUS ONCE AS NEEDED
Status: DISCONTINUED | OUTPATIENT
Start: 2025-06-26 | End: 2025-06-26 | Stop reason: HOSPADM

## 2025-06-26 RX ORDER — FAMOTIDINE 10 MG/ML
20 INJECTION, SOLUTION INTRAVENOUS ONCE AS NEEDED
OUTPATIENT
Start: 2025-07-03

## 2025-06-26 RX ORDER — EPINEPHRINE 0.3 MG/.3ML
0.3 INJECTION SUBCUTANEOUS EVERY 5 MIN PRN
OUTPATIENT
Start: 2025-07-10

## 2025-06-26 RX ADMIN — DARBEPOETIN ALFA 100 MCG: 100 INJECTION, SOLUTION INTRAVENOUS; SUBCUTANEOUS at 13:30

## 2025-06-26 ASSESSMENT — PAIN SCALES - GENERAL: PAINLEVEL_OUTOF10: 0-NO PAIN

## 2025-06-27 ENCOUNTER — PHARMACY VISIT (OUTPATIENT)
Dept: PHARMACY | Facility: CLINIC | Age: 89
End: 2025-06-27
Payer: MEDICARE

## 2025-06-28 LAB — EPO SERPL-ACNC: 2907 MU/ML (ref 4–27)

## 2025-07-07 ENCOUNTER — APPOINTMENT (OUTPATIENT)
Dept: RADIOLOGY | Facility: HOSPITAL | Age: 89
DRG: 871 | End: 2025-07-07
Payer: MEDICARE

## 2025-07-07 ENCOUNTER — HOSPITAL ENCOUNTER (INPATIENT)
Facility: HOSPITAL | Age: 89
LOS: 4 days | Discharge: HOME HEALTH | DRG: 871 | End: 2025-07-11
Attending: STUDENT IN AN ORGANIZED HEALTH CARE EDUCATION/TRAINING PROGRAM | Admitting: INTERNAL MEDICINE
Payer: MEDICARE

## 2025-07-07 ENCOUNTER — APPOINTMENT (OUTPATIENT)
Dept: CARDIOLOGY | Facility: HOSPITAL | Age: 89
DRG: 871 | End: 2025-07-07
Payer: MEDICARE

## 2025-07-07 DIAGNOSIS — R53.83 OTHER FATIGUE: ICD-10-CM

## 2025-07-07 DIAGNOSIS — C92.00 ACUTE MYELOID LEUKEMIA NOT HAVING ACHIEVED REMISSION (MULTI): ICD-10-CM

## 2025-07-07 DIAGNOSIS — J18.9 PNEUMONIA DUE TO INFECTIOUS ORGANISM, UNSPECIFIED LATERALITY, UNSPECIFIED PART OF LUNG: ICD-10-CM

## 2025-07-07 DIAGNOSIS — I31.39 PERICARDIAL EFFUSION (HHS-HCC): Primary | ICD-10-CM

## 2025-07-07 DIAGNOSIS — N39.0 URINARY TRACT INFECTION WITHOUT HEMATURIA, SITE UNSPECIFIED: ICD-10-CM

## 2025-07-07 LAB
ALBUMIN SERPL BCP-MCNC: 3.4 G/DL (ref 3.4–5)
ALP SERPL-CCNC: 82 U/L (ref 33–136)
ALT SERPL W P-5'-P-CCNC: 14 U/L (ref 10–52)
ANION GAP BLDV CALCULATED.4IONS-SCNC: 8 MMOL/L (ref 10–25)
ANION GAP SERPL CALC-SCNC: 12 MMOL/L (ref 10–20)
APPEARANCE UR: ABNORMAL
AST SERPL W P-5'-P-CCNC: 30 U/L (ref 9–39)
BACTERIA #/AREA URNS AUTO: ABNORMAL /HPF
BASE EXCESS BLDV CALC-SCNC: 6.4 MMOL/L (ref -2–3)
BASOPHILS # BLD MANUAL: 0 X10*3/UL (ref 0–0.1)
BASOPHILS NFR BLD MANUAL: 0 %
BILIRUB SERPL-MCNC: 1 MG/DL (ref 0–1.2)
BILIRUB UR STRIP.AUTO-MCNC: NEGATIVE MG/DL
BNP SERPL-MCNC: 191 PG/ML (ref 0–99)
BODY TEMPERATURE: ABNORMAL
BUN SERPL-MCNC: 14 MG/DL (ref 6–23)
CA-I BLDV-SCNC: 1.06 MMOL/L (ref 1.1–1.33)
CALCIUM SERPL-MCNC: 8.5 MG/DL (ref 8.6–10.3)
CARDIAC TROPONIN I PNL SERPL HS: 12 NG/L (ref 0–20)
CARDIAC TROPONIN I PNL SERPL HS: 13 NG/L (ref 0–20)
CHLORIDE BLDV-SCNC: 92 MMOL/L (ref 98–107)
CHLORIDE SERPL-SCNC: 92 MMOL/L (ref 98–107)
CO2 SERPL-SCNC: 31 MMOL/L (ref 21–32)
COLOR UR: ABNORMAL
CREAT SERPL-MCNC: 1.41 MG/DL (ref 0.5–1.3)
DACRYOCYTES BLD QL SMEAR: ABNORMAL
EGFRCR SERPLBLD CKD-EPI 2021: 48 ML/MIN/1.73M*2
EOSINOPHIL # BLD MANUAL: 0 X10*3/UL (ref 0–0.4)
EOSINOPHIL NFR BLD MANUAL: 0 %
ERYTHROCYTE [DISTWIDTH] IN BLOOD BY AUTOMATED COUNT: 26.1 % (ref 11.5–14.5)
GIANT PLATELETS BLD QL SMEAR: ABNORMAL
GLUCOSE BLDV-MCNC: 121 MG/DL (ref 74–99)
GLUCOSE SERPL-MCNC: 108 MG/DL (ref 74–99)
GLUCOSE UR STRIP.AUTO-MCNC: NORMAL MG/DL
HCO3 BLDV-SCNC: 30.5 MMOL/L (ref 22–26)
HCT VFR BLD AUTO: 22.8 % (ref 41–52)
HCT VFR BLD EST: 36 % (ref 41–52)
HGB BLD-MCNC: 7.5 G/DL (ref 13.5–17.5)
HGB BLDV-MCNC: 12.1 G/DL (ref 13.5–17.5)
IMM GRANULOCYTES # BLD AUTO: 0.71 X10*3/UL (ref 0–0.5)
IMM GRANULOCYTES NFR BLD AUTO: 3.3 % (ref 0–0.9)
INHALED O2 CONCENTRATION: 32 %
KETONES UR STRIP.AUTO-MCNC: NEGATIVE MG/DL
LACTATE BLDV-SCNC: 2.2 MMOL/L (ref 0.4–2)
LACTATE SERPL-SCNC: 1.4 MMOL/L (ref 0.4–2)
LACTATE SERPL-SCNC: 2.7 MMOL/L (ref 0.4–2)
LEUKOCYTE ESTERASE UR QL STRIP.AUTO: ABNORMAL
LIPASE SERPL-CCNC: 25 U/L (ref 9–82)
LYMPHOCYTES # BLD MANUAL: 0.85 X10*3/UL (ref 0.8–3)
LYMPHOCYTES NFR BLD MANUAL: 4 %
MAGNESIUM SERPL-MCNC: 1.31 MG/DL (ref 1.6–2.4)
MCH RBC QN AUTO: 32.2 PG (ref 26–34)
MCHC RBC AUTO-ENTMCNC: 32.9 G/DL (ref 32–36)
MCV RBC AUTO: 98 FL (ref 80–100)
METAMYELOCYTES # BLD MANUAL: 0.21 X10*3/UL
METAMYELOCYTES NFR BLD MANUAL: 1 %
MONOCYTES # BLD MANUAL: 17.25 X10*3/UL (ref 0.05–0.8)
MONOCYTES NFR BLD MANUAL: 81 %
MUCOUS THREADS #/AREA URNS AUTO: ABNORMAL /LPF
NEUTROPHILS # BLD MANUAL: 2.99 X10*3/UL (ref 1.6–5.5)
NEUTS BAND # BLD MANUAL: 0.43 X10*3/UL (ref 0–0.5)
NEUTS BAND NFR BLD MANUAL: 2 %
NEUTS SEG # BLD MANUAL: 2.56 X10*3/UL (ref 1.6–5)
NEUTS SEG NFR BLD MANUAL: 12 %
NITRITE UR QL STRIP.AUTO: NEGATIVE
NRBC BLD-RTO: 0 /100 WBCS (ref 0–0)
OVALOCYTES BLD QL SMEAR: ABNORMAL
OXYHGB MFR BLDV: 33.7 % (ref 45–75)
PCO2 BLDV: 41 MM HG (ref 41–51)
PH BLDV: 7.48 PH (ref 7.33–7.43)
PH UR STRIP.AUTO: 6.5 [PH]
PHOSPHATE SERPL-MCNC: 2.5 MG/DL (ref 2.5–4.9)
PLATELET # BLD AUTO: 141 X10*3/UL (ref 150–450)
PO2 BLDV: 24 MM HG (ref 35–45)
POLYCHROMASIA BLD QL SMEAR: ABNORMAL
POTASSIUM BLDV-SCNC: 2.4 MMOL/L (ref 3.5–5.3)
POTASSIUM SERPL-SCNC: 3 MMOL/L (ref 3.5–5.3)
PROT SERPL-MCNC: 6.7 G/DL (ref 6.4–8.2)
PROT UR STRIP.AUTO-MCNC: ABNORMAL MG/DL
RBC # BLD AUTO: 2.33 X10*6/UL (ref 4.5–5.9)
RBC # UR STRIP.AUTO: ABNORMAL MG/DL
RBC #/AREA URNS AUTO: ABNORMAL /HPF
RBC MORPH BLD: ABNORMAL
SAO2 % BLDV: 34 % (ref 45–75)
SODIUM BLDV-SCNC: 128 MMOL/L (ref 136–145)
SODIUM SERPL-SCNC: 132 MMOL/L (ref 136–145)
SP GR UR STRIP.AUTO: 1.04
SQUAMOUS #/AREA URNS AUTO: ABNORMAL /HPF
TOTAL CELLS COUNTED BLD: 100
UROBILINOGEN UR STRIP.AUTO-MCNC: NORMAL MG/DL
WBC # BLD AUTO: 21.3 X10*3/UL (ref 4.4–11.3)
WBC #/AREA URNS AUTO: >50 /HPF
WBC CLUMPS #/AREA URNS AUTO: ABNORMAL /HPF

## 2025-07-07 PROCEDURE — 36415 COLL VENOUS BLD VENIPUNCTURE: CPT | Performed by: STUDENT IN AN ORGANIZED HEALTH CARE EDUCATION/TRAINING PROGRAM

## 2025-07-07 PROCEDURE — 1200000002 HC GENERAL ROOM WITH TELEMETRY DAILY: Mod: IPSPLIT

## 2025-07-07 PROCEDURE — 96365 THER/PROPH/DIAG IV INF INIT: CPT | Mod: 59

## 2025-07-07 PROCEDURE — 85007 BL SMEAR W/DIFF WBC COUNT: CPT | Performed by: STUDENT IN AN ORGANIZED HEALTH CARE EDUCATION/TRAINING PROGRAM

## 2025-07-07 PROCEDURE — 96366 THER/PROPH/DIAG IV INF ADDON: CPT

## 2025-07-07 PROCEDURE — 93005 ELECTROCARDIOGRAM TRACING: CPT

## 2025-07-07 PROCEDURE — 71275 CT ANGIOGRAPHY CHEST: CPT | Performed by: STUDENT IN AN ORGANIZED HEALTH CARE EDUCATION/TRAINING PROGRAM

## 2025-07-07 PROCEDURE — 2500000004 HC RX 250 GENERAL PHARMACY W/ HCPCS (ALT 636 FOR OP/ED): Performed by: STUDENT IN AN ORGANIZED HEALTH CARE EDUCATION/TRAINING PROGRAM

## 2025-07-07 PROCEDURE — 85027 COMPLETE CBC AUTOMATED: CPT | Performed by: STUDENT IN AN ORGANIZED HEALTH CARE EDUCATION/TRAINING PROGRAM

## 2025-07-07 PROCEDURE — 84484 ASSAY OF TROPONIN QUANT: CPT | Performed by: STUDENT IN AN ORGANIZED HEALTH CARE EDUCATION/TRAINING PROGRAM

## 2025-07-07 PROCEDURE — 74177 CT ABD & PELVIS W/CONTRAST: CPT | Performed by: STUDENT IN AN ORGANIZED HEALTH CARE EDUCATION/TRAINING PROGRAM

## 2025-07-07 PROCEDURE — 84100 ASSAY OF PHOSPHORUS: CPT | Performed by: STUDENT IN AN ORGANIZED HEALTH CARE EDUCATION/TRAINING PROGRAM

## 2025-07-07 PROCEDURE — 84132 ASSAY OF SERUM POTASSIUM: CPT | Performed by: STUDENT IN AN ORGANIZED HEALTH CARE EDUCATION/TRAINING PROGRAM

## 2025-07-07 PROCEDURE — 74177 CT ABD & PELVIS W/CONTRAST: CPT

## 2025-07-07 PROCEDURE — 83690 ASSAY OF LIPASE: CPT | Performed by: STUDENT IN AN ORGANIZED HEALTH CARE EDUCATION/TRAINING PROGRAM

## 2025-07-07 PROCEDURE — 81001 URINALYSIS AUTO W/SCOPE: CPT | Performed by: STUDENT IN AN ORGANIZED HEALTH CARE EDUCATION/TRAINING PROGRAM

## 2025-07-07 PROCEDURE — 2500000002 HC RX 250 W HCPCS SELF ADMINISTERED DRUGS (ALT 637 FOR MEDICARE OP, ALT 636 FOR OP/ED): Performed by: STUDENT IN AN ORGANIZED HEALTH CARE EDUCATION/TRAINING PROGRAM

## 2025-07-07 PROCEDURE — 83880 ASSAY OF NATRIURETIC PEPTIDE: CPT | Performed by: STUDENT IN AN ORGANIZED HEALTH CARE EDUCATION/TRAINING PROGRAM

## 2025-07-07 PROCEDURE — 80053 COMPREHEN METABOLIC PANEL: CPT | Performed by: STUDENT IN AN ORGANIZED HEALTH CARE EDUCATION/TRAINING PROGRAM

## 2025-07-07 PROCEDURE — 87086 URINE CULTURE/COLONY COUNT: CPT | Mod: GENLAB | Performed by: STUDENT IN AN ORGANIZED HEALTH CARE EDUCATION/TRAINING PROGRAM

## 2025-07-07 PROCEDURE — 96367 TX/PROPH/DG ADDL SEQ IV INF: CPT

## 2025-07-07 PROCEDURE — 99285 EMERGENCY DEPT VISIT HI MDM: CPT | Mod: 25 | Performed by: STUDENT IN AN ORGANIZED HEALTH CARE EDUCATION/TRAINING PROGRAM

## 2025-07-07 PROCEDURE — 83605 ASSAY OF LACTIC ACID: CPT | Performed by: STUDENT IN AN ORGANIZED HEALTH CARE EDUCATION/TRAINING PROGRAM

## 2025-07-07 PROCEDURE — 2550000001 HC RX 255 CONTRASTS: Performed by: STUDENT IN AN ORGANIZED HEALTH CARE EDUCATION/TRAINING PROGRAM

## 2025-07-07 PROCEDURE — 83735 ASSAY OF MAGNESIUM: CPT | Performed by: STUDENT IN AN ORGANIZED HEALTH CARE EDUCATION/TRAINING PROGRAM

## 2025-07-07 PROCEDURE — 87075 CULTR BACTERIA EXCEPT BLOOD: CPT | Mod: GENLAB | Performed by: STUDENT IN AN ORGANIZED HEALTH CARE EDUCATION/TRAINING PROGRAM

## 2025-07-07 PROCEDURE — 71275 CT ANGIOGRAPHY CHEST: CPT

## 2025-07-07 RX ORDER — VANCOMYCIN HYDROCHLORIDE 1 G/200ML
1 INJECTION, SOLUTION INTRAVENOUS
Status: COMPLETED | OUTPATIENT
Start: 2025-07-07 | End: 2025-07-07

## 2025-07-07 RX ORDER — MAGNESIUM SULFATE HEPTAHYDRATE 40 MG/ML
2 INJECTION, SOLUTION INTRAVENOUS ONCE
Status: COMPLETED | OUTPATIENT
Start: 2025-07-07 | End: 2025-07-07

## 2025-07-07 RX ORDER — POTASSIUM CHLORIDE 14.9 MG/ML
20 INJECTION INTRAVENOUS
Status: COMPLETED | OUTPATIENT
Start: 2025-07-07 | End: 2025-07-07

## 2025-07-07 RX ORDER — POTASSIUM CHLORIDE 20 MEQ/1
40 TABLET, EXTENDED RELEASE ORAL ONCE
Status: COMPLETED | OUTPATIENT
Start: 2025-07-07 | End: 2025-07-07

## 2025-07-07 RX ORDER — FOLIC ACID 1 MG/1
1 TABLET ORAL DAILY
Qty: 90 TABLET | Refills: 0 | Status: SHIPPED | OUTPATIENT
Start: 2025-07-07 | End: 2026-07-07

## 2025-07-07 RX ADMIN — POTASSIUM CHLORIDE 40 MEQ: 1500 TABLET, EXTENDED RELEASE ORAL at 17:30

## 2025-07-07 RX ADMIN — POTASSIUM CHLORIDE 20 MEQ: 14.9 INJECTION, SOLUTION INTRAVENOUS at 17:31

## 2025-07-07 RX ADMIN — VANCOMYCIN HYDROCHLORIDE 1 G: 1 INJECTION, SOLUTION INTRAVENOUS at 18:35

## 2025-07-07 RX ADMIN — SODIUM CHLORIDE, SODIUM LACTATE, POTASSIUM CHLORIDE, AND CALCIUM CHLORIDE 1000 ML: .6; .31; .03; .02 INJECTION, SOLUTION INTRAVENOUS at 17:37

## 2025-07-07 RX ADMIN — MAGNESIUM SULFATE HEPTAHYDRATE 2 G: 40 INJECTION, SOLUTION INTRAVENOUS at 21:07

## 2025-07-07 RX ADMIN — VANCOMYCIN HYDROCHLORIDE 1 G: 1 INJECTION, SOLUTION INTRAVENOUS at 19:43

## 2025-07-07 RX ADMIN — POTASSIUM CHLORIDE 20 MEQ: 14.9 INJECTION, SOLUTION INTRAVENOUS at 19:26

## 2025-07-07 RX ADMIN — IOHEXOL 75 ML: 350 INJECTION, SOLUTION INTRAVENOUS at 16:58

## 2025-07-07 RX ADMIN — PIPERACILLIN SODIUM AND TAZOBACTAM SODIUM 4.5 G: 4; .5 INJECTION, SOLUTION INTRAVENOUS at 17:49

## 2025-07-07 SDOH — SOCIAL STABILITY: SOCIAL INSECURITY
WITHIN THE LAST YEAR, HAVE YOU BEEN RAPED OR FORCED TO HAVE ANY KIND OF SEXUAL ACTIVITY BY YOUR PARTNER OR EX-PARTNER?: NO

## 2025-07-07 SDOH — ECONOMIC STABILITY: FOOD INSECURITY: WITHIN THE PAST 12 MONTHS, YOU WORRIED THAT YOUR FOOD WOULD RUN OUT BEFORE YOU GOT THE MONEY TO BUY MORE.: NEVER TRUE

## 2025-07-07 SDOH — SOCIAL STABILITY: SOCIAL INSECURITY: ARE YOU OR HAVE YOU BEEN THREATENED OR ABUSED PHYSICALLY, EMOTIONALLY, OR SEXUALLY BY ANYONE?: NO

## 2025-07-07 SDOH — SOCIAL STABILITY: SOCIAL INSECURITY: ABUSE: ADULT

## 2025-07-07 SDOH — ECONOMIC STABILITY: FOOD INSECURITY: WITHIN THE PAST 12 MONTHS, THE FOOD YOU BOUGHT JUST DIDN'T LAST AND YOU DIDN'T HAVE MONEY TO GET MORE.: NEVER TRUE

## 2025-07-07 SDOH — SOCIAL STABILITY: SOCIAL INSECURITY: WITHIN THE LAST YEAR, HAVE YOU BEEN HUMILIATED OR EMOTIONALLY ABUSED IN OTHER WAYS BY YOUR PARTNER OR EX-PARTNER?: NO

## 2025-07-07 SDOH — SOCIAL STABILITY: SOCIAL INSECURITY: WITHIN THE LAST YEAR, HAVE YOU BEEN AFRAID OF YOUR PARTNER OR EX-PARTNER?: NO

## 2025-07-07 SDOH — ECONOMIC STABILITY: INCOME INSECURITY: IN THE PAST 12 MONTHS HAS THE ELECTRIC, GAS, OIL, OR WATER COMPANY THREATENED TO SHUT OFF SERVICES IN YOUR HOME?: NO

## 2025-07-07 SDOH — SOCIAL STABILITY: SOCIAL INSECURITY: WERE YOU ABLE TO COMPLETE ALL THE BEHAVIORAL HEALTH SCREENINGS?: YES

## 2025-07-07 SDOH — SOCIAL STABILITY: SOCIAL INSECURITY: ARE THERE ANY APPARENT SIGNS OF INJURIES/BEHAVIORS THAT COULD BE RELATED TO ABUSE/NEGLECT?: NO

## 2025-07-07 SDOH — SOCIAL STABILITY: SOCIAL INSECURITY
WITHIN THE LAST YEAR, HAVE YOU BEEN KICKED, HIT, SLAPPED, OR OTHERWISE PHYSICALLY HURT BY YOUR PARTNER OR EX-PARTNER?: NO

## 2025-07-07 SDOH — SOCIAL STABILITY: SOCIAL INSECURITY: HAVE YOU HAD THOUGHTS OF HARMING ANYONE ELSE?: NO

## 2025-07-07 SDOH — SOCIAL STABILITY: SOCIAL INSECURITY: DO YOU FEEL ANYONE HAS EXPLOITED OR TAKEN ADVANTAGE OF YOU FINANCIALLY OR OF YOUR PERSONAL PROPERTY?: NO

## 2025-07-07 SDOH — SOCIAL STABILITY: SOCIAL INSECURITY: HAVE YOU HAD ANY THOUGHTS OF HARMING ANYONE ELSE?: NO

## 2025-07-07 SDOH — SOCIAL STABILITY: SOCIAL INSECURITY: DOES ANYONE TRY TO KEEP YOU FROM HAVING/CONTACTING OTHER FRIENDS OR DOING THINGS OUTSIDE YOUR HOME?: NO

## 2025-07-07 SDOH — SOCIAL STABILITY: SOCIAL INSECURITY: DO YOU FEEL UNSAFE GOING BACK TO THE PLACE WHERE YOU ARE LIVING?: NO

## 2025-07-07 SDOH — SOCIAL STABILITY: SOCIAL INSECURITY: HAS ANYONE EVER THREATENED TO HURT YOUR FAMILY OR YOUR PETS?: NO

## 2025-07-07 ASSESSMENT — ACTIVITIES OF DAILY LIVING (ADL)
HEARING - RIGHT EAR: FUNCTIONAL
DRESSING YOURSELF: INDEPENDENT
LACK_OF_TRANSPORTATION: NO
FEEDING YOURSELF: INDEPENDENT
LACK_OF_TRANSPORTATION: NO
PATIENT'S MEMORY ADEQUATE TO SAFELY COMPLETE DAILY ACTIVITIES?: YES
JUDGMENT_ADEQUATE_SAFELY_COMPLETE_DAILY_ACTIVITIES: YES
HEARING - LEFT EAR: DEAF
ADEQUATE_TO_COMPLETE_ADL: YES
BATHING: NEEDS ASSISTANCE
GROOMING: INDEPENDENT
ASSISTIVE_DEVICE: WALKER
WALKS IN HOME: INDEPENDENT
TOILETING: NEEDS ASSISTANCE

## 2025-07-07 ASSESSMENT — COGNITIVE AND FUNCTIONAL STATUS - GENERAL
MOBILITY SCORE: 20
HELP NEEDED FOR BATHING: A LITTLE
MOVING TO AND FROM BED TO CHAIR: A LITTLE
WALKING IN HOSPITAL ROOM: A LITTLE
CLIMB 3 TO 5 STEPS WITH RAILING: A LITTLE
TOILETING: A LITTLE
DRESSING REGULAR LOWER BODY CLOTHING: A LITTLE
PATIENT BASELINE BEDBOUND: NO
DAILY ACTIVITIY SCORE: 21
STANDING UP FROM CHAIR USING ARMS: A LITTLE

## 2025-07-07 ASSESSMENT — LIFESTYLE VARIABLES
HOW OFTEN DO YOU HAVE 6 OR MORE DRINKS ON ONE OCCASION: NEVER
SKIP TO QUESTIONS 9-10: 1
AUDIT-C TOTAL SCORE: 1
AUDIT-C TOTAL SCORE: 1
HOW OFTEN DO YOU HAVE A DRINK CONTAINING ALCOHOL: MONTHLY OR LESS
HOW MANY STANDARD DRINKS CONTAINING ALCOHOL DO YOU HAVE ON A TYPICAL DAY: 1 OR 2

## 2025-07-07 ASSESSMENT — PAIN SCALES - GENERAL
PAINLEVEL_OUTOF10: 0 - NO PAIN
PAINLEVEL_OUTOF10: 0 - NO PAIN

## 2025-07-07 ASSESSMENT — PAIN - FUNCTIONAL ASSESSMENT: PAIN_FUNCTIONAL_ASSESSMENT: 0-10

## 2025-07-07 NOTE — ED TRIAGE NOTES
Pt here via EMS from home with complaints of shortness of breath x2 days. Pt says he feels nauseated. Denies chest pain.

## 2025-07-08 ENCOUNTER — APPOINTMENT (OUTPATIENT)
Dept: CARDIOLOGY | Facility: HOSPITAL | Age: 89
DRG: 871 | End: 2025-07-08
Payer: MEDICARE

## 2025-07-08 LAB
ABO GROUP (TYPE) IN BLOOD: NORMAL
ABO GROUP (TYPE) IN BLOOD: NORMAL
ANION GAP SERPL CALC-SCNC: 10 MMOL/L (ref 10–20)
ANTIBODY SCREEN: NORMAL
ANTIBODY SCREEN: NORMAL
APPEARANCE UR: ABNORMAL
BILIRUB UR STRIP.AUTO-MCNC: NEGATIVE MG/DL
BLOOD EXPIRATION DATE: NORMAL
BUN SERPL-MCNC: 13 MG/DL (ref 6–23)
CALCIUM SERPL-MCNC: 7.8 MG/DL (ref 8.6–10.3)
CHLORIDE SERPL-SCNC: 97 MMOL/L (ref 98–107)
CO2 SERPL-SCNC: 30 MMOL/L (ref 21–32)
COLOR UR: YELLOW
COMPONENT CODE: NORMAL
CREAT SERPL-MCNC: 1.32 MG/DL (ref 0.5–1.3)
DAT-POLYSPECIFIC: NORMAL
DISPENSE STATUS: NORMAL
EGFRCR SERPLBLD CKD-EPI 2021: 52 ML/MIN/1.73M*2
ERYTHROCYTE [DISTWIDTH] IN BLOOD BY AUTOMATED COUNT: 26.5 % (ref 11.5–14.5)
FLUAV RNA RESP QL NAA+PROBE: NOT DETECTED
FLUBV RNA RESP QL NAA+PROBE: NOT DETECTED
GLUCOSE SERPL-MCNC: 99 MG/DL (ref 74–99)
GLUCOSE UR STRIP.AUTO-MCNC: ABNORMAL MG/DL
HCT VFR BLD AUTO: 19.1 % (ref 41–52)
HCT VFR BLD AUTO: 23.1 % (ref 41–52)
HGB BLD-MCNC: 6.1 G/DL (ref 13.5–17.5)
HGB BLD-MCNC: 7.4 G/DL (ref 13.5–17.5)
HOLD SPECIMEN: NORMAL
KETONES UR STRIP.AUTO-MCNC: NEGATIVE MG/DL
LEGIONELLA AG UR QL: NEGATIVE
LEUKOCYTE ESTERASE UR QL STRIP.AUTO: ABNORMAL
MAGNESIUM SERPL-MCNC: 1.61 MG/DL (ref 1.6–2.4)
MCH RBC QN AUTO: 31.3 PG (ref 26–34)
MCHC RBC AUTO-ENTMCNC: 31.9 G/DL (ref 32–36)
MCV RBC AUTO: 98 FL (ref 80–100)
MRSA DNA SPEC QL NAA+PROBE: NOT DETECTED
MUCOUS THREADS #/AREA URNS AUTO: ABNORMAL /LPF
NITRITE UR QL STRIP.AUTO: NEGATIVE
NRBC BLD-RTO: 0 /100 WBCS (ref 0–0)
PH UR STRIP.AUTO: 8 [PH]
PLATELET # BLD AUTO: 142 X10*3/UL (ref 150–450)
POTASSIUM SERPL-SCNC: 3 MMOL/L (ref 3.5–5.3)
PROCALCITONIN SERPL-MCNC: 0.09 NG/ML
PRODUCT BLOOD TYPE: 600
PRODUCT CODE: NORMAL
PROT UR STRIP.AUTO-MCNC: ABNORMAL MG/DL
Q ONSET: 207 MS
QRS COUNT: 19 BEATS
QRS DURATION: 148 MS
QT INTERVAL: 420 MS
QTC CALCULATION(BAZETT): 578 MS
QTC FREDERICIA: 520 MS
R AXIS: -79 DEGREES
RBC # BLD AUTO: 1.95 X10*6/UL (ref 4.5–5.9)
RBC # UR STRIP.AUTO: ABNORMAL MG/DL
RBC #/AREA URNS AUTO: ABNORMAL /HPF
RH FACTOR (ANTIGEN D): NORMAL
RH FACTOR (ANTIGEN D): NORMAL
RSV RNA RESP QL NAA+PROBE: NOT DETECTED
S PNEUM AG UR QL: NEGATIVE
SARS-COV-2 RNA RESP QL NAA+PROBE: NOT DETECTED
SODIUM SERPL-SCNC: 134 MMOL/L (ref 136–145)
SP GR UR STRIP.AUTO: 1.02
SQUAMOUS #/AREA URNS AUTO: ABNORMAL /HPF
T AXIS: 41 DEGREES
T OFFSET: 417 MS
UNIT ABO: NORMAL
UNIT NUMBER: NORMAL
UNIT NUMBER: NORMAL
UNIT RH: NORMAL
UNIT VOLUME: 350
UROBILINOGEN UR STRIP.AUTO-MCNC: ABNORMAL MG/DL
VENTRICULAR RATE: 114 BPM
WBC # BLD AUTO: 18.6 X10*3/UL (ref 4.4–11.3)
WBC #/AREA URNS AUTO: >50 /HPF
WBC CLUMPS #/AREA URNS AUTO: ABNORMAL /HPF
XM INTEP: NORMAL

## 2025-07-08 PROCEDURE — 93306 TTE W/DOPPLER COMPLETE: CPT | Mod: IPSPLIT

## 2025-07-08 PROCEDURE — 87040 BLOOD CULTURE FOR BACTERIA: CPT | Mod: GENLAB | Performed by: INTERNAL MEDICINE

## 2025-07-08 PROCEDURE — 83735 ASSAY OF MAGNESIUM: CPT | Mod: IPSPLIT | Performed by: NURSE PRACTITIONER

## 2025-07-08 PROCEDURE — 86078 PHYS BLOOD BANK SERV REACTJ: CPT | Performed by: INTERNAL MEDICINE

## 2025-07-08 PROCEDURE — 81001 URINALYSIS AUTO W/SCOPE: CPT | Mod: IPSPLIT | Performed by: INTERNAL MEDICINE

## 2025-07-08 PROCEDURE — 84145 PROCALCITONIN (PCT): CPT | Mod: GENLAB | Performed by: INTERNAL MEDICINE

## 2025-07-08 PROCEDURE — 36430 TRANSFUSION BLD/BLD COMPNT: CPT | Mod: IPSPLIT

## 2025-07-08 PROCEDURE — 1200000002 HC GENERAL ROOM WITH TELEMETRY DAILY: Mod: IPSPLIT

## 2025-07-08 PROCEDURE — 36415 COLL VENOUS BLD VENIPUNCTURE: CPT | Mod: IPSPLIT | Performed by: INTERNAL MEDICINE

## 2025-07-08 PROCEDURE — 87086 URINE CULTURE/COLONY COUNT: CPT | Mod: GENLAB | Performed by: INTERNAL MEDICINE

## 2025-07-08 PROCEDURE — 99223 1ST HOSP IP/OBS HIGH 75: CPT | Performed by: INTERNAL MEDICINE

## 2025-07-08 PROCEDURE — 2500000004 HC RX 250 GENERAL PHARMACY W/ HCPCS (ALT 636 FOR OP/ED): Mod: IPSPLIT | Performed by: INTERNAL MEDICINE

## 2025-07-08 PROCEDURE — 85027 COMPLETE CBC AUTOMATED: CPT | Mod: IPSPLIT | Performed by: INTERNAL MEDICINE

## 2025-07-08 PROCEDURE — 87899 AGENT NOS ASSAY W/OPTIC: CPT | Mod: GENLAB | Performed by: INTERNAL MEDICINE

## 2025-07-08 PROCEDURE — 2500000005 HC RX 250 GENERAL PHARMACY W/O HCPCS: Mod: IPSPLIT | Performed by: INTERNAL MEDICINE

## 2025-07-08 PROCEDURE — 2500000004 HC RX 250 GENERAL PHARMACY W/ HCPCS (ALT 636 FOR OP/ED): Mod: IPSPLIT

## 2025-07-08 PROCEDURE — 87075 CULTR BACTERIA EXCEPT BLOOD: CPT | Mod: GENLAB | Performed by: INTERNAL MEDICINE

## 2025-07-08 PROCEDURE — 94760 N-INVAS EAR/PLS OXIMETRY 1: CPT | Mod: IPSPLIT

## 2025-07-08 PROCEDURE — 87449 NOS EACH ORGANISM AG IA: CPT | Mod: GENLAB | Performed by: INTERNAL MEDICINE

## 2025-07-08 PROCEDURE — P9040 RBC LEUKOREDUCED IRRADIATED: HCPCS | Mod: IPSPLIT

## 2025-07-08 PROCEDURE — 2500000002 HC RX 250 W HCPCS SELF ADMINISTERED DRUGS (ALT 637 FOR MEDICARE OP, ALT 636 FOR OP/ED): Mod: IPSPLIT | Performed by: INTERNAL MEDICINE

## 2025-07-08 PROCEDURE — 86900 BLOOD TYPING SEROLOGIC ABO: CPT | Mod: IPSPLIT | Performed by: INTERNAL MEDICINE

## 2025-07-08 PROCEDURE — 87640 STAPH A DNA AMP PROBE: CPT | Mod: IPSPLIT | Performed by: INTERNAL MEDICINE

## 2025-07-08 PROCEDURE — 86923 COMPATIBILITY TEST ELECTRIC: CPT | Mod: IPSPLIT

## 2025-07-08 PROCEDURE — 85018 HEMOGLOBIN: CPT | Mod: IPSPLIT | Performed by: INTERNAL MEDICINE

## 2025-07-08 PROCEDURE — 86880 COOMBS TEST DIRECT: CPT | Mod: IPSPLIT

## 2025-07-08 PROCEDURE — 80048 BASIC METABOLIC PNL TOTAL CA: CPT | Mod: IPSPLIT | Performed by: INTERNAL MEDICINE

## 2025-07-08 PROCEDURE — 87637 SARSCOV2&INF A&B&RSV AMP PRB: CPT | Mod: IPSPLIT | Performed by: INTERNAL MEDICINE

## 2025-07-08 PROCEDURE — 2500000001 HC RX 250 WO HCPCS SELF ADMINISTERED DRUGS (ALT 637 FOR MEDICARE OP): Mod: IPSPLIT | Performed by: INTERNAL MEDICINE

## 2025-07-08 RX ORDER — CLOPIDOGREL BISULFATE 75 MG/1
75 TABLET ORAL DAILY
Status: DISCONTINUED | OUTPATIENT
Start: 2025-07-08 | End: 2025-07-11 | Stop reason: HOSPADM

## 2025-07-08 RX ORDER — SODIUM CHLORIDE 9 MG/ML
10 INJECTION, SOLUTION INTRAVENOUS CONTINUOUS PRN
Status: DISCONTINUED | OUTPATIENT
Start: 2025-07-08 | End: 2025-07-11 | Stop reason: HOSPADM

## 2025-07-08 RX ORDER — POTASSIUM CHLORIDE 20 MEQ/1
40 TABLET, EXTENDED RELEASE ORAL ONCE
Status: COMPLETED | OUTPATIENT
Start: 2025-07-08 | End: 2025-07-08

## 2025-07-08 RX ORDER — VANCOMYCIN HYDROCHLORIDE 1 G/20ML
INJECTION, POWDER, LYOPHILIZED, FOR SOLUTION INTRAVENOUS DAILY PRN
Status: DISCONTINUED | OUTPATIENT
Start: 2025-07-08 | End: 2025-07-08

## 2025-07-08 RX ORDER — ACETAMINOPHEN 325 MG/1
650 TABLET ORAL EVERY 4 HOURS PRN
Status: DISCONTINUED | OUTPATIENT
Start: 2025-07-08 | End: 2025-07-11 | Stop reason: HOSPADM

## 2025-07-08 RX ORDER — TAMSULOSIN HYDROCHLORIDE 0.4 MG/1
0.8 CAPSULE ORAL DAILY
Status: DISCONTINUED | OUTPATIENT
Start: 2025-07-08 | End: 2025-07-11 | Stop reason: HOSPADM

## 2025-07-08 RX ORDER — ATORVASTATIN CALCIUM 40 MG/1
40 TABLET, FILM COATED ORAL DAILY
Status: DISCONTINUED | OUTPATIENT
Start: 2025-07-08 | End: 2025-07-11 | Stop reason: HOSPADM

## 2025-07-08 RX ORDER — FUROSEMIDE 20 MG/1
20 TABLET ORAL
Status: DISCONTINUED | OUTPATIENT
Start: 2025-07-09 | End: 2025-07-11 | Stop reason: HOSPADM

## 2025-07-08 RX ORDER — TALC
3 POWDER (GRAM) TOPICAL NIGHTLY PRN
Status: DISCONTINUED | OUTPATIENT
Start: 2025-07-08 | End: 2025-07-11 | Stop reason: HOSPADM

## 2025-07-08 RX ORDER — POLYETHYLENE GLYCOL 3350 17 G/17G
17 POWDER, FOR SOLUTION ORAL DAILY PRN
Status: DISCONTINUED | OUTPATIENT
Start: 2025-07-08 | End: 2025-07-11 | Stop reason: HOSPADM

## 2025-07-08 RX ORDER — PANTOPRAZOLE SODIUM 40 MG/1
40 TABLET, DELAYED RELEASE ORAL
Status: DISCONTINUED | OUTPATIENT
Start: 2025-07-08 | End: 2025-07-11 | Stop reason: HOSPADM

## 2025-07-08 RX ORDER — FINASTERIDE 5 MG/1
5 TABLET, FILM COATED ORAL DAILY
Status: DISCONTINUED | OUTPATIENT
Start: 2025-07-08 | End: 2025-07-11 | Stop reason: HOSPADM

## 2025-07-08 RX ORDER — ACETAMINOPHEN 650 MG/1
650 SUPPOSITORY RECTAL EVERY 4 HOURS PRN
Status: DISCONTINUED | OUTPATIENT
Start: 2025-07-08 | End: 2025-07-11 | Stop reason: HOSPADM

## 2025-07-08 RX ORDER — GABAPENTIN 100 MG/1
100 CAPSULE ORAL 3 TIMES DAILY
Status: DISCONTINUED | OUTPATIENT
Start: 2025-07-08 | End: 2025-07-10

## 2025-07-08 RX ORDER — FLUTICASONE PROPIONATE 50 MCG
1 SPRAY, SUSPENSION (ML) NASAL DAILY
Status: DISCONTINUED | OUTPATIENT
Start: 2025-07-08 | End: 2025-07-11 | Stop reason: HOSPADM

## 2025-07-08 RX ORDER — ONDANSETRON 4 MG/1
4 TABLET, FILM COATED ORAL EVERY 8 HOURS PRN
Status: DISCONTINUED | OUTPATIENT
Start: 2025-07-08 | End: 2025-07-11 | Stop reason: HOSPADM

## 2025-07-08 RX ORDER — ONDANSETRON HYDROCHLORIDE 2 MG/ML
4 INJECTION, SOLUTION INTRAVENOUS EVERY 8 HOURS PRN
Status: DISCONTINUED | OUTPATIENT
Start: 2025-07-08 | End: 2025-07-11 | Stop reason: HOSPADM

## 2025-07-08 RX ORDER — SODIUM CHLORIDE 9 MG/ML
INJECTION, SOLUTION INTRAVENOUS
Status: COMPLETED
Start: 2025-07-08 | End: 2025-07-08

## 2025-07-08 RX ORDER — DOCUSATE SODIUM 100 MG/1
100 CAPSULE, LIQUID FILLED ORAL 2 TIMES DAILY
Status: DISCONTINUED | OUTPATIENT
Start: 2025-07-08 | End: 2025-07-08

## 2025-07-08 RX ORDER — DOCUSATE SODIUM 100 MG/1
100 CAPSULE, LIQUID FILLED ORAL DAILY
Status: DISCONTINUED | OUTPATIENT
Start: 2025-07-08 | End: 2025-07-11 | Stop reason: HOSPADM

## 2025-07-08 RX ORDER — FOLIC ACID 1 MG/1
1 TABLET ORAL DAILY
Status: DISCONTINUED | OUTPATIENT
Start: 2025-07-08 | End: 2025-07-11 | Stop reason: HOSPADM

## 2025-07-08 RX ORDER — ACETAMINOPHEN 160 MG/5ML
650 SOLUTION ORAL EVERY 4 HOURS PRN
Status: DISCONTINUED | OUTPATIENT
Start: 2025-07-08 | End: 2025-07-11 | Stop reason: HOSPADM

## 2025-07-08 RX ADMIN — GABAPENTIN 100 MG: 100 CAPSULE ORAL at 08:46

## 2025-07-08 RX ADMIN — SODIUM CHLORIDE 250 ML: 0.9 INJECTION, SOLUTION INTRAVENOUS at 01:04

## 2025-07-08 RX ADMIN — ATORVASTATIN CALCIUM 40 MG: 40 TABLET, FILM COATED ORAL at 08:46

## 2025-07-08 RX ADMIN — PIPERACILLIN SODIUM AND TAZOBACTAM SODIUM 3.38 G: 3; .375 INJECTION, SOLUTION INTRAVENOUS at 12:03

## 2025-07-08 RX ADMIN — Medication 1 L/MIN: at 20:00

## 2025-07-08 RX ADMIN — CLOPIDOGREL BISULFATE 75 MG: 75 TABLET, FILM COATED ORAL at 08:46

## 2025-07-08 RX ADMIN — PANTOPRAZOLE SODIUM 40 MG: 40 TABLET, DELAYED RELEASE ORAL at 06:30

## 2025-07-08 RX ADMIN — PIPERACILLIN SODIUM AND TAZOBACTAM SODIUM 3.38 G: 3; .375 INJECTION, SOLUTION INTRAVENOUS at 01:05

## 2025-07-08 RX ADMIN — PIPERACILLIN SODIUM AND TAZOBACTAM SODIUM 3.38 G: 3; .375 INJECTION, SOLUTION INTRAVENOUS at 18:08

## 2025-07-08 RX ADMIN — GABAPENTIN 100 MG: 100 CAPSULE ORAL at 15:29

## 2025-07-08 RX ADMIN — PIPERACILLIN SODIUM AND TAZOBACTAM SODIUM 3.38 G: 3; .375 INJECTION, SOLUTION INTRAVENOUS at 06:31

## 2025-07-08 RX ADMIN — FLUTICASONE PROPIONATE 1 SPRAY: 50 SPRAY, METERED NASAL at 08:46

## 2025-07-08 RX ADMIN — TAMSULOSIN HYDROCHLORIDE 0.8 MG: 0.4 CAPSULE ORAL at 08:46

## 2025-07-08 RX ADMIN — Medication 1 L/MIN: at 09:22

## 2025-07-08 RX ADMIN — ACETAMINOPHEN 650 MG: 325 TABLET, FILM COATED ORAL at 17:03

## 2025-07-08 RX ADMIN — FOLIC ACID 1 MG: 1 TABLET ORAL at 08:46

## 2025-07-08 RX ADMIN — GABAPENTIN 100 MG: 100 CAPSULE ORAL at 20:45

## 2025-07-08 RX ADMIN — FINASTERIDE 5 MG: 5 TABLET, FILM COATED ORAL at 08:46

## 2025-07-08 RX ADMIN — POTASSIUM CHLORIDE 40 MEQ: 1500 TABLET, EXTENDED RELEASE ORAL at 12:07

## 2025-07-08 RX ADMIN — DOCUSATE SODIUM 100 MG: 100 CAPSULE, LIQUID FILLED ORAL at 08:46

## 2025-07-08 ASSESSMENT — ENCOUNTER SYMPTOMS
COUGH: 0
FEVER: 0
WEAKNESS: 1
SHORTNESS OF BREATH: 1
CHILLS: 0
ABDOMINAL DISTENTION: 0
ABDOMINAL PAIN: 0
PALPITATIONS: 0
DIZZINESS: 0

## 2025-07-08 ASSESSMENT — COGNITIVE AND FUNCTIONAL STATUS - GENERAL
TOILETING: A LITTLE
CLIMB 3 TO 5 STEPS WITH RAILING: TOTAL
PERSONAL GROOMING: A LITTLE
DAILY ACTIVITIY SCORE: 19
MOBILITY SCORE: 20
DRESSING REGULAR UPPER BODY CLOTHING: A LITTLE
CLIMB 3 TO 5 STEPS WITH RAILING: TOTAL
STANDING UP FROM CHAIR USING ARMS: A LITTLE
DRESSING REGULAR LOWER BODY CLOTHING: A LITTLE
STANDING UP FROM CHAIR USING ARMS: A LITTLE
MOBILITY SCORE: 20
HELP NEEDED FOR BATHING: A LITTLE

## 2025-07-08 ASSESSMENT — ACTIVITIES OF DAILY LIVING (ADL): LACK_OF_TRANSPORTATION: NO

## 2025-07-08 ASSESSMENT — PAIN SCALES - GENERAL
PAINLEVEL_OUTOF10: 0 - NO PAIN
PAINLEVEL_OUTOF10: 0 - NO PAIN

## 2025-07-08 ASSESSMENT — PAIN - FUNCTIONAL ASSESSMENT: PAIN_FUNCTIONAL_ASSESSMENT: 0-10

## 2025-07-08 NOTE — CARE PLAN
Informed by nursing staff that patient had a temperature of 101.9 °F which was a little while after his blood was transfused.  Lab had recommended to check blood cultures, type and screen and urine culture.  I did place an order for blood cultures, type and screen and urine culture, although patient is currently being treated for pneumonia and a urinary tract infection.  Patient does receive frequent blood transfusions as an outpatient as well.  Other vitals appear to be stable currently.  I discussed the case with the patient's nurse, Lissette.  Juve.    Guero Qiu D.O.

## 2025-07-08 NOTE — CONSULTS
Inpatient consult to Cardiology  Consult performed by: Ree Palumbo, SEBASTIAN-CNP  Consult ordered by: Maty Huerta MD  Reason for consult: pericardial effusion          History Of Present Illness  Chico Iqbal is a 89 y.o. male presenting with complaints of shortness of breath and weakness that started a couple days ago. 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 He states he gets a little short of breath before his transfusion but this felt different. He denies any chest pain.     Lab work in the ER showed glucose 108, sodium 132, potassium 3.0, BUN/creatinine 14/1.41, magnesium 1.31, , lipase 25, troponin negative, WBCs 21.3, H&H 7.5/22.8, VBG showed pH 7.48, PCO2 41, PO2 24, CT of the chest/abdomen pelvis showed no pulmonary embolism, new diffuse distribution of ill-defined groundglass centrilobular nodules within all lobes of both lungs, redemonstration of numerous tree-in-bud and centrilobular solid nodules, small right pleural effusion, moderate pericardial effusion, inflamed urinary bladder wall.    EKG showed AF RVR with RBBB.    He was started on antibiotics.       Past Medical History  Medical History[1]    Surgical History  Surgical History[2]     Social History  He reports that he has been smoking cigars. He has been exposed to tobacco smoke. He has never used smokeless tobacco. He reports current alcohol use of about 3.0 standard drinks of alcohol per week. He reports that he does not use drugs.    Family History  Family History[3]     Allergies  Patient has no known allergies.    Review of Systems  Review of Systems   Constitutional:  Negative for chills and fever.   Respiratory:  Positive for shortness of breath. Negative for cough.    Cardiovascular:  Negative for chest pain, palpitations and leg swelling.   Gastrointestinal:  Negative for abdominal distention and abdominal pain.   Musculoskeletal:  Negative for gait problem.   Neurological:   "Positive for weakness. Negative for dizziness.   All other systems reviewed and are negative.         Physical Exam  Constitutional: Well developed, awake/alert/oriented x3, no distress, alert and cooperative  Eyes: PERRL, EOMI, clear sclera  ENMT: mucous membranes moist, no apparent injury, no lesions seen  Head/Neck: Neck supple, no apparent injury, thyroid without mass or tenderness, No JVD, trachea midline, no bruits  Respiratory/Thorax: Patent airways, diminished throughout with good chest expansion, thorax symmetric  Cardiovascular: irregular rhythm, no murmurs, 2+ equal pulses of the extremities, normal S 1and S 2  Gastrointestinal: Nondistended, soft, non-tender, no rebound tenderness or guarding, no masses palpable, no organomegaly, +BS, no bruits  Musculoskeletal: ROM intact, no joint swelling, normal strength  Extremities: normal extremities, no cyanosis edema, contusions or wounds, no clubbing  Neurological: alert and oriented x3, intact senses, motor, response and reflexes, normal strength  Lymphatic: No significant lymphadenopathy  Psychological: Appropriate mood and behavior  Skin: Warm and dry, no lesions, no rashes       Last Recorded Vitals  Blood pressure 98/64, pulse 92, temperature 36.3 °C (97.3 °F), temperature source Temporal, resp. rate 14, height 1.803 m (5' 10.98\"), weight 86.4 kg (190 lb 7.6 oz), SpO2 94%.    Medications  Scheduled medications  Scheduled Medications[4]  Continuous medications  Continuous Medications[5]  PRN medications  PRN Medications[6]    Relevant Results  Results for orders placed or performed during the hospital encounter of 07/07/25 (from the past 24 hours)   CBC and Auto Differential   Result Value Ref Range    WBC 21.3 (H) 4.4 - 11.3 x10*3/uL    nRBC 0.0 0.0 - 0.0 /100 WBCs    RBC 2.33 (L) 4.50 - 5.90 x10*6/uL    Hemoglobin 7.5 (L) 13.5 - 17.5 g/dL    Hematocrit 22.8 (L) 41.0 - 52.0 %    MCV 98 80 - 100 fL    MCH 32.2 26.0 - 34.0 pg    MCHC 32.9 32.0 - 36.0 g/dL    " RDW 26.1 (H) 11.5 - 14.5 %    Platelets 141 (L) 150 - 450 x10*3/uL    Immature Granulocytes %, Automated 3.3 (H) 0.0 - 0.9 %    Immature Granulocytes Absolute, Automated 0.71 (H) 0.00 - 0.50 x10*3/uL   Phosphorus   Result Value Ref Range    Phosphorus 2.5 2.5 - 4.9 mg/dL   Comprehensive metabolic panel   Result Value Ref Range    Glucose 108 (H) 74 - 99 mg/dL    Sodium 132 (L) 136 - 145 mmol/L    Potassium 3.0 (L) 3.5 - 5.3 mmol/L    Chloride 92 (L) 98 - 107 mmol/L    Bicarbonate 31 21 - 32 mmol/L    Anion Gap 12 10 - 20 mmol/L    Urea Nitrogen 14 6 - 23 mg/dL    Creatinine 1.41 (H) 0.50 - 1.30 mg/dL    eGFR 48 (L) >60 mL/min/1.73m*2    Calcium 8.5 (L) 8.6 - 10.3 mg/dL    Albumin 3.4 3.4 - 5.0 g/dL    Alkaline Phosphatase 82 33 - 136 U/L    Total Protein 6.7 6.4 - 8.2 g/dL    AST 30 9 - 39 U/L    Bilirubin, Total 1.0 0.0 - 1.2 mg/dL    ALT 14 10 - 52 U/L   Magnesium   Result Value Ref Range    Magnesium 1.31 (L) 1.60 - 2.40 mg/dL   Lipase   Result Value Ref Range    Lipase 25 9 - 82 U/L   Lactate   Result Value Ref Range    Lactate 2.7 (H) 0.4 - 2.0 mmol/L   B-Type Natriuretic Peptide   Result Value Ref Range     (H) 0 - 99 pg/mL   Blood Gas Venous Full Panel   Result Value Ref Range    POCT pH, Venous 7.48 (H) 7.33 - 7.43 pH    POCT pCO2, Venous 41 41 - 51 mm Hg    POCT pO2, Venous 24 (L) 35 - 45 mm Hg    POCT SO2, Venous 34 (L) 45 - 75 %    POCT Oxy Hemoglobin, Venous 33.7 (L) 45.0 - 75.0 %    POCT Hematocrit Calculated, Venous 36.0 (L) 41.0 - 52.0 %    POCT Sodium, Venous 128 (L) 136 - 145 mmol/L    POCT Potassium, Venous 2.4 (LL) 3.5 - 5.3 mmol/L    POCT Chloride, Venous 92 (L) 98 - 107 mmol/L    POCT Ionized Calicum, Venous 1.06 (L) 1.10 - 1.33 mmol/L    POCT Glucose, Venous 121 (H) 74 - 99 mg/dL    POCT Lactate, Venous 2.2 (H) 0.4 - 2.0 mmol/L    POCT Base Excess, Venous 6.4 (H) -2.0 - 3.0 mmol/L    POCT HCO3 Calculated, Venous 30.5 (H) 22.0 - 26.0 mmol/L    POCT Hemoglobin, Venous 12.1 (L) 13.5 - 17.5  g/dL    POCT Anion Gap, Venous 8.0 (L) 10.0 - 25.0 mmol/L    Patient Temperature      FiO2 32 %   Troponin I, High Sensitivity, Initial   Result Value Ref Range    Troponin I, High Sensitivity 13 0 - 20 ng/L   Manual Differential   Result Value Ref Range    Neutrophils %, Manual 12.0 40.0 - 80.0 %    Bands %, Manual 2.0 0.0 - 5.0 %    Lymphocytes %, Manual 4.0 13.0 - 44.0 %    Monocytes %, Manual 81.0 2.0 - 10.0 %    Eosinophils %, Manual 0.0 0.0 - 6.0 %    Basophils %, Manual 0.0 0.0 - 2.0 %    Metamyelocytes %, Manual 1.0 0.0 - 0.0 %    Seg Neutrophils Absolute, Manual 2.56 1.60 - 5.00 x10*3/uL    Bands Absolute, Manual 0.43 0.00 - 0.50 x10*3/uL    Lymphocytes Absolute, Manual 0.85 0.80 - 3.00 x10*3/uL    Monocytes Absolute, Manual 17.25 (H) 0.05 - 0.80 x10*3/uL    Eosinophils Absolute, Manual 0.00 0.00 - 0.40 x10*3/uL    Basophils Absolute, Manual 0.00 0.00 - 0.10 x10*3/uL    Metamyelocytes Absolute, Manual 0.21 0.00 - 0.00 x10*3/uL    Total Cells Counted 100     Neutrophils Absolute, Manual 2.99 1.60 - 5.50 x10*3/uL    RBC Morphology See Below     Polychromasia Mild     Ovalocytes Few     Teardrop Cells Few     Giant Platelets Few    ECG 12 lead   Result Value Ref Range    Ventricular Rate 114 BPM    QRS Duration 148 ms    QT Interval 420 ms    QTC Calculation(Bazett) 578 ms    R Axis -79 degrees    T Axis 41 degrees    QRS Count 19 beats    Q Onset 207 ms    T Offset 417 ms    QTC Fredericia 520 ms   Troponin, High Sensitivity, 1 Hour   Result Value Ref Range    Troponin I, High Sensitivity 12 0 - 20 ng/L   Blood Culture    Specimen: Peripheral Venipuncture; Blood culture   Result Value Ref Range    Blood Culture Loaded on Instrument - Culture in progress    Blood Culture    Specimen: Peripheral Venipuncture; Blood culture   Result Value Ref Range    Blood Culture Loaded on Instrument - Culture in progress    Urinalysis with Reflex Culture and Microscopic   Result Value Ref Range    Color, Urine Light-Yellow  Light-Yellow, Yellow, Dark-Yellow    Appearance, Urine Turbid (N) Clear    Specific Gravity, Urine 1.036 (N) 1.005 - 1.035    pH, Urine 6.5 5.0, 5.5, 6.0, 6.5, 7.0, 7.5, 8.0    Protein, Urine 10 (TRACE) NEGATIVE, 10 (TRACE), 20 (TRACE) mg/dL    Glucose, Urine Normal Normal mg/dL    Blood, Urine 0.2 (2+) (A) NEGATIVE mg/dL    Ketones, Urine NEGATIVE NEGATIVE mg/dL    Bilirubin, Urine NEGATIVE NEGATIVE mg/dL    Urobilinogen, Urine Normal Normal mg/dL    Nitrite, Urine NEGATIVE NEGATIVE    Leukocyte Esterase, Urine 250 Jakub/uL (A) NEGATIVE   Extra Urine Gray Tube   Result Value Ref Range    Extra Tube     Microscopic Only, Urine   Result Value Ref Range    WBC, Urine >50 (A) 1-5, NONE /HPF    WBC Clumps, Urine OCCASIONAL Reference range not established. /HPF    RBC, Urine 3-5 NONE, 1-2, 3-5 /HPF    Squamous Epithelial Cells, Urine 1-9 (SPARSE) Reference range not established. /HPF    Bacteria, Urine 3+ (A) NONE SEEN /HPF    Mucus, Urine FEW Reference range not established. /LPF   Lactate   Result Value Ref Range    Lactate 1.4 0.4 - 2.0 mmol/L   MRSA Surveillance for Vancomycin De-escalation, PCR    Specimen: Anterior Nares; Swab   Result Value Ref Range    MRSA PCR Not Detected Not Detected   Legionella Antigen, Urine    Specimen: Clean Catch/Voided; Urine   Result Value Ref Range    L. pneumophila Urine Ag Negative Negative   Streptococcus pneumoniae Antigen, Urine    Specimen: Clean Catch/Voided; Urine   Result Value Ref Range    Streptococcus pneumoniae Ag, Urine Negative Negative   CBC   Result Value Ref Range    WBC 18.6 (H) 4.4 - 11.3 x10*3/uL    nRBC 0.0 0.0 - 0.0 /100 WBCs    RBC 1.95 (L) 4.50 - 5.90 x10*6/uL    Hemoglobin 6.1 (LL) 13.5 - 17.5 g/dL    Hematocrit 19.1 (L) 41.0 - 52.0 %    MCV 98 80 - 100 fL    MCH 31.3 26.0 - 34.0 pg    MCHC 31.9 (L) 32.0 - 36.0 g/dL    RDW 26.5 (H) 11.5 - 14.5 %    Platelets 142 (L) 150 - 450 x10*3/uL   Basic metabolic panel   Result Value Ref Range    Glucose 99 74 - 99 mg/dL     Sodium 134 (L) 136 - 145 mmol/L    Potassium 3.0 (L) 3.5 - 5.3 mmol/L    Chloride 97 (L) 98 - 107 mmol/L    Bicarbonate 30 21 - 32 mmol/L    Anion Gap 10 10 - 20 mmol/L    Urea Nitrogen 13 6 - 23 mg/dL    Creatinine 1.32 (H) 0.50 - 1.30 mg/dL    eGFR 52 (L) >60 mL/min/1.73m*2    Calcium 7.8 (L) 8.6 - 10.3 mg/dL   Magnesium   Result Value Ref Range    Magnesium 1.61 1.60 - 2.40 mg/dL   Prepare RBC: 1 Units   Result Value Ref Range    PRODUCT CODE N5798K65     Unit Number [id number]     Unit ABO A     Unit RH NEG     XM INTEP COMP     Dispense Status IS     Blood Expiration Date 7/13/2025 11:59:00 PM EDT     PRODUCT BLOOD TYPE 0600     UNIT VOLUME 350    Type and screen   Result Value Ref Range    ABO TYPE A     Rh TYPE POS     ANTIBODY SCREEN NEG    Sars-CoV-2, Influenza A/B and RSV PCR   Result Value Ref Range    Coronavirus 2019, PCR Not Detected Not Detected    Flu A Result Not Detected Not Detected    Flu B Result Not Detected Not Detected    RSV PCR Not Detected Not Detected       CT angio chest for pulmonary embolism   Final Result   CT CHEST:   1. No acute pulmonary embolism.        2. New diffuse distribution of ill-defined ground-glass centrilobular   nodules within all lobes of both lungs. Differential diagnosis   includes hypersensitivity pneumonitis (upper lobe predominant),   respiratory bronchiolitis (upper lobe predominant) particularly if   patient has a smoking history, viral pneumonia, or medication related.        3. Redemonstration of numerous tree-in-bud and centrilobular solid   pulmonary nodules which appear unchanged from 03/07/2023 most likely   representing sequela of remote infection with terminal airway   plugging, supported by the presence of calcified intrathoracic lymph   nodes reflecting remote granulomatous infection.        4. Small new right pleural effusion.        5. Moderate pericardial effusion, increased in size from prior study.        6. Remote myocardial infarct  involving the left ventricular apex and   apical septum.             CT ABDOMEN/PELVIS:   1. Inflamed appearance of the urinary bladder wall. Correlate for   cystitis.   2. 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   3. Ectatic infrarenal aorta measuring 2.8 cm x 2.7 cm.                       MACRO:   None.        Signed by: Porter Mccann 7/7/2025 5:30 PM   Dictation workstation:   [id number]      CT abdomen pelvis w IV contrast   Final Result   CT CHEST:   1. No acute pulmonary embolism.        2. New diffuse distribution of ill-defined ground-glass centrilobular   nodules within all lobes of both lungs. Differential diagnosis   includes hypersensitivity pneumonitis (upper lobe predominant),   respiratory bronchiolitis (upper lobe predominant) particularly if   patient has a smoking history, viral pneumonia, or medication related.        3. Redemonstration of numerous tree-in-bud and centrilobular solid   pulmonary nodules which appear unchanged from 03/07/2023 most likely   representing sequela of remote infection with terminal airway   plugging, supported by the presence of calcified intrathoracic lymph   nodes reflecting remote granulomatous infection.        4. Small new right pleural effusion.        5. Moderate pericardial effusion, increased in size from prior study.        6. 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             CT ABDOMEN/PELVIS:   1. Inflamed appearance of the urinary bladder wall. Correlate for   cystitis.   2. 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   3. Ectatic infrarenal aorta measuring 2.8 cm x 2.7 cm.                       MACRO:   None.        Signed by: Porter Mccann 7/7/2025 5:30 PM   Dictation workstation:   [id number]      Transthoracic Echo Complete    (Results Pending)        No echocardiogram results found for the past 12 months       Assessment/Plan   Echocardiogram from 2023: LVEF 60-65%, LVH, mild basal septal hypertrophy, biatrial enlargement, trace AI, mild MR/TR    Pneumonia  -CT chest showed pneumonia  -antibiotics  -bronchodilators  -oxygen support  -sputum culture  -blood cultures    2. Pericardial effusion  -Per CT chest  -Echo pending  -VSS    3. UTI  -Urine culture pending  -antibiotics    4. Anemia  -2/2 AML  -Receives blood transfusion every 2 weeks  -Hgb down to 6.1 today  -Agree with PRBC  -Cont Plavix for hx of PCI/VANE    5. Hx of CAD s/p multiple PCI/VANE  -Denies chest pain  -C/o SOB  -Troponins negative  -I reviewed the EKG, no acute changes, ST elevation, or depression  -Cont plavix and statin    6. Permanent atrial fibrillation  -RVR on arrival, now rate controlled  -Not on AC due to anemia  -Monitor on tele    7. Hypokalemia/hypomagnesemia  -Supplement  -Monitor    8. Acute kidney injury  -Possibly from dehydration  -Improved with IVF  -Hold SEBASTIAN Castaneda-CNP         [1]   Past Medical History:  Diagnosis Date    Alcohol abuse     Body mass index (BMI) 29.0-29.9, adult 09/29/2020    Body mass index (BMI) of 29.0 to 29.9 in adult    BPH (benign prostatic hyperplasia)     CAD (coronary artery disease)     Cancer (Multi)     COPD (chronic obstructive pulmonary disease) (Multi)     GERD (gastroesophageal reflux disease)     Hyperlipidemia     Hypertension     Old myocardial infarction     History of myocardial infarction    Paroxysmal A-fib (Multi)     Urinary tract infection    [2]   Past Surgical History:  Procedure Laterality Date    CARDIAC CATHETERIZATION      CORONARY STENT PLACEMENT     [3]   Family History  Problem Relation Name Age of Onset    Diabetes Mother      Heart attack Brother     [4] atorvastatin, 40 mg, oral, Daily  clopidogrel, 75 mg, oral, Daily  docusate sodium, 100 mg, oral, Daily  finasteride, 5 mg, oral,  Daily  fluticasone, 1 spray, Each Nostril, Daily  folic acid, 1 mg, oral, Daily  [Held by provider] furosemide, 20 mg, oral, Once per day on Sunday Monday Wednesday Friday Saturday  gabapentin, 100 mg, oral, TID  oxygen, , inhalation, Continuous - Inhalation  pantoprazole, 40 mg, oral, Daily before breakfast  perflutren lipid microspheres, 0.5-10 mL of dilution, intravenous, Once in imaging  perflutren protein A microsphere, 0.5 mL, intravenous, Once in imaging  piperacillin-tazobactam, 3.375 g, intravenous, q6h  sulfur hexafluoride microsphr, 2 mL, intravenous, Once in imaging  tamsulosin, 0.8 mg, oral, Daily  [5] sodium chloride 0.9%, 10 mL/hr  [6] PRN medications: acetaminophen **OR** acetaminophen **OR** acetaminophen, melatonin, ondansetron **OR** ondansetron, polyethylene glycol, sodium chloride 0.9%

## 2025-07-08 NOTE — CONSULTS
"Nutrition Initial Assessment:   Nutrition Assessment    Reason for Assessment: Admission nursing screening (MST=3)    Patient is a 89 y.o. male presenting with Urinary tract infection without hematuria, site unspecified    PMH: MARILEE, PE, CHF    Nutrition History:  Energy Intake: Fair 50-75 %  Pain affecting nutrition status: N/A  Food and Nutrient History: Visited patient at bedside. He was resting and, in a friendly manner, voiced some frustration about the number of people coming into his room. He reports having a decreased appetite but is willing to try an oral nutrition supplement (ONS), as he did during his previous stay. (NKFA)       Anthropometrics:  Height: 180.3 cm (5' 10.98\")   Weight: 86.4 kg (190 lb 7.6 oz)   BMI (Calculated): 26.58  IBW/kg (Dietitian Calculated): 78.2 kg  Percent of IBW: 110 %       Weight History:   Wt Readings from Last 10 Encounters:   07/07/25 86.4 kg (190 lb 7.6 oz)   06/26/25 86.7 kg (191 lb 4 oz)   06/11/25 87.3 kg (192 lb 7.4 oz)   06/10/25 86.6 kg (191 lb)   05/28/25 87.2 kg (192 lb 3.9 oz)   04/30/25 87.9 kg (193 lb 10.8 oz)   04/03/25 86.5 kg (190 lb 11.2 oz)   03/20/25 85.6 kg (188 lb 11.4 oz)   02/07/25 89.4 kg (197 lb)   02/06/25 89 kg (196 lb 5.1 oz)       Weight Change %:  Weight History / % Weight Change: 07/07/25 (86.4 kg) to 06/26/25 (86.7 kg), -0.3% loss in ~1.5 weeks. 07/07/25 (86.4 kg) to 03/20/25 (85.6 kg), +0.9% gain in ~3.5 months.  07/07/25 (86.4 kg) to 02/07/25 (89.4 kg), -3.4% loss in ~5 months.  Significant Weight Loss: No    Nutrition Focused Physical Exam Findings:    Subcutaneous Fat Loss:   Defer Subcutaneous Fat Loss Assessment: Defer all  Defer All Reason: pt trying to sleep  Muscle Wasting:  Defer Muscle Wasting Assessment: Defer all  Edema:  Edema Location: LLE Edema: Mild pitting, slight indentation on nursing flowsheet  Physical Findings:  Eyes: Negative  Digestive System Findings: Early satiety, Loose stool  Teeth Findings:  (missing many " teeth.)    Nutrition Significant Labs:  CBC Trend:   Results from last 7 days   Lab Units 07/09/25  0755 07/08/25  1604 07/08/25  0729 07/07/25  1629   WBC AUTO x10*3/uL 20.5*  --  18.6* 21.3*   RBC AUTO x10*6/uL 2.10*  --  1.95* 2.33*   HEMOGLOBIN g/dL 6.9*   < > 6.1* 7.5*   HEMATOCRIT % 20.6*   < > 19.1* 22.8*   MCV fL 98  --  98 98   PLATELETS AUTO x10*3/uL 131*  --  142* 141*    < > = values in this interval not displayed.    , BMP Trend:   Results from last 7 days   Lab Units 07/09/25 0755 07/08/25  0729 07/07/25  1629   GLUCOSE mg/dL 95 99 108*   CALCIUM mg/dL 7.7* 7.8* 8.5*   SODIUM mmol/L 134* 134* 132*   POTASSIUM mmol/L 3.3* 3.0* 3.0*   CO2 mmol/L 29 30 31   CHLORIDE mmol/L 99 97* 92*   BUN mg/dL 13 13 14   CREATININE mg/dL 1.62* 1.32* 1.41*     Nutrition Specific Medications:  Scheduled medications  Scheduled Medications[1]  Continuous medications  Continuous Medications[2]    I/O:   Last BM Date: 07/07/25; Stool Appearance: Loose, Soft, Watery (07/09/25 1100)    Dietary Orders (From admission, onward)       Start     Ordered    07/08/25 0026  Adult diet Regular  Diet effective now        Question:  Diet type  Answer:  Regular    07/08/25 0025    07/07/25 2317  May Participate in Room Service With Assistance  ( ROOM SERVICE MAY PARTICIPATE WITH ASSISTANCE)  Once        Question:  .  Answer:  Yes    07/07/25 2316                Estimated Needs:   Total Energy Estimated Needs in 24 hours (kCal): 2200 kCal  Method for Estimating Needs: ~ 25 kcal/kg of actual BW  Total Protein Estimated Needs in 24 Hours (g):  (86-104g)  Method for Estimating 24 Hour Protein Needs: 1-1.2 g/kg of actual BW  Total Fluid Estimated Needs in 24 Hours (mL): 2200 mL  Method for Estimating 24 Hour Fluid Needs: ~1 mL/kcal or per medical team.  Patient on Order Fluid Restriction: No        Nutrition Diagnosis        Nutrition Diagnosis  Patient has Nutrition Diagnosis: Yes  Diagnosis Status (1): New  Nutrition Diagnosis 1:  Inadequate protein energy intake  Related to (1): early satiety and decreased appetite during acute infection  As Evidenced by (1): ~50-75% intake, loose stools limiting tolerance       Nutrition Interventions/Recommendations   Nutrition prescription for oral nutrition    Nutrition Recommendations:  Individualized Nutrition Prescription Provided for : Regular diet and Ensure Plus High Protein once a day    Nutrition Interventions/Goals:   Meals and Snacks: General healthful diet  Medical Food Supplement: Commercial beverage medical food supplement therapy  Goal: Ensure Plus High Protein (350kcal and 20g protein per 8 fluid oz)  Coordination of Care with Providers:  (IDT rounds)      Education Documentation  Pt requested to rest. Will attempt again if able before discharge.       Nutrition Monitoring and Evaluation   Intake / Amount of food: Consumes at least 75% or more of meals/snacks/supplements    Body Weight: Body weight - Maintain stable weight              Goal Status: New goal(s) identified    Time Spent (min): 75 minutes              [1] atorvastatin, 40 mg, oral, Daily  clopidogrel, 75 mg, oral, Daily  docusate sodium, 100 mg, oral, Daily  finasteride, 5 mg, oral, Daily  fluticasone, 1 spray, Each Nostril, Daily  folic acid, 1 mg, oral, Daily  [Held by provider] furosemide, 20 mg, oral, Once per day on Sunday Monday Wednesday Friday Saturday  gabapentin, 100 mg, oral, TID  [Held by provider] metoprolol succinate XL, 25 mg, oral, Daily  oxygen, , inhalation, Continuous - Inhalation  pantoprazole, 40 mg, oral, Daily before breakfast  perflutren lipid microspheres, 0.5-10 mL of dilution, intravenous, Once in imaging  perflutren protein A microsphere, 0.5 mL, intravenous, Once in imaging  piperacillin-tazobactam, 3.375 g, intravenous, q6h  sulfur hexafluoride microsphr, 2 mL, intravenous, Once in imaging  tamsulosin, 0.8 mg, oral, Daily     [2] sodium chloride 0.9%, 10 mL/hr, Last Rate: 10 mL/hr (07/09/25  1216)

## 2025-07-08 NOTE — CARE PLAN
Problem: Respiratory  Goal: Clear secretions with interventions this shift  Outcome: Progressing  Goal: Minimize anxiety/maximize coping throughout shift  Outcome: Progressing     Problem: Pain - Adult  Goal: Verbalizes/displays adequate comfort level or baseline comfort level  Outcome: Met     Problem: Safety - Adult  Goal: Free from fall injury  Outcome: Met     Problem: Respiratory  Goal: Minimal/no exertional discomfort or dyspnea this shift  Outcome: Met  Goal: No signs of respiratory distress (eg. Use of accessory muscles. Peds grunting)  Outcome: Met  Goal: Patent airway maintained this shift  Outcome: Met   The patient's goals for the shift include      The clinical goals for the shift include Pt will have no falls this shift.    Over the shift, the patient did make progress toward the following goals.

## 2025-07-08 NOTE — H&P
"History Of Present Illness  Chico Iqbal is a 89 y.o. male with PMH remarkable for acute myeloid leukemia, HTN, HLD, COPD, BPH, Afib not on anticoagulation, who came to the hospital secondary to shortness of breath and generalized weakness.  Patient reports having shortness of breath for approximately 2 days.  He denies fever, chills or significant cough.  He does report having generalized weakness prior to admission.  Patient admits to having 1 episode of diarrhea on the day of admission and he also reports having some burning with urination.  He also admits to some nausea but denies vomiting.  Patient denies fever, chills, chest pain, vomiting, blood in the urine, black or bloody stools.  He reports some chronic lower extremity edema which is unchanged.  Otherwise, 10 point review of systems is benign.     Past Medical History  Medical History[1]    Surgical History  Surgical History[2]     Social History  He reports that he has been smoking cigars. He has been exposed to tobacco smoke. He has never used smokeless tobacco. He reports current alcohol use of about 3.0 standard drinks of alcohol per week. He reports that he does not use drugs.    Family History  Family History[3]     Allergies  Patient has no known allergies.    Review of Systems  -As stated in the HPI. Otherwise, 10 point review of systems is benign.    Physical Exam  General: Alert and oriented to self, birthday, president and year.  No acute distress.  HEENT: Sclera clear.  CVS: RRR.   Lungs: Diminished breath sounds bilaterally.     Abdomen: Soft.  Nontender.  Bowel sounds present.  Extremities: No pitting edema bilateral ankles.  Psychiatric: Cooperative.  Neurologic: Strength is 5/5 in all extremities.    Last Recorded Vitals  Blood pressure (!) 99/48, pulse 71, temperature 37.2 °C (99 °F), temperature source Temporal, resp. rate 18, height 1.803 m (5' 10.98\"), weight 86.4 kg (190 lb 7.6 oz), SpO2 97%.    Relevant Results      Results for orders " placed or performed during the hospital encounter of 07/07/25 (from the past 24 hours)   CBC and Auto Differential   Result Value Ref Range    WBC 21.3 (H) 4.4 - 11.3 x10*3/uL    nRBC 0.0 0.0 - 0.0 /100 WBCs    RBC 2.33 (L) 4.50 - 5.90 x10*6/uL    Hemoglobin 7.5 (L) 13.5 - 17.5 g/dL    Hematocrit 22.8 (L) 41.0 - 52.0 %    MCV 98 80 - 100 fL    MCH 32.2 26.0 - 34.0 pg    MCHC 32.9 32.0 - 36.0 g/dL    RDW 26.1 (H) 11.5 - 14.5 %    Platelets 141 (L) 150 - 450 x10*3/uL    Immature Granulocytes %, Automated 3.3 (H) 0.0 - 0.9 %    Immature Granulocytes Absolute, Automated 0.71 (H) 0.00 - 0.50 x10*3/uL   Phosphorus   Result Value Ref Range    Phosphorus 2.5 2.5 - 4.9 mg/dL   Comprehensive metabolic panel   Result Value Ref Range    Glucose 108 (H) 74 - 99 mg/dL    Sodium 132 (L) 136 - 145 mmol/L    Potassium 3.0 (L) 3.5 - 5.3 mmol/L    Chloride 92 (L) 98 - 107 mmol/L    Bicarbonate 31 21 - 32 mmol/L    Anion Gap 12 10 - 20 mmol/L    Urea Nitrogen 14 6 - 23 mg/dL    Creatinine 1.41 (H) 0.50 - 1.30 mg/dL    eGFR 48 (L) >60 mL/min/1.73m*2    Calcium 8.5 (L) 8.6 - 10.3 mg/dL    Albumin 3.4 3.4 - 5.0 g/dL    Alkaline Phosphatase 82 33 - 136 U/L    Total Protein 6.7 6.4 - 8.2 g/dL    AST 30 9 - 39 U/L    Bilirubin, Total 1.0 0.0 - 1.2 mg/dL    ALT 14 10 - 52 U/L   Magnesium   Result Value Ref Range    Magnesium 1.31 (L) 1.60 - 2.40 mg/dL   Lipase   Result Value Ref Range    Lipase 25 9 - 82 U/L   Lactate   Result Value Ref Range    Lactate 2.7 (H) 0.4 - 2.0 mmol/L   B-Type Natriuretic Peptide   Result Value Ref Range     (H) 0 - 99 pg/mL   Blood Gas Venous Full Panel   Result Value Ref Range    POCT pH, Venous 7.48 (H) 7.33 - 7.43 pH    POCT pCO2, Venous 41 41 - 51 mm Hg    POCT pO2, Venous 24 (L) 35 - 45 mm Hg    POCT SO2, Venous 34 (L) 45 - 75 %    POCT Oxy Hemoglobin, Venous 33.7 (L) 45.0 - 75.0 %    POCT Hematocrit Calculated, Venous 36.0 (L) 41.0 - 52.0 %    POCT Sodium, Venous 128 (L) 136 - 145 mmol/L    POCT  Potassium, Venous 2.4 (LL) 3.5 - 5.3 mmol/L    POCT Chloride, Venous 92 (L) 98 - 107 mmol/L    POCT Ionized Calicum, Venous 1.06 (L) 1.10 - 1.33 mmol/L    POCT Glucose, Venous 121 (H) 74 - 99 mg/dL    POCT Lactate, Venous 2.2 (H) 0.4 - 2.0 mmol/L    POCT Base Excess, Venous 6.4 (H) -2.0 - 3.0 mmol/L    POCT HCO3 Calculated, Venous 30.5 (H) 22.0 - 26.0 mmol/L    POCT Hemoglobin, Venous 12.1 (L) 13.5 - 17.5 g/dL    POCT Anion Gap, Venous 8.0 (L) 10.0 - 25.0 mmol/L    Patient Temperature      FiO2 32 %   Troponin I, High Sensitivity, Initial   Result Value Ref Range    Troponin I, High Sensitivity 13 0 - 20 ng/L   Manual Differential   Result Value Ref Range    Neutrophils %, Manual 12.0 40.0 - 80.0 %    Bands %, Manual 2.0 0.0 - 5.0 %    Lymphocytes %, Manual 4.0 13.0 - 44.0 %    Monocytes %, Manual 81.0 2.0 - 10.0 %    Eosinophils %, Manual 0.0 0.0 - 6.0 %    Basophils %, Manual 0.0 0.0 - 2.0 %    Metamyelocytes %, Manual 1.0 0.0 - 0.0 %    Seg Neutrophils Absolute, Manual 2.56 1.60 - 5.00 x10*3/uL    Bands Absolute, Manual 0.43 0.00 - 0.50 x10*3/uL    Lymphocytes Absolute, Manual 0.85 0.80 - 3.00 x10*3/uL    Monocytes Absolute, Manual 17.25 (H) 0.05 - 0.80 x10*3/uL    Eosinophils Absolute, Manual 0.00 0.00 - 0.40 x10*3/uL    Basophils Absolute, Manual 0.00 0.00 - 0.10 x10*3/uL    Metamyelocytes Absolute, Manual 0.21 0.00 - 0.00 x10*3/uL    Total Cells Counted 100     Neutrophils Absolute, Manual 2.99 1.60 - 5.50 x10*3/uL    RBC Morphology See Below     Polychromasia Mild     Ovalocytes Few     Teardrop Cells Few     Giant Platelets Few    Troponin, High Sensitivity, 1 Hour   Result Value Ref Range    Troponin I, High Sensitivity 12 0 - 20 ng/L   Blood Culture    Specimen: Peripheral Venipuncture; Blood culture   Result Value Ref Range    Blood Culture Loaded on Instrument - Culture in progress    Blood Culture    Specimen: Peripheral Venipuncture; Blood culture   Result Value Ref Range    Blood Culture Loaded on  Instrument - Culture in progress    Urinalysis with Reflex Culture and Microscopic   Result Value Ref Range    Color, Urine Light-Yellow Light-Yellow, Yellow, Dark-Yellow    Appearance, Urine Turbid (N) Clear    Specific Gravity, Urine 1.036 (N) 1.005 - 1.035    pH, Urine 6.5 5.0, 5.5, 6.0, 6.5, 7.0, 7.5, 8.0    Protein, Urine 10 (TRACE) NEGATIVE, 10 (TRACE), 20 (TRACE) mg/dL    Glucose, Urine Normal Normal mg/dL    Blood, Urine 0.2 (2+) (A) NEGATIVE mg/dL    Ketones, Urine NEGATIVE NEGATIVE mg/dL    Bilirubin, Urine NEGATIVE NEGATIVE mg/dL    Urobilinogen, Urine Normal Normal mg/dL    Nitrite, Urine NEGATIVE NEGATIVE    Leukocyte Esterase, Urine 250 Jakub/uL (A) NEGATIVE   Extra Urine Gray Tube   Result Value Ref Range    Extra Tube     Microscopic Only, Urine   Result Value Ref Range    WBC, Urine >50 (A) 1-5, NONE /HPF    WBC Clumps, Urine OCCASIONAL Reference range not established. /HPF    RBC, Urine 3-5 NONE, 1-2, 3-5 /HPF    Squamous Epithelial Cells, Urine 1-9 (SPARSE) Reference range not established. /HPF    Bacteria, Urine 3+ (A) NONE SEEN /HPF    Mucus, Urine FEW Reference range not established. /LPF   Lactate   Result Value Ref Range    Lactate 1.4 0.4 - 2.0 mmol/L   MRSA Surveillance for Vancomycin De-escalation, PCR    Specimen: Anterior Nares; Swab   Result Value Ref Range    MRSA PCR Not Detected Not Detected   CBC   Result Value Ref Range    WBC 18.6 (H) 4.4 - 11.3 x10*3/uL    nRBC 0.0 0.0 - 0.0 /100 WBCs    RBC 1.95 (L) 4.50 - 5.90 x10*6/uL    Hemoglobin 6.1 (LL) 13.5 - 17.5 g/dL    Hematocrit 19.1 (L) 41.0 - 52.0 %    MCV 98 80 - 100 fL    MCH 31.3 26.0 - 34.0 pg    MCHC 31.9 (L) 32.0 - 36.0 g/dL    RDW 26.5 (H) 11.5 - 14.5 %    Platelets 142 (L) 150 - 450 x10*3/uL   Basic metabolic panel   Result Value Ref Range    Glucose 99 74 - 99 mg/dL    Sodium 134 (L) 136 - 145 mmol/L    Potassium 3.0 (L) 3.5 - 5.3 mmol/L    Chloride 97 (L) 98 - 107 mmol/L    Bicarbonate 30 21 - 32 mmol/L    Anion Gap 10 10 -  20 mmol/L    Urea Nitrogen 13 6 - 23 mg/dL    Creatinine 1.32 (H) 0.50 - 1.30 mg/dL    eGFR 52 (L) >60 mL/min/1.73m*2    Calcium 7.8 (L) 8.6 - 10.3 mg/dL      CT angio chest for pulmonary embolism  Result Date: 7/7/2025  CT CHEST: 1. No acute pulmonary embolism.   2. New diffuse distribution of ill-defined ground-glass centrilobular nodules within all lobes of both lungs. Differential diagnosis includes hypersensitivity pneumonitis (upper lobe predominant), respiratory bronchiolitis (upper lobe predominant) particularly if patient has a smoking history, viral pneumonia, or medication related.   3. Redemonstration of numerous tree-in-bud and centrilobular solid pulmonary nodules which appear unchanged from 03/07/2023 most likely representing sequela of remote infection with terminal airway plugging, supported by the presence of calcified intrathoracic lymph nodes reflecting remote granulomatous infection.   4. Small new right pleural effusion.   5. Moderate pericardial effusion, increased in size from prior study.   6. Remote myocardial infarct involving the left ventricular apex and apical septum.     CT ABDOMEN/PELVIS: 1. Inflamed appearance of the urinary bladder wall. Correlate for cystitis. 2. Diffuse hyperattenuating bone marrow likely representing hypercellular marrow/marrow infiltration from known leukemia. 3. Ectatic infrarenal aorta measuring 2.8 cm x 2.7 cm.         MACRO: None.   Signed by: Porter Mccann 7/7/2025 5:30 PM Dictation workstation:   [id number]    CT abdomen pelvis w IV contrast  Result Date: 7/7/2025  CT CHEST: 1. No acute pulmonary embolism.   2. New diffuse distribution of ill-defined ground-glass centrilobular nodules within all lobes of both lungs. Differential diagnosis includes hypersensitivity pneumonitis (upper lobe predominant), respiratory bronchiolitis (upper lobe predominant) particularly if patient has a smoking history, viral pneumonia, or medication related.   3.  Redemonstration of numerous tree-in-bud and centrilobular solid pulmonary nodules which appear unchanged from 03/07/2023 most likely representing sequela of remote infection with terminal airway plugging, supported by the presence of calcified intrathoracic lymph nodes reflecting remote granulomatous infection.   4. Small new right pleural effusion.   5. Moderate pericardial effusion, increased in size from prior study.   6. Remote myocardial infarct involving the left ventricular apex and apical septum.     CT ABDOMEN/PELVIS: 1. Inflamed appearance of the urinary bladder wall. Correlate for cystitis. 2. Diffuse hyperattenuating bone marrow likely representing hypercellular marrow/marrow infiltration from known leukemia. 3. Ectatic infrarenal aorta measuring 2.8 cm x 2.7 cm.         MACRO: None.   Signed by: Porter Mccann 7/7/2025 5:30 PM Dictation workstation:   [id number]        Assessment & Plan    Chico Iqbal is a 89 y.o. male with PMH remarkable for acute myeloid leukemia, HTN, HLD, COPD, BPH, chronic systolic and diastolic chf with ef of 40% with diastolic dysfunction on echo 3/10/2023, Afib not on anticoagulation, who came to the hospital secondary to shortness of breath and generalized weakness admitted with acute hypoxic respiratory failure secondary to pneumonia with sepsis along with UTI, MARILEE.    Sepsis with pneumonia and UTI along with acute hypoxic respiratory failure secondary to pneumonia  - Concern for possible gram-negative pneumonia with viral process based on CT scan.  - MRSA screen is negative.  - CT angio negative for pulmonary embolism but evidence of small pleural effusion.   - Will give Zosyn and hold off on vancomycin as MRSA is negative.  - Check blood cultures, urine culture, procalcitonin level, Legionella and strep urine antigens.  - Currently on 1 L O2 but was on 3 L of O2 initially.  - Lactate level was initially elevated but has normalized.  - Check covid, influenza a/b, rsv.   -  Bp still a little on the low side overall, but have to be careful with IV fluids given patient's history of systolic diastolic CHF along with small pleural effusion.  Patient to receive blood transfusion and we will continue monitor.    Acute kidney injury  - Could be due to dehydration +/- sepsis.  - Creatinine 1.32 today; 1.41 on 7/7.   - Patient to receive blood transfusion today.  As stated above, be careful with IV fluids with history of chronic systolic and diastolic CHF.    Acute on chronic anemia  - Likely due to history of acute myeloid leukemia patient has been receiving frequent blood transfusions as an outpatient.  - Hematology/oncology follow as an outpatient and is on Aranesp as well.  - Will give 1 unit packed red blood cells today and monitor.    Pericardial effusion  -CT of the chest with moderate pericardial effusion.  - Check echo and consult cardiology.    CT of the chest mentions remote myocardial infarct along the left ventricular apex and apical septum  - Consult cardiology.  - On aspirin, Plavix and atorvastatin    Hypomagnesemia  - Replaced. Monitor.      Hypokalemia  - Replace and monitor.     Pulmonary nodules  - Treating for underlying pneumonia stated above.  Recommend follow-up with primary care provider and pulmonology, hematology/oncology as an outpatient.    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    Acute myeloid leukemia patient is followed by hematology/oncology as an outpatient.  - On gilteritinib as an outpatient and will hold for now with pneumonia/uti.   - Follow-up with hematology/oncology as an outpatient.    Thrombocytopenia  - Likely due to AML.  Appears stable overall.  Monitor.    Chronic systolic and diastolic CHF  -Hold home med of lasix (5 days per week) due to MARILEE with sepsis and low BP.  - Holding home med of metoprolol succinate 25 mg  daily due to hypotension.  Monitor.    Chronic atrial fibrillation  - Holding home med of metoprolol succinate 25 mg daily due to hypotension at this time.  Patient on aspirin and  Plavix but not on anticoagulation as an outpt.   - Monitor.     BPH  - Continue finasteride and flomax as long as bp will allow.     Dvt prophylaxis  - Holding chemical DVT prophylaxis due to acute on chronic anemia.    Guero Qiu DO         [1]   Past Medical History:  Diagnosis Date    Alcohol abuse     Body mass index (BMI) 29.0-29.9, adult 09/29/2020    Body mass index (BMI) of 29.0 to 29.9 in adult    BPH (benign prostatic hyperplasia)     CAD (coronary artery disease)     Cancer (Multi)     COPD (chronic obstructive pulmonary disease) (Multi)     GERD (gastroesophageal reflux disease)     Hyperlipidemia     Hypertension     Old myocardial infarction     History of myocardial infarction    Paroxysmal A-fib (Multi)     Urinary tract infection    [2]   Past Surgical History:  Procedure Laterality Date    CARDIAC CATHETERIZATION      CORONARY STENT PLACEMENT     [3]   Family History  Problem Relation Name Age of Onset    Diabetes Mother      Heart attack Brother

## 2025-07-08 NOTE — CONSULTS
Vancomycin Dosing by Pharmacy- Cessation of Therapy    Consult to pharmacy for vancomycin dosing has been discontinued by the prescriber, pharmacy will sign off at this time.    Please call pharmacy if there are further questions or re-enter a consult if vancomycin is resumed.     MRSA PCR negative    Roya Gutiérrez, PharmD

## 2025-07-08 NOTE — NURSING NOTE
1530 Assumed care of patient. Patient has no complaints. Call light is within reach. Nurse will continue to medicate and monitor per MD order.   1600 Took hour post infusion vitals. Tempeture increased. RN called supervision to make aware as well as lab  1630 Oral temp taken and tempeture is still increasing MD made aware

## 2025-07-08 NOTE — PROGRESS NOTES
Occupational Therapy                 Therapy Communication Note    Patient Name: Chico Iqbal  MRN: [id number]  Department: GEN  [facility]  Room: 237/[facility]  Today's Date: 7/8/2025     Discipline: Occupational Therapy    Missed Visit: OT Missed Visit: Yes     Missed Visit Reason: Missed Visit Reason: Patient placed on medical hold (Pt on hold this date 2/2 low H/H (6.1/19.1). Pt pending transfusion, will hold this date and follow up per pt status and as schedule allows.)    Missed Time: Attempt 1120

## 2025-07-08 NOTE — PROGRESS NOTES
Physical Therapy                 Therapy Communication Note    Patient Name: Chico Iqbal  MRN: [id number]  Department: GEN  [facility]  Room: [address] [place], [place] [address]A  Today's Date: 7/8/2025     Discipline: Physical Therapy    Missed Visit:       Missed Visit Reason: Missed Visit Reason:  (PT hold) PT order received; chart reviewed. Pt. With low H/H (6.1/19.1) and is planning on getting transfusion. Will cont. To follow.     Missed Time: Attempt 1051

## 2025-07-08 NOTE — PROGRESS NOTES
07/08/25 0926   Discharge Planning   Living Arrangements Children   Support Systems Children   Assistance Needed Patient states his daughter lives with him. States he is independent with ADL's and iADL's, uses a rollator. Does drive but his daughter goes with him to the stores. Currently on 2L O2, does not wear O2 at home.   Type of Residence Private residence   Number of Stairs to Enter Residence 0   Number of Stairs Within Residence 0   Who is requesting discharge planning? Provider   Home or Post Acute Services None   Expected Discharge Disposition Home  (Patient denies the need for assistance at home.)   Does the patient need discharge transport arranged? No  (Daughter will )   Financial Resource Strain   How hard is it for you to pay for the very basics like food, housing, medical care, and heating? Not hard   Housing Stability   In the last 12 months, was there a time when you were not able to pay the mortgage or rent on time? N   In the past 12 months, how many times have you moved where you were living? 0   At any time in the past 12 months, were you homeless or living in a shelter (including now)? N   Transportation Needs   In the past 12 months, has lack of transportation kept you from medical appointments or from getting medications? no   In the past 12 months, has lack of transportation kept you from meetings, work, or from getting things needed for daily living? No   Stroke Family Assessment   Stroke Family Assessment Needed No   Intensity of Service   Intensity of Service 0-30 min

## 2025-07-08 NOTE — PROGRESS NOTES
Pharmacy Medication History Review    Chico Iqbal is a 89 y.o. male admitted for Urinary tract infection without hematuria, site unspecified. Pharmacy reviewed the patient's [id number] medications and allergies for accuracy.    The list below reflectives the updated PTA list. Please review each medication in order reconciliation for additional clarification and justification.  Medications Prior to Admission   Medication Sig Dispense Refill Last Dose/Taking    atorvastatin (Lipitor) 40 mg tablet Take 1 tablet (40 mg) by mouth once daily. 90 tablet 0 7/7/2025    clopidogrel (Plavix) 75 mg tablet Take one tablet by mouth daily 90 tablet 0 7/6/2025    docusate sodium (Colace) 100 mg capsule Take 1 capsule (100 mg) by mouth 2 times a day. (Patient taking differently: Take 1 capsule (100 mg) by mouth once daily.) 60 capsule 0 7/7/2025    finasteride (Proscar) 5 mg tablet Take 1 tablet (5 mg) by mouth once daily. Do not crush, chew, or split. 90 tablet 0 7/7/2025    folic acid (Folvite) 1 mg tablet Take 1 tablet (1 mg) by mouth once daily. 90 tablet 0 7/7/2025    furosemide (Lasix) 20 mg tablet Take 1 tablet (20 mg) by mouth once daily. None on Tues and Thur 90 tablet 0 7/7/2025    gabapentin (Neurontin) 100 mg capsule Take 1 capsule (100 mg) by mouth 3 times a day. 90 capsule 5 7/7/2025 Noon    gilteritinib (Xospata) 40 mg tablet Take 3 tablets (120 mg total) by mouth once daily.  Swallow whole. 90 tablet 1 7/7/2025    metoprolol succinate XL (Toprol-XL) 25 mg 24 hr tablet Take 1 tablet (25 mg) by mouth once daily. Do not crush or chew. 90 tablet 0 7/7/2025    pantoprazole (ProtoNix) 40 mg EC tablet Take 1 tablet (40 mg) by mouth once daily in the morning. Take before meals. Do not crush, chew, or split. 90 tablet 0 7/7/2025 Morning    tamsulosin (Flomax) 0.4 mg 24 hr capsule Take 2 capsules (0.8 mg) by mouth once daily. 180 capsule 0 7/7/2025    fluticasone (Flonase) 50 mcg/actuation nasal spray 1 spray once  daily.   Unknown        The list below reflectives the updated allergy list. Please review each documented allergy for additional clarification and justification.  Allergies  Reviewed by Edson Rizvi RN on 7/7/2025   No Known Allergies         Below are additional concerns with the patient's PTA list.  Pharmacy virtually reviewed medications using Medication Dispense History (MD). Pharmacy virtual review is accurate with nursing medication history, therefore patient not interviewed. Unable to verify OTCs.    Miriam Barakat CPhT  Medication History Pharmacy Technician  SHUN 8-4:30  Available via [employer] Secure Chat  OR  [phone]

## 2025-07-08 NOTE — NURSING NOTE
First unit of blood in progress. Tolerating well. Echo also in progress. Out of bed in chair this morning

## 2025-07-09 ENCOUNTER — APPOINTMENT (OUTPATIENT)
Dept: HEMATOLOGY/ONCOLOGY | Facility: HOSPITAL | Age: 89
End: 2025-07-09
Payer: MEDICARE

## 2025-07-09 LAB
ALBUMIN SERPL BCP-MCNC: 2.8 G/DL (ref 3.4–5)
ANION GAP SERPL CALC-SCNC: 9 MMOL/L (ref 10–20)
AORTIC VALVE PEAK VELOCITY: 1.82 M/S
AV PEAK GRADIENT: 13 MMHG
AVA (PEAK VEL): 2.28 CM2
BLOOD EXPIRATION DATE: NORMAL
BUN SERPL-MCNC: 13 MG/DL (ref 6–23)
CALCIUM SERPL-MCNC: 7.7 MG/DL (ref 8.6–10.3)
CHLORIDE SERPL-SCNC: 99 MMOL/L (ref 98–107)
CO2 SERPL-SCNC: 29 MMOL/L (ref 21–32)
CREAT SERPL-MCNC: 1.62 MG/DL (ref 0.5–1.3)
DIAGNOSIS-BB-PR33: NORMAL
DISPENSE STATUS: NORMAL
EGFRCR SERPLBLD CKD-EPI 2021: 41 ML/MIN/1.73M*2
EJECTION FRACTION APICAL 4 CHAMBER: 54.2
EJECTION FRACTION: 55 %
ERYTHROCYTE [DISTWIDTH] IN BLOOD BY AUTOMATED COUNT: 24.7 % (ref 11.5–14.5)
GLUCOSE SERPL-MCNC: 95 MG/DL (ref 74–99)
HCT VFR BLD AUTO: 20.6 % (ref 41–52)
HGB BLD-MCNC: 6.9 G/DL (ref 13.5–17.5)
HOLD SPECIMEN: NORMAL
LEFT ATRIUM VOLUME AREA LENGTH INDEX BSA: 34.8 ML/M2
LEFT VENTRICLE INTERNAL DIMENSION DIASTOLE: 6.14 CM (ref 3.5–6)
LEFT VENTRICULAR OUTFLOW TRACT DIAMETER: 2 CM
MAGNESIUM SERPL-MCNC: 1.59 MG/DL (ref 1.6–2.4)
MCH RBC QN AUTO: 32.9 PG (ref 26–34)
MCHC RBC AUTO-ENTMCNC: 33.5 G/DL (ref 32–36)
MCV RBC AUTO: 98 FL (ref 80–100)
NRBC BLD-RTO: ABNORMAL /100{WBCS}
PATH REVIEW-TRANSFUSION REACTION: NORMAL
PHOSPHATE SERPL-MCNC: 2.4 MG/DL (ref 2.5–4.9)
PLATELET # BLD AUTO: 131 X10*3/UL (ref 150–450)
POTASSIUM SERPL-SCNC: 3.3 MMOL/L (ref 3.5–5.3)
PRODUCT BLOOD TYPE: NORMAL
PRODUCT CODE: NORMAL
RBC # BLD AUTO: 2.1 X10*6/UL (ref 4.5–5.9)
RIGHT VENTRICLE FREE WALL PEAK S': 15.71 CM/S
SODIUM SERPL-SCNC: 134 MMOL/L (ref 136–145)
TRICUSPID ANNULAR PLANE SYSTOLIC EXCURSION: 2.1 CM
TYPE OF REACTION: NORMAL
UNIT ABO: NORMAL
UNIT NUMBER: NORMAL
UNIT RH: NORMAL
UNIT VOLUME: 350
WBC # BLD AUTO: 20.5 X10*3/UL (ref 4.4–11.3)
XM INTEP: NORMAL

## 2025-07-09 PROCEDURE — 2500000004 HC RX 250 GENERAL PHARMACY W/ HCPCS (ALT 636 FOR OP/ED): Mod: IPSPLIT | Performed by: INTERNAL MEDICINE

## 2025-07-09 PROCEDURE — 80069 RENAL FUNCTION PANEL: CPT | Mod: IPSPLIT | Performed by: INTERNAL MEDICINE

## 2025-07-09 PROCEDURE — P9040 RBC LEUKOREDUCED IRRADIATED: HCPCS | Mod: IPSPLIT

## 2025-07-09 PROCEDURE — 83735 ASSAY OF MAGNESIUM: CPT | Mod: IPSPLIT | Performed by: INTERNAL MEDICINE

## 2025-07-09 PROCEDURE — 1200000002 HC GENERAL ROOM WITH TELEMETRY DAILY: Mod: IPSPLIT

## 2025-07-09 PROCEDURE — 85027 COMPLETE CBC AUTOMATED: CPT | Mod: IPSPLIT | Performed by: INTERNAL MEDICINE

## 2025-07-09 PROCEDURE — 2500000005 HC RX 250 GENERAL PHARMACY W/O HCPCS: Mod: IPSPLIT | Performed by: INTERNAL MEDICINE

## 2025-07-09 PROCEDURE — 99233 SBSQ HOSP IP/OBS HIGH 50: CPT | Performed by: INTERNAL MEDICINE

## 2025-07-09 PROCEDURE — 36430 TRANSFUSION BLD/BLD COMPNT: CPT | Mod: IPSPLIT

## 2025-07-09 PROCEDURE — 2500000002 HC RX 250 W HCPCS SELF ADMINISTERED DRUGS (ALT 637 FOR MEDICARE OP, ALT 636 FOR OP/ED): Mod: IPSPLIT | Performed by: INTERNAL MEDICINE

## 2025-07-09 PROCEDURE — 2500000001 HC RX 250 WO HCPCS SELF ADMINISTERED DRUGS (ALT 637 FOR MEDICARE OP): Mod: IPSPLIT | Performed by: INTERNAL MEDICINE

## 2025-07-09 PROCEDURE — 36415 COLL VENOUS BLD VENIPUNCTURE: CPT | Mod: IPSPLIT | Performed by: INTERNAL MEDICINE

## 2025-07-09 RX ORDER — DICYCLOMINE HYDROCHLORIDE 10 MG/1
10 CAPSULE ORAL 4 TIMES DAILY
Status: DISCONTINUED | OUTPATIENT
Start: 2025-07-09 | End: 2025-07-11 | Stop reason: HOSPADM

## 2025-07-09 RX ORDER — METOPROLOL SUCCINATE 25 MG/1
25 TABLET, EXTENDED RELEASE ORAL DAILY
Status: DISCONTINUED | OUTPATIENT
Start: 2025-07-09 | End: 2025-07-11 | Stop reason: HOSPADM

## 2025-07-09 RX ORDER — ALUMINUM HYDROXIDE, MAGNESIUM HYDROXIDE, AND SIMETHICONE 1200; 120; 1200 MG/30ML; MG/30ML; MG/30ML
10 SUSPENSION ORAL 4 TIMES DAILY PRN
Status: DISCONTINUED | OUTPATIENT
Start: 2025-07-09 | End: 2025-07-11 | Stop reason: HOSPADM

## 2025-07-09 RX ORDER — MAGNESIUM SULFATE HEPTAHYDRATE 40 MG/ML
2 INJECTION, SOLUTION INTRAVENOUS ONCE
Status: COMPLETED | OUTPATIENT
Start: 2025-07-09 | End: 2025-07-09

## 2025-07-09 RX ORDER — POTASSIUM CHLORIDE 20 MEQ/1
40 TABLET, EXTENDED RELEASE ORAL ONCE
Status: COMPLETED | OUTPATIENT
Start: 2025-07-09 | End: 2025-07-09

## 2025-07-09 RX ADMIN — ATORVASTATIN CALCIUM 40 MG: 40 TABLET, FILM COATED ORAL at 09:11

## 2025-07-09 RX ADMIN — Medication 1 L/MIN: at 21:54

## 2025-07-09 RX ADMIN — TAMSULOSIN HYDROCHLORIDE 0.8 MG: 0.4 CAPSULE ORAL at 09:11

## 2025-07-09 RX ADMIN — DOCUSATE SODIUM 100 MG: 100 CAPSULE, LIQUID FILLED ORAL at 09:11

## 2025-07-09 RX ADMIN — DICYCLOMINE HYDROCHLORIDE 10 MG: 10 CAPSULE ORAL at 20:29

## 2025-07-09 RX ADMIN — PANTOPRAZOLE SODIUM 40 MG: 40 TABLET, DELAYED RELEASE ORAL at 06:02

## 2025-07-09 RX ADMIN — CLOPIDOGREL BISULFATE 75 MG: 75 TABLET, FILM COATED ORAL at 09:11

## 2025-07-09 RX ADMIN — PIPERACILLIN SODIUM AND TAZOBACTAM SODIUM 3.38 G: 3; .375 INJECTION, SOLUTION INTRAVENOUS at 00:47

## 2025-07-09 RX ADMIN — GABAPENTIN 100 MG: 100 CAPSULE ORAL at 20:29

## 2025-07-09 RX ADMIN — SODIUM CHLORIDE 10 ML/HR: 0.9 INJECTION, SOLUTION INTRAVENOUS at 12:16

## 2025-07-09 RX ADMIN — SODIUM CHLORIDE 10 ML/HR: 0.9 INJECTION, SOLUTION INTRAVENOUS at 00:46

## 2025-07-09 RX ADMIN — GABAPENTIN 100 MG: 100 CAPSULE ORAL at 09:11

## 2025-07-09 RX ADMIN — ALUMINUM HYDROXIDE, MAGNESIUM HYDROXIDE, AND SIMETHICONE 10 ML: 1200; 120; 1200 SUSPENSION ORAL at 20:30

## 2025-07-09 RX ADMIN — FINASTERIDE 5 MG: 5 TABLET, FILM COATED ORAL at 09:11

## 2025-07-09 RX ADMIN — PIPERACILLIN SODIUM AND TAZOBACTAM SODIUM 3.38 G: 3; .375 INJECTION, SOLUTION INTRAVENOUS at 13:07

## 2025-07-09 RX ADMIN — POTASSIUM CHLORIDE 40 MEQ: 1500 TABLET, EXTENDED RELEASE ORAL at 11:02

## 2025-07-09 RX ADMIN — FLUTICASONE PROPIONATE 1 SPRAY: 50 SPRAY, METERED NASAL at 09:10

## 2025-07-09 RX ADMIN — Medication 3 MG: at 20:29

## 2025-07-09 RX ADMIN — MAGNESIUM SULFATE HEPTAHYDRATE 2 G: 40 INJECTION, SOLUTION INTRAVENOUS at 11:01

## 2025-07-09 RX ADMIN — FOLIC ACID 1 MG: 1 TABLET ORAL at 09:11

## 2025-07-09 RX ADMIN — GABAPENTIN 100 MG: 100 CAPSULE ORAL at 16:28

## 2025-07-09 RX ADMIN — PIPERACILLIN SODIUM AND TAZOBACTAM SODIUM 3.38 G: 3; .375 INJECTION, SOLUTION INTRAVENOUS at 06:03

## 2025-07-09 RX ADMIN — PIPERACILLIN SODIUM AND TAZOBACTAM SODIUM 3.38 G: 3; .375 INJECTION, SOLUTION INTRAVENOUS at 18:19

## 2025-07-09 ASSESSMENT — COGNITIVE AND FUNCTIONAL STATUS - GENERAL
DAILY ACTIVITIY SCORE: 19
DRESSING REGULAR LOWER BODY CLOTHING: A LITTLE
PERSONAL GROOMING: A LITTLE
TOILETING: A LITTLE
MOBILITY SCORE: 20
STANDING UP FROM CHAIR USING ARMS: A LITTLE
HELP NEEDED FOR BATHING: A LITTLE
CLIMB 3 TO 5 STEPS WITH RAILING: TOTAL
DRESSING REGULAR UPPER BODY CLOTHING: A LITTLE

## 2025-07-09 ASSESSMENT — PAIN SCALES - GENERAL
PAINLEVEL_OUTOF10: 0 - NO PAIN
PAINLEVEL_OUTOF10: 0 - NO PAIN

## 2025-07-09 ASSESSMENT — PAIN - FUNCTIONAL ASSESSMENT: PAIN_FUNCTIONAL_ASSESSMENT: 0-10

## 2025-07-09 NOTE — PROGRESS NOTES
Physical Therapy                 Therapy Communication Note    Patient Name: Chico Iqbal  MRN: [id number]  Department: [facility]  Room: [address] [place], [place] [address]  Today's Date: 7/9/2025     Discipline: Physical Therapy    Missed Visit:       Missed Visit Reason: Missed Visit Reason: Patient placed on medical hold H/H 6/9/20.6. Will cont. To follow.     Missed Time: Attempt 905

## 2025-07-09 NOTE — CARE PLAN
The patient's goals for the shift include  resting    The clinical goals for the shift include Patient will remain afebrile this shift    Assumed care of patient at 1930. Patient has remained afebrile this shift, denies pain, and VSS. IV antibiotic administered without s/s of adverse reaction. Uneventful shift. Patient resting in bed with call light within reach.

## 2025-07-09 NOTE — PROGRESS NOTES
07/09/25 1007   Discharge Planning   Assistance Needed Patient states his daughter lives with him. States he is independent with ADL's and iADL's, uses a rollator. Does drive but his daughter goes with him to the stores. Currently on 2L O2, does not wear O2 at home.   Who is requesting discharge planning? Provider   Home or Post Acute Services None   Expected Discharge Disposition Home  (PT/OT ordered, on hold due to H&H levels.)   Does the patient need discharge transport arranged? No  (daughter will )

## 2025-07-09 NOTE — PROGRESS NOTES
Chico Iqbal is a 89 y.o. male on day 2 of admission presenting with Urinary tract infection without hematuria, site unspecified.      Subjective   He is sitting up in the bed, states his shortness of breath is better today. Denies any dark, tarry stools or BRBPR.       Review of systems:  Constitutional: negative for fever, chills, or malaise  Neuro: negative for dizziness, headache, numbness, tingling  ENT: Negative for nasal congestion or sore throat  Resp: negative for cough, or wheezing  CV: negative for chest pain, palpitations  GI: negative for abd pain, nausea, vomiting or diarrhea  : negative for dysuria, frequency, or urgency  Skin: negative for lesions, wounds, or rash  Musculoskeletal: Negative for weakness, myalgia, or arthralgia  Endocrine: Negative for polyuria or polydipsia         Objective   Constitutional: Well developed, awake/alert/oriented x3, no distress, alert and cooperative  Eyes: PERRL, EOMI, clear sclera  ENMT: mucous membranes moist, no apparent injury, no lesions seen  Head/Neck: Neck supple, no apparent injury, thyroid without mass or tenderness, No JVD, trachea midline, no bruits  Respiratory/Thorax: Patent airways, diminished throughout with good chest expansion, thorax symmetric  Cardiovascular: irregular rhythm, no murmurs, 2+ equal pulses of the extremities, normal S 1and S 2  Gastrointestinal: Nondistended, soft, non-tender, no rebound tenderness or guarding, no masses palpable, no organomegaly, +BS, no bruits  Musculoskeletal: ROM intact, no joint swelling, normal strength  Extremities: normal extremities, no cyanosis edema, contusions or wounds, no clubbing  Neurological: alert and oriented x3, intact senses, motor, response and reflexes, normal strength  Lymphatic: No significant lymphadenopathy  Psychological: Appropriate mood and behavior  Skin: Warm and dry, no lesions, no rashes      Last Recorded Vitals  /56   Pulse 80   Temp 36.7 °C (98.1 °F)   Resp 22   Ht  "1.803 m (5' 10.98\")   Wt 86.4 kg (190 lb 7.6 oz)   SpO2 94%   BMI 26.58 kg/m²     Intake/Output last 3 Shifts:  I/O last 3 completed shifts:  In: 4166.3 (48.2 mL/kg) [P.O.:1740; I.V.:116.3 (1.3 mL/kg); Blood:350; IV Piggyback:1960]  Out: 2125 (24.6 mL/kg) [Urine:2125 (0.7 mL/kg/hr)]  Weight: 86.4 kg   I/O this shift:  In: 50 [IV Piggyback:50]  Out: -     Relevant Results  Scheduled medications  Scheduled Medications[1]  Continuous medications  Continuous Medications[2]  PRN medications  PRN Medications[3]    Results for orders placed or performed during the hospital encounter of 07/07/25 (from the past 24 hours)   Transthoracic Echo Complete   Result Value Ref Range    AV pk loreta 1.82 m/s    LVOT diam 2.00 cm    LA vol index A/L 34.8 ml/m2    Tricuspid annular plane systolic excursion 2.1 cm    LV EF 55 %    RV free wall pk S' 15.71 cm/s    LVIDd 6.14 cm    Aortic Valve Area by Continuity of Peak Velocity 2.28 cm2    AV pk grad 13 mmHg    LV A4C EF 54.2    Hemoglobin and hematocrit, blood   Result Value Ref Range    Hemoglobin 7.4 (L) 13.5 - 17.5 g/dL    Hematocrit 23.1 (L) 41.0 - 52.0 %   Transfusion reaction evaluation   Result Value Ref Range    Unit Number [id number]     COMPONENT CODE IRR RBC    Path Review-Transfusion Reaction   Result Value Ref Range    TYPE OF REACTION FEBRILE NON-HEMOLYTIC TRANSFUSION REACTION     PATH REVIEW-TRANSFUSION REACTION       The patient was transfused with 1 unit of red blood cells (none returned), developed change in temperature > 1 degree C, all other vitals stable. Pre-transfusion vitals 7/8/2025 1356: BP 98/64, T 36.3, RR 14, HR 92, SpO2 94. Post-transfusion vitals 7/8/2025 1636: BP 99/68, T 38.8, RR 16, HR 85, SpO2 95. KATHRINE negative, clerical check OK. Culture results are pending and will be issued separately. Meets AABB criteria for a febrile non-hemolytic transfusion reaction. OK to give additional products if clinically indicated.    DIAGNOSIS-BB T80.89XA  "   Transfusion Reaction Culture    Specimen: Blood   Result Value Ref Range    Gram Stain No polymorphonuclear leukocytes seen     Gram Stain No organisms seen    Direct Antiglobulin Test   Result Value Ref Range    KATHRINE-POLYSPECIFIC NEG    Red Top   Result Value Ref Range    Extra Tube Hold for add-ons.    Lavender Top   Result Value Ref Range    Extra Tube Hold for add-ons.    PST Top   Result Value Ref Range    Extra Tube Hold for add-ons.    Blood Culture    Specimen: Peripheral Venipuncture; Blood culture   Result Value Ref Range    Blood Culture Loaded on Instrument - Culture in progress    Type and screen   Result Value Ref Range    ABO TYPE A     Rh TYPE POS     ANTIBODY SCREEN NEG    Blood Culture    Specimen: Peripheral Venipuncture; Blood culture   Result Value Ref Range    Blood Culture Loaded on Instrument - Culture in progress    Urinalysis with Reflex Culture and Microscopic   Result Value Ref Range    Color, Urine Yellow Light-Yellow, Yellow, Dark-Yellow    Appearance, Urine Turbid (N) Clear    Specific Gravity, Urine 1.024 1.005 - 1.035    pH, Urine 8.0 5.0, 5.5, 6.0, 6.5, 7.0, 7.5, 8.0    Protein, Urine 100 (2+) (A) NEGATIVE, 10 (TRACE), 20 (TRACE) mg/dL    Glucose, Urine 30 (TRACE) (A) Normal mg/dL    Blood, Urine 0.2 (2+) (A) NEGATIVE mg/dL    Ketones, Urine NEGATIVE NEGATIVE mg/dL    Bilirubin, Urine NEGATIVE NEGATIVE mg/dL    Urobilinogen, Urine 3 (1+) (A) Normal mg/dL    Nitrite, Urine NEGATIVE NEGATIVE    Leukocyte Esterase, Urine 500 Jakub/uL (A) NEGATIVE   Extra Urine Gray Tube   Result Value Ref Range    Extra Tube     Microscopic Only, Urine   Result Value Ref Range    WBC, Urine >50 (A) 1-5, NONE /HPF    WBC Clumps, Urine FEW Reference range not established. /HPF    RBC, Urine 3-5 NONE, 1-2, 3-5 /HPF    Squamous Epithelial Cells, Urine 10-25 (FEW) Reference range not established. /HPF    Mucus, Urine FEW Reference range not established. /LPF   CBC   Result Value Ref Range    WBC 20.5 (H) 4.4  - 11.3 x10*3/uL    nRBC      RBC 2.10 (L) 4.50 - 5.90 x10*6/uL    Hemoglobin 6.9 (L) 13.5 - 17.5 g/dL    Hematocrit 20.6 (L) 41.0 - 52.0 %    MCV 98 80 - 100 fL    MCH 32.9 26.0 - 34.0 pg    MCHC 33.5 32.0 - 36.0 g/dL    RDW 24.7 (H) 11.5 - 14.5 %    Platelets 131 (L) 150 - 450 x10*3/uL   Renal Function Panel   Result Value Ref Range    Glucose 95 74 - 99 mg/dL    Sodium 134 (L) 136 - 145 mmol/L    Potassium 3.3 (L) 3.5 - 5.3 mmol/L    Chloride 99 98 - 107 mmol/L    Bicarbonate 29 21 - 32 mmol/L    Anion Gap 9 (L) 10 - 20 mmol/L    Urea Nitrogen 13 6 - 23 mg/dL    Creatinine 1.62 (H) 0.50 - 1.30 mg/dL    eGFR 41 (L) >60 mL/min/1.73m*2    Calcium 7.7 (L) 8.6 - 10.3 mg/dL    Phosphorus 2.4 (L) 2.5 - 4.9 mg/dL    Albumin 2.8 (L) 3.4 - 5.0 g/dL   Magnesium   Result Value Ref Range    Magnesium 1.59 (L) 1.60 - 2.40 mg/dL   Prepare RBC: 1 Units   Result Value Ref Range    PRODUCT CODE [id number]     Unit Number [id number]     Unit ABO O     Unit RH POS     XM INTEP COMP     Dispense Status IS     Blood Expiration Date 8/1/2025 11:59:00 PM EDT     PRODUCT BLOOD TYPE      UNIT VOLUME 350        Transthoracic Echo Complete   Final Result      CT angio chest for pulmonary embolism   Final Result   CT CHEST:   1. No acute pulmonary embolism.        2. New diffuse distribution of ill-defined ground-glass centrilobular   nodules within all lobes of both lungs. Differential diagnosis   includes hypersensitivity pneumonitis (upper lobe predominant),   respiratory bronchiolitis (upper lobe predominant) particularly if   patient has a smoking history, viral pneumonia, or medication related.        3. Redemonstration of numerous tree-in-bud and centrilobular solid   pulmonary nodules which appear unchanged from 03/07/2023 most likely   representing sequela of remote infection with terminal airway   plugging, supported by the presence of calcified intrathoracic lymph   nodes reflecting remote granulomatous infection.        4.  Small new right pleural effusion.        5. Moderate pericardial effusion, increased in size from prior study.        6. 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             CT ABDOMEN/PELVIS:   1. Inflamed appearance of the urinary bladder wall. Correlate for   cystitis.   2. 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   3. Ectatic infrarenal aorta measuring 2.8 cm x 2.7 cm.                       MACRO:   None.        Signed by: Porter Mccann 7/7/2025 5:30 PM   Dictation workstation:   [id number]      CT abdomen pelvis w IV contrast   Final Result   CT CHEST:   1. No acute pulmonary embolism.        2. New diffuse distribution of ill-defined ground-glass centrilobular   nodules within all lobes of both lungs. Differential diagnosis   includes hypersensitivity pneumonitis (upper lobe predominant),   respiratory bronchiolitis (upper lobe predominant) particularly if   patient has a smoking history, viral pneumonia, or medication related.        3. Redemonstration of numerous tree-in-bud and centrilobular solid   pulmonary nodules which appear unchanged from 03/07/2023 most likely   representing sequela of remote infection with terminal airway   plugging, supported by the presence of calcified intrathoracic lymph   nodes reflecting remote granulomatous infection.        4. Small new right pleural effusion.        5. Moderate pericardial effusion, increased in size from prior study.        6. 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             CT ABDOMEN/PELVIS:   1. Inflamed appearance of the urinary bladder wall. Correlate for   cystitis.   2. 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   3. Ectatic infrarenal aorta measuring 2.8 cm x 2.7 cm.                       MACRO:    None.        Signed by: Porter Mccann 7/7/2025 5:30 PM   Dictation workstation:   [id number]          Transthoracic Echo Complete  Result Date: 7/9/2025   [facility], [address] [place] [address]              Tel [phone] and Fax [phone] TRANSTHORACIC ECHOCARDIOGRAM REPORT  Patient Name:       TEJINDER ONEAL JACLYN Beltre Physician:    [id number] Maninder Cotto MD Study Date:         7/8/2025            Ordering Provider:    [id number] DESTINY DIAS MRN/PID:            [id number]            Fellow: Accession#:         [id number]        Nurse: Date of Birth/Age:  1935 / 89 years Sonographer:          Danni Guajardo RDCS Gender assigned at                     Additional Staff:     Matty Haley Birth:                                                        student Height:             180.34 cm           Admit Date: Weight:             86.18 kg            Admission Status:     Inpatient -                                                               Routine BSA / BMI:          2.06 m2 / 26.50     Encounter#:           [id number]                     kg/m2 Blood Pressure:     109/61 mmHg         Department Location:  [facility] Study Type:    TRANSTHORACIC ECHO (TTE) COMPLETE Diagnosis/ICD: Other pericardial effusion (noninflammatory)-I31.39 Indication:    pericardial effusion CPT Code:      Echo Complete w Full Doppler-45025 Patient History: Pertinent History: A-Fib, CAD, COPD and HTN. pericardial effusion, hx MI. Study Detail: The following Echo studies were performed: 2D, M-Mode, Doppler and               color flow. Technically challenging study due to body habitus. The               patient was awake.  PHYSICIAN  INTERPRETATION: Left Ventricle: The left ventricular systolic function is normal with a Silva's biplane calculated ejection fraction of 55%. There is mild concentric left ventricular hypertrophy. There are no regional left ventricular wall motion abnormalities. The left ventricular cavity size is normal. There is normal septal and mildly increased posterior left ventricular wall thickness. Left ventricular diastolic filling is indeterminate due to atrial fibrillation/flutter. Left Atrium: The left atrium is mildly dilated. Right Ventricle: The right ventricle is mildly enlarged. There is normal right ventricular global systolic function. Right Atrium: The right atrium is normal in size. Aortic Valve: The aortic valve is trileaflet. There is no evidence of aortic valve regurgitation. Mitral Valve: The mitral valve is normal in structure. There is mild mitral valve regurgitation. The E Vmax is 1.04 m/s. Tricuspid Valve: The tricuspid valve is structurally normal. There is trace tricuspid regurgitation. Pulmonic Valve: The pulmonic valve is structurally normal. There is physiologic pulmonic valve regurgitation. Pericardium: Small pericardial effusion. The pericardial effusion appears to contain fibrinous material. There is no evidence of cardiac tamponade. Aorta: The aortic root is normal. Pulmonary Veins: The pulmonary veins appear normal and return normally to the left atrium. Systemic Veins: The inferior vena cava appears normal in size, with IVC inspiratory collapse greater than 50%.  CONCLUSIONS:  1. The left ventricular systolic function is normal with a Silva's biplane calculated ejection fraction of 55%.  2. Left ventricular diastolic filling is indeterminate due to atrial fibrillation/flutter.  3. There is normal right ventricular global systolic function.  4. Mildly enlarged right ventricle.  5. The left atrium is mildly dilated.  6. Small pericardial effusion.  7. There is no evidence of cardiac  tamponade. QUANTITATIVE DATA SUMMARY:  2D MEASUREMENTS:          Normal Ranges: Ao Root d:       3.30 cm  (2.0-3.7cm) LAs:             4.63 cm  (2.7-4.0cm) IVSd:            1.04 cm  (0.6-1.1cm) LVPWd:           1.28 cm  (0.6-1.1cm) LVIDd:           6.14 cm  (3.9-5.9cm) LVIDs:           4.43 cm LV Mass Index:   151 g/m2 LVEDV Index:     46 ml/m2 LV % FS          27.8 %  LEFT ATRIUM:                  Normal Ranges: LA Vol A4C:        58.4 ml    (22+/-6mL/m2) LA Vol A2C:        85.7 ml LA Vol BP:         71.9 ml LA Vol Index A4C:  28.3ml/m2 LA Vol Index A2C:  41.5 ml/m2 LA Vol Index BP:   34.8 ml/m2 LA Area A4C:       20.3 cm2 LA Area A2C:       25.0 cm2 LA Major Axis A4C: 6.0 cm LA Major Axis A2C: 6.2 cm LA Vol A4C:        56.4 ml  RIGHT ATRIUM:                 Normal Ranges: RA Vol A4C:        55.7 ml    (8.3-19.5ml) RA Vol Index A4C:  27.0 ml/m2 RA Area A4C:       19.5 cm2 RA Major Axis A4C: 5.8 cm  AORTA MEASUREMENTS:         Normal Ranges: Asc Ao, d:          3.70 cm (2.1-3.4cm)  LV SYSTOLIC FUNCTION:                      Normal Ranges: EF-A4C View:    54 % (>=55%) EF-A2C View:    52 % EF-Biplane:     55 % LV EF Reported: 55 %  LV DIASTOLIC FUNCTION:           Normal Ranges: MV Peak E:             1.04 m/s  (0.7-1.2 m/s) MV e'                  0.133 m/s (>8.0) MV lateral e'          0.14 m/s MV medial e'           0.12 m/s E/e' Ratio:            7.84      (<8.0)  MITRAL VALVE:          Normal Ranges: MV DT:        176 msec (150-240msec)  AORTIC VALVE:            Normal Ranges: AoV Vmax:      1.82 m/s  (<=1.7m/s) AoV Peak P.2 mmHg (<20mmHg) LVOT Max Luca:  1.32 m/s  (<=1.1m/s) LVOT VTI:      21.56 cm LVOT Diameter: 2.00 cm   (1.8-2.4cm) AoV Area,Vmax: 2.28 cm2  (2.5-4.5cm2)  RIGHT VENTRICLE: RV Basal 4.40 cm RV Mid   4.00 cm RV Major 7.0 cm TAPSE:   20.8 mm RV s'    0.16 m/s  TRICUSPID VALVE/RVSP:         Normal Ranges: Est. RA Pressure:     3 IVC Diam:             1.70 cm  PULMONIC VALVE:          Normal  Ranges: PV Max Luca:     1.1 m/s  (0.6-0.9m/s) PV Max P.1 mmHg  AORTA: Asc Ao Diam 3.65 cm  [id number] Maninder Cotto MD Electronically signed on 2025 at 7:32:18 AM  ** Final **            Assessment/Plan   Assessment & Plan  Urinary tract infection without hematuria, site unspecified      Echocardiogram from : LVEF 60-65%, LVH, mild basal septal hypertrophy, biatrial enlargement, trace AI, mild MR/TR     Pneumonia  -CT chest showed pneumonia  -antibiotics  -bronchodilators  -oxygen support  -sputum culture  -blood cultures     2. Pericardial effusion  -Per CT chest  -Echo showed small pericardial effusion  -VSS     3. UTI  -Urine culture pending  -antibiotics     4. 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 Hx of CAD s/p multiple PCI/VANE  -Denies chest pain  -C/o SOB  -Troponins negative  -I reviewed the EKG, no acute changes, ST elevation, or depression  -Cont plavix and statin     6. Permanent atrial fibrillation  -RVR on arrival, now rate controlled  -Not on AC due to anemia  -Monitor on tele     7. Hypokalemia/hypomagnesemia  -Supplement  -Monitor     8. Acute kidney injury  -Possibly from dehydration  -Improved with IVF  -Hold SEBASTIAN Castaneda-CNP         [1] atorvastatin, 40 mg, oral, Daily  clopidogrel, 75 mg, oral, Daily  docusate sodium, 100 mg, oral, Daily  finasteride, 5 mg, oral, Daily  fluticasone, 1 spray, Each Nostril, Daily  folic acid, 1 mg, oral, Daily  [Held by provider] furosemide, 20 mg, oral, Once per day on   gabapentin, 100 mg, oral, TID  [Held by provider] metoprolol succinate XL, 25 mg, oral, Daily  oxygen, , inhalation, Continuous - Inhalation  pantoprazole, 40 mg, oral, Daily before breakfast  perflutren lipid microspheres, 0.5-10 mL of dilution, intravenous, Once in imaging  perflutren  protein A microsphere, 0.5 mL, intravenous, Once in imaging  piperacillin-tazobactam, 3.375 g, intravenous, q6h  sulfur hexafluoride microsphr, 2 mL, intravenous, Once in imaging  tamsulosin, 0.8 mg, oral, Daily  [2] sodium chloride 0.9%, 10 mL/hr, Last Rate: 10 mL/hr (07/09/25 1216)  [3] PRN medications: acetaminophen **OR** acetaminophen **OR** acetaminophen, melatonin, ondansetron **OR** ondansetron, polyethylene glycol, sodium chloride 0.9%

## 2025-07-09 NOTE — CONSULTS
Reason For Consult  Copd Navigator    The patient declines the Copd navigator at this time.      Johanna Curiel, RRT

## 2025-07-09 NOTE — PROGRESS NOTES
"  Subjective    Patient reports he is breathing better today.  He reports still having some dysuria.  He denies nausea, vomiting, diarrhea.  He reports decreased appetite.    Objective    Vitals  Visit Vitals  /55 (BP Location: Right arm, Patient Position: Lying)   Pulse 75   Temp 36.8 °C (98.2 °F) (Temporal)   Resp 19   Ht 1.803 m (5' 10.98\")   Wt 86.4 kg (190 lb 7.6 oz)   SpO2 94%   BMI 26.58 kg/m²   Smoking Status Some Days   BSA 2.08 m²       Physical Exam   General: Alert. NAD.   HEENT: Sclera clear.  CVS: Irregularly irregular.     Lungs: Diminished and some coarse breath sounds bilat.      Abdomen: Soft.  NT. +BS.   Extremities: No pitting edema bilat ankles.  Psychiatric: Cooperative.     IOs    Intake/Output Summary (Last 24 hours) at 7/9/2025 1520  Last data filed at 7/9/2025 1401  Gross per 24 hour   Intake 1396.75 ml   Output 1400 ml   Net -3.25 ml       Labs:   Results from last 72 hours   Lab Units 07/09/25  0755 07/08/25  0729 07/07/25  1629   SODIUM mmol/L 134* 134* 132*   POTASSIUM mmol/L 3.3* 3.0* 3.0*   CHLORIDE mmol/L 99 97* 92*   CO2 mmol/L 29 30 31   BUN mg/dL 13 13 14   CREATININE mg/dL 1.62* 1.32* 1.41*   GLUCOSE mg/dL 95 99 108*   CALCIUM mg/dL 7.7* 7.8* 8.5*   ANION GAP mmol/L 9* 10 12   EGFR mL/min/1.73m*2 41* 52* 48*   PHOSPHORUS mg/dL 2.4*  --  2.5      Results from last 72 hours   Lab Units 07/09/25  0755 07/08/25  1604 07/08/25  0729 07/07/25  1629   WBC AUTO x10*3/uL 20.5*  --  18.6* 21.3*   HEMOGLOBIN g/dL 6.9* 7.4* 6.1* 7.5*   HEMATOCRIT % 20.6* 23.1* 19.1* 22.8*   PLATELETS AUTO x10*3/uL 131*  --  142* 141*   LYMPHO PCT MAN %  --   --   --  4.0   MONO PCT MAN %  --   --   --  81.0   EOSINO PCT MAN %  --   --   --  0.0      Lab Results   Component Value Date    CALCIUM 7.7 (L) 07/09/2025    PHOS 2.4 (L) 07/09/2025      No results found for: \"CRP\"   [unfilled]       Images  Transthoracic Echo Complete     [facility], [address] [address]      "          Tel [phone] and Fax [phone]    TRANSTHORACIC ECHOCARDIOGRAM REPORT       Patient Name:       TEJINDER ANAYA          Reading Physician:    [id number] Maninder Cotto MD  Study Date:         7/8/2025            Ordering Provider:    [id number] DESTINY DIAS  MRN/PID:            [id number]            Fellow:  Accession#:         [id number]        Nurse:  Date of Birth/Age:  1935 / 89 years Sonographer:          Danni Guajardo                                                                RDCS  Gender assigned at                     Additional Staff:     Matty Haley  Birth:                                                        student  Height:             180.34 cm           Admit Date:  Weight:             86.18 kg            Admission Status:     Inpatient -                                                                Routine  BSA / BMI:          2.06 m2 / 26.50     Encounter#:           [id number]                      kg/m2  Blood Pressure:     109/61 mmHg         Department Location:  [facility]    Study Type:    TRANSTHORACIC ECHO (TTE) COMPLETE  Diagnosis/ICD: Other pericardial effusion (noninflammatory)-I31.39  Indication:    pericardial effusion  CPT Code:      Echo Complete w Full Doppler-28133    Patient History:  Pertinent History: A-Fib, CAD, COPD and HTN. pericardial effusion, hx MI.    Study Detail: The following Echo studies were performed: 2D, M-Mode, Doppler and                color flow. Technically challenging study due to body habitus. The                patient was awake.       PHYSICIAN INTERPRETATION:  Left Ventricle: The left ventricular systolic function is normal with a Silva's biplane calculated ejection fraction of 55%. There is mild concentric left ventricular  hypertrophy. There are no regional left ventricular wall motion abnormalities. The left ventricular cavity size is normal. There is normal septal and mildly increased posterior left ventricular wall thickness. Left ventricular diastolic filling is indeterminate due to atrial fibrillation/flutter.  Left Atrium: The left atrium is mildly dilated.  Right Ventricle: The right ventricle is mildly enlarged. There is normal right ventricular global systolic function.  Right Atrium: The right atrium is normal in size.  Aortic Valve: The aortic valve is trileaflet. There is no evidence of aortic valve regurgitation.  Mitral Valve: The mitral valve is normal in structure. There is mild mitral valve regurgitation. The E Vmax is 1.04 m/s.  Tricuspid Valve: The tricuspid valve is structurally normal. There is trace tricuspid regurgitation.  Pulmonic Valve: The pulmonic valve is structurally normal. There is physiologic pulmonic valve regurgitation.  Pericardium: Small pericardial effusion. The pericardial effusion appears to contain fibrinous material. There is no evidence of cardiac tamponade.  Aorta: The aortic root is normal.  Pulmonary Veins: The pulmonary veins appear normal and return normally to the left atrium.  Systemic Veins: The inferior vena cava appears normal in size, with IVC inspiratory collapse greater than 50%.       CONCLUSIONS:   1. The left ventricular systolic function is normal with a Silva's biplane calculated ejection fraction of 55%.   2. Left ventricular diastolic filling is indeterminate due to atrial fibrillation/flutter.   3. There is normal right ventricular global systolic function.   4. Mildly enlarged right ventricle.   5. The left atrium is mildly dilated.   6. Small pericardial effusion.   7. There is no evidence of cardiac tamponade.    QUANTITATIVE DATA SUMMARY:     2D MEASUREMENTS:          Normal Ranges:  Ao Root d:       3.30 cm  (2.0-3.7cm)  LAs:             4.63 cm  (2.7-4.0cm)  IVSd:             1.04 cm  (0.6-1.1cm)  LVPWd:           1.28 cm  (0.6-1.1cm)  LVIDd:           6.14 cm  (3.9-5.9cm)  LVIDs:           4.43 cm  LV Mass Index:   151 g/m2  LVEDV Index:     46 ml/m2  LV % FS          27.8 %       LEFT ATRIUM:                  Normal Ranges:  LA Vol A4C:        58.4 ml    (22+/-6mL/m2)  LA Vol A2C:        85.7 ml  LA Vol BP:         71.9 ml  LA Vol Index A4C:  28.3ml/m2  LA Vol Index A2C:  41.5 ml/m2  LA Vol Index BP:   34.8 ml/m2  LA Area A4C:       20.3 cm2  LA Area A2C:       25.0 cm2  LA Major Axis A4C: 6.0 cm  LA Major Axis A2C: 6.2 cm  LA Vol A4C:        56.4 ml       RIGHT ATRIUM:                 Normal Ranges:  RA Vol A4C:        55.7 ml    (8.3-19.5ml)  RA Vol Index A4C:  27.0 ml/m2  RA Area A4C:       19.5 cm2  RA Major Axis A4C: 5.8 cm       AORTA MEASUREMENTS:         Normal Ranges:  Asc Ao, d:          3.70 cm (2.1-3.4cm)       LV SYSTOLIC FUNCTION:                       Normal Ranges:  EF-A4C View:    54 % (>=55%)  EF-A2C View:    52 %  EF-Biplane:     55 %  LV EF Reported: 55 %       LV DIASTOLIC FUNCTION:           Normal Ranges:  MV Peak E:             1.04 m/s  (0.7-1.2 m/s)  MV e'                  0.133 m/s (>8.0)  MV lateral e'          0.14 m/s  MV medial e'           0.12 m/s  E/e' Ratio:            7.84      (<8.0)       MITRAL VALVE:          Normal Ranges:  MV DT:        176 msec (150-240msec)       AORTIC VALVE:            Normal Ranges:  AoV Vmax:      1.82 m/s  (<=1.7m/s)  AoV Peak P.2 mmHg (<20mmHg)  LVOT Max Luca:  1.32 m/s  (<=1.1m/s)  LVOT VTI:      21.56 cm  LVOT Diameter: 2.00 cm   (1.8-2.4cm)  AoV Area,Vmax: 2.28 cm2  (2.5-4.5cm2)       RIGHT VENTRICLE:  RV Basal 4.40 cm  RV Mid   4.00 cm  RV Major 7.0 cm  TAPSE:   20.8 mm  RV s'    0.16 m/s       TRICUSPID VALVE/RVSP:         Normal Ranges:  Est. RA Pressure:     3  IVC Diam:             1.70 cm       PULMONIC VALVE:          Normal Ranges:  PV Max Luca:     1.1 m/s  (0.6-0.9m/s)  PV Max P.1  mmHg       AORTA:  Asc Ao Diam 3.65 cm       [id number] Maninder Cotto MD  Electronically signed on 7/9/2025 at 7:32:18 AM       ** Final **      Meds  Scheduled medications  Scheduled Medications[1]  Continuous medications  Continuous Medications[2]  PRN medications  PRN Medications[3]     Assessment and Plan    Chico Iqbal is a 89 y.o. male with past medical history remarkable for acute myeloid leukemia, HTN, HLD, COPD, BPH, chronic systolic and diastolic chf with ef of 40% with diastolic dysfunction on echo 3/10/2023, Afib not on anticoagulation, who came to the hospital secondary to shortness of breath and generalized weakness admitted with acute hypoxic respiratory failure secondary to pneumonia with sepsis along with UTI, MARILEE.     Sepsis with pneumonia and UTI along with acute hypoxic respiratory failure secondary to pneumonia  -Sepsis appears to have resolved.  - Concern for possible gram-negative pneumonia with viral process based on CT scan.  - MRSA screen is negative.  Strep and Legionella urine antigens are negative.  -Lactate level initially elevated but normalized.  - Influenza A/B, RSV and COVID screens are negative.  - CT angio negative for pulmonary embolism but evidence of small pleural effusion.   -Continue IV Zosyn and holding off on vancomycin as MRSA screen was negative.  - BP has been little low overall stable and being cautious with IV fluids with history of systolic and diastolic CHF along with small pleural effusion, although echo on 7/8 with preserved EF and diastolic function was indeterminate.    - Will give another unit of packed blood cells today, 7/9.  - Blood cultures and urine cultures negative to date.  Patient did have fever yesterday after blood transfusion and no evidence of transfusion reaction per workup.  - Monitor.    Acute kidney injury  - Could be due to dehydration +/- sepsis.  - Creatinine up to 1.62 today; 1.32 on 7/8 ; 1.41 on 7/7.   - Patient to receive blood  transfusion again today 7/9.    - As stated above, being cautious with IV fluids due to history of chronic systolic and diastolic CHF, although echo on 7/8 with preserved ef and diastolic function indeterminate.   - Monitor.      Acute on chronic anemia  - Likely due to history of acute myeloid leukemia patient has been receiving frequent blood transfusions as an outpatient.  - Hematology/oncology follow as an outpatient and is on Aranesp as well.  - S/p 1 unit prbc's on 7/8.  - Will give another 1 unit packed red blood cells today and monitor.     Pericardial effusion  -CT of the chest with moderate pericardial effusion.  -Echo on 7/8/2025 with small pericardial effusion  -Cardiology following.  Monitor.     CT of the chest mentions remote myocardial infarct along the left ventricular apex and apical septum  - Cardiology following.    - On aspirin, Plavix and atorvastatin     Hypomagnesemia  - Replace and monitor.       Hypokalemia  - Replace and monitor.      Pulmonary nodules  - Treating for underlying pneumonia stated above.  Recommend follow-up with primary care provider and pulmonology, hematology/oncology as an outpatient.     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     Acute myeloid leukemia patient is followed by hematology/oncology as an outpatient.  - On gilteritinib as an outpatient and will hold for now with pneumonia/uti.   - Follow-up with hematology/oncology as an outpatient.     Thrombocytopenia  - Likely due to AML.  Appears stable overall.  Monitor.     Chronic systolic and diastolic CHF  -Hold home med of lasix (5 days per week) due to MARILEE with sepsis and low BP.  - Holding home med of metoprolol succinate 25 mg daily due to hypotension.   -As above, echo on 7/8/2025 with preserved EF and diastolic function was indeterminate.    - Monitor.    Chronic atrial  fibrillation  - Holding home med of metoprolol succinate 25 mg daily due to hypotension at this time.  Patient on aspirin and  Plavix but not on anticoagulation as an outpt due to anemia per cardiology's note.   - Monitor.      BPH  - Continue finasteride and flomax as long as bp will allow.      Dvt prophylaxis  - Holding chemical DVT prophylaxis due to acute on chronic anemia.                     [1] atorvastatin, 40 mg, oral, Daily  clopidogrel, 75 mg, oral, Daily  docusate sodium, 100 mg, oral, Daily  finasteride, 5 mg, oral, Daily  fluticasone, 1 spray, Each Nostril, Daily  folic acid, 1 mg, oral, Daily  [Held by provider] furosemide, 20 mg, oral, Once per day on Sunday Monday Wednesday Friday Saturday  gabapentin, 100 mg, oral, TID  [Held by provider] metoprolol succinate XL, 25 mg, oral, Daily  oxygen, , inhalation, Continuous - Inhalation  pantoprazole, 40 mg, oral, Daily before breakfast  perflutren lipid microspheres, 0.5-10 mL of dilution, intravenous, Once in imaging  perflutren protein A microsphere, 0.5 mL, intravenous, Once in imaging  piperacillin-tazobactam, 3.375 g, intravenous, q6h  sulfur hexafluoride microsphr, 2 mL, intravenous, Once in imaging  tamsulosin, 0.8 mg, oral, Daily  [2] sodium chloride 0.9%, 10 mL/hr, Last Rate: 10 mL/hr (07/09/25 1216)  [3] PRN medications: acetaminophen **OR** acetaminophen **OR** acetaminophen, melatonin, ondansetron **OR** ondansetron, polyethylene glycol, sodium chloride 0.9%

## 2025-07-09 NOTE — CARE PLAN
The clinical goals for the shift include Patient will tolerate IV abx and remain afebrile this shift.    Over the shift, the patient abx well with no adverse effects. Vitals were stable this shift, no reports of pain. Patient received 1 unit PRBC this shift, tolerated well with no reactions. Family at bedside this afternoon. Patient has been up to the edge of the bed and used the bedside commode. Appetite was good. Currently in bed with call light in reach and bed alarm on, no needs reported at this time.

## 2025-07-09 NOTE — PROGRESS NOTES
Occupational Therapy                 Therapy Communication Note    Patient Name: Chico Iqbal  MRN: [id number]  Department: [facility]  Room: [address] [place], [place] [address]  Today's Date: 7/9/2025     Discipline: Occupational Therapy    Missed Visit: OT Missed Visit: Yes     Missed Visit Reason: Patient placed on medical hold.  OT evaluation withheld at this time due to the pt.'s low hemoglobin(6.9) & low hematocrit(20.6).  Pt. is scheduled to receive blood today.  Will see  for OT evaluation as appropriate.    Missed Time: Attempt at 10:03AM

## 2025-07-10 ENCOUNTER — APPOINTMENT (OUTPATIENT)
Dept: RADIOLOGY | Facility: HOSPITAL | Age: 89
DRG: 871 | End: 2025-07-10
Payer: MEDICARE

## 2025-07-10 ENCOUNTER — APPOINTMENT (OUTPATIENT)
Dept: HEMATOLOGY/ONCOLOGY | Facility: HOSPITAL | Age: 89
End: 2025-07-10
Payer: MEDICARE

## 2025-07-10 LAB
ALBUMIN SERPL BCP-MCNC: 2.7 G/DL (ref 3.4–5)
ANION GAP SERPL CALC-SCNC: 10 MMOL/L (ref 10–20)
BACTERIA UR CULT: NO GROWTH
BACTERIA UR CULT: NORMAL
BUN SERPL-MCNC: 14 MG/DL (ref 6–23)
CALCIUM SERPL-MCNC: 7.7 MG/DL (ref 8.6–10.3)
CHLORIDE SERPL-SCNC: 100 MMOL/L (ref 98–107)
CO2 SERPL-SCNC: 29 MMOL/L (ref 21–32)
CREAT SERPL-MCNC: 1.78 MG/DL (ref 0.5–1.3)
EGFRCR SERPLBLD CKD-EPI 2021: 36 ML/MIN/1.73M*2
ERYTHROCYTE [DISTWIDTH] IN BLOOD BY AUTOMATED COUNT: 23.7 % (ref 11.5–14.5)
GLUCOSE SERPL-MCNC: 128 MG/DL (ref 74–99)
HCT VFR BLD AUTO: 23.7 % (ref 41–52)
HGB BLD-MCNC: 7.7 G/DL (ref 13.5–17.5)
MAGNESIUM SERPL-MCNC: 1.93 MG/DL (ref 1.6–2.4)
MCH RBC QN AUTO: 31.6 PG (ref 26–34)
MCHC RBC AUTO-ENTMCNC: 32.5 G/DL (ref 32–36)
MCV RBC AUTO: 97 FL (ref 80–100)
NRBC BLD-RTO: 0 /100 WBCS (ref 0–0)
PHOSPHATE SERPL-MCNC: 2.3 MG/DL (ref 2.5–4.9)
PLATELET # BLD AUTO: 129 X10*3/UL (ref 150–450)
POTASSIUM SERPL-SCNC: 3.5 MMOL/L (ref 3.5–5.3)
RBC # BLD AUTO: 2.44 X10*6/UL (ref 4.5–5.9)
SODIUM SERPL-SCNC: 135 MMOL/L (ref 136–145)
WBC # BLD AUTO: 19.2 X10*3/UL (ref 4.4–11.3)

## 2025-07-10 PROCEDURE — 97165 OT EVAL LOW COMPLEX 30 MIN: CPT | Mod: GO,IPSPLIT

## 2025-07-10 PROCEDURE — 2500000002 HC RX 250 W HCPCS SELF ADMINISTERED DRUGS (ALT 637 FOR MEDICARE OP, ALT 636 FOR OP/ED): Mod: IPSPLIT | Performed by: INTERNAL MEDICINE

## 2025-07-10 PROCEDURE — 83735 ASSAY OF MAGNESIUM: CPT | Mod: IPSPLIT | Performed by: INTERNAL MEDICINE

## 2025-07-10 PROCEDURE — 99232 SBSQ HOSP IP/OBS MODERATE 35: CPT | Performed by: INTERNAL MEDICINE

## 2025-07-10 PROCEDURE — 2500000001 HC RX 250 WO HCPCS SELF ADMINISTERED DRUGS (ALT 637 FOR MEDICARE OP): Mod: IPSPLIT | Performed by: INTERNAL MEDICINE

## 2025-07-10 PROCEDURE — 82570 ASSAY OF URINE CREATININE: CPT | Mod: GENLAB | Performed by: INTERNAL MEDICINE

## 2025-07-10 PROCEDURE — 2500000004 HC RX 250 GENERAL PHARMACY W/ HCPCS (ALT 636 FOR OP/ED): Mod: IPSPLIT | Performed by: INTERNAL MEDICINE

## 2025-07-10 PROCEDURE — 76770 US EXAM ABDO BACK WALL COMP: CPT | Performed by: RADIOLOGY

## 2025-07-10 PROCEDURE — 36415 COLL VENOUS BLD VENIPUNCTURE: CPT | Mod: IPSPLIT | Performed by: INTERNAL MEDICINE

## 2025-07-10 PROCEDURE — 2500000005 HC RX 250 GENERAL PHARMACY W/O HCPCS: Mod: IPSPLIT | Performed by: INTERNAL MEDICINE

## 2025-07-10 PROCEDURE — 80069 RENAL FUNCTION PANEL: CPT | Mod: IPSPLIT | Performed by: INTERNAL MEDICINE

## 2025-07-10 PROCEDURE — 97161 PT EVAL LOW COMPLEX 20 MIN: CPT | Mod: GP,IPSPLIT | Performed by: PHYSICAL THERAPIST

## 2025-07-10 PROCEDURE — 85027 COMPLETE CBC AUTOMATED: CPT | Mod: IPSPLIT | Performed by: INTERNAL MEDICINE

## 2025-07-10 PROCEDURE — 76770 US EXAM ABDO BACK WALL COMP: CPT | Mod: IPSPLIT

## 2025-07-10 PROCEDURE — 1200000002 HC GENERAL ROOM WITH TELEMETRY DAILY: Mod: IPSPLIT

## 2025-07-10 RX ORDER — SODIUM,POTASSIUM PHOSPHATES 280-250MG
2 POWDER IN PACKET (EA) ORAL
Status: COMPLETED | OUTPATIENT
Start: 2025-07-10 | End: 2025-07-11

## 2025-07-10 RX ORDER — GABAPENTIN 100 MG/1
100 CAPSULE ORAL NIGHTLY
Status: DISCONTINUED | OUTPATIENT
Start: 2025-07-10 | End: 2025-07-11 | Stop reason: HOSPADM

## 2025-07-10 RX ORDER — SODIUM CHLORIDE, SODIUM LACTATE, POTASSIUM CHLORIDE, CALCIUM CHLORIDE 600; 310; 30; 20 MG/100ML; MG/100ML; MG/100ML; MG/100ML
75 INJECTION, SOLUTION INTRAVENOUS CONTINUOUS
Status: DISPENSED | OUTPATIENT
Start: 2025-07-10 | End: 2025-07-11

## 2025-07-10 RX ADMIN — PIPERACILLIN SODIUM AND TAZOBACTAM SODIUM 3.38 G: 3; .375 INJECTION, SOLUTION INTRAVENOUS at 06:06

## 2025-07-10 RX ADMIN — POTASSIUM & SODIUM PHOSPHATES POWDER PACK 280-160-250 MG 2 PACKET: 280-160-250 PACK at 13:36

## 2025-07-10 RX ADMIN — PIPERACILLIN SODIUM AND TAZOBACTAM SODIUM 3.38 G: 3; .375 INJECTION, SOLUTION INTRAVENOUS at 18:55

## 2025-07-10 RX ADMIN — SODIUM CHLORIDE, SODIUM LACTATE, POTASSIUM CHLORIDE, AND CALCIUM CHLORIDE 75 ML/HR: .6; .31; .03; .02 INJECTION, SOLUTION INTRAVENOUS at 10:36

## 2025-07-10 RX ADMIN — DICYCLOMINE HYDROCHLORIDE 10 MG: 10 CAPSULE ORAL at 20:46

## 2025-07-10 RX ADMIN — FINASTERIDE 5 MG: 5 TABLET, FILM COATED ORAL at 10:36

## 2025-07-10 RX ADMIN — PANTOPRAZOLE SODIUM 40 MG: 40 TABLET, DELAYED RELEASE ORAL at 06:06

## 2025-07-10 RX ADMIN — CLOPIDOGREL BISULFATE 75 MG: 75 TABLET, FILM COATED ORAL at 10:36

## 2025-07-10 RX ADMIN — DICYCLOMINE HYDROCHLORIDE 10 MG: 10 CAPSULE ORAL at 06:06

## 2025-07-10 RX ADMIN — PIPERACILLIN SODIUM AND TAZOBACTAM SODIUM 3.38 G: 3; .375 INJECTION, SOLUTION INTRAVENOUS at 23:54

## 2025-07-10 RX ADMIN — FOLIC ACID 1 MG: 1 TABLET ORAL at 10:36

## 2025-07-10 RX ADMIN — POTASSIUM & SODIUM PHOSPHATES POWDER PACK 280-160-250 MG 2 PACKET: 280-160-250 PACK at 20:46

## 2025-07-10 RX ADMIN — GABAPENTIN 100 MG: 100 CAPSULE ORAL at 10:36

## 2025-07-10 RX ADMIN — PIPERACILLIN SODIUM AND TAZOBACTAM SODIUM 3.38 G: 3; .375 INJECTION, SOLUTION INTRAVENOUS at 00:21

## 2025-07-10 RX ADMIN — GABAPENTIN 100 MG: 100 CAPSULE ORAL at 20:46

## 2025-07-10 RX ADMIN — DOCUSATE SODIUM 100 MG: 100 CAPSULE, LIQUID FILLED ORAL at 10:35

## 2025-07-10 RX ADMIN — Medication 1 L/MIN: at 21:32

## 2025-07-10 RX ADMIN — PIPERACILLIN SODIUM AND TAZOBACTAM SODIUM 3.38 G: 3; .375 INJECTION, SOLUTION INTRAVENOUS at 13:03

## 2025-07-10 RX ADMIN — TAMSULOSIN HYDROCHLORIDE 0.8 MG: 0.4 CAPSULE ORAL at 10:36

## 2025-07-10 RX ADMIN — SODIUM CHLORIDE, SODIUM LACTATE, POTASSIUM CHLORIDE, AND CALCIUM CHLORIDE 75 ML/HR: .6; .31; .03; .02 INJECTION, SOLUTION INTRAVENOUS at 23:55

## 2025-07-10 RX ADMIN — DICYCLOMINE HYDROCHLORIDE 10 MG: 10 CAPSULE ORAL at 17:25

## 2025-07-10 RX ADMIN — ATORVASTATIN CALCIUM 40 MG: 40 TABLET, FILM COATED ORAL at 10:36

## 2025-07-10 RX ADMIN — POTASSIUM & SODIUM PHOSPHATES POWDER PACK 280-160-250 MG 2 PACKET: 280-160-250 PACK at 17:25

## 2025-07-10 RX ADMIN — DICYCLOMINE HYDROCHLORIDE 10 MG: 10 CAPSULE ORAL at 13:03

## 2025-07-10 ASSESSMENT — PAIN - FUNCTIONAL ASSESSMENT
PAIN_FUNCTIONAL_ASSESSMENT: 0-10

## 2025-07-10 ASSESSMENT — COGNITIVE AND FUNCTIONAL STATUS - GENERAL
STANDING UP FROM CHAIR USING ARMS: A LITTLE
MOVING TO AND FROM BED TO CHAIR: A LITTLE
DRESSING REGULAR LOWER BODY CLOTHING: A LITTLE
MOBILITY SCORE: 19
CLIMB 3 TO 5 STEPS WITH RAILING: TOTAL
TOILETING: A LITTLE
DAILY ACTIVITIY SCORE: 21
MOBILITY SCORE: 20
TURNING FROM BACK TO SIDE WHILE IN FLAT BAD: A LITTLE
STANDING UP FROM CHAIR USING ARMS: A LITTLE
HELP NEEDED FOR BATHING: A LITTLE
DAILY ACTIVITIY SCORE: 19
PERSONAL GROOMING: A LITTLE
CLIMB 3 TO 5 STEPS WITH RAILING: A LITTLE
TOILETING: A LITTLE
DRESSING REGULAR UPPER BODY CLOTHING: A LITTLE
HELP NEEDED FOR BATHING: A LITTLE
WALKING IN HOSPITAL ROOM: A LITTLE
DRESSING REGULAR LOWER BODY CLOTHING: A LITTLE

## 2025-07-10 ASSESSMENT — ACTIVITIES OF DAILY LIVING (ADL)
ADL_ASSISTANCE: INDEPENDENT
BATHING_ASSISTANCE: STAND BY
ADL_ASSISTANCE: INDEPENDENT

## 2025-07-10 ASSESSMENT — PAIN SCALES - GENERAL
PAINLEVEL_OUTOF10: 0 - NO PAIN

## 2025-07-10 NOTE — CARE PLAN
The patient's goals for the shift include      The clinical goals for the shift include patient will maintain spo2 at or above 925 on supplemental oxygen    Over the shift, the patient did make progress toward the following goals.       Problem: Nutrition  Goal: Nutrient intake appropriate for maintaining nutritional needs  Outcome: Progressing

## 2025-07-10 NOTE — NURSING NOTE
Assumed care of patient. BSSR done, call light within reach, bed in lowest position, alarm on, patient resting with eyes closed, breathing even and non labored, will cont to monitor and medicate per MD orders.

## 2025-07-10 NOTE — PROGRESS NOTES
Physical Therapy    Physical Therapy Evaluation    Patient Name: Chico Iqbal  MRN: [id number]  Department: [facility]  Room: [address] [place] [address]  Today's Date: 7/10/2025   Time Calculation  Start Time: 0835  Stop Time: 0850  Time Calculation (min): 15 min    Assessment/Plan   PT Assessment  PT Assessment Results: Decreased strength, Impaired balance, Decreased mobility, Decreased endurance, Impaired sensation  Rehab Prognosis: Good  Barriers to Discharge Home: No anticipated barriers  Evaluation/Treatment Tolerance: Patient tolerated treatment well  Medical Staff Made Aware: Yes  Strengths: Ability to acquire knowledge  Barriers to Participation:  (n/a)  End of Session Communication: Bedside nurse  Assessment Comment: Pleasant 89 y.o presents with decreased endurance, weakness and impaired mobility. Pt. lives with daughter (states she is always home and available to help if needed) and is normally TONY with WW. Pt. currently requires CGA and would benefit from additional PT to address above noted limitations and prevent further decline.  End of Session Patient Position: Alarm on  IP OR SWING BED PT PLAN  Inpatient or Swing Bed: Inpatient  PT Plan  Treatment/Interventions: Transfer training, Bed mobility, Gait training, Stair training, Balance training, Strengthening, Endurance training, Therapeutic exercise, Therapeutic activity, Home exercise program, Neuromuscular re-education  PT Plan: Ongoing PT  PT Frequency: 3 times per week  PT Discharge Recommendations: Low intensity level of continued care  Equipment Recommended upon Discharge:  (shower chair)  PT Recommended Transfer Status: Contact guard  PT - OK to Discharge: Yes Based on completed evaluation and care plan recommendations, no barriers to discharge to next site of care       Subjective     PT Visit Info:  PT Received On: 07/10/25  General Visit Information:  General  Reason for Referral: impaired mobility, UTI  Referred By: Guero Qiu,   Past Medical History Relevant  to Rehab: acute myeloid leukemia, HTN, HLD, COPD, BPH, Afib  Co-Treatment: OT  Co-Treatment Reason: pt. complexity  Prior to Session Communication: Bedside nurse  Patient Position Received: Bed, 4 rail up, Alarm on  General Comment: sandi, 1L 02  Home Living:  Home Living  Type of Home: House  Lives With:  (daughter (states she is always there))  Home Adaptive Equipment:  (Rollator)  Home Layout: One level  Home Access: No concerns  Bathroom Shower/Tub: Tub/shower unit  Prior Level of Function:  Prior Function Per Pt/Caregiver Report  Level of [place]: Independent with ADLs and functional transfers, Needs assistance with homemaking  Receives Help From:  (DTR)  ADL Assistance: Independent  Homemaking Assistance: Needs assistance  Ambulatory Assistance: Independent (with Rollator)  Prior Function Comments: + drives  Precautions:  Precautions  Medical Precautions: Fall precautions      Date/Time Vitals Session Patient Position Pulse Resp SpO2 BP MAP (mmHg)    07/10/25 0800 --  --  77  --  95 %  --  --     07/10/25 0810 --  --  83  18  96 %  105/58  74     07/10/25 0835 --  --  67  --  99 %  --  --           Objective   Pain:  Pain Assessment  Pain Assessment: 0-10  0-10 (Numeric) Pain Score: 0 - No pain  Cognition:  Cognition  Overall Cognitive Status: Within Functional Limits  Arousal/Alertness: Appropriate responses to stimuli    General Assessments:     Sensation  Sensation Comment: reports neuropathy in feet    Strength  Strength Comments: BLE: grossly 4-/5  Coordination  Movements are Fluid and Coordinated: Yes    Static Standing Balance  Static Standing-Comment/Number of Minutes: F+; with BUE support  Dynamic Standing Balance  Dynamic Standing-Comments: F+; with BUE support  Functional Assessments:  Bed Mobility  Bed Mobility: Yes  Bed Mobility 1  Bed Mobility 1: Supine to sitting  Level of Assistance 1: Close supervision    Transfers  Transfer: Yes  Transfer 1  Technique 1: Sit to stand, Stand to  sit  Transfer Device 1: Walker  Transfer Level of Assistance 1: Contact guard  Trials/Comments 1: unable to complete without UE support    Ambulation/Gait Training  Ambulation/Gait Training Performed: Yes  Ambulation/Gait Training 1  Surface 1: Level tile  Device 1: Rolling walker  Assistance 1: Contact guard  Quality of Gait 1: Decreased step length, Inconsistent stride length  Comments/Distance (ft) 1: 60'  Extremity/Trunk Assessments:  Defer to OT for UE detail   RLE   RLE : Within Functional Limits  LLE   LLE : Within Functional Limits  Outcome Measures:  [facility] Basic Mobility  Turning from your back to your side while in a flat bed without using bedrails: None  Moving from lying on your back to sitting on the side of a flat bed without using bedrails: A little  Moving to and from bed to chair (including a wheelchair): A little  Standing up from a chair using your arms (e.g. wheelchair or bedside chair): A little  To walk in hospital room: A little  Climbing 3-5 steps with railing: A little  Basic Mobility - Total Score: 19    Encounter Problems       Encounter Problems (Active)       Balance       STG - Maintains dynamic standing balance without upper extremity support with >=good- balance        Start:  07/10/25    Expected End:  07/24/25            Pt. will complete 5 STS without UE support <30 seconds        Start:  07/10/25    Expected End:  07/24/25               Mobility       LTG - Patient will ambulate community distance TONY with WW        Start:  07/10/25    Expected End:  07/24/25               PT Transfers       STG - Patient will perform bed mobility IND       Start:  07/10/25    Expected End:  07/24/25            STG - Patient will transfer sit to and from stand TONY with WW       Start:  07/10/25    Expected End:  07/24/25                   Education Documentation  Mobility Training, taught by Ritika Last, PT at 7/10/2025  9:20 AM.  Learner: Patient  Readiness: Acceptance  Method:  Explanation  Response: Verbalizes Understanding  Comment: Educated pt. on PT POC    Education Comments  No comments found.

## 2025-07-10 NOTE — PROGRESS NOTES
Occupational Therapy    Evaluation    Patient Name: Chico Iqbal  MRN: [id number]  Department: [facility]  Room: [address] [place], [place] [address]  Today's Date: 7/10/2025  Time Calculation  Start Time: 0832  Stop Time: 0850  Time Calculation (min): 18 min    Assessment  IP OT Assessment  OT Assessment: Pt is an 89 yo M referred to occupational therapy for impaired self-care and functional mobility 2/2 hospitalization for UTI. Pt demonstrates decreased endurance and ADL comeption this date. Pt's daughter is home at all times and provides supervision for bathing. Pt required CGA for STS and functional mobility and completed grooming and LE dressing w/ supervision. Pt would benefit from continued OT services at the LOW intensity level to increase safety and functional independence.  Prognosis: Good  Barriers to Discharge Home: No anticipated barriers  Evaluation/Treatment Tolerance: Patient tolerated treatment well  Medical Staff Made Aware: Yes  End of Session Communication: Bedside nurse  End of Session Patient Position: Up in chair, Alarm on  Plan:  Treatment Interventions: ADL retraining, Functional transfer training, Endurance training, Patient/family training, Equipment evaluation/education, Compensatory technique education  OT Frequency: 3 times per week  OT Discharge Recommendations: Low intensity level of continued care  Equipment Recommended upon Discharge: Other (comment) (shower chair)  OT Recommended Transfer Status: Stand by assist, Assist of 1  OT - OK to Discharge: Yes (Based on completed evaluation and care plan recommendations, no barriers to discharge to next site of care)    Subjective   Current Problem:  1. Pericardial effusion (HHS-HCC)  Transthoracic Echo Complete    Transthoracic Echo Complete      2. Urinary tract infection without hematuria, site unspecified        3. Pneumonia due to infectious organism, unspecified laterality, unspecified part of lung          OT Visit Info:  OT Received On: 07/10/25  General Visit  Info:  General  Reason for Referral: Pt is an 88 yo M referred to occupational therapy for impaired self-care and functional mobility 2/2 hospitalization for UTI.  Referred By: Guero Qiu DO  Past Medical History Relevant to Rehab: acute myeloid leukemia, HTN, HLD, COPD, BPH, Afib  Family/Caregiver Present: No  Co-Treatment: PT  Co-Treatment Reason: To maximize pt safety and outcomes  Prior to Session Communication: Bedside nurse  Patient Position Received: Bed, 4 rail up, Alarm on  Preferred Learning Style: verbal, visual  General Comment: Pt pleasant and agreeable to therapy session. Pt cleared by RN prior to session.  Precautions:  Medical Precautions: Fall precautions  Precautions Comment: tele, masimo, 1L O2 via NC     Date/Time Vitals Session Patient Position Pulse Resp SpO2 BP MAP (mmHg)    07/10/25 0800 --  --  77  --  95 %  --  --     07/10/25 0810 --  --  83  18  96 %  105/58  74     07/10/25 0835 --  --  67  --  99 %  --  --           Vital Signs Comment: SpO2 dropped to 88% following ambulation in room and hallway. Increased to 95% when NC placed back on    Pain:  Pain Assessment  Pain Assessment: 0-10  0-10 (Numeric) Pain Score: 0 - No pain    Objective   Cognition:  Overall Cognitive Status: Within Functional Limits  Arousal/Alertness: Appropriate responses to stimuli  Orientation Level: Oriented X4    Home Living:  Type of Home: House  Lives With: Adult children (Daughter (pt states she is always home))  Home Adaptive Equipment:  (Rollator)  Home Layout: One level  Home Access: No concerns  Bathroom Shower/Tub: Tub/shower unit  Bathroom Toilet: Standard  Bathroom Equipment: Grab bars in shower   Prior Function:  Level of [place]: Independent with ADLs and functional transfers, Needs assistance with homemaking  Receives Help From: Family (Daughter)  ADL Assistance: Independent (Pt states his daughter provides sup during bathing)  Homemaking Assistance: Needs assistance (Daughter  completes)  Ambulatory Assistance: Independent (w/ rollator)  Prior Function Comments: + drives    ADL:  Eating Assistance: Independent  Grooming Assistance: Independent  Bathing Assistance: Stand by  UE Dressing Assistance: Stand by  LE Dressing Assistance: Stand by  LE Dressing Deficit: Don/doff R sock, Don/doff L sock  Toileting Assistance with Device: Stand by  Functional Assistance: Stand by  ADL Comments: Pt completed bed mobility w/ supervision and STS/functional mobility w/ CGA and FWW. Pt competed LE dressing seated EOB w/ supervision. Other ADLs anticipated.    Bed Mobility/Transfers:   Bed Mobility  Bed Mobility: Yes  Bed Mobility 1  Bed Mobility 1: Supine to sitting  Level of Assistance 1: Close supervision    Transfers  Transfer: Yes  Transfer 1  Transfer From 1: Bed to  Transfer to 1: Stand  Technique 1: Sit to stand, Stand to sit  Transfer Device 1: Walker  Transfer Level of Assistance 1: Contact guard  Trials/Comments 1: cues for fully upright posture    Functional Mobility:  Functional Mobility  Functional Mobility Performed: Yes  Functional Mobility 1  Surface 1: Level tile  Device 1: Rolling walker  Assistance 1: Contact guard  Comments 1: Pt completed functional mobility in room and hallway w/ CGA and FWW, good balance throughout and during turns  Sitting Balance:  Static Sitting Balance  Static Sitting-Balance Support: Feet supported  Static Sitting-Level of Assistance: Distant supervision  Dynamic Sitting Balance  Dynamic Sitting-Balance Support: Feet supported  Dynamic Sitting-Level of Assistance: Distant supervision  Dynamic Sitting-Balance: Forward lean  Standing Balance:  Static Standing Balance  Static Standing-Balance Support: Bilateral upper extremity supported  Static Standing-Level of Assistance: Close supervision  Dynamic Standing Balance  Dynamic Standing-Balance Support: Bilateral upper extremity supported  Dynamic Standing-Level of Assistance: Close supervision  Dynamic  Standing-Balance: Turning    Sensation:  Sensation Comment: Pt reports neuropathy in both feet  Strength:  Strength Comments: BUE grossly 4-/5  Coordination:  Movements are Fluid and Coordinated: Yes   Hand Function:  Hand Function  Gross Grasp: Functional  Coordination: Functional  Extremities:   RUE: Within Functional Limits   LUE: Within Functional Limits    Outcome Measures:   [facility] Daily Activity  Putting on and taking off regular lower body clothing: A little  Bathing (including washing, rinsing, drying): A little  Putting on and taking off regular upper body clothing: None  Toileting, which includes using toilet, bedpan or urinal: A little  Taking care of personal grooming such as brushing teeth: None  Eating Meals: None  Daily Activity - Total Score: 21    Education Documentation  Body Mechanics, taught by Krista Zee OT at 7/10/2025  9:49 AM.  Learner: Patient  Readiness: Acceptance  Method: Explanation  Response: Verbalizes Understanding  Comment: Pt educated on POC, DC recs, safety and body mechanics when completing transfers and ADLs    ADL Training, taught by Krista Zee OT at 7/10/2025  9:49 AM.  Learner: Patient  Readiness: Acceptance  Method: Explanation  Response: Verbalizes Understanding  Comment: Pt educated on POC, DC recs, safety and body mechanics when completing transfers and ADLs    Goals:   Encounter Problems       Encounter Problems (Active)       ADLs       Patient will perform UB and LB bathing with stand by assist level of assistance and PRN bathroom equipment.       Start:  07/10/25    Expected End:  07/24/25            Patient with complete upper body dressing with set-up level of assistance donning and doffing all UE clothes with PRN adaptive equipment while supported sitting or edge of bed        Start:  07/10/25    Expected End:  07/24/25            Patient with complete lower body dressing with contact guard assist level of assistance donning and doffing all LE clothes   with PRN adaptive equipment while edge of bed and standing       Start:  07/10/25    Expected End:  07/24/25            Patient will complete toileting including hygiene clothing management/hygiene with stand by assist level of assistance and PRN bathroom equipment.       Start:  07/10/25    Expected End:  07/24/25               BALANCE       Pt will maintain dynamic standing balance during ADL task with stand by assist level of assistance in order to demonstrate decreased risk of falling and improved postural control.       Start:  07/10/25    Expected End:  07/24/25               MOBILITY       Patient will perform Functional mobility mod Household distances/Community Distances with stand by assist level of assistance and least restrictive device in order to improve safety and functional mobility.       Start:  07/10/25    Expected End:  07/24/25

## 2025-07-10 NOTE — PROGRESS NOTES
07/10/25 1127   Discharge Planning   Expected Discharge Disposition Home H  (PT/OT recommend LOW level of therapy on discharge. Met with patient and family to discuss recommendation. They would like [facility] as they had them in the past. Provider made aware of need for internal referral.)   Patient Choice   Provider Choice list and CMS website (https://medicare.gov/care-compare#search) for post-acute Quality and Resource Measure Data were provided and reviewed with: Patient;Family   Patient / Family choosing to utilize agency / facility established prior to hospitalization Yes   Intensity of Service   Intensity of Service 0-30 min

## 2025-07-10 NOTE — PROGRESS NOTES
"  Subjective    Patient reports having a little shortness of breath after physical therapy evaluation this morning.  He denies chest pain, nausea or vomiting.  He reports not eating much but states he is drinking okay.    Objective    Vitals  Visit Vitals  /58 (BP Location: Left arm, Patient Position: Lying)   Pulse 77   Temp 36.6 °C (97.9 °F) (Temporal)   Resp 18   Ht 1.803 m (5' 10.98\")   Wt 86.4 kg (190 lb 7.6 oz)   SpO2 95%   BMI 26.58 kg/m²   Smoking Status Some Days   BSA 2.08 m²       Physical Exam   General: Alert.  No acute distress.  Patient is sitting in a chair at the bedside.  HEENT: Sclera clear.  CVS: Irregularly irregular.     Lungs: Diminished breath sounds bilaterally.       Abdomen: Soft.  Nontender.  Bowel sounds present.  Extremities: No pitting edema bilateral ankles.  Psychiatric: Cooperative.     IOs    Intake/Output Summary (Last 24 hours) at 7/10/2025 1255  Last data filed at 7/10/2025 0810  Gross per 24 hour   Intake 656.75 ml   Output 1200 ml   Net -543.25 ml       Labs:   Results from last 72 hours   Lab Units 07/10/25  0803 07/09/25  0755 07/08/25  0729 07/07/25  1629   SODIUM mmol/L 135* 134* 134* 132*   POTASSIUM mmol/L 3.5 3.3* 3.0* 3.0*   CHLORIDE mmol/L 100 99 97* 92*   CO2 mmol/L 29 29 30 31   BUN mg/dL 14 13 13 14   CREATININE mg/dL 1.78* 1.62* 1.32* 1.41*   GLUCOSE mg/dL 128* 95 99 108*   CALCIUM mg/dL 7.7* 7.7* 7.8* 8.5*   ANION GAP mmol/L 10 9* 10 12   EGFR mL/min/1.73m*2 36* 41* 52* 48*   PHOSPHORUS mg/dL 2.3* 2.4*  --  2.5      Results from last 72 hours   Lab Units 07/10/25  0803 07/09/25  0755 07/08/25  1604 07/08/25  0729 07/07/25  1629   WBC AUTO x10*3/uL 19.2* 20.5*  --  18.6* 21.3*   HEMOGLOBIN g/dL 7.7* 6.9* 7.4* 6.1* 7.5*   HEMATOCRIT % 23.7* 20.6* 23.1* 19.1* 22.8*   PLATELETS AUTO x10*3/uL 129* 131*  --  142* 141*   LYMPHO PCT MAN %  --   --   --   --  4.0   MONO PCT MAN %  --   --   --   --  81.0   EOSINO PCT MAN %  --   --   --   --  0.0      Lab Results " "  Component Value Date    CALCIUM 7.7 (L) 07/10/2025    PHOS 2.3 (L) 07/10/2025      No results found for: \"CRP\"   [unfilled]       Images  Transthoracic Echo Complete     [facility], [address] [place] [address]               Tel [phone] and Fax [phone]    TRANSTHORACIC ECHOCARDIOGRAM REPORT       Patient Name:       TEJINDER ANAYA          Reading Physician:    [id number] Maninder Cotto MD  Study Date:         7/8/2025            Ordering Provider:    [id number] DESTINY DIAS  MRN/PID:            [id number]            Fellow:  Accession#:         [id number]        Nurse:  Date of Birth/Age:  1935 / 89 years Sonographer:          Danni Guajardo RDCS  Gender assigned at                     Additional Staff:     Matty Haley  Birth:                                                        student  Height:             180.34 cm           Admit Date:  Weight:             86.18 kg            Admission Status:     Inpatient -                                                                Routine  BSA / BMI:          2.06 m2 / 26.50     Encounter#:           [id number]                      kg/m2  Blood Pressure:     109/61 mmHg         Department Location:  [facility]    Study Type:    TRANSTHORACIC ECHO (TTE) COMPLETE  Diagnosis/ICD: Other pericardial effusion (noninflammatory)-I31.39  Indication:    pericardial effusion  CPT Code:      Echo Complete w Full Doppler-25728    Patient History:  Pertinent History: A-Fib, CAD, COPD and HTN. pericardial effusion, hx MI.    Study Detail: The following Echo studies were performed: 2D, M-Mode, Doppler and                color flow. Technically challenging study due to body habitus. The   "              patient was awake.       PHYSICIAN INTERPRETATION:  Left Ventricle: The left ventricular systolic function is normal with a Silva's biplane calculated ejection fraction of 55%. There is mild concentric left ventricular hypertrophy. There are no regional left ventricular wall motion abnormalities. The left ventricular cavity size is normal. There is normal septal and mildly increased posterior left ventricular wall thickness. Left ventricular diastolic filling is indeterminate due to atrial fibrillation/flutter.  Left Atrium: The left atrium is mildly dilated.  Right Ventricle: The right ventricle is mildly enlarged. There is normal right ventricular global systolic function.  Right Atrium: The right atrium is normal in size.  Aortic Valve: The aortic valve is trileaflet. There is no evidence of aortic valve regurgitation.  Mitral Valve: The mitral valve is normal in structure. There is mild mitral valve regurgitation. The E Vmax is 1.04 m/s.  Tricuspid Valve: The tricuspid valve is structurally normal. There is trace tricuspid regurgitation.  Pulmonic Valve: The pulmonic valve is structurally normal. There is physiologic pulmonic valve regurgitation.  Pericardium: Small pericardial effusion. The pericardial effusion appears to contain fibrinous material. There is no evidence of cardiac tamponade.  Aorta: The aortic root is normal.  Pulmonary Veins: The pulmonary veins appear normal and return normally to the left atrium.  Systemic Veins: The inferior vena cava appears normal in size, with IVC inspiratory collapse greater than 50%.       CONCLUSIONS:   1. The left ventricular systolic function is normal with a Silva's biplane calculated ejection fraction of 55%.   2. Left ventricular diastolic filling is indeterminate due to atrial fibrillation/flutter.   3. There is normal right ventricular global systolic function.   4. Mildly enlarged right ventricle.   5. The left atrium is mildly dilated.   6.  Small pericardial effusion.   7. There is no evidence of cardiac tamponade.    QUANTITATIVE DATA SUMMARY:     2D MEASUREMENTS:          Normal Ranges:  Ao Root d:       3.30 cm  (2.0-3.7cm)  LAs:             4.63 cm  (2.7-4.0cm)  IVSd:            1.04 cm  (0.6-1.1cm)  LVPWd:           1.28 cm  (0.6-1.1cm)  LVIDd:           6.14 cm  (3.9-5.9cm)  LVIDs:           4.43 cm  LV Mass Index:   151 g/m2  LVEDV Index:     46 ml/m2  LV % FS          27.8 %       LEFT ATRIUM:                  Normal Ranges:  LA Vol A4C:        58.4 ml    (22+/-6mL/m2)  LA Vol A2C:        85.7 ml  LA Vol BP:         71.9 ml  LA Vol Index A4C:  28.3ml/m2  LA Vol Index A2C:  41.5 ml/m2  LA Vol Index BP:   34.8 ml/m2  LA Area A4C:       20.3 cm2  LA Area A2C:       25.0 cm2  LA Major Axis A4C: 6.0 cm  LA Major Axis A2C: 6.2 cm  LA Vol A4C:        56.4 ml       RIGHT ATRIUM:                 Normal Ranges:  RA Vol A4C:        55.7 ml    (8.3-19.5ml)  RA Vol Index A4C:  27.0 ml/m2  RA Area A4C:       19.5 cm2  RA Major Axis A4C: 5.8 cm       AORTA MEASUREMENTS:         Normal Ranges:  Asc Ao, d:          3.70 cm (2.1-3.4cm)       LV SYSTOLIC FUNCTION:                       Normal Ranges:  EF-A4C View:    54 % (>=55%)  EF-A2C View:    52 %  EF-Biplane:     55 %  LV EF Reported: 55 %       LV DIASTOLIC FUNCTION:           Normal Ranges:  MV Peak E:             1.04 m/s  (0.7-1.2 m/s)  MV e'                  0.133 m/s (>8.0)  MV lateral e'          0.14 m/s  MV medial e'           0.12 m/s  E/e' Ratio:            7.84      (<8.0)       MITRAL VALVE:          Normal Ranges:  MV DT:        176 msec (150-240msec)       AORTIC VALVE:            Normal Ranges:  AoV Vmax:      1.82 m/s  (<=1.7m/s)  AoV Peak P.2 mmHg (<20mmHg)  LVOT Max Luca:  1.32 m/s  (<=1.1m/s)  LVOT VTI:      21.56 cm  LVOT Diameter: 2.00 cm   (1.8-2.4cm)  AoV Area,Vmax: 2.28 cm2  (2.5-4.5cm2)       RIGHT VENTRICLE:  RV Basal 4.40 cm  RV Mid   4.00 cm  RV Major 7.0 cm  TAPSE:   20.8  mm  RV s'    0.16 m/s       TRICUSPID VALVE/RVSP:         Normal Ranges:  Est. RA Pressure:     3  IVC Diam:             1.70 cm       PULMONIC VALVE:          Normal Ranges:  PV Max Luca:     1.1 m/s  (0.6-0.9m/s)  PV Max P.1 mmHg       AORTA:  Asc Ao Diam 3.65 cm       [id number] Maninder Cotto MD  Electronically signed on 2025 at 7:32:18 AM       ** Final **      Meds  Scheduled medications  Scheduled Medications[1]  Continuous medications  Continuous Medications[2]  PRN medications  PRN Medications[3]     Assessment and Plan    Chico Iqbal is a 89 y.o. male with past medical history remarkable for acute myeloid leukemia, HTN, HLD, COPD, BPH, chronic systolic and diastolic chf with ef of 40% with diastolic dysfunction on echo 3/10/2023, Afib not on anticoagulation, who came to the hospital secondary to shortness of breath and generalized weakness admitted with acute hypoxic respiratory failure secondary to pneumonia with sepsis along with UTI, MARILEE.      Sepsis with pneumonia and UTI along with acute hypoxic respiratory failure secondary to pneumonia  -Sepsis appears to have resolved.  - Concern for possible gram-negative pneumonia with viral process based on CT scan.  - MRSA screen is negative.  Strep and Legionella urine antigens are negative.  -Lactate level initially elevated but normalized.  - Influenza A/B, RSV and COVID screens are negative.  - CT angio negative for pulmonary embolism but evidence of small pleural effusion.   -Blood cultures negative to date.  -Initial urine culture with contamination and repeat urine culture was benign, but was on antibiotics on 2nd urine urine.   -Status post a total of 2 units of packed red blood cells with last unit given on .  -: Will add IV fluids with LR at 75 cc/h due to continued mildly low BP and as renal function is slowly worsening and monitor with history of CHF.  - Continue IV Zosyn and follow blood cultures.  - Monitor.     Acute kidney  injury  - Could be due to dehydration +/- sepsis.  - Holding home med of Lasix (on 5 days per week at home).  - Creatinine up to 1.78 today; 1.62 on 7/9; 1.32 on 7/8 ; 1.41 on 7/7.   - Will place on LR at 75 ml/hr on 7/10, and being cautious with ivf's due to history of chronic systolic and diastolic CHF, although echo on 7/8 with preserved ef and diastolic function indeterminate.   - Check renal ultrasound; check urine sodium/urea/creatinine to calculate FeNa and FeUrea.  - Monitor.      Acute on chronic anemia  - Likely due to history of acute myeloid leukemia patient has been receiving frequent blood transfusions as an outpatient.  - Hematology/oncology follow as an outpatient and is on Aranesp as well.  - S/p 1 unit prbc's on 7/8 and 1 unit on 7/9.   - Hgb at 7.7 today; 6.9 on 7/9.  - Monitor.      Pericardial effusion  -CT of the chest with moderate pericardial effusion.  -Echo on 7/8/2025 with small pericardial effusion  -Cardiology following.  Monitor.     CT of the chest mentions remote myocardial infarct along the left ventricular apex and apical septum  - Cardiology following.    - On aspirin, Plavix and atorvastatin     Hypomagnesemia  - Replaced.   - Monitor.       Hypokalemia  - Replaced.   - Monitor.     Hypophosphatemia  - Replace and monitor.     Pulmonary nodules  - Treating for underlying pneumonia stated above.  Recommend follow-up with primary care provider and pulmonology, hematology/oncology as an outpatient.     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     Acute myeloid leukemia patient is followed by hematology/oncology as an outpatient.  - On gilteritinib as an outpatient and will hold for now with pneumonia/uti.   - Follow-up with hematology/oncology as an outpatient.     Thrombocytopenia  - Likely due to AML.  Appears stable overall.  Monitor.     Chronic systolic  and diastolic CHF  -Holding home med of lasix (5 days per week) due to MARILEE with sepsis and low BP.  - Holding home med of metoprolol succinate 25 mg daily due to hypotension.   - As above, echo on 7/8/2025 with preserved EF and diastolic function was indeterminate.    - Monitor.     Chronic atrial fibrillation  - Holding home med of metoprolol succinate 25 mg daily due to hypotension at this time.  Patient on aspirin and  Plavix but not on anticoagulation as an outpt due to anemia per cardiology's note.   - Monitor.      BPH  - Continue finasteride and flomax as long as bp will allow.      Dvt prophylaxis  - Holding chemical DVT prophylaxis due to acute on chronic anemia.    Disposition   - Plan for discharge home when medically ready; PT/OT recommending low intensity.  Continue to treat for pneumonia, UTI with IV Zosyn and BP still mildly low along with worsening renal function.                    [1] atorvastatin, 40 mg, oral, Daily  clopidogrel, 75 mg, oral, Daily  dicyclomine, 10 mg, oral, 4x daily  docusate sodium, 100 mg, oral, Daily  finasteride, 5 mg, oral, Daily  fluticasone, 1 spray, Each Nostril, Daily  folic acid, 1 mg, oral, Daily  [Held by provider] furosemide, 20 mg, oral, Once per day on Sunday Monday Wednesday Friday Saturday  gabapentin, 100 mg, oral, TID  [Held by provider] metoprolol succinate XL, 25 mg, oral, Daily  oxygen, , inhalation, Continuous - Inhalation  pantoprazole, 40 mg, oral, Daily before breakfast  perflutren lipid microspheres, 0.5-10 mL of dilution, intravenous, Once in imaging  perflutren protein A microsphere, 0.5 mL, intravenous, Once in imaging  piperacillin-tazobactam, 3.375 g, intravenous, q6h  potassium, sodium phosphates, 2 packet, oral, With meals & nightly  sulfur hexafluoride microsphr, 2 mL, intravenous, Once in imaging  tamsulosin, 0.8 mg, oral, Daily  [2] lactated Ringer's, 75 mL/hr, Last Rate: 75 mL/hr (07/10/25 1036)  sodium chloride 0.9%, 10 mL/hr, Last Rate: 10  mL/hr (07/09/25 1216)  [3] PRN medications: acetaminophen **OR** acetaminophen **OR** acetaminophen, alum-mag hydroxide-simeth, melatonin, ondansetron **OR** ondansetron, polyethylene glycol, sodium chloride 0.9%

## 2025-07-11 ENCOUNTER — HOME HEALTH ADMISSION (OUTPATIENT)
Dept: HOME HEALTH SERVICES | Facility: HOME HEALTH | Age: 89
End: 2025-07-11
Payer: MEDICARE

## 2025-07-11 ENCOUNTER — DOCUMENTATION (OUTPATIENT)
Dept: HOME HEALTH SERVICES | Facility: HOME HEALTH | Age: 89
End: 2025-07-11
Payer: MEDICARE

## 2025-07-11 VITALS
DIASTOLIC BLOOD PRESSURE: 53 MMHG | SYSTOLIC BLOOD PRESSURE: 101 MMHG | HEIGHT: 71 IN | HEART RATE: 83 BPM | TEMPERATURE: 98.4 F | BODY MASS INDEX: 26.67 KG/M2 | WEIGHT: 190.48 LBS | RESPIRATION RATE: 18 BRPM | OXYGEN SATURATION: 91 %

## 2025-07-11 LAB
ALBUMIN SERPL BCP-MCNC: 3 G/DL (ref 3.4–5)
ANION GAP SERPL CALC-SCNC: 12 MMOL/L (ref 10–20)
BUN SERPL-MCNC: 12 MG/DL (ref 6–23)
CALCIUM SERPL-MCNC: 7.9 MG/DL (ref 8.6–10.3)
CHLORIDE SERPL-SCNC: 99 MMOL/L (ref 98–107)
CO2 SERPL-SCNC: 27 MMOL/L (ref 21–32)
CREAT SERPL-MCNC: 1.62 MG/DL (ref 0.5–1.3)
CREAT UR-MCNC: 94 MG/DL (ref 20–370)
CREAT UR-MCNC: 94 MG/DL (ref 20–370)
EGFRCR SERPLBLD CKD-EPI 2021: 41 ML/MIN/1.73M*2
ERYTHROCYTE [DISTWIDTH] IN BLOOD BY AUTOMATED COUNT: 23.5 % (ref 11.5–14.5)
GLUCOSE SERPL-MCNC: 143 MG/DL (ref 74–99)
HCT VFR BLD AUTO: 25.6 % (ref 41–52)
HGB BLD-MCNC: 8.3 G/DL (ref 13.5–17.5)
MAGNESIUM SERPL-MCNC: 1.71 MG/DL (ref 1.6–2.4)
MCH RBC QN AUTO: 31.8 PG (ref 26–34)
MCHC RBC AUTO-ENTMCNC: 32.4 G/DL (ref 32–36)
MCV RBC AUTO: 98 FL (ref 80–100)
NRBC BLD-RTO: 0 /100 WBCS (ref 0–0)
PHOSPHATE SERPL-MCNC: 2.1 MG/DL (ref 2.5–4.9)
PLATELET # BLD AUTO: 139 X10*3/UL (ref 150–450)
POTASSIUM SERPL-SCNC: 3.1 MMOL/L (ref 3.5–5.3)
RBC # BLD AUTO: 2.61 X10*6/UL (ref 4.5–5.9)
SODIUM SERPL-SCNC: 135 MMOL/L (ref 136–145)
SODIUM UR-SCNC: 73 MMOL/L
SODIUM/CREAT UR-RTO: 78 MMOL/G CREAT
UREA/CREAT UR-SRTO: 4.5 G/G CREAT
UUN UR-MCNC: 420 MG/DL
WBC # BLD AUTO: 15.8 X10*3/UL (ref 4.4–11.3)

## 2025-07-11 PROCEDURE — 97110 THERAPEUTIC EXERCISES: CPT | Mod: GP,CQ,IPSPLIT

## 2025-07-11 PROCEDURE — 80069 RENAL FUNCTION PANEL: CPT | Mod: IPSPLIT | Performed by: INTERNAL MEDICINE

## 2025-07-11 PROCEDURE — 2500000002 HC RX 250 W HCPCS SELF ADMINISTERED DRUGS (ALT 637 FOR MEDICARE OP, ALT 636 FOR OP/ED): Mod: IPSPLIT | Performed by: INTERNAL MEDICINE

## 2025-07-11 PROCEDURE — 2500000001 HC RX 250 WO HCPCS SELF ADMINISTERED DRUGS (ALT 637 FOR MEDICARE OP): Mod: IPSPLIT | Performed by: INTERNAL MEDICINE

## 2025-07-11 PROCEDURE — 36415 COLL VENOUS BLD VENIPUNCTURE: CPT | Mod: IPSPLIT | Performed by: INTERNAL MEDICINE

## 2025-07-11 PROCEDURE — 85027 COMPLETE CBC AUTOMATED: CPT | Mod: IPSPLIT | Performed by: INTERNAL MEDICINE

## 2025-07-11 PROCEDURE — 2500000004 HC RX 250 GENERAL PHARMACY W/ HCPCS (ALT 636 FOR OP/ED): Mod: IPSPLIT | Performed by: INTERNAL MEDICINE

## 2025-07-11 PROCEDURE — 2500000005 HC RX 250 GENERAL PHARMACY W/O HCPCS: Mod: IPSPLIT | Performed by: INTERNAL MEDICINE

## 2025-07-11 PROCEDURE — 94760 N-INVAS EAR/PLS OXIMETRY 1: CPT | Mod: IPSPLIT

## 2025-07-11 PROCEDURE — 97116 GAIT TRAINING THERAPY: CPT | Mod: GP,CQ,IPSPLIT

## 2025-07-11 PROCEDURE — 2500000002 HC RX 250 W HCPCS SELF ADMINISTERED DRUGS (ALT 637 FOR MEDICARE OP, ALT 636 FOR OP/ED): Mod: IPSPLIT

## 2025-07-11 PROCEDURE — 83735 ASSAY OF MAGNESIUM: CPT | Mod: IPSPLIT | Performed by: INTERNAL MEDICINE

## 2025-07-11 PROCEDURE — 99239 HOSP IP/OBS DSCHRG MGMT >30: CPT

## 2025-07-11 RX ORDER — POTASSIUM CHLORIDE 20 MEQ/1
40 TABLET, EXTENDED RELEASE ORAL ONCE
Status: COMPLETED | OUTPATIENT
Start: 2025-07-11 | End: 2025-07-11

## 2025-07-11 RX ORDER — AMOXICILLIN AND CLAVULANATE POTASSIUM 875; 125 MG/1; MG/1
1 TABLET, FILM COATED ORAL 2 TIMES DAILY
Qty: 12 TABLET | Refills: 0 | Status: SHIPPED | OUTPATIENT
Start: 2025-07-11 | End: 2025-07-18 | Stop reason: ALTCHOICE

## 2025-07-11 RX ADMIN — FINASTERIDE 5 MG: 5 TABLET, FILM COATED ORAL at 09:03

## 2025-07-11 RX ADMIN — POTASSIUM CHLORIDE 40 MEQ: 1500 TABLET, EXTENDED RELEASE ORAL at 11:22

## 2025-07-11 RX ADMIN — POTASSIUM & SODIUM PHOSPHATES POWDER PACK 280-160-250 MG 2 PACKET: 280-160-250 PACK at 09:48

## 2025-07-11 RX ADMIN — DICYCLOMINE HYDROCHLORIDE 10 MG: 10 CAPSULE ORAL at 06:11

## 2025-07-11 RX ADMIN — TAMSULOSIN HYDROCHLORIDE 0.8 MG: 0.4 CAPSULE ORAL at 09:02

## 2025-07-11 RX ADMIN — PIPERACILLIN SODIUM AND TAZOBACTAM SODIUM 3.38 G: 3; .375 INJECTION, SOLUTION INTRAVENOUS at 12:03

## 2025-07-11 RX ADMIN — ATORVASTATIN CALCIUM 40 MG: 40 TABLET, FILM COATED ORAL at 09:03

## 2025-07-11 RX ADMIN — DOCUSATE SODIUM 100 MG: 100 CAPSULE, LIQUID FILLED ORAL at 09:03

## 2025-07-11 RX ADMIN — CLOPIDOGREL BISULFATE 75 MG: 75 TABLET, FILM COATED ORAL at 09:03

## 2025-07-11 RX ADMIN — FOLIC ACID 1 MG: 1 TABLET ORAL at 09:02

## 2025-07-11 RX ADMIN — DICYCLOMINE HYDROCHLORIDE 10 MG: 10 CAPSULE ORAL at 12:59

## 2025-07-11 RX ADMIN — FLUTICASONE PROPIONATE 1 SPRAY: 50 SPRAY, METERED NASAL at 09:09

## 2025-07-11 RX ADMIN — PIPERACILLIN SODIUM AND TAZOBACTAM SODIUM 3.38 G: 3; .375 INJECTION, SOLUTION INTRAVENOUS at 06:11

## 2025-07-11 RX ADMIN — PANTOPRAZOLE SODIUM 40 MG: 40 TABLET, DELAYED RELEASE ORAL at 06:11

## 2025-07-11 RX ADMIN — Medication 1 L/MIN: at 08:43

## 2025-07-11 ASSESSMENT — PAIN SCALES - GENERAL: PAINLEVEL_OUTOF10: 0 - NO PAIN

## 2025-07-11 ASSESSMENT — COGNITIVE AND FUNCTIONAL STATUS - GENERAL
TURNING FROM BACK TO SIDE WHILE IN FLAT BAD: A LITTLE
WALKING IN HOSPITAL ROOM: A LITTLE
STANDING UP FROM CHAIR USING ARMS: A LITTLE
MOVING TO AND FROM BED TO CHAIR: A LITTLE
CLIMB 3 TO 5 STEPS WITH RAILING: A LITTLE
MOBILITY SCORE: 19

## 2025-07-11 ASSESSMENT — PAIN - FUNCTIONAL ASSESSMENT: PAIN_FUNCTIONAL_ASSESSMENT: 0-10

## 2025-07-11 NOTE — PROGRESS NOTES
07/11/25 1309   Discharge Planning   Living Arrangements Children   Support Systems Children   Assistance Needed Patients daughter lives with him. He is independent with ADL's and iADL's, uses a rollator. Does drive but his daughter goes with him to the stores. Currently on 2L O2, does not wear O2 at home.   Type of Residence Private residence   Number of Stairs to Enter Residence 0   Number of Stairs Within Residence 0   Home or Post Acute Services None   Expected Discharge Disposition Home H  (Referral sent to [facility] for [facility]. Patient is medically ready for discharge.)   Does the patient need discharge transport arranged? No  (daughter will )   Intensity of Service   Intensity of Service 0-30 min        07/11/25 3356   Discharge Planning   Expected Discharge Disposition Home H  (HC SOC 7/14)     DC Secure

## 2025-07-11 NOTE — PROGRESS NOTES
Subjective Data:  No chest pain has dyspnea   Comprehensive 10-point review of systems negative otherwise as noted above in HPI    Overnight Events:    None     Objective Data:  Last Recorded Vitals:  Vitals:    07/10/25 1600 07/10/25 1800 07/10/25 1855 07/10/25 2000   BP:   109/51    BP Location:   Left arm    Patient Position:   Lying    Pulse: 77 98 81 87   Resp:   18    Temp:   36.6 °C (97.9 °F)    TempSrc:   Temporal    SpO2: 94% 94% 95% 94%   Weight:       Height:           Last Labs:  CBC - 7/10/2025:  8:03 AM  19.2 7.7 129    23.7      CMP - 7/10/2025:  8:03 AM  7.7 6.7 30 --- 1.0   2.3 2.7 14 82      PTT - 8/28/2024:  2:47 PM  1.2   13.2 30     TROPHS   Date/Time Value Ref Range Status   07/07/2025 05:42 PM 12 0 - 20 ng/L Final   07/07/2025 04:29 PM 13 0 - 20 ng/L Final   08/25/2024 07:05 PM 4 0 - 20 ng/L Final     BNP   Date/Time Value Ref Range Status   07/07/2025 04:29  0 - 99 pg/mL Final   03/07/2023 11:29  0 - 99 pg/mL Final     Comment:     .  <100 pg/mL - Heart failure unlikely  100-299 pg/mL - Intermediate probability of acute heart  .               failure exacerbation. Correlate with clinical  .               context and patient history.    >=300 pg/mL - Heart Failure likely. Correlate with clinical  .               context and patient history.  BNP testing is performed using different testing   methodology at [facility] than at other   [facility]. Direct result comparisons should   only be made within the same method.     11/03/2017 12:39 PM 99 0 - 99 pg/mL Final     Comment:     .  <100 pg/mL - Heart failure unlikely  100-299 pg/mL - Intermediate probability of acute heart  .               failure exacerbation. Correlate with clinical  .               context and patient history.    >=300 pg/mL - Heart Failure likely. Correlate with clinical  .               context and patient history.  BNP testing is performed using different testing   methodology at [facility] than at other   system hospitals. Direct result comparisons should   only be made within the same method.       HGBA1C   Date/Time Value Ref Range Status   03/08/2022 12:36 PM 6.1 % Final     Comment:          Diagnosis of Diabetes-Adults   Non-Diabetic: < or = 5.6%   Increased risk for developing diabetes: 5.7-6.4%   Diagnostic of diabetes: > or = 6.5%  .       Monitoring of Diabetes                Age (y)     Therapeutic Goal (%)   Adults:          >18           <7.0   Pediatrics:    13-18           <7.5                   7-12           <8.0                   0- 6            7.5-8.5   American Diabetes Association. Diabetes Care 33(S1), Jan 2010.     09/02/2021 10:07 AM 6.1 % Final     Comment:          Diagnosis of Diabetes-Adults   Non-Diabetic: < or = 5.6%   Increased risk for developing diabetes: 5.7-6.4%   Diagnostic of diabetes: > or = 6.5%  .       Monitoring of Diabetes                Age (y)     Therapeutic Goal (%)   Adults:          >18           <7.0   Pediatrics:    13-18           <7.5                   7-12           <8.0                   0- 6            7.5-8.5   American Diabetes Association. Diabetes Care 33(S1), Jan 2010.       VLDL   Date/Time Value Ref Range Status   07/24/2023 09:45 AM 23 0 - 40 mg/dL Final   09/02/2021 10:07 AM 30 0 - 40 mg/dL Final   06/29/2020 09:56 AM 23 0 - 40 mg/dL Final      Last I/O:  I/O last 3 completed shifts:  In: 1016.8 (11.8 mL/kg) [P.O.:600; Blood:316.8; IV Piggyback:100]  Out: 1200 (13.9 mL/kg) [Urine:1200 (0.4 mL/kg/hr)]  Weight: 86.4 kg     Ejection Fractions:  EF   Date/Time Value Ref Range Status   07/08/2025 03:07 PM 55 %    Physical Exam:  Constitutional: Well developed, awake/alert/oriented x3, no distress, alert and cooperative  Eyes: PERRL, EOMI, clear sclera  ENMT: mucous membranes moist, no apparent injury, no lesions seen  Head/Neck: Neck supple, no apparent injury, thyroid without mass or tenderness, No JVD, trachea midline, no  bruits  Respiratory/Thorax: Patent airways, CTAB, normal breath sounds with good chest expansion, thorax symmetric  Cardiovascular: Regular, rate and rhythm, no murmurs, 2+ equal pulses of the extremities, normal S 1and S 2  Gastrointestinal: Nondistended, soft, non-tender, no rebound tenderness or guarding, no masses palpable, no organomegaly, +BS, no bruits  Musculoskeletal: ROM intact, no joint swelling, normal strength  Extremities: normal extremities, no cyanosis edema, contusions or wounds, no clubbing  Neurological: alert and oriented x3, intact senses, motor, response and reflexes, normal strength  Lymphatic: No significant lymphadenopathy  Psychological: Appropriate mood and behavior  Skin: Warm and dry, no lesions, no rashes       Assessment/Plan   Urinary tract infection without hematuria, site unspecified        Echocardiogram from 2023: LVEF 60-65%, LVH, mild basal septal hypertrophy, biatrial enlargement, trace AI, mild MR/TR     Pneumonia  -CT chest showed pneumonia  -antibiotics  -bronchodilators  -oxygen support  -sputum culture  -blood cultures     2. Pericardial effusion  -Per CT chest  -Echo showed small pericardial effusion  -VSS     3. UTI  -Urine culture pending  -antibiotics     4. 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 Hx of CAD s/p multiple PCI/VANE  -Denies chest pain  -C/o SOB  -Troponins negative  -I reviewed the EKG, no acute changes, ST elevation, or depression  -Cont plavix and statin     6. Permanent atrial fibrillation  -RVR on arrival, now rate controlled  -Not on AC due to anemia  -Monitor on tele     7. Hypokalemia/hypomagnesemia  -Supplement  -Monitor     8. Acute kidney injury  -Possibly from dehydration  -Improved with IVF  -Hold lasix  Peripheral IV 07/07/25 20 G Anterior;Right Forearm (Active)   Site Assessment Clean;Dry;Intact 07/10/25 0900    Dressing Status Clean;Dry 07/10/25 0900   Number of days: 3       Peripheral IV 07/07/25 20 G Left Antecubital (Active)   Site Assessment Clean;Dry;Intact 07/10/25 0900   Dressing Status Clean;Dry 07/10/25 0900   Number of days: 3       Code Status:  Full Code    I spent 15 minutes in the professional and overall care of this patient.        Maninder Cotto MD

## 2025-07-11 NOTE — CARE PLAN
Problem: Discharge Planning  Goal: Discharge to home or other facility with appropriate resources  Outcome: Met     Problem: Skin  Goal: Decreased wound size/increased tissue granulation at next dressing change  Outcome: Met  Goal: Promote/optimize nutrition  Outcome: Met   The patient's goals for the shift include      The clinical goals for the shift include Pt will discharge home this afternoon    Pt ready for discharge home, all DC instructions reviewed and patient voiced his understa Nding. Aware to  RX from pharmacy.

## 2025-07-11 NOTE — PROGRESS NOTES
"Occupational Therapy                 Therapy Communication Note    Patient Name: Chico Iqbal  MRN: [id number]  Department: [facility]  Room: [address] [place], [place] [address]  Today's Date: 7/11/2025     Discipline: Occupational Therapy    Missed Visit: OT Missed Visit: Yes     Missed Visit Reason: Missed Visit Reason: Patient refused    Missed Time: Attempt    Comment: Pt supine in bed, but awake. Very pleasant and cooperative however not wanting to work with therapy-- \"I am getting out of here\". Pt does not wish to do any further therapy prior to discharge-- asked if he had any questions or concerns- pt denied. RN notified as well as confirmed discharge 3-4pm today. At departure, pt remaining in bed and needs met.      "

## 2025-07-11 NOTE — CARE PLAN
The patient's goals for the shift include      The clinical goals for the shift include patient will maintain spo2 at or above 92% on RA    Over the shift, the patient did make progress toward the following goals. Patient had uneventful shift.

## 2025-07-11 NOTE — PROGRESS NOTES
Occupational Therapy                 Therapy Communication Note    Patient Name: Chico Iqbal  MRN: [id number]  Department: [facility]  Room: [address] [place], [place] [address]  Today's Date: 7/11/2025     Discipline: Occupational Therapy    Missed Visit: OT Missed Visit: Yes     Missed Visit Reason: Missed Visit Reason: Patient refused    Missed Time: Attempt    Comment: Pt supine in bed, family just exiting room. States he wants to just rest for now, but will try again later. Therapist to follow-up as schedule allows.

## 2025-07-11 NOTE — DISCHARGE SUMMARY
Discharge Diagnosis  Urinary tract infection without hematuria, site unspecified           Issues Requiring Follow-Up  [facility] referral placed  PCP follow-up: low hemoglobin, 2u PRBCs    Discharge Meds     Medication List      ASK your doctor about these medications     atorvastatin 40 mg tablet; Commonly known as: Lipitor; Take 1 tablet (40   mg) by mouth once daily.   clopidogrel 75 mg tablet; Commonly known as: Plavix; Take one tablet by   mouth daily   docusate sodium 100 mg capsule; Commonly known as: Colace; Take 1   capsule (100 mg) by mouth 2 times a day.   finasteride 5 mg tablet; Commonly known as: Proscar; Take 1 tablet (5   mg) by mouth once daily. Do not crush, chew, or split.   fluticasone 50 mcg/actuation nasal spray; Commonly known as: Flonase   folic acid 1 mg tablet; Commonly known as: Folvite; Take 1 tablet (1 mg)   by mouth once daily.   furosemide 20 mg tablet; Commonly known as: Lasix; Take 1 tablet (20 mg)   by mouth once daily. None on Tues and Thur   gabapentin 100 mg capsule; Commonly known as: Neurontin; Take 1 capsule   (100 mg) by mouth 3 times a day.   metoprolol succinate XL 25 mg 24 hr tablet; Commonly known as:   Toprol-XL; Take 1 tablet (25 mg) by mouth once daily. Do not crush or   chew.   pantoprazole 40 mg EC tablet; Commonly known as: ProtoNix; Take 1 tablet   (40 mg) by mouth once daily in the morning. Take before meals. Do not   crush, chew, or split.   tamsulosin 0.4 mg 24 hr capsule; Commonly known as: Flomax; Take 2   capsules (0.8 mg) by mouth once daily.   Xospata 40 mg tablet; Generic drug: gilteritinib; Take 3 tablets (120 mg   total) by mouth once daily.  Swallow whole.       Test Results Pending At Discharge  Pending Labs       Order Current Status    Blood Culture Preliminary result    Blood Culture Preliminary result    Blood Culture Preliminary result    Blood Culture Preliminary result    Transfusion Reaction Culture Preliminary result            Hospital Course  Chico  JM Iqbal is a 89 y.o. male with PMH remarkable for acute myeloid leukemia, HTN, HLD, COPD, BPH, Afib not on anticoagulation, who came to the hospital secondary to shortness of breath and generalized weakness. Patient was admitted with acute hypoxic respiratory failure secondary to pneumonia with sepsis along with UTI, MARILEE. Cultures negative. Patient was initially on 3L O2 but was weaned down to 1L O2. CTA negative for PE. Lactate initially elevated at 2.9 but resolved quickly. Patient has history of acute myeloid leukemia which resulted in low hemoglobin 6.1>6.9>7.7>8.3. Patient received 2u PRBCs with good effect. MARILEE with creatinine down trending 1.78>1.62. Potassium 40mEq today for K 3.1. Patient metoprolol placed on hold during stay for mild hypotension which has since resolved, patient can begin taking medication again at home. Patient was treated with IV Zosyn x4 days, discharging home with [facility] and Augmentin x6 days,     Pertinent Physical Exam At Time of Discharge  Physical Exam  Constitutional:       General: He is not in acute distress.     Appearance: Normal appearance. He is not toxic-appearing.   HENT:      Head: Normocephalic and atraumatic.      Mouth/Throat:      Mouth: Mucous membranes are moist.   Eyes:      Extraocular Movements: Extraocular movements intact.      Pupils: Pupils are equal, round, and reactive to light.   Cardiovascular:      Rate and Rhythm: Normal rate and regular rhythm.      Heart sounds: No murmur heard.     No gallop.   Pulmonary:      Effort: Pulmonary effort is normal. No respiratory distress.      Breath sounds: Normal breath sounds. No wheezing, rhonchi or rales.   Abdominal:      General: Bowel sounds are normal. There is no distension.      Palpations: Abdomen is soft.      Tenderness: There is no abdominal tenderness. There is no guarding or rebound.   Musculoskeletal:         General: No swelling, tenderness, deformity or signs of injury. Normal range of motion.       Cervical back: Normal range of motion and neck supple.   Skin:     General: Skin is warm and dry.      Capillary Refill: Capillary refill takes less than 2 seconds.      Coloration: Skin is not jaundiced.      Findings: No bruising or rash.   Neurological:      General: No focal deficit present.      Mental Status: He is alert and oriented to person, place, and time.      Cranial Nerves: No cranial nerve deficit.      Sensory: No sensory deficit.      Motor: No weakness.      Gait: Gait normal.   Psychiatric:         Mood and Affect: Mood normal.         Behavior: Behavior normal.         Thought Content: Thought content normal.         Judgment: Judgment normal.         Outpatient Follow-Up  Future Appointments   Date Time Provider Department Center   7/23/2025 11:00 AM Nithin Coronado MD [id number] [facility]   7/23/2025 11:30 AM INF 04 GENEVA [facility]   8/7/2025 11:00 [facility]   8/11/2025 10:40 AM Latrell Yancey MD [id number] [facility]   8/21/2025 11:00 [facility]   12/10/2025 11:30 AM Erick Baldwin MD [id number] [facility]     Sepsis with pneumonia and UTI along with acute hypoxic respiratory failure secondary to pneumonia  -Sepsis appears to have resolved.  - Concern for possible gram-negative pneumonia with viral process based on CT scan.  - MRSA screen is negative.  Strep and Legionella urine antigens are negative.  -Lactate level initially elevated but normalized.  - Influenza A/B, RSV and COVID screens are negative.  - CT angio negative for pulmonary embolism but evidence of small pleural effusion.   -Blood cultures negative to date.  -Initial urine culture with contamination and repeat urine culture was benign, but was on antibiotics on 2nd urine urine.   -Status post a total of 2 units of packed red blood cells with last unit given on 7/9.  -7/9: Will add IV fluids with LR at 75 cc/h due to continued mildly low BP and as renal function is slowly worsening and  monitor with history of CHF.  - Continue IV Zosyn and follow blood cultures.  - Monitor.     Acute kidney injury  - Could be due to dehydration +/- sepsis.  - Holding home med of Lasix (on 5 days per week at home).  - Creatinine up to 1.78 today; 1.62 on 7/9; 1.32 on 7/8 ; 1.41 on 7/7.   - Will place on LR at 75 ml/hr on 7/10, and being cautious with ivf's due to history of chronic systolic and diastolic CHF, although echo on 7/8 with preserved ef and diastolic function indeterminate.   - Check renal ultrasound; check urine sodium/urea/creatinine to calculate FeNa and FeUrea.  - Monitor.      Acute on chronic anemia  - Likely due to history of acute myeloid leukemia patient has been receiving frequent blood transfusions as an outpatient.  - Hematology/oncology follow as an outpatient and is on Aranesp as well.  - S/p 1 unit prbc's on 7/8 and 1 unit on 7/9.   - Hgb at 7.7 today; 6.9 on 7/9.  - Monitor.      Pericardial effusion  -CT of the chest with moderate pericardial effusion.  -Echo on 7/8/2025 with small pericardial effusion  -Cardiology following.  Monitor.     CT of the chest mentions remote myocardial infarct along the left ventricular apex and apical septum  - Cardiology following.    - On aspirin, Plavix and atorvastatin     Hypomagnesemia  - Replaced.   - Monitor.       Hypokalemia  - Replaced.   - Monitor.      Hypophosphatemia  - Replace and monitor.     Pulmonary nodules  - Treating for underlying pneumonia stated above.  Recommend follow-up with primary care provider and pulmonology, hematology/oncology as an outpatient.     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     Acute myeloid leukemia patient is followed by hematology/oncology as an outpatient.  - On gilteritinib as an outpatient and will hold for now with pneumonia/uti.   - Follow-up with hematology/oncology as an  outpatient.     Thrombocytopenia  - Likely due to AML.  Appears stable overall.  Monitor.     Chronic systolic and diastolic CHF  -Holding home med of lasix (5 days per week) due to MARILEE with sepsis and low BP.  - Holding home med of metoprolol succinate 25 mg daily due to hypotension.   - As above, echo on 7/8/2025 with preserved EF and diastolic function was indeterminate.    - Monitor.     Chronic atrial fibrillation  - Holding home med of metoprolol succinate 25 mg daily due to hypotension at this time.  Patient on aspirin and  Plavix but not on anticoagulation as an outpt due to anemia per cardiology's note.   - Monitor.      BPH  - Continue finasteride and flomax as long as bp will allow.     Liliana Maloney, NP Student    I saw and evaluated the patient. I personally obtained the key and critical portions of the history and physical exam. I reviewed the APRN Student's documentation and discussed the patient with the APRN Student. I agree with the APRN Student's decision making as documented in the current note.     Mala Jarvis, APRN-CNP

## 2025-07-11 NOTE — DISCHARGE INSTR - OTHER ORDERS
Thank you for choosing [facility] for your Health Care needs. As you transition from the hospital back to home, we hope we took your preferences into account on how you manage your health needs so you can manage your health at home.     You may receive a survey in the mail within the next couple weeks. Please take the time to complete it and return it. Your input is ALWAYS important to us. Thank you!  Your Care Transition Team - Inna Solis Angie  & Tj     For questions about your medications listed on your discharge instructions, please call the Nurses Station at [phone].

## 2025-07-11 NOTE — HH CARE COORDINATION
Home Care received a Referral for Physical Therapy and Occupational Therapy. We have processed the referral for a Start of Care on 7/14.     If you have any questions or concerns, please feel free to contact us at [phone]. Follow the prompts, enter your five digit zip code, and you will be directed to your care team on E[facility] 1.

## 2025-07-11 NOTE — PROGRESS NOTES
Physical Therapy    Physical Therapy Treatment    Patient Name: Chico Iqbal  MRN: [id number]  Department: [facility]  Room: [address] [place], [place] [address]  Today's Date: 7/11/2025  Time Calculation  Start Time: 0806  Stop Time: 0835  Time Calculation (min): 29 min         Assessment/Plan   PT Assessment  PT Assessment Results: Decreased strength, Impaired balance, Decreased mobility, Decreased endurance, Impaired sensation  Rehab Prognosis: Good  Barriers to Discharge Home: No anticipated barriers  Evaluation/Treatment Tolerance: Patient tolerated treatment well  Medical Staff Made Aware: Yes  Strengths: Ability to acquire knowledge, Premorbid level of function, Rehab experience, Support of Caregivers  Barriers to Participation: Comorbidities, Insight into problems  End of Session Communication: Bedside nurse  Assessment Comment: Pt demonstrated slight increased assist needed during bed mobility due to poor body mechanics. Pt able to improve funcitonal transfers this date to CGA with improved ue sequencing and FWW safety noted more than 50% of the time. Pt able to tolerate multiple walks this date with 1LO2 cont wiht levels maintained above 90% even with exertion with CGA and cues for VINI and gait mechanics safety. Pt cont to require skilled PT to improve b le strength, improve functional safety and mechanics up on feet to reduce risk for falls and prevent decline while here in hospital. Pt left up in chair. alarm on call bell within reach but caregiver present at end of session.  End of Session Patient Position: Up in chair, Alarm on     PT Plan  Treatment/Interventions: Transfer training, Bed mobility, Gait training, Stair training, Balance training, Strengthening, Endurance training, Therapeutic exercise, Therapeutic activity, Home exercise program, Neuromuscular re-education  PT Plan: Ongoing PT  PT Frequency: 3 times per week  PT Discharge Recommendations: Low intensity level of continued care  Equipment Recommended upon Discharge:   (shower chair)  PT Recommended Transfer Status: Contact guard  PT - OK to Discharge: Yes    PT Visit Info:  PT Received On: 07/11/25  Response to Previous Treatment: Patient with no complaints from previous session.     General Visit Information:   General  Reason for Referral: impaired mobility, UTI  Referred By: Guero Qiu DO  Past Medical History Relevant to Rehab: acute myeloid leukemia, HTN, HLD, COPD, BPH, Afib  Family/Caregiver Present: No  Prior to Session Communication: Bedside nurse  Patient Position Received: Bed, 3 rail up, Alarm on  Preferred Learning Style: verbal, visual  General Comment: Pt pleasant and agreeable to therapy this morning and wanting to get up out of bed. Pt cleared by nursing prior to session.    Subjective I am tired and feeling weak and difficulty breathing during movement.   Precautions:  Precautions  Medical Precautions: Fall precautions  Precautions Comment: masimo, 1L O2, tele     Date/Time Vitals Session Patient Position Pulse Resp SpO2 BP MAP (mmHg)    07/11/25 0800 --  --  77  16  93 %  127/67  87     07/11/25 0900 --  --  115  --  95 %  --  --             Objective   Pain:  Pain Assessment  Pain Assessment: 0-10  0-10 (Numeric) Pain Score: 0 - No pain  Cognition:  Cognition  Overall Cognitive Status: Within Functional Limits  Arousal/Alertness: Appropriate responses to stimuli  Orientation Level: Oriented X4  Coordination:  Movements are Fluid and Coordinated: Yes    Activity Tolerance:  Activity Tolerance  Endurance: Tolerates 10 - 20 min exercise with multiple rests  Treatments:  Therapeutic Exercise  Therapeutic Exercise Performed: Yes  Therapeutic Exercise Activity 1: 20x PF/DF  Therapeutic Exercise Activity 2: 20x LAQ  Therapeutic Exercise Activity 3: 20x alt march  Therapeutic Exercise Activity 4: 2x8 STS     Bed Mobility  Bed Mobility: Yes  Bed Mobility 1  Bed Mobility 1: Supine to sitting  Level of Assistance 1: Minimum assistance  Bed Mobility Comments 1: min a  needed during last portion of sit with good use of bed rail but poor log rolling technique noted.    Ambulation/Gait Training  Ambulation/Gait Training Performed: Yes  Ambulation/Gait Training 1  Surface 1: Level tile  Device 1: Rolling walker  Gait Support Devices: Gait belt  Assistance 1: Contact guard  Quality of Gait 1: Decreased step length, Inconsistent stride length  Comments/Distance (ft) 1:  ft x 2 trials with FWW with max a for cord mgmt and cues for safety during turns with cord to reduce risk for falls. Education for safer posture and FWW posture keeping AD closer to the body allowing longer heel striking mechanics.  Transfers  Transfer: Yes  Transfer 1  Transfer From 1: Bed to  Transfer to 1: Stand, Chair with arms  Technique 1: Sit to stand, Stand to sit  Transfer Device 1: Walker, Gait belt  Transfer Level of Assistance 1: Contact guard  Trials/Comments 1: cues for posture and FWW safety during turns.  Transfers 2  Transfer From 2: Chair with arms to  Transfer to 2: Stand  Technique 2: Sit to stand, Stand to sit  Transfer Device 2: Walker, Gait belt  Transfer Level of Assistance 2: Contact guard  Trials/Comments 2: improved ue sequencing noted.    Outcome Measures:  [facility] Basic Mobility  Turning from your back to your side while in a flat bed without using bedrails: None  Moving from lying on your back to sitting on the side of a flat bed without using bedrails: A little  Moving to and from bed to chair (including a wheelchair): A little  Standing up from a chair using your arms (e.g. wheelchair or bedside chair): A little  To walk in hospital room: A little  Climbing 3-5 steps with railing: A little  Basic Mobility - Total Score: 19    Education Documentation  Mobility Training, taught by Luciana Deng PTA at 7/11/2025  9:29 AM.  Learner: Patient  Readiness: Acceptance  Method: Explanation  Response: Needs Reinforcement  Comment: Education for safer posture and gait mechanics with FWW  during turns and cord mgmt to reduce risk for falls.    Education Comments  No comments found.      Encounter Problems       Encounter Problems (Active)       Balance       STG - Maintains dynamic standing balance without upper extremity support with >=good- balance  (Progressing)       Start:  07/10/25    Expected End:  07/24/25            Pt. will complete 5 STS without UE support <30 seconds  (Progressing)       Start:  07/10/25    Expected End:  07/24/25               Mobility       LTG - Patient will ambulate community distance TONY with WW  (Progressing)       Start:  07/10/25    Expected End:  07/24/25               PT Transfers       STG - Patient will perform bed mobility IND (Progressing)       Start:  07/10/25    Expected End:  07/24/25            STG - Patient will transfer sit to and from stand TONY with WW (Progressing)       Start:  07/10/25    Expected End:  07/24/25

## 2025-07-12 LAB
BACTERIA BLD CULT: NORMAL
BACTERIA BLD CULT: NORMAL
BACTERIA BPU CULT: NORMAL
GRAM STN SPEC: NORMAL
GRAM STN SPEC: NORMAL

## 2025-07-13 LAB
BACTERIA BLD CULT: NORMAL
BACTERIA BLD CULT: NORMAL

## 2025-07-14 ENCOUNTER — PATIENT OUTREACH (OUTPATIENT)
Dept: PRIMARY CARE | Facility: CLINIC | Age: 89
End: 2025-07-14
Payer: MEDICARE

## 2025-07-14 ENCOUNTER — HOME CARE VISIT (OUTPATIENT)
Dept: HOME HEALTH SERVICES | Facility: HOME HEALTH | Age: 89
End: 2025-07-14
Payer: MEDICARE

## 2025-07-14 VITALS
HEART RATE: 92 BPM | DIASTOLIC BLOOD PRESSURE: 60 MMHG | TEMPERATURE: 97.9 F | SYSTOLIC BLOOD PRESSURE: 114 MMHG | OXYGEN SATURATION: 96 %

## 2025-07-14 PROCEDURE — G0151 HHCP-SERV OF PT,EA 15 MIN: HCPCS | Mod: HHH

## 2025-07-14 PROCEDURE — 169592 NO-PAY CLAIM PROCEDURE

## 2025-07-14 SDOH — HEALTH STABILITY: PHYSICAL HEALTH: EXERCISE TYPE: LE THER EX

## 2025-07-14 ASSESSMENT — BALANCE ASSESSMENTS
ARISES: 1 - ABLE, USES ARMS TO HELP
STANDING BALANCE: 2 - NARROW STANCE WITHOUT SUPPORT
EYES CLOSED AT MAXIMUM POSITION NUDGED: 1 - STEADY
TURNING 360 DEGREES STEPS: 1 - CONTINUOUS STEPS
IMMEDIATE STANDING BALANCE FIRST 5 SECONDS: 1 - STEADY BUT USES WALKER OR OTHER SUPPORT
BALANCE SCORE: 10
SITTING BALANCE: 1 - STEADY, SAFE
NUDGED: 0 - BEGINS TO FALL
ATTEMPTS TO ARISE: 1 - ABLE, REQUIRES MORE THAN ONE ATTEMPT
ARISING SCORE: 1
SITTING DOWN: 1 - USES ARMS OR NOT SMOOTH MOTION
NUDGED SCORE: 0

## 2025-07-14 ASSESSMENT — ENCOUNTER SYMPTOMS
PAIN LOCATION: ABDOMEN
DYSPNEA ON EXERTION: 1
PAIN LOCATION - PAIN SEVERITY: 5/10
SHORTNESS OF BREATH: T
PAIN: 1
MUSCLE WEAKNESS: 1
HIGHEST PAIN SEVERITY IN PAST 24 HOURS: 8/10
FATIGUES EASILY: 1
PERSON REPORTING PAIN: PATIENT

## 2025-07-14 ASSESSMENT — ACTIVITIES OF DAILY LIVING (ADL)
CURRENT_FUNCTION: CONTACT GUARD ASSIST
AMBULATION ASSISTANCE: 1
AMBULATION ASSISTANCE ON FLAT SURFACES: 1
AMBULATION ASSISTANCE: STAND BY ASSIST
ENTERING_EXITING_HOME: NEEDS ASSISTANCE
CURRENT_FUNCTION: STAND BY ASSIST
OASIS_M1830: 03
PHYSICAL TRANSFERS ASSESSED: 1
AMBULATION ASSISTANCE: CONTACT GUARD ASSIST
AMBULATION_DISTANCE/DURATION_TOLERATED: 50 FT

## 2025-07-14 ASSESSMENT — GAIT ASSESSMENTS
WALKING STANCE: 1 - HEELS ALMOST TOUCHING WHILE WALKING
PATH SCORE: 1
PATH: 1 - MILD/MODERATE DEVIATION OR USES WALKING AID
STEP SYMMETRY: 1 - RIGHT AND LEFT STEP LENGTH APPEAR EQUAL
STEP CONTINUITY: 1 - STEPS APPEAR CONTINUOUS
BALANCE AND GAIT SCORE: 17
TRUNK SCORE: 0
GAIT SCORE: 7
INITIATION OF GAIT IMMEDIATELY AFTER GO: 1 - NO HESITANCY
TRUNK: 0 - MARKED SWAY OR USES WALKING AID

## 2025-07-14 NOTE — PROGRESS NOTES
Discharge Facility: [facility]  Discharge Diagnosis: UTI, Pneumonia   Admission Date: 7 July 25  Discharge Date: 11 July 25    PCP Appointment Date: Tasked to office  Specialist Appointment Date: 23 July 25 (Hem Onc, Ivonne)  Hospital Encounter and Summary Linked: Yes  ED to Hosp-Admission (Discharged) with Ben Fuentes MD; Faby Milton DO (07/07/2025)     See discharge assessment below for further details     Wrap Up  Wrap Up Additional Comments: Mirta (Daughter) stating pt is doing well since his discharge. still a bit weak but resting to recover as much as possible. all meds obtained without difficulty. no questions on medications or discharge instructions at this time. no good times for me to schedule for PCP follow up. office messaged in regards to scheduling. mirta agreeable to contact in 2 wks. (7/14/2025 10:36 AM)  Call End Time: 1036 (7/14/2025 10:36 AM)    Engagement  Call Start Time: 1026 (Spoke with Mirta) (7/14/2025 10:36 AM)    Medications  Medications reviewed with patient/caregiver?: Yes (7/14/2025 10:36 AM)  Is the patient having any side effects they believe may be caused by any medication additions or changes?: No (7/14/2025 10:36 AM)  Does the patient have all medications ordered at discharge?: Yes (7/14/2025 10:36 AM)  Prescription Comments: all meds obtained without difficulty (7/14/2025 10:36 AM)  Is the patient taking all medications as directed (includes completed medication regime)?: Yes (7/14/2025 10:36 AM)  Medication Comments: no questions on medications at this time. (7/14/2025 10:36 AM)    Appointments  Does the patient have a primary care provider?: Yes (tasked to office) (7/14/2025 10:36 AM)  Has the patient kept scheduled appointments due by today?: Yes (7/14/2025 10:36 AM)    Self Management  What is the home health agency?: [facility], set to see patient today per Mirta (7/14/2025 10:36 AM)  Has home health visited the patient within 72 hours of discharge?: Call prior to 72  hours (7/14/2025 10:36 AM)  What Durable Medical Equipment (DME) was ordered?: None (7/14/2025 10:36 AM)    Patient Teaching  Does the patient have access to their discharge instructions?: Yes (7/14/2025 10:36 AM)  Care Management Interventions: Reviewed instructions with patient (7/14/2025 10:36 AM)  What is the patient's perception of their health status since discharge?: Improving (7/14/2025 10:36 AM)  Is the patient/caregiver able to teach back the hierarchy of who to call/visit for symptoms/problems? PCP, Specialist, Home Health nurse, Urgent Care, ED, 911: Yes (7/14/2025 10:36 AM)  Patient/Caregiver Education Comments: None (7/14/2025 10:36 AM)

## 2025-07-15 ENCOUNTER — SPECIALTY PHARMACY (OUTPATIENT)
Dept: PHARMACY | Facility: CLINIC | Age: 89
End: 2025-07-15

## 2025-07-15 DIAGNOSIS — C92.00 ACUTE MYELOID LEUKEMIA NOT HAVING ACHIEVED REMISSION (MULTI): ICD-10-CM

## 2025-07-16 ENCOUNTER — TELEPHONE (OUTPATIENT)
Dept: HEMATOLOGY/ONCOLOGY | Facility: HOSPITAL | Age: 89
End: 2025-07-16
Payer: MEDICARE

## 2025-07-16 NOTE — TELEPHONE ENCOUNTER
Chico Iqbal (MRN: [id number]): Patient was recently discharged from the hospital and has not resumed taking Xospata due to being on antibiotics. Patient's daughter, Mirta, is requesting guidance on when the patient can safely restart Xospata. She can best be reached at [phone] for follow-up. per Nina secure chat      He should wait until he sees me per Dr. Baker secure chat      Spoke with daughter, Mirta. Told her to hold the Xospata until patient sees him next week. Mirta verbalized understanding.

## 2025-07-16 NOTE — ED PROVIDER NOTES
Chief Complaint: Shortness of breath  HPI: This is an 89-year-old male, past medical history significant for AML, presenting to the emergency department for evaluation of shortness of breath for the last 2 days.  Patient also complains of nausea however denies any vomiting or diarrhea.  He denies any fevers or chills.  He denies any chest pain.    Medical History[1]   Surgical History[2]    Physical Exam  Vitals and nursing note reviewed.   Constitutional:       Appearance: Normal appearance.   HENT:      Head: Normocephalic and atraumatic.      Mouth/Throat:      Mouth: Mucous membranes are moist.     Eyes:      Extraocular Movements: Extraocular movements intact.     Pulmonary:      Effort: Pulmonary effort is normal.      Comments: Coarse breath sounds bilaterally  Abdominal:      General: Abdomen is flat.      Palpations: Abdomen is soft.     Skin:     General: Skin is warm.     Neurological:      General: No focal deficit present.      Mental Status: He is alert.     Psychiatric:         Mood and Affect: Mood normal.            ED Course/MDM  Diagnoses as of 07/16/25 0415   Urinary tract infection without hematuria, site unspecified   Pneumonia due to infectious organism, unspecified laterality, unspecified part of lung       This is a 89 y.o. male presenting to the ED for evaluation of shortness of breath as well as nausea which began a few days ago and increased in severity today.  On physical exam The patient is resting comfortably in the bed, no acute distress.  Lungs do have some coarse breath sounds bilaterally.  Abdomen is soft and nontender.  Lab work was concerning for a UTI, as well as a mild hypokalemia.  Lab work also concerning for leukocytosis, and stable anemia.  CT imaging was obtained including CT PE and CT abdomen pelvis, and does show evidence for possible pneumonitis versus pneumonia, as well as evidence of endplate inflammation of the bladder consistent with his urinary tract infection.   Patient was started on antibiotics, and I do feel he will benefit from admission to the hospital.  Patient was admitted in stable condition for continued IV antibiotics and monitoring    Final Impression  1.  UTI  2.  Pneumonia  Disposition/Plan: Admit to medicine  Condition at disposition: Stable.     Faby Milton DO  Emergency Medicine Physician       [1]   Past Medical History:  Diagnosis Date    Alcohol abuse     Body mass index (BMI) 29.0-29.9, adult 09/29/2020    Body mass index (BMI) of 29.0 to 29.9 in adult    BPH (benign prostatic hyperplasia)     CAD (coronary artery disease)     Cancer (Multi)     COPD (chronic obstructive pulmonary disease) (Multi)     GERD (gastroesophageal reflux disease)     Hyperlipidemia     Hypertension     Old myocardial infarction     History of myocardial infarction    Paroxysmal A-fib (Multi)     Urinary tract infection    [2]   Past Surgical History:  Procedure Laterality Date    CARDIAC CATHETERIZATION      CORONARY STENT PLACEMENT          Faby Milton DO  07/16/25 0422       Faby Milton DO  07/20/25 5326

## 2025-07-17 ENCOUNTER — HOME CARE VISIT (OUTPATIENT)
Dept: HOME HEALTH SERVICES | Facility: HOME HEALTH | Age: 89
End: 2025-07-17
Payer: MEDICARE

## 2025-07-17 ENCOUNTER — SPECIALTY PHARMACY (OUTPATIENT)
Dept: PHARMACY | Facility: CLINIC | Age: 89
End: 2025-07-17

## 2025-07-17 VITALS
SYSTOLIC BLOOD PRESSURE: 112 MMHG | OXYGEN SATURATION: 94 % | HEART RATE: 89 BPM | TEMPERATURE: 98 F | DIASTOLIC BLOOD PRESSURE: 58 MMHG

## 2025-07-17 PROCEDURE — G0157 HHC PT ASSISTANT EA 15: HCPCS | Mod: CQ,HHH

## 2025-07-17 PROCEDURE — G0152 HHCP-SERV OF OT,EA 15 MIN: HCPCS | Mod: HHH

## 2025-07-17 ASSESSMENT — ENCOUNTER SYMPTOMS
PERSON REPORTING PAIN: PATIENT
DENIES PAIN: 1

## 2025-07-18 ENCOUNTER — SPECIALTY PHARMACY (OUTPATIENT)
Dept: PHARMACY | Facility: CLINIC | Age: 89
End: 2025-07-18

## 2025-07-18 ENCOUNTER — OFFICE VISIT (OUTPATIENT)
Dept: PRIMARY CARE | Facility: CLINIC | Age: 89
End: 2025-07-18
Payer: MEDICARE

## 2025-07-18 ENCOUNTER — APPOINTMENT (OUTPATIENT)
Dept: RADIOLOGY | Facility: HOSPITAL | Age: 89
End: 2025-07-18
Payer: MEDICARE

## 2025-07-18 ENCOUNTER — HOSPITAL ENCOUNTER (EMERGENCY)
Facility: HOSPITAL | Age: 89
Discharge: HOME | End: 2025-07-18
Attending: EMERGENCY MEDICINE
Payer: MEDICARE

## 2025-07-18 VITALS
OXYGEN SATURATION: 93 % | BODY MASS INDEX: 24.36 KG/M2 | HEIGHT: 71 IN | WEIGHT: 174 LBS | RESPIRATION RATE: 18 BRPM | SYSTOLIC BLOOD PRESSURE: 110 MMHG | DIASTOLIC BLOOD PRESSURE: 64 MMHG | HEART RATE: 99 BPM

## 2025-07-18 VITALS
RESPIRATION RATE: 18 BRPM | DIASTOLIC BLOOD PRESSURE: 74 MMHG | BODY MASS INDEX: 23.8 KG/M2 | HEART RATE: 82 BPM | TEMPERATURE: 97.6 F | OXYGEN SATURATION: 95 % | HEIGHT: 71 IN | WEIGHT: 170 LBS | SYSTOLIC BLOOD PRESSURE: 101 MMHG

## 2025-07-18 DIAGNOSIS — I48.0 PAF (PAROXYSMAL ATRIAL FIBRILLATION) (MULTI): ICD-10-CM

## 2025-07-18 DIAGNOSIS — E87.6 HYPOKALEMIA: Primary | ICD-10-CM

## 2025-07-18 DIAGNOSIS — Z09 HOSPITAL DISCHARGE FOLLOW-UP: ICD-10-CM

## 2025-07-18 DIAGNOSIS — K92.1 BLACK STOOL: ICD-10-CM

## 2025-07-18 DIAGNOSIS — D64.9 CHRONIC ANEMIA: ICD-10-CM

## 2025-07-18 DIAGNOSIS — K63.89 MASS OF COLON: ICD-10-CM

## 2025-07-18 DIAGNOSIS — R53.1 GENERALIZED WEAKNESS: ICD-10-CM

## 2025-07-18 LAB
ACANTHOCYTES BLD QL SMEAR: NORMAL
ALBUMIN SERPL BCP-MCNC: 3.5 G/DL (ref 3.4–5)
ALP SERPL-CCNC: 83 U/L (ref 33–136)
ALT SERPL W P-5'-P-CCNC: 14 U/L (ref 10–52)
ANION GAP SERPL CALC-SCNC: 13 MMOL/L (ref 10–20)
APPEARANCE UR: CLEAR
AST SERPL W P-5'-P-CCNC: 23 U/L (ref 9–39)
BASOPHILS # BLD AUTO: 0.02 X10*3/UL (ref 0–0.1)
BASOPHILS NFR BLD AUTO: 0.1 %
BILIRUB SERPL-MCNC: 0.8 MG/DL (ref 0–1.2)
BILIRUB UR STRIP.AUTO-MCNC: NEGATIVE MG/DL
BUN SERPL-MCNC: 13 MG/DL (ref 6–23)
CALCIUM SERPL-MCNC: 8.9 MG/DL (ref 8.6–10.3)
CHLORIDE SERPL-SCNC: 89 MMOL/L (ref 98–107)
CO2 SERPL-SCNC: 32 MMOL/L (ref 21–32)
COLOR UR: YELLOW
CREAT SERPL-MCNC: 1.34 MG/DL (ref 0.5–1.3)
EGFRCR SERPLBLD CKD-EPI 2021: 51 ML/MIN/1.73M*2
EOSINOPHIL # BLD AUTO: 0 X10*3/UL (ref 0–0.4)
EOSINOPHIL NFR BLD AUTO: 0 %
ERYTHROCYTE [DISTWIDTH] IN BLOOD BY AUTOMATED COUNT: 23 % (ref 11.5–14.5)
GLUCOSE SERPL-MCNC: 118 MG/DL (ref 74–99)
GLUCOSE UR STRIP.AUTO-MCNC: NORMAL MG/DL
GRAN CASTS #/AREA UR COMP ASSIST: ABNORMAL /LPF
HCT VFR BLD AUTO: 26.3 % (ref 41–52)
HEMOCCULT SP1 STL QL: NEGATIVE
HGB BLD-MCNC: 8.7 G/DL (ref 13.5–17.5)
HYALINE CASTS #/AREA URNS AUTO: ABNORMAL /LPF
IMM GRANULOCYTES # BLD AUTO: 1.21 X10*3/UL (ref 0–0.5)
IMM GRANULOCYTES NFR BLD AUTO: 5.9 % (ref 0–0.9)
INR PPP: 1.3 (ref 0.9–1.1)
KETONES UR STRIP.AUTO-MCNC: NEGATIVE MG/DL
LEUKOCYTE ESTERASE UR QL STRIP.AUTO: NEGATIVE
LYMPHOCYTES # BLD AUTO: 4.01 X10*3/UL (ref 0.8–3)
LYMPHOCYTES NFR BLD AUTO: 19.4 %
MAGNESIUM SERPL-MCNC: 1.75 MG/DL (ref 1.6–2.4)
MCH RBC QN AUTO: 31.6 PG (ref 26–34)
MCHC RBC AUTO-ENTMCNC: 33.1 G/DL (ref 32–36)
MCV RBC AUTO: 96 FL (ref 80–100)
MONOCYTES # BLD AUTO: 13.26 X10*3/UL (ref 0.05–0.8)
MONOCYTES NFR BLD AUTO: 64.2 %
MUCOUS THREADS #/AREA URNS AUTO: ABNORMAL /LPF
NEUTROPHILS # BLD AUTO: 2.14 X10*3/UL (ref 1.6–5.5)
NEUTROPHILS NFR BLD AUTO: 10.4 %
NITRITE UR QL STRIP.AUTO: NEGATIVE
NRBC BLD-RTO: 0 /100 WBCS (ref 0–0)
OVALOCYTES BLD QL SMEAR: NORMAL
PH UR STRIP.AUTO: 6.5 [PH]
PLATELET # BLD AUTO: 105 X10*3/UL (ref 150–450)
POTASSIUM SERPL-SCNC: 2.8 MMOL/L (ref 3.5–5.3)
PROT SERPL-MCNC: 7.2 G/DL (ref 6.4–8.2)
PROT UR STRIP.AUTO-MCNC: ABNORMAL MG/DL
PROTHROMBIN TIME: 14.5 SECONDS (ref 9.8–12.4)
RBC # BLD AUTO: 2.75 X10*6/UL (ref 4.5–5.9)
RBC # UR STRIP.AUTO: ABNORMAL MG/DL
RBC #/AREA URNS AUTO: ABNORMAL /HPF
RBC MORPH BLD: NORMAL
SCHISTOCYTES BLD QL SMEAR: NORMAL
SODIUM SERPL-SCNC: 131 MMOL/L (ref 136–145)
SP GR UR STRIP.AUTO: 1.04
SQUAMOUS #/AREA URNS AUTO: ABNORMAL /HPF
TRANS CELLS #/AREA UR COMP ASSIST: ABNORMAL /HPF
UROBILINOGEN UR STRIP.AUTO-MCNC: NORMAL MG/DL
WBC # BLD AUTO: 20.6 X10*3/UL (ref 4.4–11.3)
WBC #/AREA URNS AUTO: ABNORMAL /HPF

## 2025-07-18 PROCEDURE — 3078F DIAST BP <80 MM HG: CPT | Performed by: INTERNAL MEDICINE

## 2025-07-18 PROCEDURE — 85610 PROTHROMBIN TIME: CPT | Performed by: EMERGENCY MEDICINE

## 2025-07-18 PROCEDURE — 80053 COMPREHEN METABOLIC PANEL: CPT | Performed by: EMERGENCY MEDICINE

## 2025-07-18 PROCEDURE — 36415 COLL VENOUS BLD VENIPUNCTURE: CPT | Performed by: EMERGENCY MEDICINE

## 2025-07-18 PROCEDURE — 96366 THER/PROPH/DIAG IV INF ADDON: CPT

## 2025-07-18 PROCEDURE — 2500000004 HC RX 250 GENERAL PHARMACY W/ HCPCS (ALT 636 FOR OP/ED): Performed by: EMERGENCY MEDICINE

## 2025-07-18 PROCEDURE — 82270 OCCULT BLOOD FECES: CPT | Performed by: EMERGENCY MEDICINE

## 2025-07-18 PROCEDURE — 96365 THER/PROPH/DIAG IV INF INIT: CPT | Mod: 59

## 2025-07-18 PROCEDURE — 1126F AMNT PAIN NOTED NONE PRSNT: CPT | Performed by: INTERNAL MEDICINE

## 2025-07-18 PROCEDURE — 96375 TX/PRO/DX INJ NEW DRUG ADDON: CPT | Mod: 59

## 2025-07-18 PROCEDURE — 1111F DSCHRG MED/CURRENT MED MERGE: CPT | Performed by: INTERNAL MEDICINE

## 2025-07-18 PROCEDURE — 99285 EMERGENCY DEPT VISIT HI MDM: CPT | Mod: 25 | Performed by: EMERGENCY MEDICINE

## 2025-07-18 PROCEDURE — 74174 CTA ABD&PLVS W/CONTRAST: CPT

## 2025-07-18 PROCEDURE — 85025 COMPLETE CBC W/AUTO DIFF WBC: CPT | Performed by: EMERGENCY MEDICINE

## 2025-07-18 PROCEDURE — 1159F MED LIST DOCD IN RCRD: CPT | Performed by: INTERNAL MEDICINE

## 2025-07-18 PROCEDURE — 83735 ASSAY OF MAGNESIUM: CPT | Performed by: EMERGENCY MEDICINE

## 2025-07-18 PROCEDURE — 3074F SYST BP LT 130 MM HG: CPT | Performed by: INTERNAL MEDICINE

## 2025-07-18 PROCEDURE — 99496 TRANSJ CARE MGMT HIGH F2F 7D: CPT | Performed by: INTERNAL MEDICINE

## 2025-07-18 PROCEDURE — 2550000001 HC RX 255 CONTRASTS: Performed by: EMERGENCY MEDICINE

## 2025-07-18 PROCEDURE — 74174 CTA ABD&PLVS W/CONTRAST: CPT | Performed by: STUDENT IN AN ORGANIZED HEALTH CARE EDUCATION/TRAINING PROGRAM

## 2025-07-18 PROCEDURE — 81003 URINALYSIS AUTO W/O SCOPE: CPT | Performed by: EMERGENCY MEDICINE

## 2025-07-18 RX ORDER — POTASSIUM CHLORIDE 14.9 MG/ML
20 INJECTION INTRAVENOUS
Status: COMPLETED | OUTPATIENT
Start: 2025-07-18 | End: 2025-07-18

## 2025-07-18 RX ORDER — FAMOTIDINE 10 MG/ML
20 INJECTION, SOLUTION INTRAVENOUS ONCE
Status: COMPLETED | OUTPATIENT
Start: 2025-07-18 | End: 2025-07-18

## 2025-07-18 RX ORDER — PANTOPRAZOLE SODIUM 40 MG/10ML
40 INJECTION, POWDER, LYOPHILIZED, FOR SOLUTION INTRAVENOUS DAILY
Status: DISCONTINUED | OUTPATIENT
Start: 2025-07-18 | End: 2025-07-18 | Stop reason: SDUPTHER

## 2025-07-18 RX ORDER — POTASSIUM CHLORIDE 750 MG/1
20 TABLET, FILM COATED, EXTENDED RELEASE ORAL 2 TIMES DAILY
Qty: 40 TABLET | Refills: 0 | Status: SHIPPED | OUTPATIENT
Start: 2025-07-18 | End: 2025-07-28

## 2025-07-18 RX ORDER — PANTOPRAZOLE SODIUM 40 MG/10ML
40 INJECTION, POWDER, LYOPHILIZED, FOR SOLUTION INTRAVENOUS ONCE
Status: COMPLETED | OUTPATIENT
Start: 2025-07-18 | End: 2025-07-18

## 2025-07-18 RX ADMIN — FAMOTIDINE 20 MG: 10 INJECTION INTRAVENOUS at 18:34

## 2025-07-18 RX ADMIN — POTASSIUM CHLORIDE 20 MEQ: 14.9 INJECTION, SOLUTION INTRAVENOUS at 19:05

## 2025-07-18 RX ADMIN — POTASSIUM CHLORIDE 20 MEQ: 14.9 INJECTION, SOLUTION INTRAVENOUS at 20:50

## 2025-07-18 RX ADMIN — PANTOPRAZOLE SODIUM 40 MG: 40 INJECTION, POWDER, LYOPHILIZED, FOR SOLUTION INTRAVENOUS at 17:35

## 2025-07-18 RX ADMIN — IOHEXOL 75 ML: 350 INJECTION, SOLUTION INTRAVENOUS at 18:44

## 2025-07-18 ASSESSMENT — ENCOUNTER SYMPTOMS
SLEEP DISTURBANCE: 1
COUGH: 1
ENDOCRINE NEGATIVE: 1
EYES NEGATIVE: 1
CARDIOVASCULAR NEGATIVE: 1
FATIGUE: 1
SHORTNESS OF BREATH: 1
MYALGIAS: 1
ARTHRALGIAS: 1
WEAKNESS: 1
GASTROINTESTINAL NEGATIVE: 1
BRUISES/BLEEDS EASILY: 1

## 2025-07-18 ASSESSMENT — PAIN SCALES - GENERAL
PAINLEVEL_OUTOF10: 0 - NO PAIN
PAINLEVEL_OUTOF10: 0-NO PAIN
PAINLEVEL_OUTOF10: 0 - NO PAIN

## 2025-07-18 ASSESSMENT — PAIN - FUNCTIONAL ASSESSMENT: PAIN_FUNCTIONAL_ASSESSMENT: 0-10

## 2025-07-18 NOTE — PROGRESS NOTES
Patient ID:   Chico Iqbal is a 89 y.o. male with PMH remarkable for acute myeloid leukemia, HTN, HLD, COPD, BPH, Afib who presents to the office today for Hospital Follow-up, Black or Bloody Stool (Black tarry stools), and Shortness of Breath.    Hospitalization Discharge Follow Up:  Date(s): 7/8 to 7/11/2025  Location: [facility]  Reason Presented to ER: weakness, SOB  Synopsis: PMHx remarkable for acute myeloid leukemia, HTN, HLD, COPD, BPH, Afib not on anticoagulation, who came to the hospital secondary to shortness of breath and generalized weakness. Patient was admitted with acute hypoxic respiratory failure secondary to pneumonia with sepsis along with UTI, MARILEE. Cultures negative. Patient was initially on 3L O2 but was weaned down to 1L O2. CTA negative for PE. Lactate initially elevated at 2.9 but resolved quickly. Patient has history of acute myeloid leukemia which resulted in low hemoglobin 6.1>6.9>7.7>8.3. Patient received 2u PRBCs with good effect. MARILEE with creatinine down trending 1.78>1.62. Potassium 40mEq today for K 3.1. Patient metoprolol placed on hold during stay for mild hypotension which has since resolved, patient can begin taking medication again at home. Patient was treated with IV Zosyn x4 days, discharging home with [facility] and Augmentin x6 days,     Getting dizzy. Has conversational dyspnea. Thin and ill appearing. Denies chest pain. Not eating much. Drinking some boost supplements.   Pale conjunctiva of eyes.  Discussed with pt and daughter about going to the ER for an eval and tx.   Not on PO iron. Does not appear that he had IV venofer during hospitalization. Having black stools still. Crackles on exam.  Appears to be that pneumonia has not completely resolved. He completed augmentin today.    Social History[1]  Review of Systems   Constitutional:  Positive for fatigue.   HENT: Negative.     Eyes: Negative.    Respiratory:  Positive for cough and shortness of breath.    Cardiovascular:  "Negative.    Gastrointestinal: Negative.    Endocrine: Negative.    Genitourinary: Negative.    Musculoskeletal:  Positive for arthralgias and myalgias.   Skin: Negative.    Neurological:  Positive for weakness.   Hematological:  Bruises/bleeds easily.   Psychiatric/Behavioral:  Positive for sleep disturbance.    All other systems reviewed and are negative.    Visit Vitals  /64   Pulse 99   Resp 18   Ht 1.803 m (5' 10.98\")   Wt 78.9 kg (174 lb)   SpO2 93%   BMI 24.28 kg/m²   Smoking Status Some Days   BSA 1.99 m²     Allergies[2]   Physical Exam  Current Outpatient Medications   Medication Instructions    atorvastatin (LIPITOR) 40 mg, oral, Daily    clopidogrel (Plavix) 75 mg tablet Take one tablet by mouth daily    docusate sodium (COLACE) 100 mg, oral, 2 times daily    finasteride (PROSCAR) 5 mg, oral, Daily, Do not crush, chew, or split.    fluticasone (Flonase) 50 mcg/actuation nasal spray 1 spray, Daily RT    folic acid (FOLVITE) 1 mg, oral, Daily    furosemide (LASIX) 20 mg, oral, Daily, None on Tues and Thur    gabapentin (NEURONTIN) 100 mg, oral, 3 times daily    metoprolol succinate XL (TOPROL-XL) 25 mg, oral, Daily, Do not crush or chew.    pantoprazole (PROTONIX) 40 mg, oral, Daily before breakfast, Do not crush, chew, or split.     tamsulosin (FLOMAX) 0.8 mg, oral, Daily    Xospata 120 mg, oral, Daily, Swallow whole.      Lab Results   Component Value Date    WBC 15.8 (H) 07/11/2025    HGB 8.3 (L) 07/11/2025    HCT 25.6 (L) 07/11/2025     (L) 07/11/2025    CHOL 102 07/24/2023    TRIG 114 07/24/2023    HDL 28.8 (A) 07/24/2023    ALT 14 07/07/2025    AST 30 07/07/2025     (L) 07/11/2025    K 3.1 (L) 07/11/2025    CL 99 07/11/2025    CREATININE 1.62 (H) 07/11/2025    BUN 12 07/11/2025    CO2 27 07/11/2025    TSH 1.25 03/06/2025    INR 1.2 (H) 08/28/2024    HGBA1C 6.1 (A) 03/08/2022       {Assess/Maria Guadalupe:[id number]}    --------------------  Written by Annalise Arellano RN, acting as a " clay for Dr. Hubbard. This note accurately reflects the work and decisions made by Dr. Hubbard.     I, Dr. Hubbard, attest all medical record entries made by the clay were under my direction and were personally dictated by me. I have reviewed the chart and agree that the record accurately reflects my performance of the history, physical exam, and assessment and plan.          [1]   Social History  Tobacco Use    Smoking status: Some Days     Types: Cigars     Passive exposure: Current    Smokeless tobacco: Never   Vaping Use    Vaping status: Never Used   Substance Use Topics    Alcohol use: Yes     Alcohol/week: 3.0 standard drinks of alcohol     Types: 3 Cans of beer per week     Comment: 3 beers weekly    Drug use: Never   [2] No Known Allergies     Cultures negative. Patient was initially on 3L O2 but was weaned down to 1L O2. CTA negative for PE. Lactate initially elevated at 2.9 but resolved quickly. Patient has history of acute myeloid leukemia which resulted in low hemoglobin 6.1>6.9>7.7>8.3. Patient received 2u PRBCs with good effect. MARILEE with creatinine down trending 1.78>1.62. Potassium 40mEq today for K 3.1. Patient metoprolol placed on hold during stay for mild hypotension which has since resolved, patient can begin taking medication again at home. Patient was treated with IV Zosyn x4 days, discharging home with [facility] and Augmentin x6 days,   - endorsed dizziness, he was pale, SOB, conversational dyspnea, poor appetite.   - appeared that his pneumonia has not resolved despite completing atb  - advised to head to [facility] for an eval and treatment.            --------------------  Written by Annalise Arellano RN, acting as a scribe for Dr. Hubbard. This note accurately reflects the work and decisions made by Dr. Hubbard.     I, Dr. Hubbard, attest all medical record entries made by the scribe were under my direction and were personally dictated by me. I have reviewed the chart and agree that the record accurately reflects my performance of the history, physical exam, and assessment and plan.          [1]   Social History  Tobacco Use    Smoking status: Some Days     Types: Cigars     Passive exposure: Current    Smokeless tobacco: Never   Vaping Use    Vaping status: Never Used   Substance Use Topics    Alcohol use: Yes     Alcohol/week: 3.0 standard drinks of alcohol     Types: 3 Cans of beer per week     Comment: 3 beers weekly    Drug use: Never   [2] No Known Allergies

## 2025-07-18 NOTE — ED PROVIDER NOTES
HPI   Chief Complaint   Patient presents with    Black or Bloody Stool       HPI        Patient History   Medical History[1]  Surgical History[2]  Family History[3]  Social History[4]    Physical Exam   ED Triage Vitals [07/18/25 1605]   Temperature Heart Rate Respirations BP   36 °C (96.8 °F) 65 15 88/61      Pulse Ox Temp Source Heart Rate Source Patient Position   96 % Temporal Monitor --      BP Location FiO2 (%)     -- --       Physical Exam  Constitutional:       General: He is not in acute distress.     Appearance: Normal appearance. He is not toxic-appearing.   HENT:      Head: Normocephalic and atraumatic.      Right Ear: Tympanic membrane normal.      Left Ear: Tympanic membrane normal.      Mouth/Throat:      Mouth: Mucous membranes are moist.      Pharynx: Oropharynx is clear.     Eyes:      Conjunctiva/sclera: Conjunctivae normal.      Pupils: Pupils are equal, round, and reactive to light.       Cardiovascular:      Rate and Rhythm: Normal rate and regular rhythm.      Pulses: Normal pulses.      Heart sounds: Normal heart sounds.   Pulmonary:      Effort: Pulmonary effort is normal. No respiratory distress.      Breath sounds: Normal breath sounds. No wheezing.   Abdominal:      General: Bowel sounds are normal.      Palpations: Abdomen is soft.      Tenderness: There is no abdominal tenderness. There is no guarding or rebound.     Musculoskeletal:         General: Normal range of motion.      Cervical back: Normal range of motion.     Skin:     General: Skin is warm and dry.     Neurological:      General: No focal deficit present.      Mental Status: He is alert and oriented to person, place, and time.           ED Course & MDM   Diagnoses as of 07/18/25 1840   Hypokalemia   Black stool   Generalized weakness                 No data recorded                                 Medical Decision Making  89-year-old male seen at the PCP recommend going to the ED for generalized weakness and black stool.   Patient had a recent admission.  Patient is here with just chief complaint of generalized weakness patient does have history of leukemia has not been taking his medication secondary to the weakness.  Patient reports that he is on Plavix.  However do not appreciate that on his med list.  Patient is somewhat of a poor historian.  Did perform a rectal exam Brown stool came from the rectal vault.  Tested came back Hemoccult negative negative.  However we are still waiting for some imaging including a CT.  Patient care be signed out to the following physician for the results.  Patient potassium is 2.8 did order 40 mEq IV        Procedure  Procedures       [1]   Past Medical History:  Diagnosis Date    Alcohol abuse     Body mass index (BMI) 29.0-29.9, adult 09/29/2020    Body mass index (BMI) of 29.0 to 29.9 in adult    BPH (benign prostatic hyperplasia)     CAD (coronary artery disease)     Cancer (Multi)     COPD (chronic obstructive pulmonary disease) (Multi)     GERD (gastroesophageal reflux disease)     Hyperlipidemia     Hypertension     Old myocardial infarction     History of myocardial infarction    Paroxysmal A-fib (Multi)     Urinary tract infection    [2]   Past Surgical History:  Procedure Laterality Date    CARDIAC CATHETERIZATION      CORONARY STENT PLACEMENT     [3]   Family History  Problem Relation Name Age of Onset    Diabetes Mother      Heart attack Brother     [4]   Social History  Tobacco Use    Smoking status: Some Days     Types: Cigars     Passive exposure: Current    Smokeless tobacco: Never   Vaping Use    Vaping status: Never Used   Substance Use Topics    Alcohol use: Yes     Alcohol/week: 3.0 standard drinks of alcohol     Types: 3 Cans of beer per week     Comment: 3 beers weekly    Drug use: Never        Hayden Rico DO  07/18/25 2634

## 2025-07-19 VITALS — HEART RATE: 94 BPM | DIASTOLIC BLOOD PRESSURE: 60 MMHG | OXYGEN SATURATION: 94 % | SYSTOLIC BLOOD PRESSURE: 110 MMHG

## 2025-07-19 ASSESSMENT — ACTIVITIES OF DAILY LIVING (ADL)
DRESSING_LB_CURRENT_FUNCTION: INDEPENDENT
DRESSING_UB_CURRENT_FUNCTION: INDEPENDENT
BATHING_CURRENT_FUNCTION: STAND BY ASSIST
GROOMING_CURRENT_FUNCTION: INDEPENDENT
TOILETING: INDEPENDENT
GROOMING ASSESSED: 1
TOILETING: 1
BATHING ASSESSED: 1

## 2025-07-19 ASSESSMENT — ENCOUNTER SYMPTOMS
ANGER WITHIN DEFINED LIMITS: 1
AGGRESSION WITHIN DEFINED LIMITS: 1
SLEEP QUALITY: ADEQUATE
PERSON REPORTING PAIN: PATIENT
DENIES PAIN: 1

## 2025-07-19 NOTE — PROGRESS NOTES
Emergency Medicine Transition of Care Note.    I received Chico Iqbal in signout from Dr. HARRISON Rico.  Please see the previous ED provider note for all HPI, PE and MDM up to the time of signout at 1900. This is in addition to the primary record.    In brief Chico Iqbal is an 89 y.o. male presenting for   Chief Complaint   Patient presents with    Black or Bloody Stool     At the time of signout we were awaiting: CT imaging    Medical Decision Making  89-year-old male seen at the PCP recommend going to the ED for generalized weakness and black stool.  Patient had a recent admission.  Patient is here with just chief complaint of generalized weakness patient does have history of leukemia has not been taking his medication secondary to the weakness.  Patient reports that he is on Plavix.  However do not appreciate that on his med list.  Patient is somewhat of a poor historian.  Did perform a rectal exam Brown stool came from the rectal vault.  Tested came back Hemoccult negative negative.  However we are still waiting for some imaging including a CT.  Patient care be signed out to the following physician for the results.  Patient potassium is 2.8 did order 40 mEq IV         ED Course as of 07/18/25 2312   Fri Jul 18, 2025 2025 Urinalysis is negative for nitrites negative for leukocyte Estrace.  No evidence of acute infection. [EC]   2025 White blood cell count 20.6, hemoglobin 8.7, Man crit 26.3, platelet count 105,000, there are 7 mature granulocytes.  Elevation of lymphocytes and monocytes. [EC]   2026 Comprehensive metabolic panel sodium 131, potassium 2.8 this is being replaced by IV potassium chloride, chloride 89, bicarb 32, anion gap 13    BUN 13, creatinine 1.34, GFR 51  LFTs are normal. [EC]   2026 INR minimally elevated 1.3 [EC]   2026 Magnesium 1.75 [EC]   2027 CT of the abdomen pelvis  IMPRESSION:  1. Colonic malignancy at the hepatic flexure measuring 2.7 cm x 1.9  cm, which would explain it lower  gastrointestinal hemorrhage. No  evidence of metastatic disease in the abdomen or pelvis. Recommend  timely follow-up with colonoscopy for further evaluation. YELLOW  ALERT: An incidental finding alert was sent per protocol.      Porter Mccann discussed the significance and urgency of this  critical finding via EPIC secure chat with confirmation of receipt  with  SABINO RICO on 7/18/2025 at 7:41 pm.  (**-RCF-**) Findings:  See findings.          2. No evidence of active gastrointestinal hemorrhage.      3. No aortic branch vessel vessel occlusion or hemodynamic  significant stenosis.   [EC]   2308 Fecal occult negative stool per Dr. HARRISON Rico [EC]      ED Course User Index  [EC] Neptali Polk DO         Diagnoses as of 07/18/25 2312   Hypokalemia   Black stool   Generalized weakness   Mass of colon   Chronic anemia       Medical Decision Making  I have reviewed lab work and CT findings with the patient.  I have explained to the patient that he will require colonoscopy as an outpatient to further evaluate his colon mass.  I did send off his name and medical record number to the  diagnostic center to help schedule diagnostic colonoscopy on an outpatient basis.  The patient is referred to follow-up with his primary care Dr Hubbard on Monday.  RETURN IF WORSE or new worrisome symptoms        Final diagnoses:   [E87.6] Hypokalemia   [K92.1] Black stool   [R53.1] Generalized weakness   [K63.89] Mass of colon   [D64.9] Chronic anemia           Procedure  Procedures    Neptali Polk DO

## 2025-07-19 NOTE — DISCHARGE INSTRUCTIONS
CT shows a mass of the right colon.  There is no active bleeding. No blood in the stool.  Blood counts are better than previous.  I have sent your name into the  diagnostic testing center and they will be calling you on Monday to set up a colonoscopy for further evaluation of this mass in the right colon.  I have also shared this information with your primary care Dr Hubbard.    Continue regular home medications as directed.  Drink plenty of fluids.  Take potassium supplement as directed.    RETURN IF WORSE OR IF YOU DEVELOP NEW WORRISOME SYMPTOMS

## 2025-07-20 LAB — HOLD SPECIMEN: NORMAL

## 2025-07-21 ENCOUNTER — TELEPHONE (OUTPATIENT)
Dept: HEMATOLOGY/ONCOLOGY | Facility: HOSPITAL | Age: 89
End: 2025-07-21
Payer: MEDICARE

## 2025-07-21 ENCOUNTER — HOME CARE VISIT (OUTPATIENT)
Dept: HOME HEALTH SERVICES | Facility: HOME HEALTH | Age: 89
End: 2025-07-21
Payer: MEDICARE

## 2025-07-21 VITALS
TEMPERATURE: 97.9 F | OXYGEN SATURATION: 97 % | SYSTOLIC BLOOD PRESSURE: 92 MMHG | HEART RATE: 87 BPM | DIASTOLIC BLOOD PRESSURE: 52 MMHG

## 2025-07-21 DIAGNOSIS — C92.00 ACUTE MYELOID LEUKEMIA NOT HAVING ACHIEVED REMISSION (MULTI): ICD-10-CM

## 2025-07-21 PROBLEM — Z09 HOSPITAL DISCHARGE FOLLOW-UP: Status: ACTIVE | Noted: 2025-07-21

## 2025-07-21 PROCEDURE — G0157 HHC PT ASSISTANT EA 15: HCPCS | Mod: CQ,HHH

## 2025-07-21 ASSESSMENT — ENCOUNTER SYMPTOMS
DENIES PAIN: 1
PERSON REPORTING PAIN: PATIENT

## 2025-07-21 NOTE — ASSESSMENT & PLAN NOTE
Date(s): 7/8 to 7/11/2025  Location: [facility]  Reason Presented to ER: weakness, SOB  Synopsis: PMHx remarkable for acute myeloid leukemia, HTN, HLD, COPD, BPH, Afib not on anticoagulation, who came to the hospital secondary to shortness of breath and generalized weakness. Patient was admitted with acute hypoxic respiratory failure secondary to pneumonia with sepsis along with UTI, MARILEE. Cultures negative. Patient was initially on 3L O2 but was weaned down to 1L O2. CTA negative for PE. Lactate initially elevated at 2.9 but resolved quickly. Patient has history of acute myeloid leukemia which resulted in low hemoglobin 6.1>6.9>7.7>8.3. Patient received 2u PRBCs with good effect. MARILEE with creatinine down trending 1.78>1.62. Potassium 40mEq today for K 3.1. Patient metoprolol placed on hold during stay for mild hypotension which has since resolved, patient can begin taking medication again at home. Patient was treated with IV Zosyn x4 days, discharging home with [facility] and Augmentin x6 days,   - endorsed dizziness, he was pale, SOB, conversational dyspnea, poor appetite.   - appeared that his pneumonia has not resolved despite completing atb  - advised to head to [place] [facility] for an eval and treatment.

## 2025-07-21 NOTE — TELEPHONE ENCOUNTER
Referral placed to [facility] by Neptali Polk for colon mass, discovered incidentally on CTA abd/pelvis imaging 7/18/25. I called the patient's daughter Mirta, who is his caregiver, and discussed the imaging results and the referral. Her father is undergoing treatment for AML and follows w/ [facility] oncology, Dr. Coronado, in [place]. She prefers for her father to discuss the results w/ Dr. Coronado at his upcoming appt on Wednesday (7/23) to determine his wishes & if he would want to pursue a colonoscopy. If so, her preference is for Dr. Coronado to manage his care & for his team to arrange colonoscopy locally. I will route this note to Dr. Coronado for his awareness.  SEBASTIAN Liang.CNP  [facility]

## 2025-07-22 ENCOUNTER — SPECIALTY PHARMACY (OUTPATIENT)
Dept: PHARMACY | Facility: CLINIC | Age: 89
End: 2025-07-22

## 2025-07-23 ENCOUNTER — APPOINTMENT (OUTPATIENT)
Dept: HEMATOLOGY/ONCOLOGY | Facility: HOSPITAL | Age: 89
End: 2025-07-23
Payer: MEDICARE

## 2025-07-23 ENCOUNTER — OFFICE VISIT (OUTPATIENT)
Dept: HEMATOLOGY/ONCOLOGY | Facility: HOSPITAL | Age: 89
End: 2025-07-23
Payer: MEDICARE

## 2025-07-23 ENCOUNTER — SPECIALTY PHARMACY (OUTPATIENT)
Dept: PHARMACY | Facility: CLINIC | Age: 89
End: 2025-07-23

## 2025-07-23 ENCOUNTER — TELEPHONE (OUTPATIENT)
Dept: PRIMARY CARE | Facility: CLINIC | Age: 89
End: 2025-07-23

## 2025-07-23 VITALS
OXYGEN SATURATION: 99 % | HEART RATE: 92 BPM | BODY MASS INDEX: 24.34 KG/M2 | SYSTOLIC BLOOD PRESSURE: 115 MMHG | WEIGHT: 173.83 LBS | HEIGHT: 71 IN | TEMPERATURE: 96.6 F | DIASTOLIC BLOOD PRESSURE: 69 MMHG | RESPIRATION RATE: 16 BRPM

## 2025-07-23 VITALS
HEART RATE: 80 BPM | TEMPERATURE: 96.3 F | SYSTOLIC BLOOD PRESSURE: 120 MMHG | RESPIRATION RATE: 16 BRPM | OXYGEN SATURATION: 98 % | DIASTOLIC BLOOD PRESSURE: 72 MMHG

## 2025-07-23 DIAGNOSIS — C92.00 ACUTE MYELOID LEUKEMIA NOT HAVING ACHIEVED REMISSION (MULTI): Primary | ICD-10-CM

## 2025-07-23 DIAGNOSIS — C92.00 ACUTE MYELOID LEUKEMIA NOT HAVING ACHIEVED REMISSION (MULTI): ICD-10-CM

## 2025-07-23 DIAGNOSIS — Z08 ENCOUNTER FOR FOLLOW-UP EXAMINATION AFTER COMPLETED TREATMENT FOR MALIGNANT NEOPLASM: ICD-10-CM

## 2025-07-23 DIAGNOSIS — C95.90: ICD-10-CM

## 2025-07-23 LAB
ABO GROUP (TYPE) IN BLOOD: NORMAL
ALBUMIN SERPL BCP-MCNC: 3.3 G/DL (ref 3.4–5)
ALP SERPL-CCNC: 82 U/L (ref 33–136)
ALT SERPL W P-5'-P-CCNC: 11 U/L (ref 10–52)
ANION GAP SERPL CALC-SCNC: 13 MMOL/L (ref 10–20)
ANTIBODY SCREEN: NORMAL
AST SERPL W P-5'-P-CCNC: 22 U/L (ref 9–39)
BASOPHILS # BLD MANUAL: 0 X10*3/UL (ref 0–0.1)
BASOPHILS NFR BLD MANUAL: 0 %
BILIRUB SERPL-MCNC: 0.6 MG/DL (ref 0–1.2)
BLASTS # BLD MANUAL: 3.15 X10*3/UL
BLASTS NFR BLD MANUAL: 14 %
BLOOD EXPIRATION DATE: NORMAL
BUN SERPL-MCNC: 15 MG/DL (ref 6–23)
CALCIUM SERPL-MCNC: 9 MG/DL (ref 8.6–10.3)
CEA SERPL-MCNC: 2.4 UG/L
CHLORIDE SERPL-SCNC: 97 MMOL/L (ref 98–107)
CO2 SERPL-SCNC: 27 MMOL/L (ref 21–32)
CREAT SERPL-MCNC: 1.25 MG/DL (ref 0.5–1.3)
DISPENSE STATUS: NORMAL
EGFRCR SERPLBLD CKD-EPI 2021: 55 ML/MIN/1.73M*2
EOSINOPHIL # BLD MANUAL: 0 X10*3/UL (ref 0–0.4)
EOSINOPHIL NFR BLD MANUAL: 0 %
ERYTHROCYTE [DISTWIDTH] IN BLOOD BY AUTOMATED COUNT: 23.2 % (ref 11.5–14.5)
GLUCOSE SERPL-MCNC: 163 MG/DL (ref 74–99)
HCT VFR BLD AUTO: 23.6 % (ref 41–52)
HGB BLD-MCNC: 7.5 G/DL (ref 13.5–17.5)
IMM GRANULOCYTES # BLD AUTO: 0.62 X10*3/UL (ref 0–0.5)
IMM GRANULOCYTES NFR BLD AUTO: 2.8 % (ref 0–0.9)
LYMPHOCYTES # BLD MANUAL: 3.6 X10*3/UL (ref 0.8–3)
LYMPHOCYTES NFR BLD MANUAL: 16 %
MCH RBC QN AUTO: 31.1 PG (ref 26–34)
MCHC RBC AUTO-ENTMCNC: 31.8 G/DL (ref 32–36)
MCV RBC AUTO: 98 FL (ref 80–100)
MONOCYTES # BLD MANUAL: 13.5 X10*3/UL (ref 0.05–0.8)
MONOCYTES NFR BLD MANUAL: 60 %
NEUTS SEG # BLD MANUAL: 2.25 X10*3/UL (ref 1.6–5)
NEUTS SEG NFR BLD MANUAL: 10 %
NRBC BLD-RTO: 0 /100 WBCS (ref 0–0)
OVALOCYTES BLD QL SMEAR: ABNORMAL
PLATELET # BLD AUTO: 82 X10*3/UL (ref 150–450)
POTASSIUM SERPL-SCNC: 3.5 MMOL/L (ref 3.5–5.3)
PRODUCT BLOOD TYPE: 600
PRODUCT CODE: NORMAL
PROT SERPL-MCNC: 7 G/DL (ref 6.4–8.2)
RBC # BLD AUTO: 2.41 X10*6/UL (ref 4.5–5.9)
RBC MORPH BLD: ABNORMAL
RH FACTOR (ANTIGEN D): NORMAL
SCHISTOCYTES BLD QL SMEAR: ABNORMAL
SODIUM SERPL-SCNC: 133 MMOL/L (ref 136–145)
TOTAL CELLS COUNTED BLD: 100
UNIT ABO: NORMAL
UNIT NUMBER: NORMAL
UNIT RH: NORMAL
UNIT VOLUME: 350
WBC # BLD AUTO: 22.5 X10*3/UL (ref 4.4–11.3)
XM INTEP: NORMAL

## 2025-07-23 PROCEDURE — 86923 COMPATIBILITY TEST ELECTRIC: CPT

## 2025-07-23 PROCEDURE — 36415 COLL VENOUS BLD VENIPUNCTURE: CPT

## 2025-07-23 PROCEDURE — 36430 TRANSFUSION BLD/BLD COMPNT: CPT

## 2025-07-23 PROCEDURE — 80053 COMPREHEN METABOLIC PANEL: CPT

## 2025-07-23 PROCEDURE — 1111F DSCHRG MED/CURRENT MED MERGE: CPT | Performed by: INTERNAL MEDICINE

## 2025-07-23 PROCEDURE — 99215 OFFICE O/P EST HI 40 MIN: CPT | Mod: 25 | Performed by: INTERNAL MEDICINE

## 2025-07-23 PROCEDURE — 85027 COMPLETE CBC AUTOMATED: CPT

## 2025-07-23 PROCEDURE — 1159F MED LIST DOCD IN RCRD: CPT | Performed by: INTERNAL MEDICINE

## 2025-07-23 PROCEDURE — RXMED WILLOW AMBULATORY MEDICATION CHARGE

## 2025-07-23 PROCEDURE — P9040 RBC LEUKOREDUCED IRRADIATED: HCPCS

## 2025-07-23 PROCEDURE — 1126F AMNT PAIN NOTED NONE PRSNT: CPT | Performed by: INTERNAL MEDICINE

## 2025-07-23 PROCEDURE — 85007 BL SMEAR W/DIFF WBC COUNT: CPT

## 2025-07-23 PROCEDURE — 99215 OFFICE O/P EST HI 40 MIN: CPT | Performed by: INTERNAL MEDICINE

## 2025-07-23 PROCEDURE — 86850 RBC ANTIBODY SCREEN: CPT

## 2025-07-23 PROCEDURE — 82378 CARCINOEMBRYONIC ANTIGEN: CPT | Mod: GENLAB | Performed by: INTERNAL MEDICINE

## 2025-07-23 PROCEDURE — 3078F DIAST BP <80 MM HG: CPT | Performed by: INTERNAL MEDICINE

## 2025-07-23 PROCEDURE — G2211 COMPLEX E/M VISIT ADD ON: HCPCS | Performed by: INTERNAL MEDICINE

## 2025-07-23 PROCEDURE — 3074F SYST BP LT 130 MM HG: CPT | Performed by: INTERNAL MEDICINE

## 2025-07-23 RX ORDER — EPINEPHRINE 0.3 MG/.3ML
0.3 INJECTION SUBCUTANEOUS EVERY 5 MIN PRN
OUTPATIENT
Start: 2025-07-24

## 2025-07-23 RX ORDER — DIPHENHYDRAMINE HYDROCHLORIDE 50 MG/ML
50 INJECTION, SOLUTION INTRAMUSCULAR; INTRAVENOUS AS NEEDED
OUTPATIENT
Start: 2025-07-24

## 2025-07-23 RX ORDER — EPINEPHRINE 0.3 MG/.3ML
0.3 INJECTION SUBCUTANEOUS EVERY 5 MIN PRN
Status: DISCONTINUED | OUTPATIENT
Start: 2025-07-23 | End: 2025-07-23 | Stop reason: HOSPADM

## 2025-07-23 RX ORDER — ALBUTEROL SULFATE 0.83 MG/ML
3 SOLUTION RESPIRATORY (INHALATION) AS NEEDED
Status: DISCONTINUED | OUTPATIENT
Start: 2025-07-23 | End: 2025-07-23 | Stop reason: HOSPADM

## 2025-07-23 RX ORDER — FAMOTIDINE 10 MG/ML
20 INJECTION, SOLUTION INTRAVENOUS ONCE AS NEEDED
OUTPATIENT
Start: 2025-07-24

## 2025-07-23 RX ORDER — ALBUTEROL SULFATE 0.83 MG/ML
3 SOLUTION RESPIRATORY (INHALATION) AS NEEDED
OUTPATIENT
Start: 2025-07-24

## 2025-07-23 RX ORDER — FAMOTIDINE 10 MG/ML
20 INJECTION, SOLUTION INTRAVENOUS ONCE AS NEEDED
Status: DISCONTINUED | OUTPATIENT
Start: 2025-07-23 | End: 2025-07-23 | Stop reason: HOSPADM

## 2025-07-23 RX ORDER — DIPHENHYDRAMINE HYDROCHLORIDE 50 MG/ML
50 INJECTION, SOLUTION INTRAMUSCULAR; INTRAVENOUS AS NEEDED
Status: DISCONTINUED | OUTPATIENT
Start: 2025-07-23 | End: 2025-07-23 | Stop reason: HOSPADM

## 2025-07-23 ASSESSMENT — ENCOUNTER SYMPTOMS
GASTROINTESTINAL NEGATIVE: 1
FEVER: 0
CARDIOVASCULAR NEGATIVE: 1
RESPIRATORY NEGATIVE: 1
FATIGUE: 1

## 2025-07-23 ASSESSMENT — PAIN SCALES - GENERAL: PAINLEVEL_OUTOF10: 0-NO PAIN

## 2025-07-23 NOTE — PROGRESS NOTES
Patient ID: Chico Iqbal is a 89 y.o. male.    Subjective:  Returns for follow up for AML. He feels tired. Denies fever. Appetite good    Heme/Onc History:  - p/w rectal pain in Aug 2024 => Was anemic with Hb of 6, elevated MCV, and Plt of 89 and low folate. Initially thought to be due to folate deficiency. Peripheral smear showed abnormal cells and flow cytometry revealed AML (65% of peripheral WBCs)  - He declined having bone marrow biopsy. 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 Flow from blood in Dec 2024 and Mar 2025 showed decreased peripheral blasts  - PRBC requirements increased in Apr-May => EPO level is over 2,000.   - Admitted with pneumonia? In July 2025 => Had black stools => CT scans showed a large bowel mass => Will not go for colonoscopy because cannot have any treatment even if this is colon cancer    Assessment/Plan:  ? 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 All these have poor prognosis but he looked exceptionally well. He declined bone marrow biopsy or any drastic treatment. Definitely did not want any chemo.    We started him on giltertinib and so far he is tolerating it OK. His peripheral blast count decreased. He has anemia and was requiring 1 unit of PRBC every other week. But his transfusion requirements initially decreased. For the last 2months though, he has been requiring them every other week again.     Then had black stools => CT shows colon mass => Could be colon cancer. Ideally, would need a colonoscopy but should not have it done because he cannot get any treatment for colon cancer even if he had it. I talkde about this with .  "Rasheed and he understands and agrees not to have a colonoscopy. Actually, he is a hospice candidate but prefers to continue with giltertinib.     With a bleeding colon mass, managing his anemia will be even more difficult. We will continue with PRN transfusions and giltertinib for now. EPO level was over 2,000 => Unlikely to benefit from epogen    I provide longitudinal care for Mr. Iqbal for his acute leukemia    Review Of Systems:  Review of Systems   Constitutional:  Positive for fatigue. Negative for fever.   HENT:  Negative.     Respiratory: Negative.     Cardiovascular: Negative.    Gastrointestinal: Negative.    Genitourinary: Negative.         Physical Exam:  /69 (BP Location: Left arm, Patient Position: Sitting, BP Cuff Size: Adult)   Pulse 92   Temp 35.9 °C (96.6 °F) (Temporal)   Resp 16   Ht 1.803 m (5' 11\")   Wt 78.8 kg (173 lb 13.3 oz)   SpO2 99%   BMI 24.24 kg/m²   BSA: 1.99 meters squared  Performance Status: Symptomatic; fully ambulatory  Physical Exam  HENT:      Head: Normocephalic and atraumatic.     Eyes:      General: No scleral icterus.     Pupils: Pupils are equal, round, and reactive to light.       Cardiovascular:      Rate and Rhythm: Normal rate and regular rhythm.   Pulmonary:      Effort: Pulmonary effort is normal.   Abdominal:      General: Abdomen is flat.      Palpations: Abdomen is soft. There is no mass.     Musculoskeletal:         General: Normal range of motion.   Lymphadenopathy:      Cervical: No cervical adenopathy.     Skin:     Coloration: Skin is not jaundiced.     Neurological:      General: No focal deficit present.      Mental Status: He is alert and oriented to person, place, and time.         Results:  Diagnostic Results   Lab Results   Component Value Date    WBC 20.6 (H) 07/18/2025    HGB 8.7 (L) 07/18/2025    HCT 26.3 (L) 07/18/2025    MCV 96 07/18/2025     (L) 07/18/2025     Lab Results   Component Value Date    CALCIUM 9.0 07/23/2025     " (L) 07/23/2025    K 3.5 07/23/2025    CO2 27 07/23/2025    CL 97 (L) 07/23/2025    BUN 15 07/23/2025    CREATININE 1.25 07/23/2025    ALT 11 07/23/2025    AST 22 07/23/2025       Current Outpatient Medications:     atorvastatin (Lipitor) 40 mg tablet, Take 1 tablet (40 mg) by mouth once daily., Disp: 90 tablet, Rfl: 0    clopidogrel (Plavix) 75 mg tablet, Take one tablet by mouth daily, Disp: 90 tablet, Rfl: 0    docusate sodium (Colace) 100 mg capsule, Take 1 capsule (100 mg) by mouth 2 times a day., Disp: 60 capsule, Rfl: 0    finasteride (Proscar) 5 mg tablet, Take 1 tablet (5 mg) by mouth once daily. Do not crush, chew, or split., Disp: 90 tablet, Rfl: 0    fluticasone (Flonase) 50 mcg/actuation nasal spray, Administer 1 spray into each nostril once daily., Disp: , Rfl:     folic acid (Folvite) 1 mg tablet, Take 1 tablet (1 mg) by mouth once daily., Disp: 90 tablet, Rfl: 0    furosemide (Lasix) 20 mg tablet, Take 1 tablet (20 mg) by mouth once daily. None on Tues and Thur, Disp: 90 tablet, Rfl: 0    gabapentin (Neurontin) 100 mg capsule, Take 1 capsule (100 mg) by mouth 3 times a day. (Patient taking differently: Take 1 capsule (100 mg) by mouth once daily. 1 hour before bed), Disp: 90 capsule, Rfl: 5    metoprolol succinate XL (Toprol-XL) 25 mg 24 hr tablet, Take 1 tablet (25 mg) by mouth once daily. Do not crush or chew., Disp: 90 tablet, Rfl: 0    pantoprazole (ProtoNix) 40 mg EC tablet, Take 1 tablet (40 mg) by mouth once daily in the morning. Take before meals. Do not crush, chew, or split., Disp: 90 tablet, Rfl: 0    potassium chloride CR (Klor-Con) 10 mEq ER tablet, Take 2 tablets (20 mEq) by mouth 2 times a day for 10 days. Do not crush, chew, or split., Disp: 40 tablet, Rfl: 0    tamsulosin (Flomax) 0.4 mg 24 hr capsule, Take 2 capsules (0.8 mg) by mouth once daily., Disp: 180 capsule, Rfl: 0    gilteritinib (Xospata) 40 mg tablet, Take 3 tablets (120 mg total) by mouth once daily.  Swallow whole.,  Disp: 90 tablet, Rfl: 1     Past Surgical History:   Procedure Laterality Date    CARDIAC CATHETERIZATION      CORONARY STENT PLACEMENT       Family History   Problem Relation Name Age of Onset    Diabetes Mother      Heart attack Brother        reports that he has been smoking cigars. He has been exposed to tobacco smoke. He has never used smokeless tobacco.    Diagnoses and all orders for this visit:  Acute myeloid leukemia not having achieved remission (Multi)  -     Clinic Appointment Request Follow up  -     Clinic Appointment Request; Future  -     CBC and Auto Differential; Future  -     Comprehensive Metabolic Panel; Future  -     Lactate Dehydrogenase; Future  -     Carcinoembryonic Antigen; Future  Encounter for follow-up examination after completed treatment for malignant neoplasm  -     Carcinoembryonic Antigen; Future         Nithin Coronado MD

## 2025-07-24 ENCOUNTER — PHARMACY VISIT (OUTPATIENT)
Dept: PHARMACY | Facility: CLINIC | Age: 89
End: 2025-07-24
Payer: MEDICARE

## 2025-07-24 ENCOUNTER — APPOINTMENT (OUTPATIENT)
Dept: HEMATOLOGY/ONCOLOGY | Facility: HOSPITAL | Age: 89
End: 2025-07-24
Payer: MEDICARE

## 2025-07-24 ENCOUNTER — HOME CARE VISIT (OUTPATIENT)
Dept: HOME HEALTH SERVICES | Facility: HOME HEALTH | Age: 89
End: 2025-07-24
Payer: MEDICARE

## 2025-07-24 VITALS
HEART RATE: 71 BPM | TEMPERATURE: 97.9 F | DIASTOLIC BLOOD PRESSURE: 56 MMHG | OXYGEN SATURATION: 97 % | SYSTOLIC BLOOD PRESSURE: 102 MMHG

## 2025-07-24 LAB — PATH REVIEW-CBC DIFFERENTIAL: NORMAL

## 2025-07-24 PROCEDURE — G0157 HHC PT ASSISTANT EA 15: HCPCS | Mod: CQ,HHH

## 2025-07-24 ASSESSMENT — ENCOUNTER SYMPTOMS
PERSON REPORTING PAIN: PATIENT
DENIES PAIN: 1

## 2025-07-24 NOTE — PROGRESS NOTES
Patient scored a 15 on PG-SGA. Patient and daughter decline dietary consult at this time. State the weight loss is d/t is recent admission to the hospital. Requested they contact the office if they change their minds.

## 2025-07-28 ENCOUNTER — SPECIALTY PHARMACY (OUTPATIENT)
Dept: PHARMACY | Facility: CLINIC | Age: 89
End: 2025-07-28

## 2025-07-28 ENCOUNTER — HOME CARE VISIT (OUTPATIENT)
Dept: HOME HEALTH SERVICES | Facility: HOME HEALTH | Age: 89
End: 2025-07-28
Payer: MEDICARE

## 2025-07-28 VITALS
OXYGEN SATURATION: 97 % | TEMPERATURE: 98.5 F | HEART RATE: 88 BPM | DIASTOLIC BLOOD PRESSURE: 56 MMHG | SYSTOLIC BLOOD PRESSURE: 100 MMHG

## 2025-07-28 PROCEDURE — G0157 HHC PT ASSISTANT EA 15: HCPCS | Mod: CQ,HHH

## 2025-07-28 ASSESSMENT — ENCOUNTER SYMPTOMS
DENIES PAIN: 1
PERSON REPORTING PAIN: PATIENT

## 2025-07-28 NOTE — CASE COMMUNICATION
Patient/daughter request DC from [facility] at this time. The family had recent death in family; patient has been compliant with HEP/activities and they will continue on their own.

## 2025-07-28 NOTE — PROGRESS NOTES
[facility] Clinical Note  Patient Reassessment     Introduction  Chico Iqbal is a 89 y.o. male who is on the specialty pharmacy service for management of: Oncology Core.      [facility] supplied medication: Xospata 40 mg - Take 120 mg by mouth once daily         Duration of therapy: Until drug toxicity or progression    The most recent encounter visit with the referring prescriber Nithin Coronado on 7/23/25 was reviewed.  Pharmacy will continue to collaborate in the care of this patient with the referring prescriber.    Discussion  Chico was contacted on 7/28/2025 at 5:02 PM for a pharmacy visit with encounter number [id number] from:   [facility]  [address] [place] [id number]  Dept: [phone]  Dept Fax: [phone]  CaregiverMirta consented to a/an Telephone visit, which was performed.    Efficacy  Patient has developed new symptoms of condition: No  Patient/caregiver feels medication is affecting the disease state: Notes that patient is having progression of disease, but is to continue on Xospata.    Goals  Provided education on goals and possible outcomes of therapy:  Adherence with therapy  Timely completion of appropriate labs  Timely and appropriate follow up with provider  Identify and address medication interactions with presciption medications, OTC medications and supplements  Optimize or maintain quality of life  Oncology: Prolong life/No disease progression  Manage side effects (ex: nausea/vomiting, constipation, fatigue) in conjunction with care team  Patient has documented target(s) for goals of therapy: No        Tolerance  Patient has experienced side effects from this medication: No  Changes to current therapy regimen: No    The follow-up timeline was discussed. Every person responds to and reacts to therapy differently. Patient should be assessed for efficacy and tolerability in approximately: 3 months             Adherence  Patient Information  Informant: Child/Children  Demonstrates Understanding of Importance of Adherence: Yes  Does the patient have any barriers to self-administration (including physical and mental?): No  Support Network for Adherence: Family Member  Medication Information  Medication: gilteritinib fumarate (Xospata)  Patient Reported Missed Doses in the Last 4 Weeks: More than 5 (Patient's daughter reports pt has missed about 14 days at [facility] direction.)  Estimated Medication Adherence Level: Good  Adherence Estimation Source: Claims history  Barriers to Adherence: No Problems identified  What concerns do you have regarding your medications?: None   The importance of adherence was discussed and patient/caregiver was advised to take the medication as prescribed by their provider. Encouraged patient/caregiver to call physician's office or specialty pharmacy if they have a question regarding a missed dose.    General Assessment  Changes to home medications, OTCs or supplements: No  Current Medications[1]  Reported new allergies: No  Reported new medical conditions: No  Additional monitoring reviewed: Oncology - CMP:   Lab Results   Component Value Date    GLUCOSE 163 (H) 07/23/2025     (L) 07/23/2025    K 3.5 07/23/2025    CL 97 (L) 07/23/2025    CO2 27 07/23/2025    ANIONGAP 13 07/23/2025    BUN 15 07/23/2025    CREATININE 1.25 07/23/2025    CALCIUM 9.0 07/23/2025    ALBUMIN 3.3 (L) 07/23/2025    ALKPHOS 82 07/23/2025    PROT 7.0 07/23/2025    AST 22 07/23/2025    BILITOT 0.6 07/23/2025    ALT 11 07/23/2025     Is laboratory follow up needed? Per provider    Advised to contact the pharmacy if there are any changes to the patient's medication list, including prescriptions, OTC medications, herbal products, or supplements.    Impression/Plan  This patient has been identified as high risk due to Geriatric (over 65 years of age).  The following action was taken:Engaged patient support  system          QOL/Patient Satisfaction  Rate your quality of life on scale of 1-10:  (unable to assess.)  Rate your satisfaction with  Specialty Pharmacy on scale of 1-10:  (unable to assess.)    Provided contact information ([phone]) for [facility] and reviewed dispensing process, refill timeline and patient management follow up. Confirmed understanding of education conducted during assessment. All questions and concerns were addressed and patient/caregiver was encouraged to reach out for additional questions or concerns.    Based on the patient's diagnosis, medication list, progress towards goals, adherence, tolerance, and medication list, medication remains appropriate: Therapy remains appropriate (I attest)    Miguel Guillaume, PharmD       [1]   Current Outpatient Medications   Medication Sig Dispense Refill    atorvastatin (Lipitor) 40 mg tablet Take 1 tablet (40 mg) by mouth once daily. 90 tablet 0    clopidogrel (Plavix) 75 mg tablet Take one tablet by mouth daily 90 tablet 0    docusate sodium (Colace) 100 mg capsule Take 1 capsule (100 mg) by mouth 2 times a day. 60 capsule 0    finasteride (Proscar) 5 mg tablet Take 1 tablet (5 mg) by mouth once daily. Do not crush, chew, or split. 90 tablet 0    fluticasone (Flonase) 50 mcg/actuation nasal spray Administer 1 spray into each nostril once daily.      folic acid (Folvite) 1 mg tablet Take 1 tablet (1 mg) by mouth once daily. 90 tablet 0    furosemide (Lasix) 20 mg tablet Take 1 tablet (20 mg) by mouth once daily. None on Tues and Thur 90 tablet 0    gabapentin (Neurontin) 100 mg capsule Take 1 capsule (100 mg) by mouth 3 times a day. (Patient taking differently: Take 1 capsule (100 mg) by mouth once daily. 1 hour before bed) 90 capsule 5    gilteritinib (Xospata) 40 mg tablet Take 3 tablets (120 mg total) by mouth once daily.  Swallow whole. 90 tablet 1    gilteritinib (Xospata) 40 mg tablet Take 3 tablets (120 mg  total) by mouth once daily.  Swallow whole. 90 tablet 1    metoprolol succinate XL (Toprol-XL) 25 mg 24 hr tablet Take 1 tablet (25 mg) by mouth once daily. Do not crush or chew. 90 tablet 0    pantoprazole (ProtoNix) 40 mg EC tablet Take 1 tablet (40 mg) by mouth once daily in the morning. Take before meals. Do not crush, chew, or split. 90 tablet 0    potassium chloride CR (Klor-Con) 10 mEq ER tablet Take 2 tablets (20 mEq) by mouth 2 times a day for 10 days. Do not crush, chew, or split. 40 tablet 0    tamsulosin (Flomax) 0.4 mg 24 hr capsule Take 2 capsules (0.8 mg) by mouth once daily. 180 capsule 0     No current facility-administered medications for this visit.

## 2025-07-29 ENCOUNTER — PATIENT OUTREACH (OUTPATIENT)
Dept: PRIMARY CARE | Facility: CLINIC | Age: 89
End: 2025-07-29
Payer: MEDICARE

## 2025-07-30 ENCOUNTER — HOME CARE VISIT (OUTPATIENT)
Dept: HOME HEALTH SERVICES | Facility: HOME HEALTH | Age: 89
End: 2025-07-30
Payer: MEDICARE

## 2025-07-30 LAB
Q ONSET: 207 MS
QRS COUNT: 19 BEATS
QRS DURATION: 148 MS
QT INTERVAL: 420 MS
QTC CALCULATION(BAZETT): 578 MS
QTC FREDERICIA: 520 MS
R AXIS: -79 DEGREES
T AXIS: 41 DEGREES
T OFFSET: 417 MS
VENTRICULAR RATE: 114 BPM

## 2025-08-04 ENCOUNTER — TELEPHONE (OUTPATIENT)
Dept: HEMATOLOGY/ONCOLOGY | Facility: HOSPITAL | Age: 89
End: 2025-08-04
Payer: MEDICARE

## 2025-08-04 ENCOUNTER — HOME CARE VISIT (OUTPATIENT)
Dept: HOME HEALTH SERVICES | Facility: HOME HEALTH | Age: 89
End: 2025-08-04
Payer: MEDICARE

## 2025-08-04 ENCOUNTER — TELEPHONE (OUTPATIENT)
Dept: PRIMARY CARE | Facility: CLINIC | Age: 89
End: 2025-08-04
Payer: MEDICARE

## 2025-08-04 VITALS — HEART RATE: 86 BPM | DIASTOLIC BLOOD PRESSURE: 60 MMHG | SYSTOLIC BLOOD PRESSURE: 92 MMHG | OXYGEN SATURATION: 96 %

## 2025-08-04 DIAGNOSIS — Z51.5 ENCOUNTER FOR HOSPICE CARE: Primary | ICD-10-CM

## 2025-08-04 PROCEDURE — G0151 HHCP-SERV OF PT,EA 15 MIN: HCPCS | Mod: HHH

## 2025-08-04 SDOH — HEALTH STABILITY: PHYSICAL HEALTH: EXERCISE TYPE: LE THER EX

## 2025-08-04 ASSESSMENT — ENCOUNTER SYMPTOMS
PERSON REPORTING PAIN: PATIENT
DENIES PAIN: 1
MUSCLE WEAKNESS: 1

## 2025-08-04 ASSESSMENT — ACTIVITIES OF DAILY LIVING (ADL)
PHYSICAL TRANSFERS ASSESSED: 1
HOME_HEALTH_OASIS: 01
OASIS_M1830: 03
CURRENT_FUNCTION: STAND BY ASSIST

## 2025-08-04 NOTE — TELEPHONE ENCOUNTER
Pt and family req hospice services  Tyson from [facility]  [phone] fax  [phone] phone

## 2025-08-04 NOTE — TELEPHONE ENCOUNTER
Spoke with [facility]. Patient information provided. Dr. Baker's office note, along with recent lab work and imaging faxed to their office. Palliative Care office note also faxed. Requested they reach out to Dr. Hubbard, the patient's PCP, as Dr. Baker will be out of the office for the next two weeks.

## 2025-08-04 NOTE — TELEPHONE ENCOUNTER
Spoke with daughter, Mirta, this morning. States her father is ready for hospice. Requested [facility]. Let her know the a phone call would be made to Hospice to start the process and paperwork would be faxed to them.

## 2025-08-04 NOTE — TELEPHONE ENCOUNTER
Confirmed with daughter, Mirta, that Hospice has contacted her. Hospice scheduled to see patient tomorrow.

## 2025-08-06 ENCOUNTER — APPOINTMENT (OUTPATIENT)
Dept: HEMATOLOGY/ONCOLOGY | Facility: HOSPITAL | Age: 89
End: 2025-08-06
Payer: MEDICARE

## 2025-08-07 ENCOUNTER — APPOINTMENT (OUTPATIENT)
Dept: HEMATOLOGY/ONCOLOGY | Facility: HOSPITAL | Age: 89
End: 2025-08-07
Payer: MEDICARE

## 2025-08-11 ENCOUNTER — APPOINTMENT (OUTPATIENT)
Dept: UROLOGY | Facility: CLINIC | Age: 89
End: 2025-08-11
Payer: MEDICARE

## 2025-08-19 ENCOUNTER — SPECIALTY PHARMACY (OUTPATIENT)
Dept: PHARMACY | Facility: CLINIC | Age: 89
End: 2025-08-19

## 2025-08-20 ENCOUNTER — APPOINTMENT (OUTPATIENT)
Dept: HEMATOLOGY/ONCOLOGY | Facility: HOSPITAL | Age: 89
End: 2025-08-20
Payer: MEDICARE

## 2025-08-20 DIAGNOSIS — C92.00 ACUTE MYELOID LEUKEMIA NOT HAVING ACHIEVED REMISSION (MULTI): ICD-10-CM

## 2025-08-21 ENCOUNTER — APPOINTMENT (OUTPATIENT)
Dept: HEMATOLOGY/ONCOLOGY | Facility: HOSPITAL | Age: 89
End: 2025-08-21
Payer: MEDICARE

## 2025-09-04 ENCOUNTER — APPOINTMENT (OUTPATIENT)
Dept: HEMATOLOGY/ONCOLOGY | Facility: HOSPITAL | Age: 89
End: 2025-09-04
Payer: MEDICARE

## 2025-12-10 ENCOUNTER — APPOINTMENT (OUTPATIENT)
Dept: PRIMARY CARE | Facility: CLINIC | Age: 89
End: 2025-12-10
Payer: MEDICARE